# Patient Record
Sex: FEMALE | Race: WHITE | NOT HISPANIC OR LATINO | URBAN - METROPOLITAN AREA
[De-identification: names, ages, dates, MRNs, and addresses within clinical notes are randomized per-mention and may not be internally consistent; named-entity substitution may affect disease eponyms.]

---

## 2017-04-14 ENCOUNTER — INPATIENT (INPATIENT)
Facility: HOSPITAL | Age: 42
LOS: 3 days | Discharge: LEFT BEFORE DISCHARGE | DRG: 824 | End: 2017-04-18
Attending: HOSPITALIST | Admitting: HOSPITALIST
Payer: COMMERCIAL

## 2017-04-14 VITALS
HEIGHT: 66 IN | TEMPERATURE: 99 F | RESPIRATION RATE: 18 BRPM | OXYGEN SATURATION: 97 % | DIASTOLIC BLOOD PRESSURE: 75 MMHG | SYSTOLIC BLOOD PRESSURE: 106 MMHG | HEART RATE: 106 BPM

## 2017-04-14 DIAGNOSIS — R91.8 OTHER NONSPECIFIC ABNORMAL FINDING OF LUNG FIELD: ICD-10-CM

## 2017-04-14 LAB
ALBUMIN SERPL ELPH-MCNC: 2.6 G/DL — LOW (ref 3.3–5)
ALP SERPL-CCNC: 281 U/L — HIGH (ref 40–120)
ALT FLD-CCNC: 16 U/L RC — SIGNIFICANT CHANGE UP (ref 10–45)
ANION GAP SERPL CALC-SCNC: 16 MMOL/L — SIGNIFICANT CHANGE UP (ref 5–17)
APPEARANCE UR: CLEAR — SIGNIFICANT CHANGE UP
APTT BLD: 32.5 SEC — SIGNIFICANT CHANGE UP (ref 27.5–37.4)
AST SERPL-CCNC: 20 U/L — SIGNIFICANT CHANGE UP (ref 10–40)
BACTERIA # UR AUTO: ABNORMAL /HPF
BASE EXCESS BLDV CALC-SCNC: -1.8 MMOL/L — SIGNIFICANT CHANGE UP (ref -2–2)
BASOPHILS # BLD AUTO: 0 K/UL — SIGNIFICANT CHANGE UP (ref 0–0.2)
BASOPHILS NFR BLD AUTO: 0 % — SIGNIFICANT CHANGE UP (ref 0–2)
BILIRUB SERPL-MCNC: 0.8 MG/DL — SIGNIFICANT CHANGE UP (ref 0.2–1.2)
BILIRUB UR-MCNC: NEGATIVE — SIGNIFICANT CHANGE UP
BUN SERPL-MCNC: 14 MG/DL — SIGNIFICANT CHANGE UP (ref 7–23)
CA-I SERPL-SCNC: 1.18 MMOL/L — SIGNIFICANT CHANGE UP (ref 1.12–1.3)
CALCIUM SERPL-MCNC: 9.1 MG/DL — SIGNIFICANT CHANGE UP (ref 8.4–10.5)
CHLORIDE BLDV-SCNC: 103 MMOL/L — SIGNIFICANT CHANGE UP (ref 96–108)
CHLORIDE SERPL-SCNC: 99 MMOL/L — SIGNIFICANT CHANGE UP (ref 96–108)
CO2 BLDV-SCNC: 24 MMOL/L — SIGNIFICANT CHANGE UP (ref 22–30)
CO2 SERPL-SCNC: 18 MMOL/L — LOW (ref 22–31)
COLOR SPEC: YELLOW — SIGNIFICANT CHANGE UP
CREAT SERPL-MCNC: 0.53 MG/DL — SIGNIFICANT CHANGE UP (ref 0.5–1.3)
DIFF PNL FLD: ABNORMAL
EOSINOPHIL # BLD AUTO: 0.1 K/UL — SIGNIFICANT CHANGE UP (ref 0–0.5)
EOSINOPHIL NFR BLD AUTO: 1 % — SIGNIFICANT CHANGE UP (ref 0–6)
EPI CELLS # UR: SIGNIFICANT CHANGE UP /HPF
GAS PNL BLDV: 130 MMOL/L — LOW (ref 136–145)
GAS PNL BLDV: SIGNIFICANT CHANGE UP
GAS PNL BLDV: SIGNIFICANT CHANGE UP
GLUCOSE BLDV-MCNC: 85 MG/DL — SIGNIFICANT CHANGE UP (ref 70–99)
GLUCOSE SERPL-MCNC: 88 MG/DL — SIGNIFICANT CHANGE UP (ref 70–99)
GLUCOSE UR QL: NEGATIVE — SIGNIFICANT CHANGE UP
GRAN CASTS # UR COMP ASSIST: ABNORMAL
HCG UR QL: NEGATIVE — SIGNIFICANT CHANGE UP
HCO3 BLDV-SCNC: 23 MMOL/L — SIGNIFICANT CHANGE UP (ref 21–29)
HCT VFR BLD CALC: 32.7 % — LOW (ref 34.5–45)
HCT VFR BLDA CALC: 29 % — LOW (ref 39–50)
HGB BLD CALC-MCNC: 9.5 G/DL — LOW (ref 11.5–15.5)
HGB BLD-MCNC: 9.7 G/DL — LOW (ref 11.5–15.5)
HIV 1 & 2 AB SERPL IA.RAPID: SIGNIFICANT CHANGE UP
HYALINE CASTS # UR AUTO: ABNORMAL
INR BLD: 1.53 RATIO — HIGH (ref 0.88–1.16)
KETONES UR-MCNC: NEGATIVE — SIGNIFICANT CHANGE UP
LACTATE BLDV-MCNC: 1.7 MMOL/L — SIGNIFICANT CHANGE UP (ref 0.7–2)
LEUKOCYTE ESTERASE UR-ACNC: NEGATIVE — SIGNIFICANT CHANGE UP
LYMPHOCYTES # BLD AUTO: 0.9 K/UL — LOW (ref 1–3.3)
LYMPHOCYTES # BLD AUTO: 6.6 % — LOW (ref 13–44)
MCHC RBC-ENTMCNC: 23 PG — LOW (ref 27–34)
MCHC RBC-ENTMCNC: 29.8 GM/DL — LOW (ref 32–36)
MCV RBC AUTO: 77.3 FL — LOW (ref 80–100)
MONOCYTES # BLD AUTO: 0.6 K/UL — SIGNIFICANT CHANGE UP (ref 0–0.9)
MONOCYTES NFR BLD AUTO: 4.6 % — SIGNIFICANT CHANGE UP (ref 2–14)
NEUTROPHILS # BLD AUTO: 11.7 K/UL — HIGH (ref 1.8–7.4)
NEUTROPHILS NFR BLD AUTO: 87.8 % — HIGH (ref 43–77)
NITRITE UR-MCNC: NEGATIVE — SIGNIFICANT CHANGE UP
PCO2 BLDV: 40 MMHG — SIGNIFICANT CHANGE UP (ref 35–50)
PH BLDV: 7.37 — SIGNIFICANT CHANGE UP (ref 7.35–7.45)
PH UR: 5.5 — SIGNIFICANT CHANGE UP (ref 4.8–8)
PLATELET # BLD AUTO: 418 K/UL — HIGH (ref 150–400)
PO2 BLDV: 20 MMHG — LOW (ref 25–45)
POTASSIUM BLDV-SCNC: 4.8 MMOL/L — SIGNIFICANT CHANGE UP (ref 3.5–5)
POTASSIUM SERPL-MCNC: 4.7 MMOL/L — SIGNIFICANT CHANGE UP (ref 3.5–5.3)
POTASSIUM SERPL-SCNC: 4.7 MMOL/L — SIGNIFICANT CHANGE UP (ref 3.5–5.3)
PROT SERPL-MCNC: 9.4 G/DL — HIGH (ref 6–8.3)
PROT UR-MCNC: SIGNIFICANT CHANGE UP
PROTHROM AB SERPL-ACNC: 16.8 SEC — HIGH (ref 9.8–12.7)
RBC # BLD: 4.23 M/UL — SIGNIFICANT CHANGE UP (ref 3.8–5.2)
RBC # FLD: 18.9 % — HIGH (ref 10.3–14.5)
RBC CASTS # UR COMP ASSIST: SIGNIFICANT CHANGE UP /HPF (ref 0–2)
SAO2 % BLDV: 20 % — LOW (ref 67–88)
SODIUM SERPL-SCNC: 133 MMOL/L — LOW (ref 135–145)
SP GR SPEC: 1.02 — SIGNIFICANT CHANGE UP (ref 1.01–1.02)
UROBILINOGEN FLD QL: NEGATIVE — SIGNIFICANT CHANGE UP
WBC # BLD: 13.3 K/UL — HIGH (ref 3.8–10.5)
WBC # FLD AUTO: 13.3 K/UL — HIGH (ref 3.8–10.5)
WBC UR QL: SIGNIFICANT CHANGE UP /HPF (ref 0–5)

## 2017-04-14 PROCEDURE — 99285 EMERGENCY DEPT VISIT HI MDM: CPT | Mod: 25

## 2017-04-14 PROCEDURE — 93010 ELECTROCARDIOGRAM REPORT: CPT

## 2017-04-14 PROCEDURE — 71275 CT ANGIOGRAPHY CHEST: CPT | Mod: 26

## 2017-04-14 PROCEDURE — 71010: CPT | Mod: 26

## 2017-04-14 RX ORDER — IPRATROPIUM/ALBUTEROL SULFATE 18-103MCG
3 AEROSOL WITH ADAPTER (GRAM) INHALATION ONCE
Qty: 0 | Refills: 0 | Status: COMPLETED | OUTPATIENT
Start: 2017-04-14 | End: 2017-04-14

## 2017-04-14 RX ADMIN — Medication 3 MILLILITER(S): at 20:40

## 2017-04-14 NOTE — ED PROVIDER NOTE - OBJECTIVE STATEMENT
41 year old female patient with no significant reported pmhx originally from [place] and now a  presents to the ED with worsening shortness of breath, cough productive of yellow sputum, night chills, diaphoresis. Reports that cough was initially a year ago now worsening. Weight loss over the last year. Unsure of the exact amount, but friend states she is very thin now. States she was tested with ppd around 1985 that was negative. No significant family history. nonsmoker. Unknown last HIV test. Also notes swelling left side of neck several months worsening. No drooling, no difficulty speaking, or changes in voice. No rash.

## 2017-04-14 NOTE — ED ADULT NURSE REASSESSMENT NOTE - NS ED NURSE REASSESS COMMENT FT1
Pt states feeling better after duoneb treatment.
Respiratory called for acid fast sputum culture. Respiratory states they do not do them anymore - Dr. Reynoso aware.
Pt is in no current distress. Comfort and safety provided. Sister at bedside. Pt is eating self-bought meal. Offered duoneb. Pt states she wanted to finish eating first.

## 2017-04-14 NOTE — ED PROVIDER NOTE - PROGRESS NOTE DETAILS
Spoke with radiology, no evidence of PE, no evidence of cavitary lesions in the lungs.  Prominent enlarged lymph nodes, lung nodules and splenic irregularities concerning for lymphoma/malignancy.  Results discussed with patient, explained concern for possible malignancy and importance of urgent work-up.  Patient has no PMD or outpatient f/u at present.  Will admit to hospitalist (unattached admission) and have paged house hem/onc service to request inpatient consultation.  -Edgardo

## 2017-04-14 NOTE — ED PROVIDER NOTE - ENMT, MLM
Oral pharynx normal. Neck with significant large too great to measure palpable lymph nodes too many to measure extending from posterior radicular region thru lateral and anterior cervical cide of left neck. Palpable to super clavicular region.

## 2017-04-14 NOTE — ED PROVIDER NOTE - MEDICAL DECISION MAKING DETAILS
41 year old female patient with no known pmhx presenting with worsening cough, night sweats, weight loss, lymphadenopathy past year. Cough and shortness of breath worse over the last 2 weeks. On exam, lungs with wheezing, significant lymphadenopathy, tachycardic but not hypoxic. Underlying malignancy or infection. r/o lymphoma vs tuberculosis given her travel history. Other differential diagnoses include ebv, hiv, or other viral syndromes or other malignancy. Will obtain basic labs, HIV screen, monospot, CXR, sputum cultures for TB assessment, CT of chest, CT angio to r/o PE.

## 2017-04-14 NOTE — ED PROVIDER NOTE - NS ED MD SCRIBE ATTENDING SCRIBE SECTIONS
VITAL SIGNS( Pullset)/PHYSICAL EXAM/HIV/PAST MEDICAL/SURGICAL/SOCIAL HISTORY/REVIEW OF SYSTEMS/DISPOSITION/HISTORY OF PRESENT ILLNESS

## 2017-04-14 NOTE — ED ADULT NURSE NOTE - OBJECTIVE STATEMENT
41 y.o. Female presents to the ED accompanied by sister for SOB, swollen lymph nodes and weight loss. Sister at bedside reports pt was reluctant to see the MD for the past year. Hx of father passing away in sept 2016 for lung CA. Pt reports having worsening SOB for the past week, productive cough for the past year - no blood, and has night sweats. As per sister, pt loss about 20lbs in the past month. Pt states taking supplement for inflammation. Swollen lymph nodes noted on neck b/l. L side > R, firm. Last tested for TB was in 1995. Wheezing b/l. A&Ox3. Ambulatory. Pt is in no current distress. Comfort and safety provided. MD at bedside.

## 2017-04-15 DIAGNOSIS — A41.9 SEPSIS, UNSPECIFIED ORGANISM: ICD-10-CM

## 2017-04-15 DIAGNOSIS — R63.8 OTHER SYMPTOMS AND SIGNS CONCERNING FOOD AND FLUID INTAKE: ICD-10-CM

## 2017-04-15 DIAGNOSIS — R06.00 DYSPNEA, UNSPECIFIED: ICD-10-CM

## 2017-04-15 DIAGNOSIS — R59.1 GENERALIZED ENLARGED LYMPH NODES: ICD-10-CM

## 2017-04-15 DIAGNOSIS — Z41.8 ENCOUNTER FOR OTHER PROCEDURES FOR PURPOSES OTHER THAN REMEDYING HEALTH STATE: ICD-10-CM

## 2017-04-15 LAB
ALBUMIN SERPL ELPH-MCNC: 2.4 G/DL — LOW (ref 3.3–5)
ALP SERPL-CCNC: 257 U/L — HIGH (ref 40–120)
ALT FLD-CCNC: 16 U/L RC — SIGNIFICANT CHANGE UP (ref 10–45)
ANION GAP SERPL CALC-SCNC: 14 MMOL/L — SIGNIFICANT CHANGE UP (ref 5–17)
AST SERPL-CCNC: 19 U/L — SIGNIFICANT CHANGE UP (ref 10–40)
B2 MICROGLOB SERPL-MCNC: 3.1 MG/L — HIGH (ref 0.8–2.2)
BASOPHILS # BLD AUTO: 0.2 K/UL — SIGNIFICANT CHANGE UP (ref 0–0.2)
BASOPHILS NFR BLD AUTO: 1.4 % — SIGNIFICANT CHANGE UP (ref 0–2)
BILIRUB SERPL-MCNC: 0.7 MG/DL — SIGNIFICANT CHANGE UP (ref 0.2–1.2)
BLD GP AB SCN SERPL QL: NEGATIVE — SIGNIFICANT CHANGE UP
BUN SERPL-MCNC: 9 MG/DL — SIGNIFICANT CHANGE UP (ref 7–23)
CALCIUM SERPL-MCNC: 8.5 MG/DL — SIGNIFICANT CHANGE UP (ref 8.4–10.5)
CHLORIDE SERPL-SCNC: 97 MMOL/L — SIGNIFICANT CHANGE UP (ref 96–108)
CO2 SERPL-SCNC: 19 MMOL/L — LOW (ref 22–31)
CREAT SERPL-MCNC: 0.55 MG/DL — SIGNIFICANT CHANGE UP (ref 0.5–1.3)
EOSINOPHIL # BLD AUTO: 0 K/UL — SIGNIFICANT CHANGE UP (ref 0–0.5)
EOSINOPHIL NFR BLD AUTO: 0.3 % — SIGNIFICANT CHANGE UP (ref 0–6)
GLUCOSE SERPL-MCNC: 78 MG/DL — SIGNIFICANT CHANGE UP (ref 70–99)
HBV SURFACE AB SER-ACNC: SIGNIFICANT CHANGE UP
HBV SURFACE AG SER-ACNC: SIGNIFICANT CHANGE UP
HCT VFR BLD CALC: 28.7 % — LOW (ref 34.5–45)
HCV AB S/CO SERPL IA: 0.27 S/CO — SIGNIFICANT CHANGE UP
HCV AB SERPL-IMP: SIGNIFICANT CHANGE UP
HETEROPH AB TITR SER AGGL: NEGATIVE — SIGNIFICANT CHANGE UP
HGB BLD-MCNC: 9.2 G/DL — LOW (ref 11.5–15.5)
KAPPA LC SER QL IFE: 14.5 MG/DL — HIGH (ref 0.33–1.94)
KAPPA/LAMBDA FREE LIGHT CHAIN RATIO, SERUM: 1.76 RATIO — HIGH (ref 0.26–1.65)
LAMBDA LC SER QL IFE: 8.24 MG/DL — HIGH (ref 0.57–2.63)
LDH SERPL L TO P-CCNC: 203 U/L — SIGNIFICANT CHANGE UP (ref 50–242)
LYMPHOCYTES # BLD AUTO: 0.4 K/UL — LOW (ref 1–3.3)
LYMPHOCYTES # BLD AUTO: 2.8 % — LOW (ref 13–44)
MCHC RBC-ENTMCNC: 24.3 PG — LOW (ref 27–34)
MCHC RBC-ENTMCNC: 32.1 GM/DL — SIGNIFICANT CHANGE UP (ref 32–36)
MCV RBC AUTO: 75.8 FL — LOW (ref 80–100)
MONOCYTES # BLD AUTO: 0.6 K/UL — SIGNIFICANT CHANGE UP (ref 0–0.9)
MONOCYTES NFR BLD AUTO: 4.6 % — SIGNIFICANT CHANGE UP (ref 2–14)
NEUTROPHILS # BLD AUTO: 12.7 K/UL — HIGH (ref 1.8–7.4)
NEUTROPHILS NFR BLD AUTO: 91.1 % — HIGH (ref 43–77)
NIGHT BLUE STAIN TISS: SIGNIFICANT CHANGE UP
PLATELET # BLD AUTO: 343 K/UL — SIGNIFICANT CHANGE UP (ref 150–400)
POTASSIUM SERPL-MCNC: 4 MMOL/L — SIGNIFICANT CHANGE UP (ref 3.5–5.3)
POTASSIUM SERPL-SCNC: 4 MMOL/L — SIGNIFICANT CHANGE UP (ref 3.5–5.3)
PROT SERPL-MCNC: 8 G/DL — SIGNIFICANT CHANGE UP (ref 6–8.3)
PROT SERPL-MCNC: 8 G/DL — SIGNIFICANT CHANGE UP (ref 6–8.3)
PROT SERPL-MCNC: 8.5 G/DL — HIGH (ref 6–8.3)
RAPID RVP RESULT: SIGNIFICANT CHANGE UP
RBC # BLD: 3.78 M/UL — LOW (ref 3.8–5.2)
RBC # FLD: 19.3 % — HIGH (ref 10.3–14.5)
RH IG SCN BLD-IMP: POSITIVE — SIGNIFICANT CHANGE UP
SODIUM SERPL-SCNC: 130 MMOL/L — LOW (ref 135–145)
SPECIMEN SOURCE: SIGNIFICANT CHANGE UP
URATE SERPL-MCNC: 4.1 MG/DL — SIGNIFICANT CHANGE UP (ref 2.5–7)
WBC # BLD: 14 K/UL — HIGH (ref 3.8–10.5)
WBC # FLD AUTO: 14 K/UL — HIGH (ref 3.8–10.5)

## 2017-04-15 PROCEDURE — 74176 CT ABD & PELVIS W/O CONTRAST: CPT | Mod: 26

## 2017-04-15 PROCEDURE — 99223 1ST HOSP IP/OBS HIGH 75: CPT | Mod: GC

## 2017-04-15 RX ORDER — HEPARIN SODIUM 5000 [USP'U]/ML
5000 INJECTION INTRAVENOUS; SUBCUTANEOUS EVERY 12 HOURS
Qty: 0 | Refills: 0 | Status: DISCONTINUED | OUTPATIENT
Start: 2017-04-15 | End: 2017-04-17

## 2017-04-15 RX ORDER — IPRATROPIUM/ALBUTEROL SULFATE 18-103MCG
3 AEROSOL WITH ADAPTER (GRAM) INHALATION ONCE
Qty: 0 | Refills: 0 | Status: COMPLETED | OUTPATIENT
Start: 2017-04-15 | End: 2017-04-15

## 2017-04-15 RX ORDER — ALBUTEROL 90 UG/1
1 AEROSOL, METERED ORAL EVERY 6 HOURS
Qty: 0 | Refills: 0 | Status: DISCONTINUED | OUTPATIENT
Start: 2017-04-15 | End: 2017-04-16

## 2017-04-15 RX ORDER — ALBUTEROL 90 UG/1
1 AEROSOL, METERED ORAL EVERY 6 HOURS
Qty: 0 | Refills: 0 | Status: COMPLETED | OUTPATIENT
Start: 2017-04-15 | End: 2018-03-14

## 2017-04-15 RX ADMIN — Medication 3 MILLILITER(S): at 22:23

## 2017-04-15 RX ADMIN — Medication 3 MILLILITER(S): at 00:50

## 2017-04-15 NOTE — H&P ADULT. - NEUROLOGICAL DETAILS
alert and oriented x 3/responds to verbal commands/responds to pain responds to verbal commands/alert and oriented x 3/responds to pain/cranial nerves intact

## 2017-04-15 NOTE — H&P ADULT. - HISTORY OF PRESENT ILLNESS
42 y/o F with no significant past medical history presents with complaints of shortness of breath:    Patient reports progressively worsening shortness of breath with exertion over the last week, prompting her to come to ED. She otherwise denies chest pain, palpitations, dizziness, lightheadedness, or syncope.     She reports chronic nonproductive cough for the last year. Over the last week, the cough has become productive of clear to white sputum. She denies fevers or chills. Endorses intermittent night sweats over the last few years. She reports they are correlated with what she eats. She often wakes up drenched. Endorses weakness and malaise. Appetite is appropriate. Unintentional 15 pound weight loss over last year.     She has not been evaluated for any of these symptoms by a physician. She has noted increasingly bulky lymph nodes over the last three years. She believes they are related to hyperthyroidism in discussion with a relative in medicine. Denies other sx of hyperthyroidism including palpitations, tremors, anxiety, changes in skin or nails, diarrhea.     She does not take any medications daily. She denies previous radiation exposures. Denies dysphagia to liquid or solid foods. Denies change in voice quality.     ED Vitals:   98.4, , /67, RR 18, 95 on RA 42 y/o F with no significant past medical history presents with complaints of shortness of breath:    Patient reports progressively worsening shortness of breath with exertion over the last week, prompting her to come to ED. She otherwise denies chest pain, palpitations, dizziness, lightheadedness, or syncope.     She reports chronic nonproductive cough for the last year. Over the last week, the cough has become productive of clear to white sputum. She denies fevers or chills. Endorses intermittent night sweats over the last few years. She reports they are correlated with what she eats. She often wakes up drenched. Endorses weakness and malaise. Appetite is appropriate. Unintentional 15 pound weight loss over last year.     She has not been evaluated for any of these symptoms by a physician. She has noted increasingly bulky lymph nodes over the last three years. She believes they are related to hyperthyroidism in discussion with a relative in medicine. Denies other sx of hyperthyroidism including palpitations, tremors, anxiety, changes in skin or nails, diarrhea.     She does not take any medications daily. She denies previous radiation exposures. Denies dysphagia to liquid or solid foods. Denies change in voice quality. Pt was born in [place].    ED Vitals:   98.4, , /67, RR 18, 95 on RA

## 2017-04-15 NOTE — H&P ADULT. - PROBLEM SELECTOR PLAN 2
likely secondary to tracheobronchial narrowing  currently patient is saturating well on room air  vital signs q4 give CT findings and concern for potential airway compromise  concern for TB very low as other obvious explanation for SOB/weight loss/night sweats is present. likely secondary to tracheobronchial narrowing  currently patient is saturating well on room air  vital signs q4 give CT findings and concern for potential airway compromise  concern for TB very low as other obvious explanation for SOB/weight loss/night sweats is present.  pulmonary consultation for tracheobronchial narrowing. also to assess necessity to rule out TB. likely secondary to tracheobronchial narrowing vs PNA  currently patient is saturating well on room air  vital signs q4 give CT findings and concern for potential airway compromise  concern for TB very low as other obvious explanation for SOB/weight loss/night sweats is present.  pulmonary consultation in AM for tracheobronchial narrowing. also to assess necessity to rule out TB. Cont to r/o TB for now until evaluated by pulm.

## 2017-04-15 NOTE — H&P ADULT. - PROBLEM SELECTOR PLAN 1
patient with likely underlying lymphoma given diffuse lymphadenopathy, splenomegaly and bronchovascular pulmonary nodules. unclear the longevity of her symptoms as patient is somewhat vague historian. patient with limited insight into the consequences of her illness. no personal or family hx of leukemia/lymphoma. no history of radiation treatment, immunosuppression. presence of B symptoms more common in aggressive lymphomas. if consistent with lymphoma, patient would be characterized as stage 4.   -hematology/oncology consultation  -obtain lymph node biopsy, bone marrow biopsy  -monitor cell counts, serum calcium, uric acid  -check LDH, beta 2 microglobulin, HIV, Hepatitis B and C serologies  -will likely require further imaging, PET CT patient with likely underlying lymphoma given diffuse lymphadenopathy, splenomegaly and bronchovascular pulmonary nodules. unclear the longevity of her symptoms as patient is somewhat vague historian. patient with limited insight into the consequences of her illness. no personal or family hx of leukemia/lymphoma. no history of radiation treatment, immunosuppression. presence of B symptoms more common in aggressive lymphomas. if consistent with lymphoma, patient would be characterized as stage 4.   -hematology/oncology consultation  -obtain lymph node biopsy, bone marrow biopsy, eval peripheral smear  -monitor cell counts, serum calcium, uric acid  -check LDH, beta 2 microglobulin, HIV, Hepatitis B and C serologies  -will likely require further imaging, PET CT vs additional CT A/P patient with likely underlying lymphoma or other malignancy given diffuse lymphadenopathy, splenomegaly and bronchovascular pulmonary nodules. unclear the longevity of her symptoms as patient is somewhat vague historian. patient with limited insight into the consequences of her illness. no personal or family hx of leukemia/lymphoma. no history of radiation treatment, immunosuppression. presence of B symptoms more common in aggressive lymphomas. if consistent with lymphoma, patient might be characterized as stage 4.   - pt has severely elevated protein-albumin gap  -hematology/oncology consultation in AM  -obtain lymph node biopsy (IR consult in AM), bone marrow biopsy, eval peripheral smear  -monitor cell counts, serum calcium, uric acid  -check LDH, beta 2 microglobulin, HIV, Hepatitis B and C serologies, SPEP, CHANDRIKA, free light chains  -will likely require further imaging, PET CT vs additional CT A/P  - pt is on airborne isolation for r/o TB

## 2017-04-15 NOTE — H&P ADULT. - ASSESSMENT
40 y/o F w/o significant history presents with shortness of breath, with diffuse lymphadenopathy on imaging, concerning for underlying lymphoma with tracheobroncial narrowing and mild SVC narrowing. 42 y/o F w/o significant history presents with shortness of breath, with diffuse lymphadenopathy on imaging, concerning for underlying lymphoma or malignancy, c/b airway and mild SVC narrowing, as well as sepsis in setting of poss URI vs PNA.

## 2017-04-15 NOTE — H&P ADULT. - RS GEN PE MLT RESP DETAILS PC
airway patent/diminished breath sounds, R/respirations non-labored respirations non-labored/airway patent/diminished breath sounds, R/rales

## 2017-04-15 NOTE — H&P ADULT. - PROBLEM SELECTOR PLAN 3
HSQ patient meeting sepsis criteria with leukocytosis, tachycardia, and low grade fevers  concern for URI vs postobstructive pna  check RVP  blood cultures x 2 sets  would favor to monitor off antibiotics at this time. patient meeting sepsis criteria with leukocytosis, tachycardia, and low grade fevers  concern for URI vs postobstructive pna  check RVP  blood cultures x 2 sets  would favor to monitor off antibiotics at this time unless clinical worsening

## 2017-04-15 NOTE — H&P ADULT. - LAB RESULTS AND INTERPRETATION
Labs personally reviewed by me: WBC 13.3, 87% neutrophils  INR 1.53  Albumin 2.6  Total Protein 9.4  Alk phos 281

## 2017-04-15 NOTE — H&P ADULT. - RADIOLOGY RESULTS AND INTERPRETATION
CTA  1.  No pulmonary embolism.  2.  Marked thoracic and upper abdominal lymphadenopathy, multiple   bilateral bronchovascular pulmonary nodules, and splenomegaly with   multiple hypodense lesions. Findings are highly suspicious for lymphoma.  3.  Associated tracheobronchial narrowing with occlusion of distal   bronchi as described. Mild narrowing of the superior vena cava. Personally reviewed imaging. Extensive widespread lymphadenopathy and masses throughout chest, pleural, chest wall with external compression of airways and blood vessels. CTA:  1.  No pulmonary embolism.  2.  Marked thoracic and upper abdominal lymphadenopathy, multiple   bilateral bronchovascular pulmonary nodules, and splenomegaly with   multiple hypodense lesions. Findings are highly suspicious for lymphoma.  3.  Associated tracheobronchial narrowing with occlusion of distal   bronchi as described. Mild narrowing of the superior vena cava.

## 2017-04-15 NOTE — H&P ADULT. - EKG AND INTERPRETATION
NSR, nl intervals, no ST segment changes Personally reviewed EKG. NSR, nl intervals, no ST segment changes

## 2017-04-15 NOTE — H&P ADULT. - LYMPHATIC
supraclavicular L/inguinal R/anterior cervical R/anterior cervical L/axillary R/inguinal L/axillary L/posterior cervical L axillary L/anterior cervical R/inguinal R/posterior cervical L/supraclavicular L/axillary R/inguinal L/posterior cervical R/anterior cervical L

## 2017-04-16 LAB
ANION GAP SERPL CALC-SCNC: 17 MMOL/L — SIGNIFICANT CHANGE UP (ref 5–17)
APTT BLD: 33 SEC — SIGNIFICANT CHANGE UP (ref 27.5–37.4)
BASOPHILS # BLD AUTO: 0 K/UL — SIGNIFICANT CHANGE UP (ref 0–0.2)
BASOPHILS NFR BLD AUTO: 0 % — SIGNIFICANT CHANGE UP (ref 0–2)
BUN SERPL-MCNC: 10 MG/DL — SIGNIFICANT CHANGE UP (ref 7–23)
CALCIUM SERPL-MCNC: 8.6 MG/DL — SIGNIFICANT CHANGE UP (ref 8.4–10.5)
CHLORIDE SERPL-SCNC: 96 MMOL/L — SIGNIFICANT CHANGE UP (ref 96–108)
CO2 SERPL-SCNC: 22 MMOL/L — SIGNIFICANT CHANGE UP (ref 22–31)
CREAT SERPL-MCNC: 0.56 MG/DL — SIGNIFICANT CHANGE UP (ref 0.5–1.3)
EOSINOPHIL # BLD AUTO: 0 K/UL — SIGNIFICANT CHANGE UP (ref 0–0.5)
EOSINOPHIL NFR BLD AUTO: 0.3 % — SIGNIFICANT CHANGE UP (ref 0–6)
FERRITIN SERPL-MCNC: 278.6 NG/ML — HIGH (ref 15–150)
GLUCOSE SERPL-MCNC: 92 MG/DL — SIGNIFICANT CHANGE UP (ref 70–99)
HCT VFR BLD CALC: 31.3 % — LOW (ref 34.5–45)
HGB BLD-MCNC: 9.5 G/DL — LOW (ref 11.5–15.5)
INR BLD: 1.6 RATIO — HIGH (ref 0.88–1.16)
IRON SATN MFR SERPL: 11 % — LOW (ref 14–50)
IRON SATN MFR SERPL: 21 UG/DL — LOW (ref 30–160)
LYMPHOCYTES # BLD AUTO: 0.6 K/UL — LOW (ref 1–3.3)
LYMPHOCYTES # BLD AUTO: 5.1 % — LOW (ref 13–44)
MAGNESIUM SERPL-MCNC: 1.9 MG/DL — SIGNIFICANT CHANGE UP (ref 1.6–2.6)
MCHC RBC-ENTMCNC: 22.9 PG — LOW (ref 27–34)
MCHC RBC-ENTMCNC: 30.3 GM/DL — LOW (ref 32–36)
MCV RBC AUTO: 75.6 FL — LOW (ref 80–100)
MONOCYTES # BLD AUTO: 0.6 K/UL — SIGNIFICANT CHANGE UP (ref 0–0.9)
MONOCYTES NFR BLD AUTO: 4.7 % — SIGNIFICANT CHANGE UP (ref 2–14)
NEUTROPHILS # BLD AUTO: 11.4 K/UL — HIGH (ref 1.8–7.4)
NEUTROPHILS NFR BLD AUTO: 89.9 % — HIGH (ref 43–77)
PHOSPHATE SERPL-MCNC: 3.2 MG/DL — SIGNIFICANT CHANGE UP (ref 2.5–4.5)
PLATELET # BLD AUTO: 387 K/UL — SIGNIFICANT CHANGE UP (ref 150–400)
POTASSIUM SERPL-MCNC: 3.9 MMOL/L — SIGNIFICANT CHANGE UP (ref 3.5–5.3)
POTASSIUM SERPL-SCNC: 3.9 MMOL/L — SIGNIFICANT CHANGE UP (ref 3.5–5.3)
PROTHROM AB SERPL-ACNC: 17.4 SEC — HIGH (ref 9.8–12.7)
RBC # BLD: 4.14 M/UL — SIGNIFICANT CHANGE UP (ref 3.8–5.2)
RBC # FLD: 19.1 % — HIGH (ref 10.3–14.5)
SODIUM SERPL-SCNC: 135 MMOL/L — SIGNIFICANT CHANGE UP (ref 135–145)
TIBC SERPL-MCNC: 188 UG/DL — LOW (ref 220–430)
UIBC SERPL-MCNC: 167 UG/DL — SIGNIFICANT CHANGE UP (ref 110–370)
WBC # BLD: 12.7 K/UL — HIGH (ref 3.8–10.5)
WBC # FLD AUTO: 12.7 K/UL — HIGH (ref 3.8–10.5)

## 2017-04-16 RX ORDER — IPRATROPIUM/ALBUTEROL SULFATE 18-103MCG
3 AEROSOL WITH ADAPTER (GRAM) INHALATION EVERY 6 HOURS
Qty: 0 | Refills: 0 | Status: DISCONTINUED | OUTPATIENT
Start: 2017-04-16 | End: 2017-04-18

## 2017-04-16 RX ADMIN — Medication 3 MILLILITER(S): at 20:30

## 2017-04-17 ENCOUNTER — RESULT REVIEW (OUTPATIENT)
Age: 42
End: 2017-04-17

## 2017-04-17 LAB
ANION GAP SERPL CALC-SCNC: 4 MMOL/L — LOW (ref 5–17)
BASOPHILS # BLD AUTO: 0 K/UL — SIGNIFICANT CHANGE UP (ref 0–0.2)
BASOPHILS NFR BLD AUTO: 0.1 % — SIGNIFICANT CHANGE UP (ref 0–2)
BUN SERPL-MCNC: 8 MG/DL — SIGNIFICANT CHANGE UP (ref 7–23)
CALCIUM SERPL-MCNC: 8.1 MG/DL — LOW (ref 8.4–10.5)
CHLORIDE SERPL-SCNC: 97 MMOL/L — SIGNIFICANT CHANGE UP (ref 96–108)
CO2 SERPL-SCNC: 33 MMOL/L — HIGH (ref 22–31)
CREAT SERPL-MCNC: 0.47 MG/DL — LOW (ref 0.5–1.3)
EOSINOPHIL # BLD AUTO: 0 K/UL — SIGNIFICANT CHANGE UP (ref 0–0.5)
EOSINOPHIL NFR BLD AUTO: 0.4 % — SIGNIFICANT CHANGE UP (ref 0–6)
GLUCOSE SERPL-MCNC: 100 MG/DL — HIGH (ref 70–99)
HBV CORE AB SER-ACNC: SIGNIFICANT CHANGE UP
HCT VFR BLD CALC: 29.6 % — LOW (ref 34.5–45)
HGB BLD-MCNC: 8.9 G/DL — LOW (ref 11.5–15.5)
LYMPHOCYTES # BLD AUTO: 0.6 K/UL — LOW (ref 1–3.3)
LYMPHOCYTES # BLD AUTO: 4.8 % — LOW (ref 13–44)
MAGNESIUM SERPL-MCNC: 1.9 MG/DL — SIGNIFICANT CHANGE UP (ref 1.6–2.6)
MCHC RBC-ENTMCNC: 22.6 PG — LOW (ref 27–34)
MCHC RBC-ENTMCNC: 29.9 GM/DL — LOW (ref 32–36)
MCV RBC AUTO: 75.5 FL — LOW (ref 80–100)
MONOCYTES # BLD AUTO: 0.7 K/UL — SIGNIFICANT CHANGE UP (ref 0–0.9)
MONOCYTES NFR BLD AUTO: 5.5 % — SIGNIFICANT CHANGE UP (ref 2–14)
NEUTROPHILS # BLD AUTO: 11.6 K/UL — HIGH (ref 1.8–7.4)
NEUTROPHILS NFR BLD AUTO: 89.2 % — HIGH (ref 43–77)
NIGHT BLUE STAIN TISS: SIGNIFICANT CHANGE UP
PHOSPHATE SERPL-MCNC: 3 MG/DL — SIGNIFICANT CHANGE UP (ref 2.5–4.5)
PLATELET # BLD AUTO: 391 K/UL — SIGNIFICANT CHANGE UP (ref 150–400)
POTASSIUM SERPL-MCNC: 3.8 MMOL/L — SIGNIFICANT CHANGE UP (ref 3.5–5.3)
POTASSIUM SERPL-SCNC: 3.8 MMOL/L — SIGNIFICANT CHANGE UP (ref 3.5–5.3)
RBC # BLD: 3.92 M/UL — SIGNIFICANT CHANGE UP (ref 3.8–5.2)
RBC # FLD: 19 % — HIGH (ref 10.3–14.5)
SODIUM SERPL-SCNC: 134 MMOL/L — LOW (ref 135–145)
SPECIMEN SOURCE: SIGNIFICANT CHANGE UP
WBC # BLD: 13 K/UL — HIGH (ref 3.8–10.5)
WBC # FLD AUTO: 13 K/UL — HIGH (ref 3.8–10.5)

## 2017-04-17 PROCEDURE — 99222 1ST HOSP IP/OBS MODERATE 55: CPT

## 2017-04-17 PROCEDURE — 99232 SBSQ HOSP IP/OBS MODERATE 35: CPT | Mod: GC

## 2017-04-17 PROCEDURE — 99233 SBSQ HOSP IP/OBS HIGH 50: CPT | Mod: GC

## 2017-04-17 PROCEDURE — 88189 FLOWCYTOMETRY/READ 16 & >: CPT

## 2017-04-17 RX ORDER — SENNA PLUS 8.6 MG/1
2 TABLET ORAL AT BEDTIME
Qty: 0 | Refills: 0 | Status: DISCONTINUED | OUTPATIENT
Start: 2017-04-17 | End: 2017-04-18

## 2017-04-17 RX ORDER — ENOXAPARIN SODIUM 100 MG/ML
40 INJECTION SUBCUTANEOUS DAILY
Qty: 0 | Refills: 0 | Status: DISCONTINUED | OUTPATIENT
Start: 2017-04-17 | End: 2017-04-17

## 2017-04-17 RX ORDER — SODIUM CHLORIDE 9 MG/ML
3 INJECTION INTRAMUSCULAR; INTRAVENOUS; SUBCUTANEOUS ONCE
Qty: 0 | Refills: 0 | Status: COMPLETED | OUTPATIENT
Start: 2017-04-17 | End: 2017-04-17

## 2017-04-17 RX ADMIN — SENNA PLUS 2 TABLET(S): 8.6 TABLET ORAL at 22:00

## 2017-04-17 RX ADMIN — ENOXAPARIN SODIUM 40 MILLIGRAM(S): 100 INJECTION SUBCUTANEOUS at 13:45

## 2017-04-17 RX ADMIN — SODIUM CHLORIDE 3 MILLILITER(S): 9 INJECTION INTRAMUSCULAR; INTRAVENOUS; SUBCUTANEOUS at 11:39

## 2017-04-17 NOTE — PROVIDER CONTACT NOTE (OTHER) - ASSESSMENT
Patient AOx3 and resting comfortably in bed. Currently denies presence of pain, dyspnea and/or discomfort at this time.

## 2017-04-17 NOTE — PROVIDER CONTACT NOTE (OTHER) - REASON
Patient refusing AF sputum collection during shift. Patient states "I've sent 3 already" Patient refusing AF sputum collection during shift. Patient states "I did it three times already"

## 2017-04-17 NOTE — PROVIDER CONTACT NOTE (OTHER) - SITUATION
Patient refusing AF sputum collection during shift. Patient states "I've sent 3 already and I'm not doing it anymore" Patient refusing AF sputum collection during shift. Patient states "I did it three times already and I'm not doing it anymore"

## 2017-04-18 VITALS
OXYGEN SATURATION: 97 % | HEART RATE: 108 BPM | TEMPERATURE: 99 F | SYSTOLIC BLOOD PRESSURE: 105 MMHG | DIASTOLIC BLOOD PRESSURE: 74 MMHG | RESPIRATION RATE: 18 BRPM

## 2017-04-18 LAB
% ALBUMIN: 26.8 % — SIGNIFICANT CHANGE UP
% ALPHA 1: 7.1 % — SIGNIFICANT CHANGE UP
% ALPHA 2: 13 % — SIGNIFICANT CHANGE UP
% BETA: 13.2 % — SIGNIFICANT CHANGE UP
% GAMMA: 39.9 % — SIGNIFICANT CHANGE UP
ALBUMIN SERPL ELPH-MCNC: 2.1 G/DL — LOW (ref 3.6–5.5)
ALBUMIN/GLOB SERPL ELPH: 0.4 RATIO — SIGNIFICANT CHANGE UP
ALPHA1 GLOB SERPL ELPH-MCNC: 0.6 G/DL — HIGH (ref 0.1–0.4)
ALPHA2 GLOB SERPL ELPH-MCNC: 1 G/DL — SIGNIFICANT CHANGE UP (ref 0.5–1)
ANION GAP SERPL CALC-SCNC: 16 MMOL/L — SIGNIFICANT CHANGE UP (ref 5–17)
APTT BLD: 33 SEC — SIGNIFICANT CHANGE UP (ref 27.5–37.4)
B-GLOBULIN SERPL ELPH-MCNC: 1.1 G/DL — HIGH (ref 0.5–1)
BUN SERPL-MCNC: 10 MG/DL — SIGNIFICANT CHANGE UP (ref 7–23)
CALCIUM SERPL-MCNC: 8.6 MG/DL — SIGNIFICANT CHANGE UP (ref 8.4–10.5)
CHLORIDE SERPL-SCNC: 94 MMOL/L — LOW (ref 96–108)
CO2 SERPL-SCNC: 23 MMOL/L — SIGNIFICANT CHANGE UP (ref 22–31)
CREAT SERPL-MCNC: 0.48 MG/DL — LOW (ref 0.5–1.3)
GAMMA GLOBULIN: 3.2 G/DL — HIGH (ref 0.6–1.6)
GLUCOSE SERPL-MCNC: 79 MG/DL — SIGNIFICANT CHANGE UP (ref 70–99)
HCT VFR BLD CALC: 30.5 % — LOW (ref 34.5–45)
HGB BLD-MCNC: 9.3 G/DL — LOW (ref 11.5–15.5)
INR BLD: 1.65 RATIO — HIGH (ref 0.88–1.16)
INTERPRETATION SERPL IFE-IMP: SIGNIFICANT CHANGE UP
M TB TUBERC IFN-G BLD QL: 0 IU/ML — SIGNIFICANT CHANGE UP
M TB TUBERC IFN-G BLD QL: 0.02 IU/ML — SIGNIFICANT CHANGE UP
M TB TUBERC IFN-G BLD QL: ABNORMAL
MAGNESIUM SERPL-MCNC: 1.8 MG/DL — SIGNIFICANT CHANGE UP (ref 1.6–2.6)
MAGNESIUM SERPL-MCNC: 2 MG/DL — SIGNIFICANT CHANGE UP (ref 1.6–2.6)
MCHC RBC-ENTMCNC: 23.3 PG — LOW (ref 27–34)
MCHC RBC-ENTMCNC: 30.5 GM/DL — LOW (ref 32–36)
MCV RBC AUTO: 76.2 FL — LOW (ref 80–100)
MITOGEN IGNF BCKGRD COR BLD-ACNC: 0.02 IU/ML — SIGNIFICANT CHANGE UP
NIGHT BLUE STAIN TISS: SIGNIFICANT CHANGE UP
NIGHT BLUE STAIN TISS: SIGNIFICANT CHANGE UP
PHOSPHATE SERPL-MCNC: 3.2 MG/DL — SIGNIFICANT CHANGE UP (ref 2.5–4.5)
PHOSPHATE SERPL-MCNC: 3.4 MG/DL — SIGNIFICANT CHANGE UP (ref 2.5–4.5)
PLATELET # BLD AUTO: 375 K/UL — SIGNIFICANT CHANGE UP (ref 150–400)
POTASSIUM SERPL-MCNC: 3.7 MMOL/L — SIGNIFICANT CHANGE UP (ref 3.5–5.3)
POTASSIUM SERPL-SCNC: 3.7 MMOL/L — SIGNIFICANT CHANGE UP (ref 3.5–5.3)
PROT PATTERN SERPL ELPH-IMP: SIGNIFICANT CHANGE UP
PROTHROM AB SERPL-ACNC: 18 SEC — HIGH (ref 9.8–12.7)
RBC # BLD: 4 M/UL — SIGNIFICANT CHANGE UP (ref 3.8–5.2)
RBC # FLD: 19.1 % — HIGH (ref 10.3–14.5)
SODIUM SERPL-SCNC: 133 MMOL/L — LOW (ref 135–145)
SPECIMEN SOURCE: SIGNIFICANT CHANGE UP
SPECIMEN SOURCE: SIGNIFICANT CHANGE UP
WBC # BLD: 14.8 K/UL — HIGH (ref 3.8–10.5)
WBC # FLD AUTO: 14.8 K/UL — HIGH (ref 3.8–10.5)

## 2017-04-18 PROCEDURE — 87040 BLOOD CULTURE FOR BACTERIA: CPT

## 2017-04-18 PROCEDURE — 88341 IMHCHEM/IMCYTCHM EA ADD ANTB: CPT | Mod: 26

## 2017-04-18 PROCEDURE — 83521 IG LIGHT CHAINS FREE EACH: CPT

## 2017-04-18 PROCEDURE — 85610 PROTHROMBIN TIME: CPT

## 2017-04-18 PROCEDURE — 99232 SBSQ HOSP IP/OBS MODERATE 35: CPT

## 2017-04-18 PROCEDURE — 84132 ASSAY OF SERUM POTASSIUM: CPT

## 2017-04-18 PROCEDURE — 83605 ASSAY OF LACTIC ACID: CPT

## 2017-04-18 PROCEDURE — 82232 ASSAY OF BETA-2 PROTEIN: CPT

## 2017-04-18 PROCEDURE — 87015 SPECIMEN INFECT AGNT CONCNTJ: CPT

## 2017-04-18 PROCEDURE — 88305 TISSUE EXAM BY PATHOLOGIST: CPT

## 2017-04-18 PROCEDURE — 83615 LACTATE (LD) (LDH) ENZYME: CPT

## 2017-04-18 PROCEDURE — 80053 COMPREHEN METABOLIC PANEL: CPT

## 2017-04-18 PROCEDURE — 86334 IMMUNOFIX E-PHORESIS SERUM: CPT

## 2017-04-18 PROCEDURE — 94640 AIRWAY INHALATION TREATMENT: CPT

## 2017-04-18 PROCEDURE — 83735 ASSAY OF MAGNESIUM: CPT

## 2017-04-18 PROCEDURE — 85027 COMPLETE CBC AUTOMATED: CPT

## 2017-04-18 PROCEDURE — 88173 CYTOPATH EVAL FNA REPORT: CPT | Mod: 26

## 2017-04-18 PROCEDURE — 88184 FLOWCYTOMETRY/ TC 1 MARKER: CPT

## 2017-04-18 PROCEDURE — 81001 URINALYSIS AUTO W/SCOPE: CPT

## 2017-04-18 PROCEDURE — 84550 ASSAY OF BLOOD/URIC ACID: CPT

## 2017-04-18 PROCEDURE — 86480 TB TEST CELL IMMUN MEASURE: CPT

## 2017-04-18 PROCEDURE — 93005 ELECTROCARDIOGRAM TRACING: CPT

## 2017-04-18 PROCEDURE — 82947 ASSAY GLUCOSE BLOOD QUANT: CPT

## 2017-04-18 PROCEDURE — 86704 HEP B CORE ANTIBODY TOTAL: CPT

## 2017-04-18 PROCEDURE — 88172 CYTP DX EVAL FNA 1ST EA SITE: CPT

## 2017-04-18 PROCEDURE — 87486 CHLMYD PNEUM DNA AMP PROBE: CPT

## 2017-04-18 PROCEDURE — 71275 CT ANGIOGRAPHY CHEST: CPT

## 2017-04-18 PROCEDURE — 87633 RESP VIRUS 12-25 TARGETS: CPT

## 2017-04-18 PROCEDURE — 76942 ECHO GUIDE FOR BIOPSY: CPT

## 2017-04-18 PROCEDURE — 88312 SPECIAL STAINS GROUP 1: CPT

## 2017-04-18 PROCEDURE — 87581 M.PNEUMON DNA AMP PROBE: CPT

## 2017-04-18 PROCEDURE — 86900 BLOOD TYPING SEROLOGIC ABO: CPT

## 2017-04-18 PROCEDURE — 88365 INSITU HYBRIDIZATION (FISH): CPT

## 2017-04-18 PROCEDURE — 99232 SBSQ HOSP IP/OBS MODERATE 35: CPT | Mod: GC

## 2017-04-18 PROCEDURE — 87798 DETECT AGENT NOS DNA AMP: CPT

## 2017-04-18 PROCEDURE — 85730 THROMBOPLASTIN TIME PARTIAL: CPT

## 2017-04-18 PROCEDURE — 84165 PROTEIN E-PHORESIS SERUM: CPT

## 2017-04-18 PROCEDURE — 86706 HEP B SURFACE ANTIBODY: CPT

## 2017-04-18 PROCEDURE — 88185 FLOWCYTOMETRY/TC ADD-ON: CPT

## 2017-04-18 PROCEDURE — 83550 IRON BINDING TEST: CPT

## 2017-04-18 PROCEDURE — 86308 HETEROPHILE ANTIBODY SCREEN: CPT

## 2017-04-18 PROCEDURE — 87340 HEPATITIS B SURFACE AG IA: CPT

## 2017-04-18 PROCEDURE — 80048 BASIC METABOLIC PNL TOTAL CA: CPT

## 2017-04-18 PROCEDURE — 88305 TISSUE EXAM BY PATHOLOGIST: CPT | Mod: 26

## 2017-04-18 PROCEDURE — 99285 EMERGENCY DEPT VISIT HI MDM: CPT | Mod: 25

## 2017-04-18 PROCEDURE — 87116 MYCOBACTERIA CULTURE: CPT

## 2017-04-18 PROCEDURE — 82330 ASSAY OF CALCIUM: CPT

## 2017-04-18 PROCEDURE — 88342 IMHCHEM/IMCYTCHM 1ST ANTB: CPT | Mod: 26

## 2017-04-18 PROCEDURE — 84155 ASSAY OF PROTEIN SERUM: CPT

## 2017-04-18 PROCEDURE — 82728 ASSAY OF FERRITIN: CPT

## 2017-04-18 PROCEDURE — 88365 INSITU HYBRIDIZATION (FISH): CPT | Mod: 26

## 2017-04-18 PROCEDURE — 84100 ASSAY OF PHOSPHORUS: CPT

## 2017-04-18 PROCEDURE — 82435 ASSAY OF BLOOD CHLORIDE: CPT

## 2017-04-18 PROCEDURE — 81025 URINE PREGNANCY TEST: CPT

## 2017-04-18 PROCEDURE — 82803 BLOOD GASES ANY COMBINATION: CPT

## 2017-04-18 PROCEDURE — 38505 NEEDLE BIOPSY LYMPH NODES: CPT

## 2017-04-18 PROCEDURE — 88312 SPECIAL STAINS GROUP 1: CPT | Mod: 26

## 2017-04-18 PROCEDURE — 87206 SMEAR FLUORESCENT/ACID STAI: CPT

## 2017-04-18 PROCEDURE — 76942 ECHO GUIDE FOR BIOPSY: CPT | Mod: 26

## 2017-04-18 PROCEDURE — 86901 BLOOD TYPING SEROLOGIC RH(D): CPT

## 2017-04-18 PROCEDURE — 86803 HEPATITIS C AB TEST: CPT

## 2017-04-18 PROCEDURE — 85014 HEMATOCRIT: CPT

## 2017-04-18 PROCEDURE — 88341 IMHCHEM/IMCYTCHM EA ADD ANTB: CPT

## 2017-04-18 PROCEDURE — 88173 CYTOPATH EVAL FNA REPORT: CPT

## 2017-04-18 PROCEDURE — 71045 X-RAY EXAM CHEST 1 VIEW: CPT

## 2017-04-18 PROCEDURE — 74176 CT ABD & PELVIS W/O CONTRAST: CPT

## 2017-04-18 PROCEDURE — 88342 IMHCHEM/IMCYTCHM 1ST ANTB: CPT

## 2017-04-18 PROCEDURE — 84295 ASSAY OF SERUM SODIUM: CPT

## 2017-04-18 PROCEDURE — 86703 HIV-1/HIV-2 1 RESULT ANTBDY: CPT

## 2017-04-18 PROCEDURE — 86850 RBC ANTIBODY SCREEN: CPT

## 2017-04-18 NOTE — DISCHARGE NOTE ADULT - HOSPITAL COURSE
42 yo F p/w cough, night sweats and weight loss, found to have extensive lymphadenopathy with tracheobronchial narrowing with occlusion of distal bronchi and mild narrowing of the superior vena cava, concerning for lymphoma (please see below for CT findings). HIV was negative, AFB negative x2 (suspicion for TB low, third sputum pending at time of leaving AMA). Surg/onc, hematology and pulmonology were consulted. Patient was recommended to have an excisional lymph node biopsy for tissue diagnosis. After repeated discussions, patient only agreed to core needle biopsy understanding that this option may lead to inconclusive results and a delay in diagnosis and treatment. Patient had core needle biopsy done 4/18, tolerated procedure well. Despite multiple discussions about importance of staying in hospital for diagnosis and inpatient treatment, patient was adamant about leaving hospital, understanding risks including delay in diagnosis/treatment, airway compromise and death and understanding that she will be leaving against medical advice. Patient was strongly encouraged to followup with hematology for results of biopsy and potential treatment as well as pulmonology.     *** Patient leaving against medical advice ***

## 2017-04-18 NOTE — DISCHARGE NOTE ADULT - PLAN OF CARE
Follow up You were found to have enlarged lymph nodes. These enlarged lymph nodes were impinging on your airway and one of your blood vessels. There is significant concern that you may have lymphoma. It was recommended that you have a excisional lymph node biopsy done for diagnosis and that you stay in the hospital for possible inpatient treatment with chemotherapy. You decided to have a core needle biopsy done and to leave the hospital, understanding the risks including delay in diagnosis and/or treatment, airway compromise and death.     It is CRITICALLY IMPORTANT that you follow up with the HEMATOLOGIST after discharge regarding the results of the biopsy and treatment options. Return to the hospital if you are having difficulty breathing or swallowing.

## 2017-04-18 NOTE — DISCHARGE NOTE ADULT - CARE PLAN
Principal Discharge DX:	Lymphadenopathy  Goal:	Follow up  Instructions for follow-up, activity and diet:	You were found to have enlarged lymph nodes. These enlarged lymph nodes were impinging on your airway and one of your blood vessels. There is significant concern that you may have lymphoma. It was recommended that you have a excisional lymph node biopsy done for diagnosis and that you stay in the hospital for possible inpatient treatment with chemotherapy. You decided to have a core needle biopsy done and to leave the hospital, understanding the risks including delay in diagnosis and/or treatment, airway compromise and death.     It is CRITICALLY IMPORTANT that you follow up with the HEMATOLOGIST after discharge regarding the results of the biopsy and treatment options. Return to the hospital if you are having difficulty breathing or swallowing.

## 2017-04-18 NOTE — DISCHARGE NOTE ADULT - PATIENT PORTAL LINK FT
“You can access the FollowHealth Patient Portal, offered by [facility], by registering with the following website: [email or web address]”

## 2017-04-18 NOTE — DISCHARGE NOTE ADULT - OTHER SIGNIFICANT FINDINGS
PROCEDURE:   CTA of the Chest was performed with intravenous contrast.  90 ml of Omnipaque 350 was injected intravenously. 10 ml were discarded.  Sagittal, coronal, and MIP reformats were performed.    FINDINGS:    CHEST:     LUNGS AND LARGE AIRWAYS: There is narrowing of the tracheobronchial tree   by mediastinal and hilar lymphadenopathy. The trachea is mild to   moderately narrowed. There is mild narrowing of the bilateral mainstem   bronchi with endobronchial extension. There is narrowing and partial   occlusion of the right upper, right middle and right lower lobe bronchi   with distal occlusion by lymphadenopathy and bronchovascular soft tissue   opacity. There is mild right upper lobe peribronchial thickening. There   is mosaic attenuation compatible with air trapping. There are multiple   soft tissue nodules in the bilateral lungs, right greater than left in a   bronchovascular distribution.    PLEURA: 1.2 x 0.5 cm right lower lobe pleural nodule (image 76, series   7). There is a left major fissural nodule measuring 0.7 x 0.5 cm (image   58, series 7).  No pleural effusion or pneumothorax.    VESSELS: No pulmonary embolism. Mild narrowing of the superior vena cava   by lymphadenopathy.    HEART: Normal-size heart. No pericardial effusion.    AXILLA, MEDIASTINUM AND SOBEIDA: Extensive mediastinal, hilar and axillary   lymphadenopathy, some necrotic. A lymph node mass in the right superior   mediastinum measures 5.2 x 3.2 cm (image 28, series 7). A lymph node mass   in the AP window measures 4.9 x 3.6 cm (image 50, series 7). A subcarinal   lymph node mass measures 2.7 cm in short axis. A left hilar lymph node   measures 1.5 cm in short axis (image 57, series 7). A left axillary lymph   node mass measures 3.7 x 2.7 cm (image 40, series 3). A necrotic right   axillary lymph node measures 2.8 x 2.2 cm (image 49, series 7). There is   a 1.2 cm short axis right cardiophrenic lymph node (image 102, series 7).     CHEST WALL AND LOWER NECK: There is extensive bilateral supraclavicular   lymphadenopathy. There are bilateral breast implants.  Multiple enlarged   bilateral subpectoral and internal mammary lymph nodes are present    VISUALIZED UPPER ABDOMEN: Extensive upper abdominal lymphadenopathy,   partially imaged, with para-aortic, gastrohepatic, periportal, portacaval   and peripancreatic lymph nodes. Enlarged spleen containing multiple small   hypodense lesions.    BONES: No suspicious osseous lesions.    IMPRESSION:   1.  No pulmonary embolism.  2.  Marked thoracic and upper abdominal lymphadenopathy, multiple   bilateral bronchovascular pulmonary nodules, and splenomegaly with   multiple hypodense lesions. Findings are highly suspicious for lymphoma.  3.  Associated tracheobronchial narrowing with occlusion of distal   bronchi as described. Mild narrowing of the superior vena cava.    EXAM:  CT ABDOMEN AND PELVIS                          PROCEDURE DATE:  04/15/2017      INTERPRETATION:  Clinical information: Lymphadenopathy noted on a recent   CT scan of the chest.    CT scan of the abdomen was obtained without administration of either oral   or intravenous contrast.    Patient is status post bilateral breast implants.    For interpretation of the pulmonary findings, please see the report of   the CT scan of the chest performed on 4/14/2017.    Liver, spleen, pancreas and the right adrenal gland are unremarkable. The   gallbladder and the left adrenal gland is not clearly visualized on this   exam.     Both kidneys are present in the anatomic location. No hydronephrosis is   noted.    Extensive adenopathy is noted in the retroperitoneum and the retrocrural   region. One the largest lymph node is noted in the left para-aortic space   and measures 2.8 x 1.7 cm.    Stool is noted throughout the large bowel.     Examination of the pelvis demonstrate no free fluid.     Visualized osseous structures are within normal limits.    Impression: Extensive retroperitoneal adenopathy. Differential diagnostic   consideration includes lymphoma/leukemia.

## 2017-04-18 NOTE — DISCHARGE NOTE ADULT - CARE PROVIDER_API CALL
Elmer Guthrie), Critical Care Medicine; Internal Medicine; Pulmonary Disease  [address] [place] [address]  Phone: [phone]  Fax: [phone]    Gabrielle Baxter (), Hematology; Internal Medicine; Medical Oncology  [address] [place] [address]  Phone: [phone]  Fax: [phone]

## 2017-04-18 NOTE — DISCHARGE NOTE ADULT - CARE PROVIDERS DIRECT ADDRESSES
[email or web address],DirectAddress_Unknown

## 2017-04-20 PROBLEM — Z00.00 ENCOUNTER FOR PREVENTIVE HEALTH EXAMINATION: Status: ACTIVE | Noted: 2017-04-20

## 2017-04-20 LAB
CULTURE RESULTS: SIGNIFICANT CHANGE UP
CULTURE RESULTS: SIGNIFICANT CHANGE UP
SPECIMEN SOURCE: SIGNIFICANT CHANGE UP
SPECIMEN SOURCE: SIGNIFICANT CHANGE UP
TM INTERPRETATION: SIGNIFICANT CHANGE UP

## 2017-04-24 LAB — TM INTERPRETATION: SIGNIFICANT CHANGE UP

## 2017-05-08 ENCOUNTER — APPOINTMENT (OUTPATIENT)
Dept: SURGICAL ONCOLOGY | Facility: AMBULATORY SURGERY CENTER | Age: 42
End: 2017-05-08

## 2017-05-10 ENCOUNTER — APPOINTMENT (OUTPATIENT)
Dept: HEMATOLOGY ONCOLOGY | Facility: CLINIC | Age: 42
End: 2017-05-10

## 2017-05-15 ENCOUNTER — LABORATORY RESULT (OUTPATIENT)
Age: 42
End: 2017-05-15

## 2017-05-15 ENCOUNTER — APPOINTMENT (OUTPATIENT)
Dept: PULMONOLOGY | Facility: CLINIC | Age: 42
End: 2017-05-15

## 2017-05-15 VITALS
HEIGHT: 66 IN | TEMPERATURE: 97.8 F | RESPIRATION RATE: 18 BRPM | DIASTOLIC BLOOD PRESSURE: 60 MMHG | HEART RATE: 109 BPM | BODY MASS INDEX: 17.19 KG/M2 | WEIGHT: 107 LBS | SYSTOLIC BLOOD PRESSURE: 90 MMHG

## 2017-05-15 DIAGNOSIS — R59.1 GENERALIZED ENLARGED LYMPH NODES: ICD-10-CM

## 2017-05-15 DIAGNOSIS — E55.9 VITAMIN D DEFICIENCY, UNSPECIFIED: ICD-10-CM

## 2017-05-15 DIAGNOSIS — D64.9 ANEMIA, UNSPECIFIED: ICD-10-CM

## 2017-05-15 DIAGNOSIS — C85.90 NON-HODGKIN LYMPHOMA, UNSPECIFIED, UNSPECIFIED SITE: ICD-10-CM

## 2017-05-16 ENCOUNTER — CHART COPY (OUTPATIENT)
Age: 42
End: 2017-05-16

## 2017-05-16 PROBLEM — E55.9 VITAMIN D DEFICIENCY: Status: ACTIVE | Noted: 2017-05-16

## 2017-05-16 PROBLEM — D64.9 ANEMIA: Status: ACTIVE | Noted: 2017-05-16

## 2017-05-16 LAB
25(OH)D3 SERPL-MCNC: 5.7 NG/ML
ALBUMIN SERPL ELPH-MCNC: 2.4 G/DL
ALP BLD-CCNC: 351 U/L
ALT SERPL-CCNC: 22 U/L
ANION GAP SERPL CALC-SCNC: 15 MMOL/L
APTT BLD: 37.6 SEC
AST SERPL-CCNC: 20 U/L
BASOPHILS # BLD AUTO: 0.11 K/UL
BASOPHILS NFR BLD AUTO: 0.9 %
BILIRUB SERPL-MCNC: 1.1 MG/DL
BUN SERPL-MCNC: 19 MG/DL
CALCIUM SERPL-MCNC: 8.7 MG/DL
CALCIUM SERPL-MCNC: 8.7 MG/DL
CHLORIDE SERPL-SCNC: 100 MMOL/L
CO2 SERPL-SCNC: 17 MMOL/L
CREAT SERPL-MCNC: 0.53 MG/DL
EOSINOPHIL # BLD AUTO: 0.43 K/UL
EOSINOPHIL NFR BLD AUTO: 3.5 %
GLUCOSE SERPL-MCNC: 71 MG/DL
HCG SERPL-MCNC: <1 MIU/ML
HCT VFR BLD CALC: 29.8 %
HGB BLD-MCNC: 8.6 G/DL
INR PPP: 1.42 RATIO
LDH SERPL-CCNC: 293 U/L
LYMPHOCYTES # BLD AUTO: 0.54 K/UL
LYMPHOCYTES NFR BLD AUTO: 4.4 %
MAN DIFF?: NORMAL
MCHC RBC-ENTMCNC: 23.6 PG
MCHC RBC-ENTMCNC: 28.9 GM/DL
MCV RBC AUTO: 81.9 FL
MONOCYTES # BLD AUTO: 0.54 K/UL
MONOCYTES NFR BLD AUTO: 4.4 %
MPO AB + PR3 PNL SER: NORMAL
NEUTROPHILS # BLD AUTO: 10.49 K/UL
NEUTROPHILS NFR BLD AUTO: 81.4 %
PARATHYROID HORMONE INTACT: 38 PG/ML
PLATELET # BLD AUTO: 367 K/UL
POTASSIUM SERPL-SCNC: 4 MMOL/L
PROT SERPL-MCNC: 9.4 G/DL
PT BLD: 16.2 SEC
RBC # BLD: 3.64 M/UL
RBC # FLD: 22.2 %
SODIUM SERPL-SCNC: 132 MMOL/L
TSH SERPL-ACNC: 4.01 UIU/ML
URATE SERPL-MCNC: 2.7 MG/DL
VIT B12 SERPL-MCNC: 986 PG/ML
WBC # FLD AUTO: 12.36 K/UL

## 2017-05-17 LAB
A1AT SERPL-MCNC: 307 MG/DL
ANA PAT FLD IF-IMP: ABNORMAL
ANACR T: ABNORMAL
CARDIOLIPIN AB SER IA-ACNC: POSITIVE
DEPRECATED KAPPA LC FREE/LAMBDA SER: 1.88 RATIO
IGA SER QL IEP: 769 MG/DL
IGG SER QL IEP: 4370 MG/DL
IGM SER QL IEP: 71 MG/DL
KAPPA LC CSF-MCNC: 10.7 MG/DL
KAPPA LC SERPL-MCNC: 20.1 MG/DL

## 2017-05-18 LAB
ALBUMIN MFR SERPL ELPH: 26.6 %
ALBUMIN SERPL-MCNC: 2.5 G/DL
ALBUMIN/GLOB SERPL: 0.4 RATIO
ALPHA1 GLOB MFR SERPL ELPH: 6.6 %
ALPHA1 GLOB SERPL ELPH-MCNC: 0.6 G/DL
ALPHA2 GLOB MFR SERPL ELPH: 11.7 %
ALPHA2 GLOB SERPL ELPH-MCNC: 1.1 G/DL
B-GLOBULIN MFR SERPL ELPH: 12.5 %
B-GLOBULIN SERPL ELPH-MCNC: 1.2 G/DL
B2 MICROGLOB SERPL-MCNC: 3.4 MG/L
GAMMA GLOB FLD ELPH-MCNC: 4 G/DL
GAMMA GLOB MFR SERPL ELPH: 42.6 %
INTERPRETATION SERPL IEP-IMP: NORMAL
PROT SERPL-MCNC: 9.4 G/DL
PROT SERPL-MCNC: 9.4 G/DL
TOTAL IGE SMQN RAST: ABNORMAL KU/L

## 2017-05-20 ENCOUNTER — APPOINTMENT (OUTPATIENT)
Dept: CT IMAGING | Facility: IMAGING CENTER | Age: 42
End: 2017-05-20
Payer: COMMERCIAL

## 2017-05-20 ENCOUNTER — OUTPATIENT (OUTPATIENT)
Dept: OUTPATIENT SERVICES | Facility: HOSPITAL | Age: 42
LOS: 1 days | End: 2017-05-20
Payer: COMMERCIAL

## 2017-05-20 DIAGNOSIS — C85.90 NON-HODGKIN LYMPHOMA, UNSPECIFIED, UNSPECIFIED SITE: ICD-10-CM

## 2017-05-20 PROCEDURE — 71260 CT THORAX DX C+: CPT

## 2017-05-20 PROCEDURE — 71260 CT THORAX DX C+: CPT | Mod: 26

## 2017-05-27 LAB
CULTURE RESULTS: SIGNIFICANT CHANGE UP
SPECIMEN SOURCE: SIGNIFICANT CHANGE UP

## 2017-05-31 LAB
CULTURE RESULTS: SIGNIFICANT CHANGE UP
SPECIMEN SOURCE: SIGNIFICANT CHANGE UP

## 2017-06-28 ENCOUNTER — OUTPATIENT (OUTPATIENT)
Dept: OUTPATIENT SERVICES | Facility: HOSPITAL | Age: 42
LOS: 1 days | End: 2017-06-28

## 2017-06-28 VITALS
SYSTOLIC BLOOD PRESSURE: 96 MMHG | WEIGHT: 111.99 LBS | HEART RATE: 96 BPM | HEIGHT: 65.5 IN | TEMPERATURE: 101 F | DIASTOLIC BLOOD PRESSURE: 64 MMHG | OXYGEN SATURATION: 96 % | RESPIRATION RATE: 16 BRPM

## 2017-06-28 DIAGNOSIS — Z98.890 OTHER SPECIFIED POSTPROCEDURAL STATES: Chronic | ICD-10-CM

## 2017-06-28 DIAGNOSIS — R50.9 FEVER, UNSPECIFIED: ICD-10-CM

## 2017-06-28 DIAGNOSIS — R59.9 ENLARGED LYMPH NODES, UNSPECIFIED: ICD-10-CM

## 2017-06-28 DIAGNOSIS — Z98.82 BREAST IMPLANT STATUS: Chronic | ICD-10-CM

## 2017-06-28 DIAGNOSIS — R59.1 GENERALIZED ENLARGED LYMPH NODES: ICD-10-CM

## 2017-06-28 LAB
ANISOCYTOSIS BLD QL: SLIGHT — SIGNIFICANT CHANGE UP
ANISOCYTOSIS BLD QL: SLIGHT — SIGNIFICANT CHANGE UP
BASOPHILS # BLD AUTO: 0.02 K/UL — SIGNIFICANT CHANGE UP (ref 0–0.2)
BASOPHILS NFR BLD AUTO: 0.2 % — SIGNIFICANT CHANGE UP (ref 0–2)
BASOPHILS NFR SPEC: 0.9 % — SIGNIFICANT CHANGE UP (ref 0–2)
BASOPHILS NFR SPEC: 0.9 % — SIGNIFICANT CHANGE UP (ref 0–2)
BUN SERPL-MCNC: 10 MG/DL — SIGNIFICANT CHANGE UP (ref 7–23)
CALCIUM SERPL-MCNC: 8.7 MG/DL — SIGNIFICANT CHANGE UP (ref 8.4–10.5)
CHLORIDE SERPL-SCNC: 93 MMOL/L — LOW (ref 98–107)
CO2 SERPL-SCNC: 27 MMOL/L — SIGNIFICANT CHANGE UP (ref 22–31)
CREAT SERPL-MCNC: 0.55 MG/DL — SIGNIFICANT CHANGE UP (ref 0.5–1.3)
EOSINOPHIL # BLD AUTO: 0.28 K/UL — SIGNIFICANT CHANGE UP (ref 0–0.5)
EOSINOPHIL NFR BLD AUTO: 3.3 % — SIGNIFICANT CHANGE UP (ref 0–6)
EOSINOPHIL NFR FLD: 1.8 % — SIGNIFICANT CHANGE UP (ref 0–6)
EOSINOPHIL NFR FLD: 1.8 % — SIGNIFICANT CHANGE UP (ref 0–6)
GIANT PLATELETS BLD QL SMEAR: PRESENT — SIGNIFICANT CHANGE UP
GIANT PLATELETS BLD QL SMEAR: PRESENT — SIGNIFICANT CHANGE UP
GLUCOSE SERPL-MCNC: 89 MG/DL — SIGNIFICANT CHANGE UP (ref 70–99)
HCG SERPL-ACNC: < 5 MIU/ML — SIGNIFICANT CHANGE UP
HCT VFR BLD CALC: 27.6 % — LOW (ref 34.5–45)
HCT VFR BLD CALC: 27.6 % — LOW (ref 34.5–45)
HGB BLD-MCNC: 8.1 G/DL — LOW (ref 11.5–15.5)
HGB BLD-MCNC: 8.1 G/DL — LOW (ref 11.5–15.5)
HYPOCHROMIA BLD QL: SLIGHT — SIGNIFICANT CHANGE UP
HYPOCHROMIA BLD QL: SLIGHT — SIGNIFICANT CHANGE UP
IMM GRANULOCYTES # BLD AUTO: 0.48 # — SIGNIFICANT CHANGE UP
IMM GRANULOCYTES NFR BLD AUTO: 5.6 % — HIGH (ref 0–1.5)
LYMPHOCYTES # BLD AUTO: 1.15 K/UL — SIGNIFICANT CHANGE UP (ref 1–3.3)
LYMPHOCYTES # BLD AUTO: 13.4 % — SIGNIFICANT CHANGE UP (ref 13–44)
LYMPHOCYTES NFR SPEC AUTO: 5.6 % — LOW (ref 13–44)
LYMPHOCYTES NFR SPEC AUTO: 5.6 % — LOW (ref 13–44)
MCHC RBC-ENTMCNC: 24.9 PG — LOW (ref 27–34)
MCHC RBC-ENTMCNC: 24.9 PG — LOW (ref 27–34)
MCHC RBC-ENTMCNC: 29.3 % — LOW (ref 32–36)
MCHC RBC-ENTMCNC: 29.3 % — LOW (ref 32–36)
MCV RBC AUTO: 84.9 FL — SIGNIFICANT CHANGE UP (ref 80–100)
MCV RBC AUTO: 84.9 FL — SIGNIFICANT CHANGE UP (ref 80–100)
MICROCYTES BLD QL: SLIGHT — SIGNIFICANT CHANGE UP
MICROCYTES BLD QL: SLIGHT — SIGNIFICANT CHANGE UP
MONOCYTES # BLD AUTO: 0.82 K/UL — SIGNIFICANT CHANGE UP (ref 0–0.9)
MONOCYTES NFR BLD AUTO: 9.5 % — SIGNIFICANT CHANGE UP (ref 2–14)
MONOCYTES NFR BLD: 10.4 % — HIGH (ref 2–9)
MONOCYTES NFR BLD: 10.4 % — HIGH (ref 2–9)
MYELOCYTES NFR BLD: 1.3 % — HIGH (ref 0–0)
MYELOCYTES NFR BLD: 1.3 % — HIGH (ref 0–0)
NEUTROPHIL AB SER-ACNC: 74 % — SIGNIFICANT CHANGE UP (ref 43–77)
NEUTROPHIL AB SER-ACNC: 74 % — SIGNIFICANT CHANGE UP (ref 43–77)
NEUTROPHILS # BLD AUTO: 5.86 K/UL — SIGNIFICANT CHANGE UP (ref 1.8–7.4)
NEUTROPHILS NFR BLD AUTO: 68 % — SIGNIFICANT CHANGE UP (ref 43–77)
NEUTS BAND # BLD: 4.3 % — SIGNIFICANT CHANGE UP (ref 0–6)
NEUTS BAND # BLD: 4.3 % — SIGNIFICANT CHANGE UP (ref 0–6)
NRBC # FLD: 0.23 — SIGNIFICANT CHANGE UP
NRBC # FLD: 0.23 — SIGNIFICANT CHANGE UP
NRBC FLD-RTO: 2.7 — SIGNIFICANT CHANGE UP
NRBC FLD-RTO: 2.7 — SIGNIFICANT CHANGE UP
OTHER - HEMATOLOGY %: 1.7 — SIGNIFICANT CHANGE UP
OTHER - HEMATOLOGY %: 1.7 — SIGNIFICANT CHANGE UP
PLATELET # BLD AUTO: 320 K/UL — SIGNIFICANT CHANGE UP (ref 150–400)
PLATELET # BLD AUTO: 320 K/UL — SIGNIFICANT CHANGE UP (ref 150–400)
PLATELET COUNT - ESTIMATE: NORMAL — SIGNIFICANT CHANGE UP
PLATELET COUNT - ESTIMATE: NORMAL — SIGNIFICANT CHANGE UP
PMV BLD: 9.3 FL — SIGNIFICANT CHANGE UP (ref 7–13)
PMV BLD: 9.3 FL — SIGNIFICANT CHANGE UP (ref 7–13)
POLYCHROMASIA BLD QL SMEAR: SIGNIFICANT CHANGE UP
POLYCHROMASIA BLD QL SMEAR: SIGNIFICANT CHANGE UP
POTASSIUM SERPL-MCNC: 4.2 MMOL/L — SIGNIFICANT CHANGE UP (ref 3.5–5.3)
POTASSIUM SERPL-SCNC: 4.2 MMOL/L — SIGNIFICANT CHANGE UP (ref 3.5–5.3)
RBC # BLD: 3.25 M/UL — LOW (ref 3.8–5.2)
RBC # BLD: 3.25 M/UL — LOW (ref 3.8–5.2)
RBC # FLD: 19.9 % — HIGH (ref 10.3–14.5)
RBC # FLD: 19.9 % — HIGH (ref 10.3–14.5)
SODIUM SERPL-SCNC: 133 MMOL/L — LOW (ref 135–145)
WBC # BLD: 8.38 K/UL — SIGNIFICANT CHANGE UP (ref 3.8–10.5)
WBC # BLD: 8.38 K/UL — SIGNIFICANT CHANGE UP (ref 3.8–10.5)
WBC # FLD AUTO: 8.38 K/UL — SIGNIFICANT CHANGE UP (ref 3.8–10.5)
WBC # FLD AUTO: 8.38 K/UL — SIGNIFICANT CHANGE UP (ref 3.8–10.5)

## 2017-06-28 NOTE — H&P PST ADULT - PROBLEM SELECTOR PLAN 1
Pt. is scheduled for excisional biopsy of axillary lymph node possible cervical on 7/12/17.  Preop instructions reviewed, pt verbalized understanding.  Preop Famotidine provided.  Lab results pending

## 2017-06-28 NOTE — H&P PST ADULT - WEIGHT IN LBS
Chief Complaint   Patient presents with     WINDY Marte Norberto Bowden is a 58 year old male who presents today for follow up of   Chief Complaint   Patient presents with     RECHECK    f/u post RRP.  He is without any complaints.    Current Outpatient Prescriptions   Medication Sig Dispense Refill     docusate sodium (COLACE) 100 MG capsule Take 100 mg by mouth       bacitracin 500 UNIT/GM OINT Apply 1 each topically       ciprofloxacin (CIPRO) 500 MG tablet Take 500 mg by mouth       polyethylene glycol (MIRALAX) powder Take 17 g (1 capful) by mouth 2 times daily 510 g 1     No Known Allergies   Past Medical History   Diagnosis Date     Guillain-Kinsale syndrome (H) 1999     no sequale     ED (erectile dysfunction)      Prostate cancer (H) 5/2015     Past Surgical History   Procedure Laterality Date     No history of surgery       Sigmoidoscopy flexible  7/15/2013     Procedure: SIGMOIDOSCOPY FLEXIBLE;  Sigmoidoscopy Flexible;  Surgeon: Dusty Chang MD;  Location: [facility]     Arthrotomy shoulder, rotator cuff repair, combined Right 10/7/2016     Right RCR, Russell Farley     Family History   Problem Relation Age of Onset     DIABETES Maternal Grandmother      C.A.D. No family hx of      Coronary Artery Disease No family hx of      Breast Cancer No family hx of      Ovarian Cancer No family hx of      Mental Illness No family hx of      Asthma No family hx of      Obesity No family hx of      Unknown/Adopted No family hx of      Anesthesia Reaction No family hx of      CEREBROVASCULAR DISEASE No family hx of      Other Cancer No family hx of      Prostate Cancer No family hx of      Cancer - colorectal No family hx of      Hyperlipidemia No family hx of      Known Genetic Syndrome No family hx of      OSTEOPOROSIS No family hx of      Depression No family hx of      Anxiety Disorder No family hx of      MENTAL ILLNESS No family hx of      Substance Abuse No family hx of      Thyroid Disease No family  hx of      Genetic Disorder No family hx of      Colon Cancer No family hx of      Hypertension Mother      Social History     Social History     Marital Status:      Spouse Name: N/A     Number of Children: 2     Years of Education: N/A     Occupational History      [employer]     Social History Main Topics     Smoking status: Former Smoker     Types: Dip, chew, snus or snuff     Smokeless tobacco: Current User     Types: Chew      Comment: 1 tin every 3 days     Alcohol Use: 0.0 oz/week     0 Standard drinks or equivalent per week      Comment: 6-12 beers per week     Drug Use: No     Sexual Activity:     Partners: Female      Comment: no protection     Other Topics Concern     Parent/Sibling W/ Cabg, Mi Or Angioplasty Before 65f 55m? No      Service No     Blood Transfusions No     Caffeine Concern No     Occupational Exposure No     Hobby Hazards No     Sleep Concern No     Stress Concern No     Weight Concern No     Special Diet No     Back Care No     Exercise Yes     [employer] 12+ times per month     Seat Belt Yes     Self-Exams Yes     Social History Narrative       REVIEW OF SYSTEMS  =================  C: NEGATIVE for fever, chills, change in weight  I: NEGATIVE for worrisome rashes, moles or lesions  E/M: NEGATIVE for ear, mouth and throat problems  R: NEGATIVE for significant cough or SHORTNESS OF BREATH,   CV: NEGATIVE for chest pain, palpitations or peripheral edema  GI: NEGATIVE for nausea, abdominal pain, heartburn, or change in bowel habits  NEURO: NEGATIVE any motor/sensory changes  PSYCH: NEGATIVE for recent mood disorder    Physical Exam:  /80 mmHg  Pulse 88  Resp 12   Patient is pleasant, in no acute distress, good general condition.  Lung: no evidence of respiratory distress    Abdomen: Soft, nondistended, non tender. No masses. No rebound or guarding.   Exam: incision c/d/i.  Rangel and DAVID removed today.  Skin: Warm and dry.  No redness.  Psych: normal mood  and affect  Neuro: alert and oriented  Final Diagnosis   A) LYMPH NODES, RIGHT PELVIC, REGIONAL DISSECTION:  One lymph node, negative for metastatic carcinoma (0/1)    B) LYMPH NODES, LEFT PELVIC, REGIONAL DISSECTION:  One lymph node, negative for metastatic carcinoma (0/1)    C) PROSTATE, RADICAL PROSTATECTOMY:  1. Prostatic adenocarcinoma, [place] s grade 4+4 (8/10)  2. Extraprostatic extension by tumor is present at the left base  3. Tumor involves the muscularis of the seminal vesicle bilaterally  4. The surgical margins are negative for tumor  5. Negative for lymphatic/vascular invasion by tumor  6. Please see prostate cancer staging parameters below    Assessment/Plan:   (C61) Prostate cancer (H)  (primary encounter diagnosis)  Comment: path discussed.  [place] 8 stage T3b  Plan: kegel exercise instructions           psa in 3-4 months                       111.9

## 2017-06-28 NOTE — H&P PST ADULT - PROBLEM SELECTOR PLAN 2
Attempted to notify Dr. Cornelius about Pts. fever.  I called his office, but he was gone for the day.  Email sent to MD re:above

## 2017-06-28 NOTE — H&P PST ADULT - NEGATIVE NEUROLOGICAL SYMPTOMS
no weakness/no paresthesias/no focal seizures/no generalized seizures/no difficulty walking/no headache/no syncope

## 2017-06-28 NOTE — H&P PST ADULT - NEGATIVE BREAST SYMPTOMS
no breast lump R/no breast tenderness L/no breast lump L/no breast tenderness R/no nipple discharge L

## 2017-06-28 NOTE — H&P PST ADULT - ATTENDING COMMENTS
R/B/A discussed with patient, she expressed understanding and agrees to proceed with excisional biopsy of left axillary lymph node, possible right.

## 2017-06-28 NOTE — H&P PST ADULT - HISTORY OF PRESENT ILLNESS
41 y/o female with history of adenopathy presents to PAST today for presurgical evlauation.  She noticed the swollen lymph nodes approximately 3 years ago, but wasnt going to the doctors.  In April she went to [facility] for shortness of breath.  CT Scan was done, biopsy was performed.  fever, chills, fatigue, 41 y/o female with history of adenopathy presents to PAST today for presurgical evaluation.  She reports history of swollen lymph nodes for approximately 3 years, but never went to the doctor.  In April she was admitted to [facility] for shortness of breath.  CT Scan was done, biopsy was performed, results inconclusive.  She continues to complain of fever, chills, night sweats, fatigue, weight loss and cough.  Denies any shortness of breath.  She is scheduled for excisional biopsy of axillary lymph node, possible cervical on 7/12/17.

## 2017-06-28 NOTE — H&P PST ADULT - NSANTHOSAYNRD_GEN_A_CORE
No. TORO screening performed.  STOP BANG Legend: 0-2 = LOW Risk; 3-4 = INTERMEDIATE Risk; 5-8 = HIGH Risk

## 2017-07-12 ENCOUNTER — APPOINTMENT (OUTPATIENT)
Dept: SURGICAL ONCOLOGY | Facility: AMBULATORY SURGERY CENTER | Age: 42
End: 2017-07-12

## 2017-07-12 ENCOUNTER — RESULT REVIEW (OUTPATIENT)
Age: 42
End: 2017-07-12

## 2017-07-12 ENCOUNTER — OUTPATIENT (OUTPATIENT)
Dept: OUTPATIENT SERVICES | Facility: HOSPITAL | Age: 42
LOS: 1 days | Discharge: HOME | End: 2017-07-12
Payer: COMMERCIAL

## 2017-07-12 ENCOUNTER — TRANSCRIPTION ENCOUNTER (OUTPATIENT)
Age: 42
End: 2017-07-12

## 2017-07-12 VITALS
RESPIRATION RATE: 26 BRPM | DIASTOLIC BLOOD PRESSURE: 64 MMHG | OXYGEN SATURATION: 96 % | WEIGHT: 111.99 LBS | HEIGHT: 65.5 IN | SYSTOLIC BLOOD PRESSURE: 96 MMHG | HEART RATE: 121 BPM | TEMPERATURE: 101 F

## 2017-07-12 VITALS
TEMPERATURE: 98 F | DIASTOLIC BLOOD PRESSURE: 52 MMHG | SYSTOLIC BLOOD PRESSURE: 93 MMHG | RESPIRATION RATE: 26 BRPM | OXYGEN SATURATION: 98 % | HEART RATE: 95 BPM

## 2017-07-12 DIAGNOSIS — Z98.890 OTHER SPECIFIED POSTPROCEDURAL STATES: Chronic | ICD-10-CM

## 2017-07-12 DIAGNOSIS — Z98.82 BREAST IMPLANT STATUS: Chronic | ICD-10-CM

## 2017-07-12 DIAGNOSIS — R59.9 ENLARGED LYMPH NODES, UNSPECIFIED: ICD-10-CM

## 2017-07-12 PROCEDURE — 88341 IMHCHEM/IMCYTCHM EA ADD ANTB: CPT | Mod: 26,59

## 2017-07-12 PROCEDURE — 88342 IMHCHEM/IMCYTCHM 1ST ANTB: CPT | Mod: 26,59

## 2017-07-12 PROCEDURE — 88365 INSITU HYBRIDIZATION (FISH): CPT | Mod: 26,59

## 2017-07-12 PROCEDURE — 88331 PATH CONSLTJ SURG 1 BLK 1SPC: CPT | Mod: 26

## 2017-07-12 PROCEDURE — 38525 BIOPSY/REMOVAL LYMPH NODES: CPT

## 2017-07-12 PROCEDURE — 88307 TISSUE EXAM BY PATHOLOGIST: CPT | Mod: 26

## 2017-07-12 PROCEDURE — 88360 TUMOR IMMUNOHISTOCHEM/MANUAL: CPT | Mod: 26

## 2017-07-12 PROCEDURE — 88367 INSITU HYBRIDIZATION AUTO: CPT | Mod: 26

## 2017-07-12 NOTE — BRIEF OPERATIVE NOTE - OPERATION/FINDINGS
Left axillary lymphadenopathy identified.  Excisional biopsy taken, and frozen section confirmed adequate tissue specimen.

## 2017-07-12 NOTE — ASU DISCHARGE PLAN (ADULT/PEDIATRIC). - NOTIFY
Swelling that continues/Persistent Nausea and Vomiting/Unable to Urinate/Bleeding that does not stop/Pain not relieved by Medications/Inability to Tolerate Liquids or Foods/Fever greater than 101

## 2017-07-25 ENCOUNTER — APPOINTMENT (OUTPATIENT)
Dept: PULMONOLOGY | Facility: CLINIC | Age: 42
End: 2017-07-25
Payer: COMMERCIAL

## 2017-07-25 ENCOUNTER — APPOINTMENT (OUTPATIENT)
Dept: SURGICAL ONCOLOGY | Facility: CLINIC | Age: 42
End: 2017-07-25

## 2017-07-25 VITALS
WEIGHT: 121 LBS | HEIGHT: 66 IN | SYSTOLIC BLOOD PRESSURE: 113 MMHG | DIASTOLIC BLOOD PRESSURE: 85 MMHG | HEART RATE: 117 BPM | BODY MASS INDEX: 19.44 KG/M2 | OXYGEN SATURATION: 93 %

## 2017-07-25 VITALS
BODY MASS INDEX: 19.44 KG/M2 | TEMPERATURE: 98.5 F | HEIGHT: 66 IN | HEART RATE: 89 BPM | RESPIRATION RATE: 16 BRPM | DIASTOLIC BLOOD PRESSURE: 70 MMHG | WEIGHT: 121 LBS | SYSTOLIC BLOOD PRESSURE: 119 MMHG | OXYGEN SATURATION: 98 %

## 2017-07-25 DIAGNOSIS — R06.02 SHORTNESS OF BREATH: ICD-10-CM

## 2017-07-25 DIAGNOSIS — R60.0 LOCALIZED EDEMA: ICD-10-CM

## 2017-07-25 PROCEDURE — 99215 OFFICE O/P EST HI 40 MIN: CPT

## 2017-07-25 RX ORDER — PREDNISONE 10 MG/1
10 TABLET ORAL DAILY
Qty: 60 | Refills: 1 | Status: DISCONTINUED | COMMUNITY
Start: 2017-05-15 | End: 2017-07-25

## 2017-07-25 RX ORDER — OMEPRAZOLE 20 MG/1
20 CAPSULE, DELAYED RELEASE ORAL
Qty: 30 | Refills: 5 | Status: DISCONTINUED | COMMUNITY
Start: 2017-05-15 | End: 2017-07-25

## 2017-07-26 ENCOUNTER — OUTPATIENT (OUTPATIENT)
Dept: OUTPATIENT SERVICES | Facility: HOSPITAL | Age: 42
LOS: 1 days | Discharge: HOME | End: 2017-07-26

## 2017-07-26 DIAGNOSIS — Z98.890 OTHER SPECIFIED POSTPROCEDURAL STATES: Chronic | ICD-10-CM

## 2017-07-26 DIAGNOSIS — R59.1 GENERALIZED ENLARGED LYMPH NODES: ICD-10-CM

## 2017-07-26 DIAGNOSIS — Z98.82 BREAST IMPLANT STATUS: Chronic | ICD-10-CM

## 2017-07-27 ENCOUNTER — INPATIENT (INPATIENT)
Facility: HOSPITAL | Age: 42
LOS: 1 days | Discharge: HOME | DRG: 841 | End: 2017-07-29
Attending: HOSPITALIST | Admitting: STUDENT IN AN ORGANIZED HEALTH CARE EDUCATION/TRAINING PROGRAM
Payer: COMMERCIAL

## 2017-07-27 ENCOUNTER — RESULT REVIEW (OUTPATIENT)
Age: 42
End: 2017-07-27

## 2017-07-27 ENCOUNTER — APPOINTMENT (OUTPATIENT)
Dept: HEMATOLOGY ONCOLOGY | Facility: CLINIC | Age: 42
End: 2017-07-27
Payer: COMMERCIAL

## 2017-07-27 VITALS
DIASTOLIC BLOOD PRESSURE: 57 MMHG | HEART RATE: 110 BPM | TEMPERATURE: 99 F | OXYGEN SATURATION: 98 % | SYSTOLIC BLOOD PRESSURE: 96 MMHG

## 2017-07-27 VITALS
BODY MASS INDEX: 19.84 KG/M2 | RESPIRATION RATE: 16 BRPM | HEIGHT: 65.98 IN | TEMPERATURE: 98.5 F | OXYGEN SATURATION: 97 % | SYSTOLIC BLOOD PRESSURE: 77 MMHG | HEART RATE: 140 BPM | WEIGHT: 123.46 LBS | DIASTOLIC BLOOD PRESSURE: 49 MMHG

## 2017-07-27 DIAGNOSIS — Z80.1 FAMILY HISTORY OF MALIGNANT NEOPLASM OF TRACHEA, BRONCHUS AND LUNG: ICD-10-CM

## 2017-07-27 DIAGNOSIS — C85.90 NON-HODGKIN LYMPHOMA, UNSPECIFIED, UNSPECIFIED SITE: ICD-10-CM

## 2017-07-27 DIAGNOSIS — C81.78 OTHER HODGKIN LYMPHOMA, LYMPH NODES OF MULTIPLE SITES: ICD-10-CM

## 2017-07-27 DIAGNOSIS — Z98.82 BREAST IMPLANT STATUS: Chronic | ICD-10-CM

## 2017-07-27 DIAGNOSIS — Z98.890 OTHER SPECIFIED POSTPROCEDURAL STATES: Chronic | ICD-10-CM

## 2017-07-27 DIAGNOSIS — R22.0 LOCALIZED SWELLING, MASS AND LUMP, HEAD: ICD-10-CM

## 2017-07-27 LAB
ALBUMIN SERPL ELPH-MCNC: 2.3 G/DL — LOW (ref 3.3–5)
ALP SERPL-CCNC: 298 U/L — HIGH (ref 40–120)
ALT FLD-CCNC: 19 U/L RC — SIGNIFICANT CHANGE UP (ref 10–45)
ANION GAP SERPL CALC-SCNC: 10 MMOL/L — SIGNIFICANT CHANGE UP (ref 5–17)
ANISOCYTOSIS BLD QL: SLIGHT — SIGNIFICANT CHANGE UP
APTT BLD: 33.7 SEC — SIGNIFICANT CHANGE UP (ref 27.5–37.4)
AST SERPL-CCNC: 17 U/L — SIGNIFICANT CHANGE UP (ref 10–40)
BASOPHILS # BLD AUTO: 0 K/UL — SIGNIFICANT CHANGE UP (ref 0–0.2)
BASOPHILS # BLD AUTO: 0.3 K/UL — HIGH (ref 0–0.2)
BASOPHILS NFR BLD AUTO: 0 % — SIGNIFICANT CHANGE UP (ref 0–2)
BASOPHILS NFR BLD AUTO: 1.9 % — SIGNIFICANT CHANGE UP (ref 0–2)
BILIRUB SERPL-MCNC: 1.3 MG/DL — HIGH (ref 0.2–1.2)
BUN SERPL-MCNC: 11 MG/DL — SIGNIFICANT CHANGE UP (ref 7–23)
CALCIUM SERPL-MCNC: 8.8 MG/DL — SIGNIFICANT CHANGE UP (ref 8.4–10.5)
CHLORIDE SERPL-SCNC: 96 MMOL/L — SIGNIFICANT CHANGE UP (ref 96–108)
CO2 SERPL-SCNC: 28 MMOL/L — SIGNIFICANT CHANGE UP (ref 22–31)
CREAT SERPL-MCNC: 0.43 MG/DL — LOW (ref 0.5–1.3)
DACRYOCYTES BLD QL SMEAR: SLIGHT — SIGNIFICANT CHANGE UP
EOSINOPHIL # BLD AUTO: 0 K/UL — SIGNIFICANT CHANGE UP (ref 0–0.5)
EOSINOPHIL # BLD AUTO: 0.2 K/UL — SIGNIFICANT CHANGE UP (ref 0–0.5)
EOSINOPHIL NFR BLD AUTO: 1 % — SIGNIFICANT CHANGE UP (ref 0–6)
EOSINOPHIL NFR BLD AUTO: 1.1 % — SIGNIFICANT CHANGE UP (ref 0–6)
ERYTHROCYTE [SEDIMENTATION RATE] IN BLOOD: 120 MM/HR — HIGH (ref 0–15)
GLUCOSE SERPL-MCNC: 111 MG/DL — HIGH (ref 70–99)
HCT VFR BLD CALC: 25.2 % — LOW (ref 34.5–45)
HCT VFR BLD CALC: 25.9 % — LOW (ref 34.5–45)
HGB BLD-MCNC: 7.9 G/DL — LOW (ref 11.5–15.5)
HGB BLD-MCNC: 8.1 G/DL — LOW (ref 11.5–15.5)
INR BLD: 1.69 RATIO — HIGH (ref 0.88–1.16)
LDH SERPL L TO P-CCNC: 172 U/L — SIGNIFICANT CHANGE UP (ref 50–242)
LYMPHOCYTES # BLD AUTO: 0.9 K/UL — LOW (ref 1–3.3)
LYMPHOCYTES # BLD AUTO: 1 % — LOW (ref 13–44)
LYMPHOCYTES # BLD AUTO: 1 K/UL — SIGNIFICANT CHANGE UP (ref 1–3.3)
LYMPHOCYTES # BLD AUTO: 4.9 % — LOW (ref 13–44)
MACROCYTES BLD QL: SLIGHT — SIGNIFICANT CHANGE UP
MCHC RBC-ENTMCNC: 25.8 PG — LOW (ref 27–34)
MCHC RBC-ENTMCNC: 26.2 PG — LOW (ref 27–34)
MCHC RBC-ENTMCNC: 30.5 GM/DL — LOW (ref 32–36)
MCHC RBC-ENTMCNC: 32 G/DL — SIGNIFICANT CHANGE UP (ref 32–36)
MCV RBC AUTO: 81.9 FL — SIGNIFICANT CHANGE UP (ref 80–100)
MCV RBC AUTO: 84.5 FL — SIGNIFICANT CHANGE UP (ref 80–100)
MICROCYTES BLD QL: SLIGHT — SIGNIFICANT CHANGE UP
MONOCYTES # BLD AUTO: 0.5 K/UL — SIGNIFICANT CHANGE UP (ref 0–0.9)
MONOCYTES # BLD AUTO: 0.5 K/UL — SIGNIFICANT CHANGE UP (ref 0–0.9)
MONOCYTES NFR BLD AUTO: 2.8 % — SIGNIFICANT CHANGE UP (ref 2–14)
MONOCYTES NFR BLD AUTO: 3 % — SIGNIFICANT CHANGE UP (ref 2–14)
MYELOCYTES NFR BLD: 1 % — HIGH (ref 0–0)
NEUTROPHILS # BLD AUTO: 14.1 K/UL — HIGH (ref 1.8–7.4)
NEUTROPHILS # BLD AUTO: 16.1 K/UL — HIGH (ref 1.8–7.4)
NEUTROPHILS NFR BLD AUTO: 89.4 % — HIGH (ref 43–77)
NEUTROPHILS NFR BLD AUTO: 91 % — HIGH (ref 43–77)
NEUTS BAND # BLD: 3 % — SIGNIFICANT CHANGE UP (ref 0–8)
NRBC # BLD: 1 /100 — HIGH (ref 0–0)
PLAT MORPH BLD: NORMAL — SIGNIFICANT CHANGE UP
PLATELET # BLD AUTO: 310 K/UL — SIGNIFICANT CHANGE UP (ref 150–400)
PLATELET # BLD AUTO: 339 K/UL — SIGNIFICANT CHANGE UP (ref 150–400)
POIKILOCYTOSIS BLD QL AUTO: SLIGHT — SIGNIFICANT CHANGE UP
POLYCHROMASIA BLD QL SMEAR: SLIGHT — SIGNIFICANT CHANGE UP
POTASSIUM SERPL-MCNC: 4.1 MMOL/L — SIGNIFICANT CHANGE UP (ref 3.5–5.3)
POTASSIUM SERPL-SCNC: 4.1 MMOL/L — SIGNIFICANT CHANGE UP (ref 3.5–5.3)
PROT SERPL-MCNC: 7.3 G/DL — SIGNIFICANT CHANGE UP (ref 6–8.3)
PROTHROM AB SERPL-ACNC: 18.4 SEC — HIGH (ref 9.8–12.7)
RBC # BLD: 3.06 M/UL — LOW (ref 3.8–5.2)
RBC # BLD: 3.08 M/UL — LOW (ref 3.8–5.2)
RBC # FLD: 21.7 % — HIGH (ref 10.3–14.5)
RBC # FLD: 22 % — HIGH (ref 10.3–14.5)
RBC BLD AUTO: ABNORMAL
SODIUM SERPL-SCNC: 134 MMOL/L — LOW (ref 135–145)
TOXIC GRANULES BLD QL SMEAR: PRESENT — SIGNIFICANT CHANGE UP
URATE SERPL-MCNC: 3.3 MG/DL — SIGNIFICANT CHANGE UP (ref 2.5–7)
WBC # BLD: 15.6 K/UL — HIGH (ref 3.8–10.5)
WBC # BLD: 18 K/UL — HIGH (ref 3.8–10.5)
WBC # FLD AUTO: 15.6 K/UL — HIGH (ref 3.8–10.5)
WBC # FLD AUTO: 18 K/UL — HIGH (ref 3.8–10.5)

## 2017-07-27 PROCEDURE — 99499A: CUSTOM | Mod: NC

## 2017-07-27 PROCEDURE — 99223 1ST HOSP IP/OBS HIGH 75: CPT | Mod: GC

## 2017-07-27 PROCEDURE — 74177 CT ABD & PELVIS W/CONTRAST: CPT | Mod: 26

## 2017-07-27 PROCEDURE — 99285 EMERGENCY DEPT VISIT HI MDM: CPT

## 2017-07-27 PROCEDURE — 71275 CT ANGIOGRAPHY CHEST: CPT | Mod: 26

## 2017-07-27 PROCEDURE — 93308 TTE F-UP OR LMTD: CPT | Mod: 26

## 2017-07-27 RX ORDER — ALLOPURINOL 300 MG
100 TABLET ORAL DAILY
Qty: 0 | Refills: 0 | Status: DISCONTINUED | OUTPATIENT
Start: 2017-07-27 | End: 2017-07-29

## 2017-07-27 RX ORDER — FERROUS SULFATE 325(65) MG
325 TABLET ORAL DAILY
Qty: 0 | Refills: 0 | Status: DISCONTINUED | OUTPATIENT
Start: 2017-07-27 | End: 2017-07-29

## 2017-07-27 RX ORDER — ACETAMINOPHEN 500 MG
650 TABLET ORAL EVERY 6 HOURS
Qty: 0 | Refills: 0 | Status: DISCONTINUED | OUTPATIENT
Start: 2017-07-27 | End: 2017-07-29

## 2017-07-27 RX ORDER — ENOXAPARIN SODIUM 100 MG/ML
40 INJECTION SUBCUTANEOUS DAILY
Qty: 0 | Refills: 0 | Status: DISCONTINUED | OUTPATIENT
Start: 2017-07-27 | End: 2017-07-29

## 2017-07-27 RX ORDER — SODIUM CHLORIDE 9 MG/ML
1000 INJECTION INTRAMUSCULAR; INTRAVENOUS; SUBCUTANEOUS ONCE
Qty: 0 | Refills: 0 | Status: COMPLETED | OUTPATIENT
Start: 2017-07-27 | End: 2017-07-27

## 2017-07-27 RX ORDER — DEXAMETHASONE 0.5 MG/5ML
10 ELIXIR ORAL ONCE
Qty: 0 | Refills: 0 | Status: COMPLETED | OUTPATIENT
Start: 2017-07-27 | End: 2017-07-27

## 2017-07-27 RX ADMIN — Medication 102 MILLIGRAM(S): at 13:59

## 2017-07-27 RX ADMIN — SODIUM CHLORIDE 1000 MILLILITER(S): 9 INJECTION INTRAMUSCULAR; INTRAVENOUS; SUBCUTANEOUS at 13:59

## 2017-07-27 NOTE — ED ADULT NURSE NOTE - OBJECTIVE STATEMENT
41 yo F presents to ED A+OX3 via EMS from [facility] for hypotension. As per EMS, pt. was recently diagnosed with hodgkins lymphoma, has not started any chemotherapy, went into [facility] earlier today for "a routine visit." As per EMS, pt. was noted to have BP of 70s/50s. Pt. arrives with BP 90s/60s. Pt. denies chest pain, SOB, abdominal pain, swelling to extremities, fever, chills, recent travel, sick contacts, dizziness, lightheadedness. Breathing unlabored on RA. Skin warm pink and dry. Abdomen soft. Comfort and safety measures in place.

## 2017-07-27 NOTE — H&P ADULT - NSHPREVIEWOFSYSTEMS_GEN_ALL_CORE
Review of Systems: GEN: no fevers, chills, no night sweats no weight loss , no swollen lymph nodes    EYES: No blurry vision , no changes in vision  ENT: no sores, no runny nose, no sore throat   PULM: no cough, no shortness of breath   CARD: no chest pain, no palpitations    GI : no abdominal pain , no nausea, no vomiting  : no burning urination, no change in color of urine, no flank pain, no blood in urine   MSK: no swelling   SKIN: no bruising or bleeding, no sores in mouth  , no yellowing of the skin  Neuro: no lightheadedness, no headaches, no weakness, no fainting, no confusion , no seizures

## 2017-07-27 NOTE — H&P ADULT - HISTORY OF PRESENT ILLNESS
Patient is a 42 years old female with PMH of Hodgkin's Lymphoma (Lymphocyte depleted) who was sent in from [facility] by Dr. Gabrielle Baxter after she was found to have worsening facial swelling and hypotension to 70s/40 in office. Patient has been experiencing diffuse lymphadenopathy and night sweats for the past two years and was found to have extensive lymphadenopathy on imaging. Biopsies revealed classical HL with bulk disease. She is known to have diffuse lymphadenopathy systemically including a mediastinal mass which is ~5x4 cm in size and is impinging on her bronchotracheal tree and SVC. Patient has thus far not been treated out of refusal to accept conventional medical therapies and has expressed wishes to try alternative medical therapies despite repeat counseling by her oncologist about the risks of delaying treatment which include death. Patient was seen at [facility] today and was found to have significant facial swelling along with RUE swelling and some dyspnea. Patient has been experiencing these symptoms for the past two months and says that they are getting worse. Patient sent to NS for IV steroids, pan scanning and workup prior to initiating RT/chemo and evaluation for facial swelling.     In the ER, patient was afebrile, tachycardic in 100s and BP 90s/50s. Received 10 IV decadron and admitted to medicine. Patient is a 42 years old female with PMH of Hodgkin's Lymphoma (Lymphocyte depleted) who was sent in from [facility] by Dr. Gabrielle Baxter after she was found to have worsening facial swelling and hypotension to 70s/40 in office. Patient has been experiencing diffuse lymphadenopathy and night sweats for the past two years and was found to have extensive lymphadenopathy on imaging. Biopsies revealed classical HL with bulk disease. She is also known to have a mediastinal mass which is ~5x4 cm in size which is impinging on her bronchotracheal tree and SVC. Patient has thus far not been treated out of refusal to accept conventional medical therapies and has expressed wishes to try alternative medical therapies despite repeat counseling by her oncologist about the risks of delaying treatment which include death. Patient was seen at [facility] today and was found to have significant facial swelling along with RUE swelling and some dyspnea. Patient has been experiencing these symptoms for the past two months and says that they are getting worse. She otherwise noticed no redness of face.  Denies any shortness of breath.  She also denies fever, weight loss.  Patient sent to [facility] for IV steroids, pan scanning and workup prior to initiating RT/chemo and evaluation for facial swelling.     In the ER, patient was afebrile, tachycardic in 100s and BP 90s/50s. Received 10 IV decadron and admitted to medicine.  During ER stay, her temperature dropped to 94.

## 2017-07-27 NOTE — H&P ADULT - PROBLEM SELECTOR PLAN 4
DVT ppx: Lovenox HL untreated with lymphopenia who is hypothermic   Will pan cultures. CT chest neg for consolidation   Cover with broad spectrum antibiotics for now

## 2017-07-27 NOTE — ED PROVIDER NOTE - NS ED ROS FT
ROS: denies HA, dizziness, fevers/chills, nausea/vomiting, chest pain, SOB, diaphoresis, abdominal pain, back/neck pain, dysuria/hematuria, or rash  +generalized weakness

## 2017-07-27 NOTE — ED PROVIDER NOTE - OBJECTIVE STATEMENT
42 y.o. female hx of recent diagnosis of Hodgkins lymphoma has not started chemo yet because pt currently refusing, sent in for [facility] for possible SVC syndrome, hypotensive in office to 70s systolic. Sent with instructions for admission, labs, cts, fluids, decadron. Pt denies any upper extremity swelling, new sob, chest pain.

## 2017-07-27 NOTE — H&P ADULT - NSHPREVIEWOFSYSTEMS_GEN_ALL_CORE
Review of Systems: GEN: Neg fevers, Neg chills, Neg night sweats no weight loss , Positive swollen lymph nodes    EYES: No blurry vision , no changes in vision  ENT: no sores, no runny nose, no sore throat   PULM: no cough, Positive shortness of breath   CARD: no chest pain, no palpitations    GI : no abdominal pain , no nausea, no vomiting  : no burning urination, no change in color of urine, no flank pain, no blood in urine   MSK: Positive RUE swelling   SKIN: no bruising or bleeding, no sores in mouth  , no yellowing of the skin  Neuro: no lightheadedness, no headaches, no weakness, no fainting, no confusion , no seizures Review of Systems: GEN: Neg fevers, Neg chills, + night sweats; no weight loss , Positive swollen lymph nodes    EYES: No blurry vision , no changes in vision  ENT: no sores, no runny nose, no sore throat   PULM: no cough, Positive shortness of breath   CARD: no chest pain, no palpitations    GI : no abdominal pain , no nausea, no vomiting  : no burning urination, no change in color of urine, no flank pain, no blood in urine   MSK: Positive RUE swelling   PSYCH: no anxiety or depression  SKIN: no bruising or bleeding, no sores in mouth  , no yellowing of the skin  Neuro: no lightheadedness, no headaches, no weakness, no fainting, no confusion , no seizures

## 2017-07-27 NOTE — H&P ADULT - HISTORY OF PRESENT ILLNESS
This is a 42 year old female with progressive lymphadenopathy for the past 2 years who recently underwent a left axillary lymph node excisional biopsy that revealed classic Hodgkin's lymphoma, lymphocyte depleted. Pathology with enlarged lymph node with diffuse proliferation of atypical cells/Pedro-Marcos cells, foci of necrosis. Cells are positive for CD30, [place]-5, Mum-1, negative CD3, CD4, CD15, CD20, CD79a, CD43, CD45, KIMBERLEY. Ki-67 20%.     She had noticed cervical lymphadenopathy over the last 2 years, progressively increasing. She had associated night sweats, weight loss. She was admitted from 4/14-4/18 at [facility] due to worsening dyspnea on exertion. There she underwent a CT chest/abdomen/pelvis which revealed extensive mediastinal, hilar, axillary lymphadenopathy. Mass in the right superior mediastinum measuring 5.2 x 3.2 cm, AP window 4.9 x 3.6 cm, subcarinal 2.7 cm, left axillary 2.8 x 2.2 cm. There was narrowing of the tracheaobronchial tree, bilateral mainstem bronchi with distal occlusion. Extensive upper abdominal, paraaortic/periportal, portacaval, peripancreatic lymph nodes, enlarged spleen. She underwent a core needle biopsy on 4/18- results were not diagnostic. She then underwent a CT chest with contrast on 5/20 which revealed increased mediastinal, axillary, upper/retroperitoneal adenopathy. She underwent an excisional lymph node biopsy on 7/12.     She is currently accompanied by her daughter for this visit. Her lymph nodes have progressively gotten larger, now with new lymphadenopathy in her left groin. She has progressive facial swelling, lower extremity swelling. She continues to have dyspnea on exertion, shortness of breath at rest. She does not feel it has worsened though her daughter does feel that she has worsened. She denies headaches, not lightheaded, not dizzy, no visual changes. She has a fair appetite, does not feel that she has lost any weight. She does not have chest pain, no abdominal pain, no n/v/d, no fever/chills. She denies hemoptysis or any cough. KsoadJqdyyos722Ppd RfftgLitgrho270Hrvrz   GtgrxQlivutp504Nfv MsvfmJopdxdb924Lktgx EwdrfNqpamoq864Imm RywskFasiblc251Yavwe DbnrmNdiunet739Rai VhbuyCvjinuy880Xrrgl SmaxjEmcruzd612Ble KuppiOedonfc364Iwser VynyxJnnjlsr014Qny KifxfWndniwd134Xlbeq NdlngAzivihd007Ftd PddqdLxouyja827Cpacf IbnztTprgyxm562Fbl NgoouAzqttmx540Bxurt GduanPfgmvtf318Vfr EoygmQfvsakg589Awltq DxbwtQydukmo858Ekb DlsyrHosnfzx841Dlkal MipdhHfeywcz364Rxi YzjxkFbnnvpu877Htshu StbueFxicoxs174Vpi NpreoLsxeqnd122Tcepq This is a 42 year old female with progressive lymphadenopathy for the past 2 years who recently underwent a left axillary lymph node excisional biopsy that revealed classic Hodgkin's lymphoma, lymphocyte depleted. Pathology with enlarged lymph node with diffuse proliferation of atypical cells/Pedro-Marcos cells, foci of necrosis. Cells are positive for CD30, [place]-5, Mum-1, negative CD3, CD4, CD15, CD20, CD79a, CD43, CD45, KIMBERLEY. Ki-67 20%.     She had noticed cervical lymphadenopathy over the last 2 years, progressively increasing. She had associated night sweats, weight loss. She was admitted from 4/14-4/18 at [facility] due to worsening dyspnea on exertion. There she underwent a CT chest/abdomen/pelvis which revealed extensive mediastinal, hilar, axillary lymphadenopathy. Mass in the right superior mediastinum measuring 5.2 x 3.2 cm, AP window 4.9 x 3.6 cm, subcarinal 2.7 cm, left axillary 2.8 x 2.2 cm. There was narrowing of the tracheaobronchial tree, bilateral mainstem bronchi with distal occlusion. Extensive upper abdominal, paraaortic/periportal, portacaval, peripancreatic lymph nodes, enlarged spleen. She underwent a core needle biopsy on 4/18- results were not diagnostic. She then underwent a CT chest with contrast on 5/20 which revealed increased mediastinal, axillary, upper/retroperitoneal adenopathy. She underwent an excisional lymph node biopsy on 7/12.     She is currently accompanied by her daughter for this visit. Her lymph nodes have progressively gotten larger, now with new lymphadenopathy in her left groin. She has progressive facial swelling, lower extremity swelling. She continues to have dyspnea on exertion, shortness of breath at rest. She does not feel it has worsened though her daughter does feel that she has worsened. She denies headaches, not lightheaded, not dizzy, no visual changes. She has a fair appetite, does not feel that she has lost any weight. She does not have chest pain, no abdominal pain, no n/v/d, no fever/chills. She denies hemoptysis or any cough. PmlvnOlpgjee215Owl JbhtoLxskzju460Mqvzb   KgkegBxjkhrr386Uxb UhnbpGoeqckx888Ictrh QpfjsUargaft588Xhv OwpqnXxrdovq789Oqnmv FynrsQwkkmzt302Cdy DxkzgAwxshxl866Ypqis ZtrbeGmwgper755Bgx VzhlxOsmdioy184Irffb CgnriJhrrpum647Gty JmywoWkkpmje369Vwoon ToqhySowsdwz324Dfm BckkpBytzojg577Lhqxs YzlejNktwybb173Siu CtsfqSmrwmqh681Kiajo QlbgiVykocqo497Krm IqcgpVldrmhh683Giadv UtjpxOblxibl230Yek AhsdaMlcjjnf047Ubvov ZksloEnmaqbj651Mxp LrpgbPicclzg654Lyvsn QgszuWfczulh659Lez RlltdAjlttle476Jxlmw

## 2017-07-27 NOTE — ED PROVIDER NOTE - PHYSICAL EXAMINATION
Gen: Well appearing, NAD, AOx3  Head: NCAT  HEENT: PERRL, MMM, normal conjunctiva  Lung: CTAB, no rales, rhonchi or wheezing  CV: S1/S2, no murmurs, rubs or gallops  Abd: soft, NTND, no rebound or guarding  MSK: No CVA tenderness. No edema, no visible deformities  Neuro: No focal neurologic deficits.   Skin: Warm and dry, no evidence of rash  Psych: normal mood and affect Gen: ill appearing, NAD, AOx3  Head: NCAT  HEENT: PERRL, MMM, normal conjunctiva, neck: anterior and posterior cervical lympadenopathy  Lung: CTAB, no rales, rhonchi or wheezing  CV: S1/S2, no murmurs, rubs or gallops, + 1 pitting edema rue, Mild edema b/l le  Abd: soft, NTND, no rebound or guarding  MSK: No CVA tenderness. No edema, no visible deformities  Neuro: No focal neurologic deficits.   Skin: Warm and dry, no evidence of rash  Psych: normal mood and affect

## 2017-07-27 NOTE — H&P ADULT - PROBLEM SELECTOR PLAN 2
Classical HL as per pathology report-newly diagnosed and lymphocyte depleted   Bulky disease with extensive involvement of her lungs-bronchial narrowing and compression on imaging-Stage III/IV with likely BM involvement as per onc. Has IPS risk factors like WBC> 15, Hg< 10, Lymphocytes >8%  Untreated because patient not amenable  Plan to offer AVM with RT-omitting bleomycin because of pulmonary symptoms   Will check TL labs q12 with TTE. Miller scanned already   Onc following Classical HL as per pathology report-newly diagnosed and lymphocyte depleted   Bulky disease with extensive involvement of her lungs-bronchial narrowing and compression on imaging-Stage III/IV with likely BM involvement as per onc. Has IPS risk factors like WBC> 15, Hg< 10, Lymphocytes >8%--IPS score 3060% freedome from progression   Untreated because patient not amenable  Plan to offer AVM with RT-omitting bleomycin because of pulmonary symptoms   Will check TL labs q12 with TTE. Miller scanned already   Onc following Classical HL as per pathology report-newly diagnosed and lymphocyte depleted   Bulky disease with extensive involvement of her lungs-bronchial narrowing and compression on imaging-Stage III/IV with likely BM involvement as per onc. Has IPS risk factors like WBC> 15, Hg< 10, Lymphocytes >8%--IPS score 3060% freedome from progression   Untreated because patient not amenable. She was explicitly told by Dr. Baxter that the risks of delaying treatment include worsening respiratory distress and even death among other complications   Plan to offer AVM with RT-omitting bleomycin because of pulmonary symptoms   Will check TL labs q12 with TTE. Miller scanned already   Onc following

## 2017-07-27 NOTE — H&P ADULT - ASSESSMENT
Patient is a 42 years old female with PMH of Hodgkin's Lymphoma (Lymphocyte depleted) who was sent in from [facility] by Dr. Gabrielle Baxter after she was found to have worsening facial swelling and hypotension to 70s/40 in office.

## 2017-07-27 NOTE — ED ADULT NURSE REASSESSMENT NOTE - NS ED NURSE REASSESS COMMENT FT1
Pt. on CM, MD aware of heart rate, no interventions required at this time. Breathing unlabored on RA. comfort and safety measures in place. TBA.

## 2017-07-27 NOTE — CONSULT NOTE ADULT - SUBJECTIVE AND OBJECTIVE BOX
HPI:    pt is  a 42 yo F with newly diagnosed classical hodgkins lymphoma who was sent from [facility] with hypotension and concern for SVC syndrome. Pt reports her hx dates back to 2-3 years ago when she noted lymphadenopathy in her neck region. She ignored this as they were small. It wasn't until this past april 207 when she went to the ED with acute onset of SOB. She went for CT chest that     PAST MEDICAL & SURGICAL HISTORY:  Fever and chills  Adenopathy  H/O abdominoplasty: 2004  H/O bilateral breast implants: 2004      General: denies fevers, chills  Skin/Breast: denies rash   Ophthalmologic: denies blurry vision  ENMT: denies throat pain  Respiratory and Thorax: denies cough, denies shortness of breath  Cardiovascular: denies chest pain, palpitations. Denies LE swelling   Gastrointestinal: denies abdominal pain/ nausea/ vomiting/ diarrhea. Denies BRBPR/ melena   Genitourinary: Denies dysuria  Musculoskeletal: Denies mylagias   Neurological: Denies syncope  Psychiatric: Denies mood disturbance   Hematology/Lymphatics: denies bleeding/bruising. Denies skin lumps 	    MEDICATIONS  (STANDING):    MEDICATIONS  (PRN):      Allergies    No Known Allergies    Intolerances        SOCIAL HISTORY:    FAMILY HISTORY:  Family history of lung cancer (Father)      Vital Signs Last 24 Hrs  T(C): 36.7 (27 Jul 2017 13:48), Max: 37.1 (27 Jul 2017 13:23)  T(F): 98.1 (27 Jul 2017 13:48), Max: 98.7 (27 Jul 2017 13:23)  HR: 105 (27 Jul 2017 13:48) (105 - 110)  BP: 95/75 (27 Jul 2017 13:48) (95/75 - 96/57)  BP(mean): --  RR: 18 (27 Jul 2017 13:48) (18 - 18)  SpO2: 100% (27 Jul 2017 13:48) (98% - 100%)    PHYSICAL EXAM:    GENERAL: NAD, AAOx3   HEAD:  NC/AT  EYES: EOMI, PERRLA, no scleral icterus  HEENT: Moist mucous membranes  LUNG: Clear to auscultation bilaterally; No rales, rhonchi, wheezing, or rubs  HEART: RRR; No murmurs, rubs, or gallops  ABDOMEN: +BS, ST/ND/NT  EXTREMITIES:  2+ Peripheral Pulses, No clubbing, cyanosis, or edema  LAD: no palpable adenopathy    LABS:                        7.9    15.6  )-----------( 310      ( 27 Jul 2017 14:12 )             25.9     07-27    134<L>  |  96  |  11  ----------------------------<  111<H>  4.1   |  28  |  0.43<L>    Ca    8.8      27 Jul 2017 14:12    TPro  7.3  /  Alb  2.3<L>  /  TBili  1.3<H>  /  DBili  x   /  AST  17  /  ALT  19  /  AlkPhos  298<H>  07-27    PT/INR - ( 27 Jul 2017 14:12 )   PT: 18.4 sec;   INR: 1.69 ratio         PTT - ( 27 Jul 2017 14:12 )  PTT:33.7 sec          RADIOLOGY & ADDITIONAL STUDIES: HPI:    pt is  a 44 yo F with newly diagnosed classical hodgkins lymphoma who was sent from [facility] with hypotension and concern for SVC syndrome. Pt reports her hx dates back to 2-3 years ago when she noted lymphadenopathy in her neck region. She ignored this as they were small. It wasn't until this past april 207 when she went to the ED with acute onset of SOB. She went for CT chest that showed diffuse lymphadenopathy. Pt was advised to come to [facility] for work up however was     PAST MEDICAL & SURGICAL HISTORY:  Fever and chills  Adenopathy  H/O abdominoplasty: 2004  H/O bilateral breast implants: 2004      General: denies fevers, chills  Skin/Breast: denies rash   Ophthalmologic: denies blurry vision  ENMT: denies throat pain  Respiratory and Thorax: denies cough, denies shortness of breath  Cardiovascular: denies chest pain, palpitations. Denies LE swelling   Gastrointestinal: denies abdominal pain/ nausea/ vomiting/ diarrhea. Denies BRBPR/ melena   Genitourinary: Denies dysuria  Musculoskeletal: Denies mylagias   Neurological: Denies syncope  Psychiatric: Denies mood disturbance   Hematology/Lymphatics: denies bleeding/bruising. Denies skin lumps 	    MEDICATIONS  (STANDING):    MEDICATIONS  (PRN):      Allergies    No Known Allergies    Intolerances        SOCIAL HISTORY:    FAMILY HISTORY:  Family history of lung cancer (Father)      Vital Signs Last 24 Hrs  T(C): 36.7 (27 Jul 2017 13:48), Max: 37.1 (27 Jul 2017 13:23)  T(F): 98.1 (27 Jul 2017 13:48), Max: 98.7 (27 Jul 2017 13:23)  HR: 105 (27 Jul 2017 13:48) (105 - 110)  BP: 95/75 (27 Jul 2017 13:48) (95/75 - 96/57)  BP(mean): --  RR: 18 (27 Jul 2017 13:48) (18 - 18)  SpO2: 100% (27 Jul 2017 13:48) (98% - 100%)    PHYSICAL EXAM:    GENERAL: NAD, AAOx3   HEAD:  NC/AT  EYES: EOMI, PERRLA, no scleral icterus  HEENT: Moist mucous membranes  LUNG: Clear to auscultation bilaterally; No rales, rhonchi, wheezing, or rubs  HEART: RRR; No murmurs, rubs, or gallops  ABDOMEN: +BS, ST/ND/NT  EXTREMITIES:  2+ Peripheral Pulses, No clubbing, cyanosis, or edema  LAD: no palpable adenopathy    LABS:                        7.9    15.6  )-----------( 310      ( 27 Jul 2017 14:12 )             25.9     07-27    134<L>  |  96  |  11  ----------------------------<  111<H>  4.1   |  28  |  0.43<L>    Ca    8.8      27 Jul 2017 14:12    TPro  7.3  /  Alb  2.3<L>  /  TBili  1.3<H>  /  DBili  x   /  AST  17  /  ALT  19  /  AlkPhos  298<H>  07-27    PT/INR - ( 27 Jul 2017 14:12 )   PT: 18.4 sec;   INR: 1.69 ratio         PTT - ( 27 Jul 2017 14:12 )  PTT:33.7 sec          RADIOLOGY & ADDITIONAL STUDIES: HPI:    pt is  a 42 yo F with newly diagnosed classical hodgkins lymphoma who was sent from [facility] with hypotension and concern for SVC syndrome. Pt reports her hx dates back to 2-3 years ago when she noted lymphadenopathy in her neck region. She ignored this as they were small. It wasn't until this past april 2007 when she went to the ED with acute onset of SOB. She went for CT chest that showed diffuse lymphadenopathy. Pt was advised to come to [facility] for work up however was     PAST MEDICAL & SURGICAL HISTORY:  Fever and chills  Adenopathy  H/O abdominoplasty: 2004  H/O bilateral breast implants: 2004      General: denies fevers, chills  Skin/Breast: denies rash   Ophthalmologic: denies blurry vision  ENMT: denies throat pain  Respiratory and Thorax: denies cough, denies shortness of breath  Cardiovascular: denies chest pain, palpitations. Denies LE swelling   Gastrointestinal: denies abdominal pain/ nausea/ vomiting/ diarrhea. Denies BRBPR/ melena   Genitourinary: Denies dysuria  Musculoskeletal: Denies mylagias   Neurological: Denies syncope  Psychiatric: Denies mood disturbance   Hematology/Lymphatics: denies bleeding/bruising. Denies skin lumps 	    MEDICATIONS  (STANDING):    MEDICATIONS  (PRN):      Allergies    No Known Allergies    Intolerances        SOCIAL HISTORY:    FAMILY HISTORY:  Family history of lung cancer (Father)      Vital Signs Last 24 Hrs  T(C): 36.7 (27 Jul 2017 13:48), Max: 37.1 (27 Jul 2017 13:23)  T(F): 98.1 (27 Jul 2017 13:48), Max: 98.7 (27 Jul 2017 13:23)  HR: 105 (27 Jul 2017 13:48) (105 - 110)  BP: 95/75 (27 Jul 2017 13:48) (95/75 - 96/57)  BP(mean): --  RR: 18 (27 Jul 2017 13:48) (18 - 18)  SpO2: 100% (27 Jul 2017 13:48) (98% - 100%)    PHYSICAL EXAM:    GENERAL: NAD, AAOx3   HEAD:  NC/AT  EYES: EOMI, PERRLA, no scleral icterus  HEENT: Moist mucous membranes  LUNG: Clear to auscultation bilaterally; No rales, rhonchi, wheezing, or rubs  HEART: RRR; No murmurs, rubs, or gallops  ABDOMEN: +BS, ST/ND/NT  EXTREMITIES:  2+ Peripheral Pulses, No clubbing, cyanosis, or edema  LAD: no palpable adenopathy    LABS:                        7.9    15.6  )-----------( 310      ( 27 Jul 2017 14:12 )             25.9     07-27    134<L>  |  96  |  11  ----------------------------<  111<H>  4.1   |  28  |  0.43<L>    Ca    8.8      27 Jul 2017 14:12    TPro  7.3  /  Alb  2.3<L>  /  TBili  1.3<H>  /  DBili  x   /  AST  17  /  ALT  19  /  AlkPhos  298<H>  07-27    PT/INR - ( 27 Jul 2017 14:12 )   PT: 18.4 sec;   INR: 1.69 ratio         PTT - ( 27 Jul 2017 14:12 )  PTT:33.7 sec          RADIOLOGY & ADDITIONAL STUDIES: HPI:    pt is  a 44 yo F with newly diagnosed classical hodgkins lymphoma who was sent from [facility] with hypotension and concern for SVC syndrome. Pt reports her hx dates back to 2-3 years ago when she noted lymphadenopathy in her neck region. She ignored this as they were small. It wasn't until this past apri/ mayl 2007 when she went to the ED with acute onset of SOB. She went for CT chest that showed diffuse lymphadenopathy. Pt was advised to come to [facility] for work up however she did not make an appointment as her symptoms resloved in the ED. she then had further progression of her symptoms that prompted her to seek medical care. she underwent a left axillary LN biopsy on 7/12 that was consistent with classical hodgkins. Pt was planned for an appointment with Dr. Baxter at [facility] to establish care today, however upon arrival was found with a BP of 70/50 and swelling of the UE and face. Given hypotension and concern for SVC syndrome pt was sent urgently over to [facility]. Treatment options and the need to urgent radiation therapy were d/w pt at [facility] and pt is not interesetd in treatment at this moment as she feels overwhelmed with all the news and needs to let things " settle in".     PAST MEDICAL & SURGICAL HISTORY:  Fever and chills  Adenopathy  H/O abdominoplasty: 2004  H/O bilateral breast implants: 2004      General: denies fevers, chills, occasional night sweats and weight loss over the past 6 months  Skin/Breast: denies rash   Ophthalmologic: denies blurry vision  ENMT: denies throat pain, denies issues swallowing   Respiratory and Thorax: + dry wheezy cough denies shortness of breath  Cardiovascular: denies chest pain, palpitations. Denies LE swelling   Gastrointestinal: denies abdominal pain/ nausea/ vomiting/ diarrhea. Denies BRBPR/ melena   Genitourinary: Denies dysuria  Musculoskeletal: Denies mylagias   Neurological: Denies syncope  Psychiatric: Denies mood disturbance   Hematology/Lymphatics: denies bleeding/bruising. + swollen lymph nodes 	    MEDICATIONS  (STANDING):    MEDICATIONS  (PRN):      Allergies    No Known Allergies    Intolerances        SOCIAL HISTORY:    FAMILY HISTORY:  Family history of lung cancer (Father)      Vital Signs Last 24 Hrs  T(C): 36.7 (27 Jul 2017 13:48), Max: 37.1 (27 Jul 2017 13:23)  T(F): 98.1 (27 Jul 2017 13:48), Max: 98.7 (27 Jul 2017 13:23)  HR: 105 (27 Jul 2017 13:48) (105 - 110)  BP: 95/75 (27 Jul 2017 13:48) (95/75 - 96/57)  BP(mean): --  RR: 18 (27 Jul 2017 13:48) (18 - 18)  SpO2: 100% (27 Jul 2017 13:48) (98% - 100%)    PHYSICAL EXAM:    GENERAL: NAD, AAOx3   HEAD:  NC/AT  EYES: EOMI, PERRLA, no scleral icterus  HEENT: Moist mucous membranes  LUNG: Clear to auscultation bilaterally; No rales, rhonchi, wheezing, or rubs  HEART: RRR; No murmurs, rubs, or gallops  ABDOMEN: +BS, ST/ND/NT, no splenomegaly  EXTREMITIES:  2+ Peripheral Pulses, No clubbing, cyanosis, or edema  LAD: extensive palpable adenopathy cervical, axillary, and inguinal region on the left.     LABS:                        7.9    15.6  )-----------( 310      ( 27 Jul 2017 14:12 )             25.9     07-27    134<L>  |  96  |  11  ----------------------------<  111<H>  4.1   |  28  |  0.43<L>    Ca    8.8      27 Jul 2017 14:12    TPro  7.3  /  Alb  2.3<L>  /  TBili  1.3<H>  /  DBili  x   /  AST  17  /  ALT  19  /  AlkPhos  298<H>  07-27    PT/INR - ( 27 Jul 2017 14:12 )   PT: 18.4 sec;   INR: 1.69 ratio         PTT - ( 27 Jul 2017 14:12 )  PTT:33.7 sec    Lactate Dehydrogenase, Serum (07.27.17 @ 14:12)    Lactate Dehydrogenase, Serum: 172 U/L    Uric Acid, Serum: 3.3 mg/dL (07.27.17 @ 14:12)    Sedimentation Rate, Erythrocyte (07.27.17 @ 14:12)    Sedimentation Rate, Erythrocyte: 120 mm/hr              RADIOLOGY & ADDITIONAL STUDIES:

## 2017-07-27 NOTE — H&P ADULT - PSH
H/O abdominoplasty  2004  H/O bilateral breast implants  2004
H/O abdominoplasty  2004  H/O bilateral breast implants  2004

## 2017-07-27 NOTE — H&P ADULT - PROBLEM SELECTOR PLAN 1
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 Will c/w decadron 4 mg q6 and monitor respiratory status   Patient still not amenable to RT or chemo-may need urgent RT this admission Facial and RUE swelling along with mild dyspnea   Likely secondary to SVC impingement from mediastinal mass along with other diffuse lymphadenopathy. Will c/w IV decadron 4 mg q6 and monitor respiratory status   Patient still not amenable to RT or chemo-may need urgent RT this admission

## 2017-07-27 NOTE — H&P ADULT - NSHPLABSRESULTS_GEN_ALL_CORE
7.9    15.6  )-----------( 310      ( 27 Jul 2017 14:12 )             25.9       07-27    134<L>  |  96  |  11  ----------------------------<  111<H>  4.1   |  28  |  0.43<L>    Ca    8.8      27 Jul 2017 14:12    TPro  7.3  /  Alb  2.3<L>  /  TBili  1.3<H>  /  DBili  x   /  AST  17  /  ALT  19  /  AlkPhos  298<H>  07-27    CT: 7.9    15.6  )-----------( 310      ( 27 Jul 2017 14:12 )             25.9       07-27    134<L>  |  96  |  11  ----------------------------<  111<H>  4.1   |  28  |  0.43<L>    Ca    8.8      27 Jul 2017 14:12    TPro  7.3  /  Alb  2.3<L>  /  TBili  1.3<H>  /  DBili  x   /  AST  17  /  ALT  19  /  AlkPhos  298<H>  07-27    CT: Chest:  1.  No pulmonary embolism.   2.  Moderate right and small left pleural effusions.   3.   Bilateral nodular opacities have decreased from prior examination.   4.  Redemonstration of extensive lymphadenopathy.    Abdomen/Pelvis  1.  Redemonstration of extensive retroperitoneal lymphadenopathy with new   pelvic side wall and left inguinal lymphadenopathy.  2.  Redemonstration of splenomegaly with multiple indeterminate hypodense   lesions suspicious for metastatic disease.  3.  Small volume ascites.  4.  Anasarca. 7.9    15.6  )-----------( 310      ( 27 Jul 2017 14:12 )             25.9       07-27    134<L>  |  96  |  11  ----------------------------<  111<H>  4.1   |  28  |  0.43<L>    Ca    8.8      27 Jul 2017 14:12    TPro  7.3  /  Alb  2.3<L>  /  TBili  1.3<H>  /  DBili  x   /  AST  17  /  ALT  19  /  AlkPhos  298<H>  07-27    CT: Chest:  1.  No pulmonary embolism.   2.  Moderate right and small left pleural effusions.   3.   Bilateral nodular opacities have decreased from prior examination.   4.  Redemonstration of extensive lymphadenopathy.    Abdomen/Pelvis  1.  Redemonstration of extensive retroperitoneal lymphadenopathy with new   pelvic side wall and left inguinal lymphadenopathy.  2.  Redemonstration of splenomegaly with multiple indeterminate hypodense   lesions suspicious for metastatic disease.  3.  Small volume ascites.  4.  Anasarca.    A  focused  transthoracic  cardiac  ultrasound  examination  was  performed, which showed moderate pericardial effusion and some right ventricular collapse with no evidence of temponade      EKG ordered    Old records reviewed:  EKG April 2017: NSR  CXR April 2017: Clear lungs.  Multiple nodules are seen scattered throughout both lungs. Correlate with   CT chest May 2017: Extensive mediastinal, axillary, and upper abdominal/retroperitoneal   adenopathy, with increased axillary adenopathy increased compared to the   prior study concerning for lymphoma. Bronchovascular nodularity is   increased compared to the prior study also concerning for lymphoma. This   causes mass effect upon the trachea and opacification of bronchi.     Right breast skin thickening may be secondary to mass effect upon   draining veins. Correlate with physical exam.

## 2017-07-27 NOTE — H&P ADULT - FAMILY HISTORY
Father  Still living? Unknown  Family history of lung cancer, Age at diagnosis: Age Unknown     Sibling  Still living? Unknown  Family history of cervical cancer, Age at diagnosis: Age Unknown
Father  Still living? Unknown  Family history of lung cancer, Age at diagnosis: Age Unknown

## 2017-07-27 NOTE — H&P ADULT - NSHPLABSRESULTS_GEN_ALL_CORE
7.9    15.6  )-----------( 310      ( 27 Jul 2017 14:12 )             25.9       07-27    134<L>  |  96  |  11  ----------------------------<  111<H>  4.1   |  28  |  0.43<L>    Ca    8.8      27 Jul 2017 14:12    TPro  7.3  /  Alb  2.3<L>  /  TBili  1.3<H>  /  DBili  x   /  AST  17  /  ALT  19  /  AlkPhos  298<H>  07-27

## 2017-07-27 NOTE — H&P ADULT - PROBLEM SELECTOR PLAN 3
HL untreated with lymphopenia who is hypothermic   Will pan cultures. CT chest neg for consolidation   Cover with broad spectrum antibiotics for now wbc>12 with HR>90   HL untreated with lymphopenia who is hypothermic   Will pan cultures. CT chest neg for consolidation   Cover with broad spectrum antibiotics for now

## 2017-07-27 NOTE — H&P ADULT - NSHPSOCIALHISTORY_GEN_ALL_CORE
Lives with  and children   Denies smoking, alcohol use and illicit drug use Lives with children   Denies smoking, alcohol use and illicit drug use

## 2017-07-27 NOTE — CONSULT NOTE ADULT - ASSESSMENT
42 F wtih newly diagnosed 42 F with newly diagnosed with classical HD with SVC syndrome. 42 F with newly diagnosed treatment naieve classical HD admitted with symptomatic disease and concern for SVC syndrome

## 2017-07-27 NOTE — H&P ADULT - NSHPPHYSICALEXAM_GEN_ALL_CORE
Vital Signs Last 24 Hrs  T(C): 34.8 (27 Jul 2017 23:04), Max: 37.1 (27 Jul 2017 13:23)  T(F): 94.6 (27 Jul 2017 23:04), Max: 98.7 (27 Jul 2017 13:23)  HR: 85 (27 Jul 2017 23:04) (85 - 117)  BP: 101/72 (27 Jul 2017 23:04) (95/75 - 102/73)  BP(mean): --  RR: 18 (27 Jul 2017 23:04) (17 - 18)  SpO2: 99% (27 Jul 2017 23:04) (95% - 100%)    PHYSICAL EXAM:  GENERAL: NAD, well-developed, facial swelling noted   HEAD:  Atraumatic, Normocephalic  EYES: EOMI, PERRLA, conjunctiva and sclera clear  NECK: Supple, No JVD, anterior and posterior cervical Lymphadenopathy noted b/l  CHEST/LUNG: Clear to auscultation bilaterally; No wheeze or crackles. Good airway movement all fields   HEART: Regular rate and rhythm; No murmurs, rubs, or gallops  ABDOMEN: Soft, Nontender, Nondistended; Bowel sounds present. Hepatomegaly and Splenomegaly noted. No fluid wave   EXTREMITIES:  RUE pitting edema 1+. 2+ Peripheral Pulses, No clubbing, cyanosis  PSYCH: AAOx3  NEUROLOGY: non-focal  Lymphatics: Diffuse lymphadenopathy noted cervical, axillary,   SKIN: No rashes or lesions
Vital Signs Last 24 Hrs  T(C): 36.3 (27 Jul 2017 22:22), Max: 37.1 (27 Jul 2017 13:23)  T(F): 97.3 (27 Jul 2017 22:22), Max: 98.7 (27 Jul 2017 13:23)  HR: 85 (27 Jul 2017 23:04) (85 - 117)  BP: 101/72 (27 Jul 2017 23:04) (95/75 - 102/73)  BP(mean): --  RR: 18 (27 Jul 2017 23:04) (17 - 18)  SpO2: 99% (27 Jul 2017 23:04) (95% - 100%)    PHYSICAL EXAM:  GENERAL: NAD, well-developed  HEAD:  Atraumatic, Normocephalic  EYES: EOMI, PERRLA, conjunctiva and sclera clear  NECK: Supple, No JVD  CHEST/LUNG: Clear to auscultation bilaterally; No wheeze  HEART: Regular rate and rhythm; No murmurs, rubs, or gallops  ABDOMEN: Soft, Nontender, Nondistended; Bowel sounds present  EXTREMITIES:  2+ Peripheral Pulses, No clubbing, cyanosis, or edema  PSYCH: AAOx3  NEUROLOGY: non-focal  SKIN: No rashes or lesions

## 2017-07-27 NOTE — ED PROVIDER NOTE - MEDICAL DECISION MAKING DETAILS
42 y.o. female sent in for admission for cts, fluids, decadron for possible SVC syndrome. Will then call heme/onc and admit. 42 y.o. female sent in for admission for cts, fluids, decadron for possible SVC syndrome. Will then call heme/onc and admit.  Adilia: Patient sent from [facility] with newly diagnosed lymphoma with hypotension. IVF and steroids started, will get cta to r/o pe, admit.

## 2017-07-28 ENCOUNTER — TRANSCRIPTION ENCOUNTER (OUTPATIENT)
Age: 42
End: 2017-07-28

## 2017-07-28 DIAGNOSIS — R65.10 SYSTEMIC INFLAMMATORY RESPONSE SYNDROME (SIRS) OF NON-INFECTIOUS ORIGIN WITHOUT ACUTE ORGAN DYSFUNCTION: ICD-10-CM

## 2017-07-28 DIAGNOSIS — Z29.9 ENCOUNTER FOR PROPHYLACTIC MEASURES, UNSPECIFIED: ICD-10-CM

## 2017-07-28 DIAGNOSIS — C81.90 HODGKIN LYMPHOMA, UNSPECIFIED, UNSPECIFIED SITE: ICD-10-CM

## 2017-07-28 DIAGNOSIS — T68.XXXA HYPOTHERMIA, INITIAL ENCOUNTER: ICD-10-CM

## 2017-07-28 DIAGNOSIS — I31.3 PERICARDIAL EFFUSION (NONINFLAMMATORY): ICD-10-CM

## 2017-07-28 DIAGNOSIS — Z71.89 OTHER SPECIFIED COUNSELING: ICD-10-CM

## 2017-07-28 DIAGNOSIS — I87.1 COMPRESSION OF VEIN: ICD-10-CM

## 2017-07-28 DIAGNOSIS — J90 PLEURAL EFFUSION, NOT ELSEWHERE CLASSIFIED: ICD-10-CM

## 2017-07-28 LAB
ALBUMIN SERPL ELPH-MCNC: 2.3 G/DL — LOW (ref 3.3–5)
ALBUMIN SERPL ELPH-MCNC: 2.4 G/DL — LOW (ref 3.3–5)
ALP SERPL-CCNC: 257 U/L — HIGH (ref 40–120)
ALP SERPL-CCNC: 278 U/L — HIGH (ref 40–120)
ALT FLD-CCNC: 17 U/L RC — SIGNIFICANT CHANGE UP (ref 10–45)
ALT FLD-CCNC: 17 U/L RC — SIGNIFICANT CHANGE UP (ref 10–45)
ANION GAP SERPL CALC-SCNC: 13 MMOL/L — SIGNIFICANT CHANGE UP (ref 5–17)
ANION GAP SERPL CALC-SCNC: 14 MMOL/L — SIGNIFICANT CHANGE UP (ref 5–17)
APPEARANCE UR: CLEAR — SIGNIFICANT CHANGE UP
AST SERPL-CCNC: 13 U/L — SIGNIFICANT CHANGE UP (ref 10–40)
AST SERPL-CCNC: 14 U/L — SIGNIFICANT CHANGE UP (ref 10–40)
BILIRUB SERPL-MCNC: 0.9 MG/DL — SIGNIFICANT CHANGE UP (ref 0.2–1.2)
BILIRUB SERPL-MCNC: 1 MG/DL — SIGNIFICANT CHANGE UP (ref 0.2–1.2)
BILIRUB UR-MCNC: NEGATIVE — SIGNIFICANT CHANGE UP
BUN SERPL-MCNC: 12 MG/DL — SIGNIFICANT CHANGE UP (ref 7–23)
BUN SERPL-MCNC: 12 MG/DL — SIGNIFICANT CHANGE UP (ref 7–23)
CALCIUM SERPL-MCNC: 9 MG/DL — SIGNIFICANT CHANGE UP (ref 8.4–10.5)
CALCIUM SERPL-MCNC: 9.7 MG/DL — SIGNIFICANT CHANGE UP (ref 8.4–10.5)
CHLORIDE SERPL-SCNC: 100 MMOL/L — SIGNIFICANT CHANGE UP (ref 96–108)
CHLORIDE SERPL-SCNC: 97 MMOL/L — SIGNIFICANT CHANGE UP (ref 96–108)
CO2 SERPL-SCNC: 22 MMOL/L — SIGNIFICANT CHANGE UP (ref 22–31)
CO2 SERPL-SCNC: 23 MMOL/L — SIGNIFICANT CHANGE UP (ref 22–31)
COLOR SPEC: YELLOW — SIGNIFICANT CHANGE UP
CREAT SERPL-MCNC: 0.43 MG/DL — LOW (ref 0.5–1.3)
CREAT SERPL-MCNC: 0.46 MG/DL — LOW (ref 0.5–1.3)
DIFF PNL FLD: NEGATIVE — SIGNIFICANT CHANGE UP
GLUCOSE SERPL-MCNC: 131 MG/DL — HIGH (ref 70–99)
GLUCOSE SERPL-MCNC: 200 MG/DL — HIGH (ref 70–99)
GLUCOSE UR QL: NEGATIVE — SIGNIFICANT CHANGE UP
HAV IGM SER-ACNC: SIGNIFICANT CHANGE UP
HBV CORE IGM SER-ACNC: SIGNIFICANT CHANGE UP
HBV SURFACE AB SER-ACNC: SIGNIFICANT CHANGE UP
HBV SURFACE AG SER-ACNC: SIGNIFICANT CHANGE UP
HCT VFR BLD CALC: 27.4 % — LOW (ref 34.5–45)
HCV AB S/CO SERPL IA: 0.21 S/CO — SIGNIFICANT CHANGE UP
HCV AB SERPL-IMP: SIGNIFICANT CHANGE UP
HGB BLD-MCNC: 7.9 G/DL — LOW (ref 11.5–15.5)
HIV 1+2 AB+HIV1 P24 AG SERPL QL IA: SIGNIFICANT CHANGE UP
KETONES UR-MCNC: NEGATIVE — SIGNIFICANT CHANGE UP
LDH SERPL L TO P-CCNC: 164 U/L — SIGNIFICANT CHANGE UP (ref 50–242)
LDH SERPL L TO P-CCNC: 180 U/L — SIGNIFICANT CHANGE UP (ref 50–242)
LEUKOCYTE ESTERASE UR-ACNC: NEGATIVE — SIGNIFICANT CHANGE UP
MAGNESIUM SERPL-MCNC: 1.9 MG/DL — SIGNIFICANT CHANGE UP (ref 1.6–2.6)
MAGNESIUM SERPL-MCNC: 2 MG/DL — SIGNIFICANT CHANGE UP (ref 1.6–2.6)
MCHC RBC-ENTMCNC: 24.6 PG — LOW (ref 27–34)
MCHC RBC-ENTMCNC: 28.9 GM/DL — LOW (ref 32–36)
MCV RBC AUTO: 85 FL — SIGNIFICANT CHANGE UP (ref 80–100)
NITRITE UR-MCNC: NEGATIVE — SIGNIFICANT CHANGE UP
PH UR: 5.5 — SIGNIFICANT CHANGE UP (ref 5–8)
PHOSPHATE SERPL-MCNC: 3.8 MG/DL — SIGNIFICANT CHANGE UP (ref 2.5–4.5)
PHOSPHATE SERPL-MCNC: 3.9 MG/DL — SIGNIFICANT CHANGE UP (ref 2.5–4.5)
PLATELET # BLD AUTO: 285 K/UL — SIGNIFICANT CHANGE UP (ref 150–400)
POTASSIUM SERPL-MCNC: 4 MMOL/L — SIGNIFICANT CHANGE UP (ref 3.5–5.3)
POTASSIUM SERPL-MCNC: 4.1 MMOL/L — SIGNIFICANT CHANGE UP (ref 3.5–5.3)
POTASSIUM SERPL-SCNC: 4 MMOL/L — SIGNIFICANT CHANGE UP (ref 3.5–5.3)
POTASSIUM SERPL-SCNC: 4.1 MMOL/L — SIGNIFICANT CHANGE UP (ref 3.5–5.3)
PROT SERPL-MCNC: 7.4 G/DL — SIGNIFICANT CHANGE UP (ref 6–8.3)
PROT SERPL-MCNC: 7.9 G/DL — SIGNIFICANT CHANGE UP (ref 6–8.3)
PROT UR-MCNC: 30 MG/DL
RBC # BLD: 3.22 M/UL — LOW (ref 3.8–5.2)
RBC # FLD: 21.9 % — HIGH (ref 10.3–14.5)
SODIUM SERPL-SCNC: 132 MMOL/L — LOW (ref 135–145)
SODIUM SERPL-SCNC: 137 MMOL/L — SIGNIFICANT CHANGE UP (ref 135–145)
SP GR SPEC: >1.03 — HIGH (ref 1.01–1.02)
URATE SERPL-MCNC: 2.8 MG/DL — SIGNIFICANT CHANGE UP (ref 2.5–7)
URATE SERPL-MCNC: 3.4 MG/DL — SIGNIFICANT CHANGE UP (ref 2.5–7)
UROBILINOGEN FLD QL: NEGATIVE — SIGNIFICANT CHANGE UP
WBC # BLD: 13.3 K/UL — HIGH (ref 3.8–10.5)
WBC # FLD AUTO: 13.3 K/UL — HIGH (ref 3.8–10.5)

## 2017-07-28 PROCEDURE — 99223 1ST HOSP IP/OBS HIGH 75: CPT

## 2017-07-28 PROCEDURE — 99233 SBSQ HOSP IP/OBS HIGH 50: CPT | Mod: GC

## 2017-07-28 PROCEDURE — 93306 TTE W/DOPPLER COMPLETE: CPT | Mod: 26

## 2017-07-28 PROCEDURE — 0399T: CPT

## 2017-07-28 PROCEDURE — 99232 SBSQ HOSP IP/OBS MODERATE 35: CPT | Mod: GC

## 2017-07-28 RX ORDER — SODIUM CHLORIDE 9 MG/ML
1000 INJECTION INTRAMUSCULAR; INTRAVENOUS; SUBCUTANEOUS
Qty: 0 | Refills: 0 | Status: DISCONTINUED | OUTPATIENT
Start: 2017-07-28 | End: 2017-07-28

## 2017-07-28 RX ORDER — VANCOMYCIN HCL 1 G
1000 VIAL (EA) INTRAVENOUS EVERY 12 HOURS
Qty: 0 | Refills: 0 | Status: DISCONTINUED | OUTPATIENT
Start: 2017-07-28 | End: 2017-07-28

## 2017-07-28 RX ORDER — DEXAMETHASONE 0.5 MG/5ML
4 ELIXIR ORAL EVERY 6 HOURS
Qty: 0 | Refills: 0 | Status: DISCONTINUED | OUTPATIENT
Start: 2017-07-28 | End: 2017-07-29

## 2017-07-28 RX ORDER — PIPERACILLIN AND TAZOBACTAM 4; .5 G/20ML; G/20ML
3.38 INJECTION, POWDER, LYOPHILIZED, FOR SOLUTION INTRAVENOUS EVERY 8 HOURS
Qty: 0 | Refills: 0 | Status: DISCONTINUED | OUTPATIENT
Start: 2017-07-28 | End: 2017-07-28

## 2017-07-28 RX ORDER — PANTOPRAZOLE SODIUM 20 MG/1
40 TABLET, DELAYED RELEASE ORAL
Qty: 0 | Refills: 0 | Status: DISCONTINUED | OUTPATIENT
Start: 2017-07-28 | End: 2017-07-29

## 2017-07-28 RX ORDER — PIPERACILLIN AND TAZOBACTAM 4; .5 G/20ML; G/20ML
3.38 INJECTION, POWDER, LYOPHILIZED, FOR SOLUTION INTRAVENOUS ONCE
Qty: 0 | Refills: 0 | Status: COMPLETED | OUTPATIENT
Start: 2017-07-28 | End: 2017-07-28

## 2017-07-28 RX ORDER — DEXAMETHASONE 0.5 MG/5ML
4 ELIXIR ORAL ONCE
Qty: 0 | Refills: 0 | Status: COMPLETED | OUTPATIENT
Start: 2017-07-28 | End: 2017-07-28

## 2017-07-28 RX ADMIN — ENOXAPARIN SODIUM 40 MILLIGRAM(S): 100 INJECTION SUBCUTANEOUS at 12:49

## 2017-07-28 RX ADMIN — ENOXAPARIN SODIUM 40 MILLIGRAM(S): 100 INJECTION SUBCUTANEOUS at 00:01

## 2017-07-28 RX ADMIN — Medication 4 MILLIGRAM(S): at 23:34

## 2017-07-28 RX ADMIN — Medication 4 MILLIGRAM(S): at 06:34

## 2017-07-28 RX ADMIN — PIPERACILLIN AND TAZOBACTAM 200 GRAM(S): 4; .5 INJECTION, POWDER, LYOPHILIZED, FOR SOLUTION INTRAVENOUS at 02:41

## 2017-07-28 RX ADMIN — SODIUM CHLORIDE 100 MILLILITER(S): 9 INJECTION INTRAMUSCULAR; INTRAVENOUS; SUBCUTANEOUS at 02:41

## 2017-07-28 RX ADMIN — Medication 4 MILLIGRAM(S): at 17:44

## 2017-07-28 RX ADMIN — Medication 250 MILLIGRAM(S): at 04:11

## 2017-07-28 RX ADMIN — Medication 325 MILLIGRAM(S): at 12:49

## 2017-07-28 RX ADMIN — Medication 100 MILLIGRAM(S): at 12:49

## 2017-07-28 RX ADMIN — Medication 4 MILLIGRAM(S): at 12:49

## 2017-07-28 NOTE — DISCHARGE NOTE ADULT - PLAN OF CARE
please continue to take all meds as prescribed you have a known diagnosis of Hodgkins lymphoma. Please make an appointment with Dr. Baxter as soon as possible and follow up as an outpatient for possible treatment options. You will need to continue taking the steroids until Dr. Baxter sees you in clinic. The same goes for the allopurinol medication. You were foudnt to have a small-moderate amount of fluid around your heart, as well as in your lungs. You will need to have an outpt TTE to evaluate for resolution. Please come back to the ED if you ever have any chest pain, shortness of breath or palpitations. Your cancer is causing inefficient drainage of your body fluids. Please follow up with Dr. Baxter for possible treatment options as these will be the only ways to relieve the fluid build up and pressure you are experiencing. You were found to have a small-moderate amount of fluid around your heart, as well as in your lungs. You will need to have an outpt TTE to evaluate for resolution. Please come back to the ED if you ever have any chest pain, shortness of breath or palpitations.

## 2017-07-28 NOTE — DISCHARGE NOTE ADULT - HOME CARE AGENCY
[facility] - A visiting nurse will contact you to schedule your home care visit. If you have any questions about your home care services, please contact [facility]

## 2017-07-28 NOTE — PHYSICAL THERAPY INITIAL EVALUATION ADULT - ADDITIONAL COMMENTS
She is also known to have a mediastinal mass which is ~5x4 cm in size which is impinging on her bronchotracheal tree and SVC. Patient has thus far not been treated out of refusal to accept conventional medical therapies and has expressed wishes to try alternative medical therapies despite repeat counseling by her oncologist about the risks of delaying treatment which include death. Patient was seen at [facility] today and was found to have significant facial swelling along with RUE swelling and some dyspnea. Patient has been experiencing these symptoms for the past two months and says that they are getting worse. She otherwise noticed no redness of face. Patient sent to [facility] for IV steroids, pan scanning and workup prior to initiating RT/chemo and evaluation for facial swelling. CT C/A/P: b/l pleural effusions, extensive diffuse LAD. She is also known to have a mediastinal mass which is ~5x4 cm in size which is impinging on her bronchotracheal tree and SVC. Patient has thus far not been treated out of refusal to accept conventional medical therapies and has expressed wishes to try alternative medical therapies despite repeat counseling by her oncologist about the risks of delaying treatment which include death. Patient was seen at [facility] today and was found to have significant facial swelling along with RUE swelling and some dyspnea. Patient has been experiencing these symptoms for the past two months and says that they are getting worse. She otherwise noticed no redness of face. Patient sent to [facility] for IV steroids, pan scanning and workup prior to initiating RT/chemo and evaluation for facial swelling. CT C/A/P: b/l pleural effusions, extensive diffuse LAD.  Pt lives alone in an apartment with 4 flights of stairs to enter. Per pt, her daughter has been staying with her to assist as needed. Pt ambulated without AD

## 2017-07-28 NOTE — PROGRESS NOTE ADULT - SUBJECTIVE AND OBJECTIVE BOX
Nghia Silva MD  Cell: [phone]  Pager: [phone]    S:  Pt reports no pain or distress currently.      VITAL SIGNS:  Vital Signs Last 24 Hrs  T(C): 35.1 (28 Jul 2017 14:12), Max: 36.7 (27 Jul 2017 19:40)  T(F): 95.1 (28 Jul 2017 14:12), Max: 98 (27 Jul 2017 19:40)  HR: 80 (28 Jul 2017 13:26) (80 - 117)  BP: 92/63 (28 Jul 2017 13:26) (91/65 - 102/73)  BP(mean): --  RR: 18 (28 Jul 2017 13:26) (17 - 18)  SpO2: 97% (28 Jul 2017 13:26) (95% - 100%)      PHYSICAL EXAM:  GENERAL: NAD, well-developed,  HEAD:   facial swelling, atraumatic  EYES: EOMI, conjunctiva and sclera clear  NECK: anterior and posterior cervical Lymphadenopathy noted b/l  CHEST/LUNG: CTAB; No wheezes or crackles  HEART: Regular rate and rhythm; No murmurs, rubs, or gallops  ABDOMEN: Soft, Nontender, Bowel sounds present. Hepatomegaly and Splenomegaly noted. No fluid wave.  EXTREMITIES:  RUE pitting edema 1+. 2+ Peripheral Pulses, No clubbing, cyanosis  PSYCH: AAOx3  NEUROLOGY: non-focal  Lymphatics: Diffuse lymphadenopathy noted cervical, axillary,   SKIN: No rashes or lesions                          7.9    13.3  )-----------( 285      ( 28 Jul 2017 06:37 )             27.4     07-28    132<L>  |  97  |  12  ----------------------------<  200<H>  4.0   |  22  |  0.43<L>    Ca    9.0      28 Jul 2017 01:25  Phos  3.8     07-28  Mg     1.9     07-28    TPro  7.4  /  Alb  2.3<L>  /  TBili  1.0  /  DBili  x   /  AST  13  /  ALT  17  /  AlkPhos  278<H>  07-28      CAPILLARY BLOOD GLUCOSE          MEDICATIONS  (STANDING):  enoxaparin Injectable 40 milliGRAM(s) SubCutaneous daily  ferrous    sulfate 325 milliGRAM(s) Oral daily  allopurinol 100 milliGRAM(s) Oral daily  sodium chloride 0.9%. 1000 milliLiter(s) (100 mL/Hr) IV Continuous <Continuous>  dexamethasone  Injectable 4 milliGRAM(s) IV Push every 6 hours

## 2017-07-28 NOTE — DISCHARGE NOTE ADULT - CARE PROVIDER_API CALL
Gabrielle Baxter (DO), Hematology; Internal Medicine; Medical Oncology  [address] [place] [address]  Phone: [phone]  Fax: [phone]

## 2017-07-28 NOTE — PROGRESS NOTE ADULT - PROBLEM SELECTOR PLAN 1
- please get TLS labs q 12, cmp, uric acid, ldh, mag, phos, CBC with diff; HIV, hepatitis panel  - TTE moderate pleural effusion with RV collapse, no other evidence of tamponade  - CT C/A/P: b/l pleural effusions, extensive diffuse LAD  - will likely need marrow for staging as pt has not gone for PET CT  - will review treatment options again with pt. Explained she has high risk disease given her extensive LN. Informed her that she is at risk for tumor lysis syndrome and should her uric acid reach above 10 we would like to give her rasburicase. She was also unsure about this but would think about it.   -c/w NS IVF, allopurinol 100mg daily, pt agreeable to this..

## 2017-07-28 NOTE — CONSULT NOTE ADULT - SUBJECTIVE AND OBJECTIVE BOX
CHIEF COMPLAINT: Patient is a 42y old  Female who presents with a chief complaint of facial swelling, hypotension (27 Jul 2017 23:59)    HPI: 42F with significant lymphadenopathy for the last few years now s/p recent LN biopsy which showed classic hodgkins. She has been unwilling to accept her diagnosis and to this point had not agreed to treatment. She is hoping for an alternative treatment.    She originally went to have a diagnosis because she was having shortness of breath. Today she denies chest pain, palpitations or shortness of breath. She denies fevers, chills or recent travels. She is originally from [facility].    PAST MEDICAL & SURGICAL HISTORY:  Hodgkin lymphoma  H/O abdominoplasty: 2004  H/O bilateral breast implants: 2004      FAMILY HISTORY:  Family history of cervical cancer (Sibling)  Family history of lung cancer (Father)      SOCIAL HISTORY:  Smoking: Never  Substance Use: Denied  EtOH Use: Denied  Marital Status: Lives with daughter  Occupation:   Recent Travel: none  Country of Birth: [facility]  Advance Directives: Full Code    Allergies:  No Known Allergies      HOME MEDICATIONS: reviewed    REVIEW OF SYSTEMS:  Constitutional: [x] negative[ ] fevers [ ] chills [ ] weight loss [ ] weight gain  HEENT: [x] facial swelling [ ] dry eyes [ ] eye irritation [ ] postnasal drip [ ] nasal congestion  CV: [x] negative [ ] chest pain [ ] orthopnea [ ] palpitations [ ] murmur  Resp: [ ] cough [x ] shortness of breath [ ] dyspnea [ ] wheezing [ ] sputum [ ] hemoptysis  Neurological: [x] negative [ ] headache [ ] dizziness [ ] syncope [ ] weakness [ ] numbness  Psychiatric: [ ] anxiety [x ] depression  Endocrine: [ ] diabetes [ ] thyroid problem  Hematologic/Lymphatic: [ ] anemia [ ] bleeding problem [x] Lymphomas  [ x] All other systems negative  [ ] Unable to assess ROS because ________    OBJECTIVE:  ICU Vital Signs Last 24 Hrs  T(C): 35.1 (28 Jul 2017 14:12), Max: 36.7 (27 Jul 2017 19:40)  T(F): 95.1 (28 Jul 2017 14:12), Max: 98 (27 Jul 2017 19:40)  HR: 80 (28 Jul 2017 13:26) (80 - 117)  BP: 92/63 (28 Jul 2017 13:26) (91/65 - 102/73)  BP(mean): --  ABP: --  ABP(mean): --  RR: 18 (28 Jul 2017 13:26) (17 - 18)  SpO2: 97% (28 Jul 2017 13:26) (95% - 100%)      CAPILLARY BLOOD GLUCOSE      HOSPITAL MEDICATIONS:  MEDICATIONS  (STANDING):  enoxaparin Injectable 40 milliGRAM(s) SubCutaneous daily  ferrous    sulfate 325 milliGRAM(s) Oral daily  allopurinol 100 milliGRAM(s) Oral daily  sodium chloride 0.9%. 1000 milliLiter(s) (100 mL/Hr) IV Continuous <Continuous>  dexamethasone  Injectable 4 milliGRAM(s) IV Push every 6 hours    MEDICATIONS  (PRN):  acetaminophen   Tablet 650 milliGRAM(s) Oral every 6 hours PRN For Temp greater than 38 C (100.4 F)      LABS:                        7.9    13.3  )-----------( 285      ( 28 Jul 2017 06:37 )             27.4     Hgb Trend: 7.9<--, 7.9<--, 8.1<--  07-28    132<L>  |  97  |  12  ----------------------------<  200<H>  4.0   |  22  |  0.43<L>    Ca    9.0      28 Jul 2017 01:25  Phos  3.8     07-28  Mg     1.9     07-28    TPro  7.4  /  Alb  2.3<L>  /  TBili  1.0  /  DBili  x   /  AST  13  /  ALT  17  /  AlkPhos  278<H>  07-28    Creatinine Trend: 0.43<--, 0.43<--, 0.55<--  PT/INR - ( 27 Jul 2017 14:12 )   PT: 18.4 sec;   INR: 1.69 ratio         PTT - ( 27 Jul 2017 14:12 )  PTT:33.7 sec  Urinalysis Basic - ( 28 Jul 2017 01:25 )    Color: Yellow / Appearance: Clear / SG: >1.030 / pH: x  Gluc: x / Ketone: Negative  / Bili: Negative / Urobili: Negative   Blood: x / Protein: 30 mg/dL / Nitrite: Negative   Leuk Esterase: Negative / RBC: 2-5 /HPF / WBC 2-5 /HPF   Sq Epi: x / Non Sq Epi: Few /HPF / Bacteria: Few /HPF      MICROBIOLOGY:     RADIOLOGY:  [x ] Reviewed and interpreted by me  ECHO 7/27/17 - < from: US TTE 2D F/U, Limited w/o Contrast (ED) (07.27.17 @ 23:23) >  IMPRESSION:     Moderate Pericardial Effusion with some right ventricular collapse on   subxiphoid view only. No further evidence of tamponade seen    < end of copied text >    CT CHEST 7/17/17 - < from: CT Angio Chest w/ IV Cont (07.27.17 @ 17:48) >  IMPRESSION:     Chest:  1.  No pulmonary embolism.   2.  Moderate right and small left pleural effusions.   3.   Bilateral nodular opacities have decreased from prior examination.   4.  Redemonstration of extensive lymphadenopathy.    Abdomen/Pelvis  1.  Redemonstration of extensive retroperitoneal lymphadenopathy with new   pelvic side wall and left inguinal lymphadenopathy.  2.  Redemonstration of splenomegaly with multiple indeterminate hypodense   lesions suspicious for metastatic disease.  3.  Small volume ascites.  4.  Anasarca.    < end of copied text >

## 2017-07-28 NOTE — PROGRESS NOTE ADULT - PROBLEM SELECTOR PLAN 2
Classical HL as per pathology report-newly diagnosed and lymphocyte depleted   Bulky disease with extensive involvement of her lungs-bronchial narrowing and compression on imaging-Stage III/IV with likely BM involvement as per onc. Has IPS risk factors like WBC> 15, Hg< 10, Lymphocytes >8%--IPS score 3060% freedome from progression   Untreated because patient not amenable. She was explicitly told by Dr. Baxter that the risks of delaying treatment include worsening respiratory distress and even death among other complications   Plan to offer AVM with RT-omitting bleomycin because of pulmonary symptoms   Will check TL labs q12 with TTE. Miller scanned already   Onc following - Facial and RUE swelling along with mild dyspnea   - Likely secondary to SVC impingement from mediastinal mass along with other diffuse lymphadenopathy.   - Will c/w IV decadron 4 mg q6 and monitor respiratory status - add PPI  - Patient still not amenable to RT or chemo-may need urgent RT this admission  PER ONC:  explained that this is a hematologic emergency and requirers high dose steroids and radiation evaluation to try and reverse her aggressive disease. despite this, pt refused radiation consult as she is still overwhelmed with the diagnosis - Facial and RUE swelling along with mild dyspnea   - Likely secondary to SVC impingement from mediastinal mass along with other diffuse lymphadenopathy.   - Will c/w IV decadron 4 mg q6 and monitor respiratory status - add PPI  - Patient still not amenable to RT or chemo-may need urgent RT this admission  PER HEME/ONC:  - explained that this is a hematologic emergency and requirers high dose steroids and radiation evaluation to try and reverse her aggressive disease. despite this, pt refused radiation consult as she is still overwhelmed with the diagnosis - Facial and RUE swelling along with mild dyspnea   - Likely secondary to SVC impingement from mediastinal mass along with other diffuse lymphadenopathy.   - Will c/w IV decadron 4 mg q6 and monitor respiratory status  - Patient still not amenable to RT or chemo-may need urgent RT this admission  PER HEME/ONC:  - explained that this is a hematologic emergency and requirers high dose steroids and radiation evaluation to try and reverse her aggressive disease.

## 2017-07-28 NOTE — DISCHARGE NOTE ADULT - PATIENT PORTAL LINK FT
“You can access the [facility] Patient Portal, offered by [facility], by registering with the following website: [email or web address]”

## 2017-07-28 NOTE — PROGRESS NOTE ADULT - PROBLEM SELECTOR PLAN 3
wbc>12 with HR>90   HL untreated with lymphopenia who is hypothermic   Will pan cultures. CT chest neg for consolidation   Cover with broad spectrum antibiotics for now  - Pt has continued to have low temperatures wbc>12 with HR>90   HL untreated with lymphopenia who is hypothermic   Will pan cultures. CT chest neg for consolidation   no focal source for infection at this time, will hold broad spectrum antibiotics for now and monitor  - Pt has continued to have low temperatures - wbc>12 with HR>90   - HL untreated with lymphopenia who and hypothermia  - no focal source for infection at this time, will hold broad spectrum antibiotics for now and monitor  - Pt has continued to have low temperatures - wbc>12 with HR>90   - HL untreated with lymphopenia and hypothermia  - no focal source for infection at this time, will hold broad spectrum antibiotics for now and monitor  - Pt has continued to have low temperatures

## 2017-07-28 NOTE — CONSULT NOTE ADULT - LYMPHATIC
anterior cervical L/posterior cervical L/axillary L/axillary R/supraclavicular R/supraclavicular L/anterior cervical R/posterior cervical R

## 2017-07-28 NOTE — DISCHARGE NOTE ADULT - HOSPITAL COURSE
Patient is a 42 years old female with PMH of Hodgkin's Lymphoma (Lymphocyte depleted) who was sent in from [facility] by Dr. Gabrielle Baxter after she was found to have worsening facial swelling and hypotension to 70s/40 in office. Patient has been experiencing diffuse lymphadenopathy and night sweats for the past two years and was found to have extensive lymphadenopathy on imaging. Biopsies revealed classical HL with bulk disease. She is also known to have a mediastinal mass which is ~5x4 cm in size which is impinging on her bronchotracheal tree and SVC. Patient has thus far not been treated out of refusal to accept conventional medical therapies and has expressed wishes to try alternative medical therapies despite repeat counseling by her oncologist about the risks of delaying treatment which include death. Patient was seen at [facility] today and was found to have significant facial swelling along with RUE swelling and some dyspnea. Patient has been experiencing these symptoms for the past two months and says that they are getting worse. She otherwise noticed no redness of face.  Denies any shortness of breath.  She also denies fever, weight loss.  Patient sent to [facility] for IV steroids, pan scanning and workup prior to initiating RT/chemo and evaluation for facial swelling.     In the ER, patient was afebrile, tachycardic in 100s and BP 90s/50s. Received 10 IV decadron and admitted to medicine.  During ER stay, her temperature dropped to 94.     Hospital Stay:  Pt continued on dex 4mg q6, and allopurinol started. PT still not willing to start chemo/radiation. CJust wants to go home. Pt will be discharged as no acute issues keeping here in the hospital. As per onc, will continue on dexamethasone 4 mg q6 PO and the allopurinol. Patient is a 42 years old female with PMH of Hodgkin's Lymphoma (Lymphocyte depleted) who was sent in from [facility] by Dr. Gabrielle Baxter after she was found to have worsening facial swelling and hypotension to 70s/40 in office. Patient has been experiencing diffuse lymphadenopathy and night sweats for the past two years and was found to have extensive lymphadenopathy on imaging. Biopsies revealed classical HL with bulk disease. She is also known to have a mediastinal mass which is ~5x4 cm in size which is impinging on her bronchotracheal tree and SVC. Patient has thus far not been treated out of refusal to accept conventional medical therapies and has expressed wishes to try alternative medical therapies despite repeat counseling by her oncologist about the risks of delaying treatment which include death. Patient was seen at [facility] today and was found to have significant facial swelling along with RUE swelling and some dyspnea. Patient has been experiencing these symptoms for the past two months and says that they are getting worse. She otherwise noticed no redness of face.  Denies any shortness of breath.  She also denies fever, weight loss.  Patient sent to [facility] for IV steroids, pan scanning and workup prior to initiating RT/chemo and evaluation for facial swelling.     In the ER, patient was afebrile, tachycardic in 100s and BP 90s/50s. Received 10 IV decadron and admitted to medicine.  During ER stay, her temperature dropped to 94.     Hospital Stay:  Pt continued on dex 4mg q6, and allopurinol started. Patient still not willing to start chemo/radiation. Just wants to go home. Pt will be discharged as no acute issues keeping here in the hospital. As per onc, will continue on dexamethasone 4 mg q6 PO and the allopurinol.    Discharge time spent: 32 min

## 2017-07-28 NOTE — PROGRESS NOTE ADULT - SUBJECTIVE AND OBJECTIVE BOX
INTERVAL HPI/OVERNIGHT EVENTS:  Patient S&E at bedside.    VITAL SIGNS:  T(F): 94.5 (07-28-17 @ 06:28)  HR: 82 (07-28-17 @ 04:48)  BP: 91/65 (07-28-17 @ 04:48)  RR: 18 (07-28-17 @ 04:48)  SpO2: 99% (07-28-17 @ 04:48)  Wt(kg): --    PHYSICAL EXAM:  Constitutional: NAD  HEENT:   Respiratory: CTA b/l, good air entry b/l  Cardiovascular: RRR, no M/R/G  Gastrointestinal: soft, NTND, no masses palpable, + BS, no hepatosplenomegaly  Extremities: no c/c/e  Neurological: AAOx3  LAD:     MEDICATIONS  (STANDING):  enoxaparin Injectable 40 milliGRAM(s) SubCutaneous daily  ferrous    sulfate 325 milliGRAM(s) Oral daily  allopurinol 100 milliGRAM(s) Oral daily  sodium chloride 0.9%. 1000 milliLiter(s) (100 mL/Hr) IV Continuous <Continuous>  dexamethasone  Injectable 4 milliGRAM(s) IV Push every 6 hours    MEDICATIONS  (PRN):  acetaminophen   Tablet 650 milliGRAM(s) Oral every 6 hours PRN For Temp greater than 38 C (100.4 F)    Allergies  No Known Allergies    LABS:                        7.9    13.3  )-----------( 285      ( 28 Jul 2017 06:37 )             27.4     07-28    132<L>  |  97  |  12  ----------------------------<  200<H>  4.0   |  22  |  0.43<L>    Ca    9.0      28 Jul 2017 01:25  Phos  3.8     07-28  Mg     1.9     07-28    TPro  7.4  /  Alb  2.3<L>  /  TBili  1.0  /  DBili  x   /  AST  13  /  ALT  17  /  AlkPhos  278<H>  07-28    PT/INR - ( 27 Jul 2017 14:12 )   PT: 18.4 sec;   INR: 1.69 ratio         PTT - ( 27 Jul 2017 14:12 )  PTT:33.7 sec  Urinalysis Basic - ( 28 Jul 2017 01:25 )    Color: Yellow / Appearance: Clear / SG: >1.030 / pH: x  Gluc: x / Ketone: Negative  / Bili: Negative / Urobili: Negative   Blood: x / Protein: 30 mg/dL / Nitrite: Negative   Leuk Esterase: Negative / RBC: 2-5 /HPF / WBC 2-5 /HPF   Sq Epi: x / Non Sq Epi: Few /HPF / Bacteria: Few /HPF        RADIOLOGY & ADDITIONAL TESTS:  Studies reviewed.    ASSESSMENT & PLAN:

## 2017-07-28 NOTE — DISCHARGE NOTE ADULT - CARE PLAN
Principal Discharge DX:	Hodgkin lymphoma  Goal:	please continue to take all meds as prescribed  Instructions for follow-up, activity and diet:	you have a known diagnosis of Hodgkins lymphoma. Please make an appointment with Dr. Baxter as soon as possible and follow up as an outpatient for possible treatment options. You will need to continue taking the steroids until Dr. Baxter sees you in clinic. The same goes for the allopurinol medication.  Secondary Diagnosis:	Pericardial effusion  Instructions for follow-up, activity and diet:	You were foudnt to have a small-moderate amount of fluid around your heart, as well as in your lungs. You will need to have an outpt TTE to evaluate for resolution. Please come back to the ED if you ever have any chest pain, shortness of breath or palpitations.  Secondary Diagnosis:	Superior vena cava syndrome  Instructions for follow-up, activity and diet:	Your cancer is causing inefficient drainage of your body fluids. Please follow up with Dr. Baxter for possible treatment options as these will be the only ways to relieve the fluid build up and pressure you are experiencing. Principal Discharge DX:	Hodgkin lymphoma  Goal:	please continue to take all meds as prescribed  Instructions for follow-up, activity and diet:	you have a known diagnosis of Hodgkins lymphoma. Please make an appointment with Dr. Baxter as soon as possible and follow up as an outpatient for possible treatment options. You will need to continue taking the steroids until Dr. Baxter sees you in clinic. The same goes for the allopurinol medication.  Secondary Diagnosis:	Pericardial effusion  Instructions for follow-up, activity and diet:	You were found to have a small-moderate amount of fluid around your heart, as well as in your lungs. You will need to have an outpt TTE to evaluate for resolution. Please come back to the ED if you ever have any chest pain, shortness of breath or palpitations.  Secondary Diagnosis:	Superior vena cava syndrome  Instructions for follow-up, activity and diet:	Your cancer is causing inefficient drainage of your body fluids. Please follow up with Dr. Baxter for possible treatment options as these will be the only ways to relieve the fluid build up and pressure you are experiencing.

## 2017-07-28 NOTE — PHYSICAL THERAPY INITIAL EVALUATION ADULT - PERTINENT HX OF CURRENT PROBLEM, REHAB EVAL
41 yo F w/ Hodgkin's Lymphoma (Lymphocyte depleted) who was sent in from [facility] by Dr. Gabrielle Baxter after she was found to have worsening facial swelling and hypotension to 70s/40 in office. Patient has been experiencing diffuse lymphadenopathy and night sweats for the past two years and was found to have extensive lymphadenopathy on imaging. Biopsies revealed classical HL with bulk disease. Cont'd...

## 2017-07-28 NOTE — CONSULT NOTE ADULT - PROBLEM SELECTOR RECOMMENDATION 4
-moderate on right and small on left  -no role for thoracentesis at this time  -maintain O2 sats > 90  -incentive spirometer and acapella  -her effusions are likely to be persistent given her significant adenopathy - without treatment for the adenopathy the effusions will constantly return

## 2017-07-28 NOTE — PROGRESS NOTE ADULT - PROBLEM SELECTOR PLAN 1
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 Will c/w IV decadron 4 mg q6 and monitor respiratory status   Patient still not amenable to RT or chemo-may need urgent RT this admission Classical HL as per pathology report-newly diagnosed and lymphocyte depleted   Bulky disease with extensive involvement of her lungs-bronchial narrowing and compression on imaging-Stage III/IV with likely BM involvement as per onc. Has IPS risk factors like WBC> 15, Hg< 10, Lymphocytes >8%--IPS score 3060% freedome from progression   Untreated because patient not amenable. She was explicitly told by Dr. Baxter that the risks of delaying treatment include worsening respiratory distress and even death among other complications   Plan to offer AVM with RT-omitting bleomycin because of pulmonary symptoms   Will check TL labs q12 with TTE.   - CT C/A/P: b/l pleural effusions, extensive diffuse LAD  PER ONC:  - TLS labs q 12, cmp, uric acid, ldh, mag, phos, CBC with diff  -  Informed her that she is at risk for tumor lysis syndrome and should her uric acid reach above 10 we would like to give her rasburicase.   - c/w NS IVF, allopurinol 100mg daily, pt agreeable to this.  - will likely need marrow for staging as pt has not gone for PET CT  - HIV and Hep Panel - Classical HL as per pathology report-newly diagnosed and lymphocyte depleted   Bulky disease with extensive involvement of her lungs-bronchial narrowing and compression on imaging  - Stage III/IV with likely BM involvement as per onc.  - Untreated because patient not amenable. She was explicitly told by Dr. Baxter that the risks of delaying treatment include worsening respiratory distress and even death among other complications   - CT C/A/P: b/l pleural effusions, extensive diffuse LAD, splenomegaly with multiple indeterminate hypodense lesions suspicious for metastatic disease.  PER HEME/ONC:  - pt refusing treatment  - Plan to offer AVM with RT-omitting bleomycin because of pulmonary symptoms   - TLS labs q 12, cmp, uric acid, ldh, mag, phos, CBC with diff  - Informed her that she is at risk for tumor lysis syndrome and should her uric acid reach above 10 we would like to give her rasburicase.   - c/w NS IVF, allopurinol 100mg daily, pt agreeable to this.  - will likely need marrow for staging as pt has not gone for PET CT  - HIV negative and Hep Panel pending - Classical HL as per pathology report-newly diagnosed and lymphocyte depleted   Bulky disease with extensive involvement of her lungs-bronchial narrowing and compression on imaging  - Stage III/IV with likely BM involvement as per onc.  - Untreated because patient not amenable. She was explicitly told by Dr. Baxter that the risks of delaying treatment include worsening respiratory distress and even death among other complications   - CT C/A/P: b/l pleural effusions, extensive diffuse LAD, splenomegaly with multiple indeterminate hypodense lesions suspicious for metastatic disease.  PER HEME/ONC:  - pt refusing treatment  - Plan to offer AVM with RT - omitting bleomycin because of pulmonary symptoms   - TLS labs q 12, cmp, uric acid, ldh, mag, phos, CBC with diff  - Informed her that she is at risk for tumor lysis syndrome and should her uric acid reach above 10 we would like to give her rasburicase.   - c/w NS IVF, allopurinol 100mg daily, pt agreeable to this.  - will likely need marrow for staging as pt has not gone for PET CT  - HIV negative and Hep Panel pending - Classical HL as per pathology report - newly diagnosed and lymphocyte depleted   Bulky disease with extensive involvement of her lungs - bronchial narrowing and compression on imaging  - Stage III/IV with likely BM involvement as per onc.  - Untreated because patient not amenable. She was explicitly told by Dr. Baxter that the risks of delaying treatment include worsening respiratory distress and even death among other complications   - CT C/A/P: b/l pleural effusions, extensive diffuse LAD, splenomegaly with multiple indeterminate hypodense lesions suspicious for metastatic disease.  PER HEME/ONC:  - pt refusing treatment  - Plan to offer AVM with RT - omitting bleomycin because of pulmonary symptoms   - Tumor Lysis labs q 12: cmp, uric acid, ldh, mag, phos, CBC with diff  - Informed her that she is at risk for tumor lysis syndrome and should her uric acid reach above 10 we would like to give her rasburicase.   - c/w NS IVF, allopurinol 100mg daily, pt agreeable to this.  - will likely need marrow for staging as pt has not gone for PET CT  - HIV negative and Hep Panel pending

## 2017-07-28 NOTE — PROVIDER CONTACT NOTE (OTHER) - ACTION/TREATMENT ORDERED:
pt refused warming blanket,blood culture x 2 sets and Ua urine culture ordered and send   started on IV antibiotic as ordered.will continue to monitor.
No intervention ordered, will continue to monitor.

## 2017-07-28 NOTE — CONSULT NOTE ADULT - PROBLEM SELECTOR RECOMMENDATION 2
- concerned for SVC syndome given extensive bulky disease  - explained that this is a hematologic emergency and requrires high dose steriods and radiation evalution to try and reverse her aggressive disease. despite this, pt refused radiation consult as she is still overhwlemed with the diagnosis. she was agreeable to steriods.   - loaded with 10mg Iv x 1, please cont dex 4 q 6 iv , add PPI and monitor glucose and for s/s of thrush.
-concern for SVC syndrome as RUE and R. face are increasingly swollen as compared to the left

## 2017-07-28 NOTE — CONSULT NOTE ADULT - PROBLEM SELECTOR PROBLEM 1
Other classical Hodgkin lymphoma of lymph nodes of multiple regions
Other classical Hodgkin lymphoma of lymph nodes of multiple regions

## 2017-07-28 NOTE — CONSULT NOTE ADULT - ATTENDING COMMENTS
Pt with longstanding lymphadenopathy who presented to [facility] and had work up with excisional LN biopsy 7/12/17 showing classical HD. She has not received any prior therapy and presenting with SVC syndrome. Currently hemodynamically stable. Treat with steroids along with PPI. Pt will have repeat follow up CT imaging to re-evaluate size of LN prior to chemotherapy. Please check LDH, uric acid, CMP prior to treatment. Obtain 2 D Echo for EF prior to chemotherapy. Supportive care.
I will be covered by the service over the weekend, please call [phone] with questions    Edgardo De La Rosa MD  [phone]

## 2017-07-28 NOTE — CONSULT NOTE ADULT - PROBLEM SELECTOR RECOMMENDATION 9
- please get TLS labs q 12, cmp, uric acid, ldh, mag, phos, CBC with diff  - HIV, hepatitis panel  - please order TTE  - follow up Ct chest abd pelvis with contrast to get baseline of pts disease  - will likely need marrow for staging as pt has not gone for PET CT  - fluids  - will review treatment options again with pt. Explained she has high risk disease given her extensive LN. Informed her that she is at risk for tumor lysis syndrome and should her uric acid reach above 10 we would like to give her rasburicase. She was also unsure about this but would think about it.   -please start allopurinol 100mg daily, pt agreeable to this.
-she has classical hodgkin's lymphoma of multiple sites  -she has refused treatment by report  -followup with hematology regarding options - ABVD vs Steroids/XRT vs ?immunotherapy which patient is hoping she will be able to take  -patient at this time does not seem to want any treatment

## 2017-07-28 NOTE — CONSULT NOTE ADULT - ASSESSMENT
42 F with newly diagnosed treatment naive classical Hodgkins Lymphoma admitted with symptomatic disease and concern for SVC syndrome with noted pleural and pericardial effusion

## 2017-07-28 NOTE — DISCHARGE NOTE ADULT - MEDICATION SUMMARY - MEDICATIONS TO TAKE
I will START or STAY ON the medications listed below when I get home from the hospital:    dexamethasone 4 mg oral tablet  -- 1 tab(s) by mouth every 6 hours  -- It is very important that you take or use this exactly as directed.  Do not skip doses or discontinue unless directed by your doctor.  Obtain medical advice before taking any non-prescription drugs as some may affect the action of this medication.  Take with food or milk.    -- Indication: For Hodgkin lymphoma    allopurinol 100 mg oral tablet  -- 1 tab(s) by mouth once a day  -- Indication: For Hodgkin lymphoma    ferrous sulfate 324 mg (65 mg elemental iron) oral tablet  --  by mouth   -- Indication: For Anemia    pantoprazole 40 mg oral delayed release tablet  -- 1 tab(s) by mouth once a day (before a meal)  -- Indication: For Ppx

## 2017-07-28 NOTE — PROGRESS NOTE ADULT - PROBLEM SELECTOR PLAN 2
- concerned for SVC syndome given extensive bulky disease  - explained that this is a hematologic emergency and requrires high dose steriods and radiation evalution to try and reverse her aggressive disease. despite this, pt refused radiation consult as she is still overhwlemed with the diagnosis. she was agreeable to steriods.   - loaded with 10mg Iv x 1, please cont dex 4 q 6 iv , add PPI and monitor glucose and for s/s of thrush..

## 2017-07-28 NOTE — PROGRESS NOTE ADULT - ATTENDING COMMENTS
CT of the chest/ abd/ pelvis reviewed: bulky adenopathy. Currently tolerating steroids and clinically feels improved. No tumor lysis. Pt refusing standard chemotherapy or radiation. Not ready for Pet either until next week. She has many reservations and fears about chemotherapy that remains despite review of information and treatments. She is open to immunotherapy but will follow up with Dr Baxter. If remains clinically stable on steroids and insists on returning home tomorrow, we will arrange for outpatient follow up with Dr Baxter next week.

## 2017-07-28 NOTE — PROGRESS NOTE ADULT - PROBLEM SELECTOR PLAN 5
Sister is next of kin   Full code - moderate on right and small on left  - Per Pulm: no role for thoracentesis at this time  - her effusions are likely to be persistent given her significant adenopathy - without treatment for the adenopathy the effusions will constantly return

## 2017-07-28 NOTE — PROGRESS NOTE ADULT - PROBLEM SELECTOR PLAN 4
DVT ppx: Lovenox - must be cautious with fluid administration - TTE shows small to moderate circumferential pericardial effusion.  - must be cautious with fluid administration - TTE shows small to moderate circumferential pericardial effusion but no compression that would be cause for concern and reason to consult cardio  - must be cautious with fluid administration

## 2017-07-29 VITALS — TEMPERATURE: 95 F | HEART RATE: 82 BPM | SYSTOLIC BLOOD PRESSURE: 108 MMHG | DIASTOLIC BLOOD PRESSURE: 74 MMHG

## 2017-07-29 LAB
ALBUMIN SERPL ELPH-MCNC: 2.4 G/DL — LOW (ref 3.3–5)
ALP SERPL-CCNC: 224 U/L — HIGH (ref 40–120)
ALT FLD-CCNC: 17 U/L RC — SIGNIFICANT CHANGE UP (ref 10–45)
ANION GAP SERPL CALC-SCNC: 13 MMOL/L — SIGNIFICANT CHANGE UP (ref 5–17)
AST SERPL-CCNC: 13 U/L — SIGNIFICANT CHANGE UP (ref 10–40)
BILIRUB SERPL-MCNC: 0.7 MG/DL — SIGNIFICANT CHANGE UP (ref 0.2–1.2)
BUN SERPL-MCNC: 17 MG/DL — SIGNIFICANT CHANGE UP (ref 7–23)
CALCIUM SERPL-MCNC: 9.6 MG/DL — SIGNIFICANT CHANGE UP (ref 8.4–10.5)
CHLORIDE SERPL-SCNC: 101 MMOL/L — SIGNIFICANT CHANGE UP (ref 96–108)
CO2 SERPL-SCNC: 24 MMOL/L — SIGNIFICANT CHANGE UP (ref 22–31)
CREAT SERPL-MCNC: 0.57 MG/DL — SIGNIFICANT CHANGE UP (ref 0.5–1.3)
CULTURE RESULTS: NO GROWTH — SIGNIFICANT CHANGE UP
GLUCOSE SERPL-MCNC: 159 MG/DL — HIGH (ref 70–99)
HCT VFR BLD CALC: 27.3 % — LOW (ref 34.5–45)
HGB BLD-MCNC: 8.2 G/DL — LOW (ref 11.5–15.5)
LDH SERPL L TO P-CCNC: 141 U/L — SIGNIFICANT CHANGE UP (ref 50–242)
MAGNESIUM SERPL-MCNC: 2.1 MG/DL — SIGNIFICANT CHANGE UP (ref 1.6–2.6)
MCHC RBC-ENTMCNC: 26.2 PG — LOW (ref 27–34)
MCHC RBC-ENTMCNC: 30.2 GM/DL — LOW (ref 32–36)
MCV RBC AUTO: 86.8 FL — SIGNIFICANT CHANGE UP (ref 80–100)
PHOSPHATE SERPL-MCNC: 4 MG/DL — SIGNIFICANT CHANGE UP (ref 2.5–4.5)
PLATELET # BLD AUTO: 312 K/UL — SIGNIFICANT CHANGE UP (ref 150–400)
POTASSIUM SERPL-MCNC: 4.7 MMOL/L — SIGNIFICANT CHANGE UP (ref 3.5–5.3)
POTASSIUM SERPL-SCNC: 4.7 MMOL/L — SIGNIFICANT CHANGE UP (ref 3.5–5.3)
PROT SERPL-MCNC: 7.6 G/DL — SIGNIFICANT CHANGE UP (ref 6–8.3)
RBC # BLD: 3.15 M/UL — LOW (ref 3.8–5.2)
RBC # FLD: 22.1 % — HIGH (ref 10.3–14.5)
SODIUM SERPL-SCNC: 138 MMOL/L — SIGNIFICANT CHANGE UP (ref 135–145)
SPECIMEN SOURCE: SIGNIFICANT CHANGE UP
URATE SERPL-MCNC: 3.1 MG/DL — SIGNIFICANT CHANGE UP (ref 2.5–7)
WBC # BLD: 16 K/UL — HIGH (ref 3.8–10.5)
WBC # FLD AUTO: 16 K/UL — HIGH (ref 3.8–10.5)

## 2017-07-29 PROCEDURE — 93356 MYOCRD STRAIN IMG SPCKL TRCK: CPT

## 2017-07-29 PROCEDURE — 74177 CT ABD & PELVIS W/CONTRAST: CPT

## 2017-07-29 PROCEDURE — 85027 COMPLETE CBC AUTOMATED: CPT

## 2017-07-29 PROCEDURE — 80074 ACUTE HEPATITIS PANEL: CPT

## 2017-07-29 PROCEDURE — 86706 HEP B SURFACE ANTIBODY: CPT

## 2017-07-29 PROCEDURE — 99285 EMERGENCY DEPT VISIT HI MDM: CPT | Mod: 25

## 2017-07-29 PROCEDURE — 97162 PT EVAL MOD COMPLEX 30 MIN: CPT

## 2017-07-29 PROCEDURE — 81001 URINALYSIS AUTO W/SCOPE: CPT

## 2017-07-29 PROCEDURE — 96374 THER/PROPH/DIAG INJ IV PUSH: CPT | Mod: XU

## 2017-07-29 PROCEDURE — 85730 THROMBOPLASTIN TIME PARTIAL: CPT

## 2017-07-29 PROCEDURE — 93308 TTE F-UP OR LMTD: CPT

## 2017-07-29 PROCEDURE — 83735 ASSAY OF MAGNESIUM: CPT

## 2017-07-29 PROCEDURE — 83615 LACTATE (LD) (LDH) ENZYME: CPT

## 2017-07-29 PROCEDURE — 84550 ASSAY OF BLOOD/URIC ACID: CPT

## 2017-07-29 PROCEDURE — 87086 URINE CULTURE/COLONY COUNT: CPT

## 2017-07-29 PROCEDURE — 99239 HOSP IP/OBS DSCHRG MGMT >30: CPT

## 2017-07-29 PROCEDURE — 71275 CT ANGIOGRAPHY CHEST: CPT

## 2017-07-29 PROCEDURE — 80053 COMPREHEN METABOLIC PANEL: CPT

## 2017-07-29 PROCEDURE — 85610 PROTHROMBIN TIME: CPT

## 2017-07-29 PROCEDURE — 84100 ASSAY OF PHOSPHORUS: CPT

## 2017-07-29 PROCEDURE — 99238 HOSP IP/OBS DSCHRG MGMT 30/<: CPT

## 2017-07-29 PROCEDURE — 85652 RBC SED RATE AUTOMATED: CPT

## 2017-07-29 PROCEDURE — 87040 BLOOD CULTURE FOR BACTERIA: CPT

## 2017-07-29 PROCEDURE — 93306 TTE W/DOPPLER COMPLETE: CPT

## 2017-07-29 PROCEDURE — 87389 HIV-1 AG W/HIV-1&-2 AB AG IA: CPT

## 2017-07-29 RX ORDER — ALLOPURINOL 300 MG
1 TABLET ORAL
Qty: 30 | Refills: 0 | OUTPATIENT
Start: 2017-07-29 | End: 2017-08-28

## 2017-07-29 RX ORDER — DEXAMETHASONE 0.5 MG/5ML
1 ELIXIR ORAL
Qty: 56 | Refills: 0 | OUTPATIENT
Start: 2017-07-29 | End: 2017-08-12

## 2017-07-29 RX ORDER — PANTOPRAZOLE SODIUM 20 MG/1
1 TABLET, DELAYED RELEASE ORAL
Qty: 30 | Refills: 0 | OUTPATIENT
Start: 2017-07-29 | End: 2017-08-28

## 2017-07-29 RX ORDER — DEXAMETHASONE 0.5 MG/5ML
1 ELIXIR ORAL
Qty: 28 | Refills: 0 | OUTPATIENT
Start: 2017-07-29 | End: 2017-08-12

## 2017-07-29 RX ADMIN — Medication 325 MILLIGRAM(S): at 12:49

## 2017-07-29 RX ADMIN — Medication 4 MILLIGRAM(S): at 13:18

## 2017-07-29 RX ADMIN — Medication 100 MILLIGRAM(S): at 12:49

## 2017-07-29 RX ADMIN — Medication 4 MILLIGRAM(S): at 07:59

## 2017-07-29 NOTE — PROGRESS NOTE ADULT - PROBLEM SELECTOR PLAN 5
- moderate on right and small on left  - Per Pulm: no role for thoracentesis at this time  - her effusions are likely to be persistent given her significant adenopathy - without treatment for the adenopathy the effusions will constantly return - moderate on right and small on left  - Per Pulm: no role for thoracentesis at this time

## 2017-07-29 NOTE — PROGRESS NOTE ADULT - SUBJECTIVE AND OBJECTIVE BOX
Patient is a 42y old  Female who presents with a chief complaint of facial swelling, hypotension (28 Jul 2017 16:56)      SUBJECTIVE / OVERNIGHT EVENTS:  No overnight events of complaints.     MEDICATIONS  (STANDING):  enoxaparin Injectable 40 milliGRAM(s) SubCutaneous daily  ferrous    sulfate 325 milliGRAM(s) Oral daily  allopurinol 100 milliGRAM(s) Oral daily  dexamethasone  Injectable 4 milliGRAM(s) IV Push every 6 hours  pantoprazole    Tablet 40 milliGRAM(s) Oral before breakfast    MEDICATIONS  (PRN):  acetaminophen   Tablet 650 milliGRAM(s) Oral every 6 hours PRN For Temp greater than 38 C (100.4 F)        CAPILLARY BLOOD GLUCOSE  164 (29 Jul 2017 08:45)  172 (28 Jul 2017 21:41)        I&O's Summary      PHYSICAL EXAM:  GENERAL: NAD, well-developed  HEAD:  Atraumatic, Normocephalic  EYES: EOMI, PERRLA, conjunctiva and sclera clear  NECK: Supple, No JVD  CHEST/LUNG: Clear to auscultation bilaterally; No wheeze  HEART: Regular rate and rhythm; No murmurs, rubs, or gallops  ABDOMEN: Soft, Nontender, Nondistended; Bowel sounds present  EXTREMITIES:  2+ Peripheral Pulses, No clubbing, cyanosis, or edema  PSYCH: AAOx3  NEUROLOGY: non-focal  SKIN: No rashes or lesions    LABS:                        8.2    16.0  )-----------( 312      ( 29 Jul 2017 02:38 )             27.3     07-29    138  |  101  |  17  ----------------------------<  159<H>  4.7   |  24  |  0.57    Ca    9.6      29 Jul 2017 02:38  Phos  4.0     07-29  Mg     2.1     07-29    TPro  7.6  /  Alb  2.4<L>  /  TBili  0.7  /  DBili  x   /  AST  13  /  ALT  17  /  AlkPhos  224<H>  07-29    PT/INR - ( 27 Jul 2017 14:12 )   PT: 18.4 sec;   INR: 1.69 ratio         PTT - ( 27 Jul 2017 14:12 )  PTT:33.7 sec      Urinalysis Basic - ( 28 Jul 2017 01:25 )    Color: Yellow / Appearance: Clear / SG: >1.030 / pH: x  Gluc: x / Ketone: Negative  / Bili: Negative / Urobili: Negative   Blood: x / Protein: 30 mg/dL / Nitrite: Negative   Leuk Esterase: Negative / RBC: 2-5 /HPF / WBC 2-5 /HPF   Sq Epi: x / Non Sq Epi: Few /HPF / Bacteria: Few /HPF        RADIOLOGY & ADDITIONAL TESTS:    Imaging Personally Reviewed:    Consultant(s) Notes Reviewed:      Care Discussed with Consultants/Other Providers: Patient is a 42y old  Female who presents with a chief complaint of facial swelling, hypotension (28 Jul 2017 16:56)      SUBJECTIVE / OVERNIGHT EVENTS:  No overnight events of complaints.     MEDICATIONS  (STANDING):  enoxaparin Injectable 40 milliGRAM(s) SubCutaneous daily  ferrous    sulfate 325 milliGRAM(s) Oral daily  allopurinol 100 milliGRAM(s) Oral daily  dexamethasone  Injectable 4 milliGRAM(s) IV Push every 6 hours  pantoprazole    Tablet 40 milliGRAM(s) Oral before breakfast    MEDICATIONS  (PRN):  acetaminophen   Tablet 650 milliGRAM(s) Oral every 6 hours PRN For Temp greater than 38 C (100.4 F)        CAPILLARY BLOOD GLUCOSE  164 (29 Jul 2017 08:45)  172 (28 Jul 2017 21:41)        I&O's Summary      PHYSICAL EXAM:  GENERAL: NAD  HEAD:  Atraumatic, Normocephalic  EYES: EOMI, PERRLA, conjunctiva and sclera clear  NECK: significant lymphadenopathy diffuedly throughout cervical ln and supraclavicular ln  CHEST/LUNG: Clear to auscultation bilaterally; No wheeze  HEART: Regular rate and rhythm; No murmurs, rubs, or gallops  ABDOMEN: Soft, Nontender, Nondistended; Bowel sounds present  EXTREMITIES:  2+ Peripheral Pulses, No clubbing, cyanosis, or edema  PSYCH: AAOx3  NEUROLOGY: non-focal  Lymph: diffusely enlarge LN throughout the body    LABS:                        8.2    16.0  )-----------( 312      ( 29 Jul 2017 02:38 )             27.3     07-29    138  |  101  |  17  ----------------------------<  159<H>  4.7   |  24  |  0.57    Ca    9.6      29 Jul 2017 02:38  Phos  4.0     07-29  Mg     2.1     07-29    TPro  7.6  /  Alb  2.4<L>  /  TBili  0.7  /  DBili  x   /  AST  13  /  ALT  17  /  AlkPhos  224<H>  07-29    PT/INR - ( 27 Jul 2017 14:12 )   PT: 18.4 sec;   INR: 1.69 ratio         PTT - ( 27 Jul 2017 14:12 )  PTT:33.7 sec      Urinalysis Basic - ( 28 Jul 2017 01:25 )    Color: Yellow / Appearance: Clear / SG: >1.030 / pH: x  Gluc: x / Ketone: Negative  / Bili: Negative / Urobili: Negative   Blood: x / Protein: 30 mg/dL / Nitrite: Negative   Leuk Esterase: Negative / RBC: 2-5 /HPF / WBC 2-5 /HPF   Sq Epi: x / Non Sq Epi: Few /HPF / Bacteria: Few /HPF        RADIOLOGY & ADDITIONAL TESTS:    Imaging Personally Reviewed:    Consultant(s) Notes Reviewed:      Care Discussed with Consultants/Other Providers: Patient is a 42y old  Female who presents with a chief complaint of facial swelling, hypotension (28 Jul 2017 16:56)      SUBJECTIVE / OVERNIGHT EVENTS:  No overnight events of complaints.     Vital Signs Last 24 Hrs  T(C): 35.1 (29 Jul 2017 08:45), Max: 35.1 (29 Jul 2017 08:45)  T(F): 95.1 (29 Jul 2017 08:45), Max: 95.1 (29 Jul 2017 08:45)  HR: 82 (29 Jul 2017 08:45) (72 - 83)  BP: 108/74 (29 Jul 2017 08:45) (86/58 - 108/74)  BP(mean): --  RR: 18 (29 Jul 2017 04:59) (18 - 18)  SpO2: 95% (29 Jul 2017 04:59) (95% - 98%)    MEDICATIONS  (STANDING):  enoxaparin Injectable 40 milliGRAM(s) SubCutaneous daily  ferrous    sulfate 325 milliGRAM(s) Oral daily  allopurinol 100 milliGRAM(s) Oral daily  dexamethasone  Injectable 4 milliGRAM(s) IV Push every 6 hours  pantoprazole    Tablet 40 milliGRAM(s) Oral before breakfast    MEDICATIONS  (PRN):  acetaminophen   Tablet 650 milliGRAM(s) Oral every 6 hours PRN For Temp greater than 38 C (100.4 F)        CAPILLARY BLOOD GLUCOSE  164 (29 Jul 2017 08:45)  172 (28 Jul 2017 21:41)        I&O's Summary      PHYSICAL EXAM:  GENERAL: NAD  HEAD:  Atraumatic, Normocephalic  EYES: EOMI, PERRLA, conjunctiva and sclera clear  NECK: significant lymphadenopathy diffusely throughout cervical ln and supraclavicular ln  CHEST/LUNG: Clear to auscultation bilaterally; No wheeze  HEART: Regular rate and rhythm; No murmurs, rubs, or gallops  ABDOMEN: Soft, Nontender, Nondistended; Bowel sounds present  EXTREMITIES:  2+ Peripheral Pulses, No clubbing, cyanosis, or edema  PSYCH: AAOx3  NEUROLOGY: non-focal  Lymph: diffusely enlarge LN throughout the body    LABS:                        8.2    16.0  )-----------( 312      ( 29 Jul 2017 02:38 )             27.3     07-29    138  |  101  |  17  ----------------------------<  159<H>  4.7   |  24  |  0.57    Ca    9.6      29 Jul 2017 02:38  Phos  4.0     07-29  Mg     2.1     07-29    TPro  7.6  /  Alb  2.4<L>  /  TBili  0.7  /  DBili  x   /  AST  13  /  ALT  17  /  AlkPhos  224<H>  07-29    PT/INR - ( 27 Jul 2017 14:12 )   PT: 18.4 sec;   INR: 1.69 ratio         PTT - ( 27 Jul 2017 14:12 )  PTT:33.7 sec      Urinalysis Basic - ( 28 Jul 2017 01:25 )    Color: Yellow / Appearance: Clear / SG: >1.030 / pH: x  Gluc: x / Ketone: Negative  / Bili: Negative / Urobili: Negative   Blood: x / Protein: 30 mg/dL / Nitrite: Negative   Leuk Esterase: Negative / RBC: 2-5 /HPF / WBC 2-5 /HPF   Sq Epi: x / Non Sq Epi: Few /HPF / Bacteria: Few /HPF        RADIOLOGY & ADDITIONAL TESTS:    Imaging Personally Reviewed:    Consultant(s) Notes Reviewed:  Pulmonary and Heme    Care Discussed with Consultants/Other Providers:

## 2017-07-29 NOTE — PROGRESS NOTE ADULT - PROBLEM SELECTOR PLAN 4
- TTE shows small to moderate circumferential pericardial effusion but no compression that would be cause for concern and reason to consult cardio  - must be cautious with fluid administration - TTE shows small to moderate circumferential pericardial effusion without tamponade  - must be cautious with fluid administration

## 2017-07-29 NOTE — PROGRESS NOTE ADULT - PROBLEM SELECTOR PLAN 1
- Stage III/IV with likely BM involvement as per onc, may require BM bx here while hospitalized  - Untreated because patient not amenable. She was explicitly told by Dr. Baxter that the risks of delaying treatment include worsening respiratory distress and even death among other complications   - CT C/A/P: b/l pleural effusions, extensive diffuse LAD, splenomegaly with multiple indeterminate hypodense lesions suspicious for metastatic disease.  PER HEME/ONC:  - pt refusing treatment at this time  - Plan to offer AVM with RT - omitting bleomycin because of pulmonary symptoms   - Tumor Lysis labs q 12: cmp, uric acid, ldh, mag, phos, CBC with diff  - Informed her that she is at risk for tumor lysis syndrome and should her uric acid reach above 10 we would like to give her rasburicase but patient likely does not want that treatment either.  - c/w allopurinol 100mg daily, pt agreeable to this. - Stage III/IV with likely BM involvement as per onc, will require BM for staging  - Untreated because patient not amenable.   - c/w allopurinol 100mg daily and c/w dexamethasone

## 2017-07-29 NOTE — PROGRESS NOTE ADULT - PROBLEM SELECTOR PLAN 2
- Facial and RUE swelling along with mild dyspnea  - Likely secondary to SVC impingement from mediastinal mass along with other diffuse lymphadenopathy.   -pt told that symptoms will not resolve unless HL is treated.  - Will c/w IV decadron 4 mg q6 and monitor respiratory status  - Patient still not amenable to RT or chemo-may need urgent RT this admission  PER HEME/ONC: - Facial and RUE swelling along with mild dyspnea  - Likely secondary to SVC impingement from mediastinal mass along with other diffuse lymphadenopathy.   -pt told that symptoms will not resolve unless HL is treated.  - Will c/w decadron 4 mg q6 will change to PO

## 2017-07-29 NOTE — PROGRESS NOTE ADULT - PROBLEM SELECTOR PLAN 7
Sister is next of kin, spoke in detail with mother, sister and daughter along with patient about disease and outcomes without treatment, pt still refusing to make decision about treatment. Continues to be concerned about losing her hair, etc and does not want chemo for that reason.  Full code
Sister is next of kin   Full code

## 2017-07-29 NOTE — PROGRESS NOTE ADULT - ASSESSMENT
Patient is a 42 years old female with PMH of Hodgkin's Lymphoma (Lymphocyte depleted) who was sent in from [facility] by Dr. Gabrielle Baxetr after she was found to have worsening facial swelling and hypotension to 70s/40 in office.
42 F with newly diagnosed treatment naieve classical HD admitted with symptomatic disease and concern for SVC syndrome
42 F with newly diagnosed treatment naieve classical HD admitted with symptomatic disease and concern for SVC syndrome
Patient is a 42 years old female with PMH of Hodgkin's Lymphoma (Lymphocyte depleted) who was sent in from [facility] by Dr. Gabrielle Baxter after she was found to have worsening facial swelling and hypotension to 70s/40 in office.

## 2017-07-29 NOTE — PROGRESS NOTE ADULT - PROBLEM SELECTOR PLAN 1
Patient being discharged.  Instructed to stay on same steroid dose and f/u with Dr. Baxter at [facility] this coming week.

## 2017-07-29 NOTE — PROGRESS NOTE ADULT - PROBLEM SELECTOR PLAN 3
- wbc>12 with HR>90   - HL untreated with lymphopenia and hypothermia  - no focal source for infection at this time, will hold broad spectrum antibiotics for now and monitor  - Pt has continued to have low temperatures - resolved  - wbc>12 with HR>90   - HL untreated with lymphopenia and hypothermia  - no focal source for infection at this time, will hold broad spectrum antibiotics for now and monitor - patient has been doing well off of abx

## 2017-07-29 NOTE — PROGRESS NOTE ADULT - SUBJECTIVE AND OBJECTIVE BOX
INTERVAL HPI/OVERNIGHT EVENTS:  Feels well and awaiting discharge.  No SOB or CP.      VITAL SIGNS:  T(F): 94.5 (07-28-17 @ 06:28)  HR: 82 (07-28-17 @ 04:48)  BP: 91/65 (07-28-17 @ 04:48)  RR: 18 (07-28-17 @ 04:48)  SpO2: 99% (07-28-17 @ 04:48)  Wt(kg): --    PHYSICAL EXAM:  Constitutional: NAD  HEENT: MMM No icterus   Respiratory: CTA b/l, good air entry b/l  Cardiovascular: RRR, no M/R/G    MEDICATIONS  (STANDING):  enoxaparin Injectable 40 milliGRAM(s) SubCutaneous daily  ferrous    sulfate 325 milliGRAM(s) Oral daily  allopurinol 100 milliGRAM(s) Oral daily  sodium chloride 0.9%. 1000 milliLiter(s) (100 mL/Hr) IV Continuous <Continuous>  dexamethasone  Injectable 4 milliGRAM(s) IV Push every 6 hours    MEDICATIONS  (PRN):  acetaminophen   Tablet 650 milliGRAM(s) Oral every 6 hours PRN For Temp greater than 38 C (100.4 F)    Allergies  No Known Allergies    LABS:                        7.9    13.3  )-----------( 285      ( 28 Jul 2017 06:37 )             27.4     07-28    132<L>  |  97  |  12  ----------------------------<  200<H>  4.0   |  22  |  0.43<L>    Ca    9.0      28 Jul 2017 01:25  Phos  3.8     07-28  Mg     1.9     07-28    TPro  7.4  /  Alb  2.3<L>  /  TBili  1.0  /  DBili  x   /  AST  13  /  ALT  17  /  AlkPhos  278<H>  07-28    PT/INR - ( 27 Jul 2017 14:12 )   PT: 18.4 sec;   INR: 1.69 ratio         PTT - ( 27 Jul 2017 14:12 )  PTT:33.7 sec  Urinalysis Basic - ( 28 Jul 2017 01:25 )    Color: Yellow / Appearance: Clear / SG: >1.030 / pH: x  Gluc: x / Ketone: Negative  / Bili: Negative / Urobili: Negative   Blood: x / Protein: 30 mg/dL / Nitrite: Negative   Leuk Esterase: Negative / RBC: 2-5 /HPF / WBC 2-5 /HPF   Sq Epi: x / Non Sq Epi: Few /HPF / Bacteria: Few /HPF        RADIOLOGY & ADDITIONAL TESTS:  Studies reviewed.    ASSESSMENT & PLAN:

## 2017-08-02 LAB
CULTURE RESULTS: SIGNIFICANT CHANGE UP
CULTURE RESULTS: SIGNIFICANT CHANGE UP
SPECIMEN SOURCE: SIGNIFICANT CHANGE UP
SPECIMEN SOURCE: SIGNIFICANT CHANGE UP

## 2017-08-08 ENCOUNTER — OTHER (OUTPATIENT)
Age: 42
End: 2017-08-08

## 2017-08-25 ENCOUNTER — OTHER (OUTPATIENT)
Age: 42
End: 2017-08-25

## 2017-08-25 ENCOUNTER — INPATIENT (INPATIENT)
Facility: HOSPITAL | Age: 42
LOS: 2 days | Discharge: LEFT BEFORE DISCHARGE | DRG: 840 | End: 2017-08-28
Attending: HOSPITALIST | Admitting: INTERNAL MEDICINE
Payer: COMMERCIAL

## 2017-08-25 VITALS — HEART RATE: 76 BPM | DIASTOLIC BLOOD PRESSURE: 63 MMHG | RESPIRATION RATE: 20 BRPM | SYSTOLIC BLOOD PRESSURE: 92 MMHG

## 2017-08-25 DIAGNOSIS — Z98.82 BREAST IMPLANT STATUS: Chronic | ICD-10-CM

## 2017-08-25 DIAGNOSIS — Z98.890 OTHER SPECIFIED POSTPROCEDURAL STATES: Chronic | ICD-10-CM

## 2017-08-25 LAB
ALBUMIN SERPL ELPH-MCNC: 2.6 G/DL — LOW (ref 3.3–5)
ALP SERPL-CCNC: 244 U/L — HIGH (ref 40–120)
ALT FLD-CCNC: 23 U/L RC — SIGNIFICANT CHANGE UP (ref 10–45)
ANION GAP SERPL CALC-SCNC: 15 MMOL/L — SIGNIFICANT CHANGE UP (ref 5–17)
AST SERPL-CCNC: 15 U/L — SIGNIFICANT CHANGE UP (ref 10–40)
BASE EXCESS BLDV CALC-SCNC: 1.4 MMOL/L — SIGNIFICANT CHANGE UP (ref -2–2)
BILIRUB SERPL-MCNC: 1 MG/DL — SIGNIFICANT CHANGE UP (ref 0.2–1.2)
BUN SERPL-MCNC: 17 MG/DL — SIGNIFICANT CHANGE UP (ref 7–23)
CA-I SERPL-SCNC: 1.28 MMOL/L — SIGNIFICANT CHANGE UP (ref 1.12–1.3)
CALCIUM SERPL-MCNC: 9.9 MG/DL — SIGNIFICANT CHANGE UP (ref 8.4–10.5)
CHLORIDE BLDV-SCNC: 102 MMOL/L — SIGNIFICANT CHANGE UP (ref 96–108)
CHLORIDE SERPL-SCNC: 98 MMOL/L — SIGNIFICANT CHANGE UP (ref 96–108)
CK MB BLD-MCNC: 8.3 % — HIGH (ref 0–3.5)
CK MB CFR SERPL CALC: 1 NG/ML — SIGNIFICANT CHANGE UP (ref 0–3.8)
CK SERPL-CCNC: 12 U/L — LOW (ref 25–170)
CO2 BLDV-SCNC: 27 MMOL/L — SIGNIFICANT CHANGE UP (ref 22–30)
CO2 SERPL-SCNC: 24 MMOL/L — SIGNIFICANT CHANGE UP (ref 22–31)
CREAT SERPL-MCNC: 0.52 MG/DL — SIGNIFICANT CHANGE UP (ref 0.5–1.3)
GAS PNL BLDV: 133 MMOL/L — LOW (ref 136–145)
GAS PNL BLDV: SIGNIFICANT CHANGE UP
GAS PNL BLDV: SIGNIFICANT CHANGE UP
GLUCOSE BLDV-MCNC: 107 MG/DL — HIGH (ref 70–99)
GLUCOSE SERPL-MCNC: 115 MG/DL — HIGH (ref 70–99)
HCO3 BLDV-SCNC: 26 MMOL/L — SIGNIFICANT CHANGE UP (ref 21–29)
HCT VFR BLD CALC: 25.3 % — LOW (ref 34.5–45)
HCT VFR BLDA CALC: 31 % — LOW (ref 39–50)
HGB BLD CALC-MCNC: 10.2 G/DL — LOW (ref 11.5–15.5)
HGB BLD-MCNC: 7.6 G/DL — LOW (ref 11.5–15.5)
LACTATE BLDV-MCNC: 3.2 MMOL/L — HIGH (ref 0.7–2)
MCHC RBC-ENTMCNC: 26.1 PG — LOW (ref 27–34)
MCHC RBC-ENTMCNC: 30.1 GM/DL — LOW (ref 32–36)
MCV RBC AUTO: 86.6 FL — SIGNIFICANT CHANGE UP (ref 80–100)
NT-PROBNP SERPL-SCNC: 445 PG/ML — HIGH (ref 0–300)
PCO2 BLDV: 43 MMHG — SIGNIFICANT CHANGE UP (ref 35–50)
PH BLDV: 7.4 — SIGNIFICANT CHANGE UP (ref 7.35–7.45)
PLATELET # BLD AUTO: 298 K/UL — SIGNIFICANT CHANGE UP (ref 150–400)
PO2 BLDV: 27 MMHG — SIGNIFICANT CHANGE UP (ref 25–45)
POTASSIUM BLDV-SCNC: 3.5 MMOL/L — SIGNIFICANT CHANGE UP (ref 3.5–5)
POTASSIUM SERPL-MCNC: 3.8 MMOL/L — SIGNIFICANT CHANGE UP (ref 3.5–5.3)
POTASSIUM SERPL-SCNC: 3.8 MMOL/L — SIGNIFICANT CHANGE UP (ref 3.5–5.3)
PROT SERPL-MCNC: 6.9 G/DL — SIGNIFICANT CHANGE UP (ref 6–8.3)
RBC # BLD: 2.92 M/UL — LOW (ref 3.8–5.2)
RBC # FLD: 22.2 % — HIGH (ref 10.3–14.5)
SAO2 % BLDV: 36 % — LOW (ref 67–88)
SODIUM SERPL-SCNC: 137 MMOL/L — SIGNIFICANT CHANGE UP (ref 135–145)
TROPONIN T SERPL-MCNC: <0.01 NG/ML — SIGNIFICANT CHANGE UP (ref 0–0.06)
WBC # BLD: 13.5 K/UL — HIGH (ref 3.8–10.5)
WBC # FLD AUTO: 13.5 K/UL — HIGH (ref 3.8–10.5)

## 2017-08-25 PROCEDURE — 99285 EMERGENCY DEPT VISIT HI MDM: CPT

## 2017-08-25 PROCEDURE — 93308 TTE F-UP OR LMTD: CPT | Mod: 26

## 2017-08-25 PROCEDURE — 71020: CPT | Mod: 26

## 2017-08-25 RX ORDER — FUROSEMIDE 40 MG
20 TABLET ORAL ONCE
Qty: 0 | Refills: 0 | Status: COMPLETED | OUTPATIENT
Start: 2017-08-25 | End: 2017-08-25

## 2017-08-25 RX ORDER — SODIUM CHLORIDE 9 MG/ML
250 INJECTION INTRAMUSCULAR; INTRAVENOUS; SUBCUTANEOUS ONCE
Qty: 0 | Refills: 0 | Status: COMPLETED | OUTPATIENT
Start: 2017-08-25 | End: 2017-08-25

## 2017-08-25 RX ADMIN — Medication 20 MILLIGRAM(S): at 23:09

## 2017-08-25 NOTE — ED PROVIDER NOTE - FAMILY HISTORY
Father  Still living? Unknown  Family history of lung cancer, Age at diagnosis: Age Unknown     Sibling  Still living? Unknown  Family history of cervical cancer, Age at diagnosis: Age Unknown

## 2017-08-25 NOTE — ED PROVIDER NOTE - CARE PLAN
Principal Discharge DX:	Pleural effusion  Secondary Diagnosis:	Hodgkin lymphoma Principal Discharge DX:	Pleural effusion  Secondary Diagnosis:	Hodgkin lymphoma  Secondary Diagnosis:	Pericardial effusion

## 2017-08-25 NOTE — ED ADULT NURSE REASSESSMENT NOTE - NS ED NURSE REASSESS COMMENT FT1
Comments: Patient has never received lasix - lasix given as ordered, 20 mg. IV push very slowly over 10 minutes. BP closely monitored.   2330 - patient ambulated to bathroom, advised patient to get up slowly, patient was assisted by family, denies dizziness, ambulated well with steady gait noted.

## 2017-08-25 NOTE — ED PROVIDER NOTE - OBJECTIVE STATEMENT
42 year old female, past medical history Hodgkins Lymphoma (not on active treatment), who presents to the ED for shortness of breath for 1-2 weeks. Worsening shortness of breath. No chest pain. Reports wheezing, abdominal distention, and swelling in legs. No fevers, chills, nausea, vomiting. Reports increasing cough, white sputum. No abdominal pain, diarrhea, constipation.     primary medical doctor: Dr. Ethan Mejia   Onc: Dr. Baxter

## 2017-08-25 NOTE — ED PROVIDER NOTE - MEDICAL DECISION MAKING DETAILS
42 year old female, past medical history Hodgkins Lymphoma (not on active treatment), who presents to the ED for shortness of breath for 1-2 weeks. Concern for new onset congestive heart failure, vs pneuonia, vs effusion, vs copd/asthma, will obtain labwork. xray, and symptomatic treatment. 42 year old female, past medical history Hodgkins Lymphoma (not on active treatment), who presents to the ED for shortness of breath for 1-2 weeks. Concern for new onset congestive heart failure, vs pneuonia, vs effusion, vs copd/asthma, will obtain labwork. xray, and symptomatic treatment. lasix, Oncology consult, admit, reassess

## 2017-08-25 NOTE — ED PROVIDER NOTE - PROGRESS NOTE DETAILS
DAVID: Onc consult, admit DAVID: Onc consult, admit for sob, Lasix, CXR R effusion HERNANDEZ: , pt walking around ED, SOB improved, Bedside US showed + Pericardial effusion, no obvious tamponade physiology, Cardiology consult, admit, reassess discussed case with hospitalist, pending CTA. cardiology final recs pending

## 2017-08-26 DIAGNOSIS — J96.01 ACUTE RESPIRATORY FAILURE WITH HYPOXIA: ICD-10-CM

## 2017-08-26 DIAGNOSIS — Z29.9 ENCOUNTER FOR PROPHYLACTIC MEASURES, UNSPECIFIED: ICD-10-CM

## 2017-08-26 DIAGNOSIS — I87.1 COMPRESSION OF VEIN: ICD-10-CM

## 2017-08-26 DIAGNOSIS — J90 PLEURAL EFFUSION, NOT ELSEWHERE CLASSIFIED: ICD-10-CM

## 2017-08-26 DIAGNOSIS — C81.90 HODGKIN LYMPHOMA, UNSPECIFIED, UNSPECIFIED SITE: ICD-10-CM

## 2017-08-26 DIAGNOSIS — Z71.3 DIETARY COUNSELING AND SURVEILLANCE: ICD-10-CM

## 2017-08-26 DIAGNOSIS — I31.3 PERICARDIAL EFFUSION (NONINFLAMMATORY): ICD-10-CM

## 2017-08-26 LAB
ANISOCYTOSIS BLD QL: SIGNIFICANT CHANGE UP
APPEARANCE UR: CLEAR — SIGNIFICANT CHANGE UP
APTT BLD: 35.1 SEC — SIGNIFICANT CHANGE UP (ref 27.5–37.4)
BASE EXCESS BLDV CALC-SCNC: 3.2 MMOL/L — HIGH (ref -2–2)
BASOPHILS # BLD AUTO: 0 K/UL — SIGNIFICANT CHANGE UP (ref 0–0.2)
BASOPHILS NFR BLD AUTO: 0 % — SIGNIFICANT CHANGE UP (ref 0–2)
BILIRUB UR-MCNC: NEGATIVE — SIGNIFICANT CHANGE UP
CA-I SERPL-SCNC: 1.24 MMOL/L — SIGNIFICANT CHANGE UP (ref 1.12–1.3)
CHLORIDE BLDV-SCNC: 102 MMOL/L — SIGNIFICANT CHANGE UP (ref 96–108)
CO2 BLDV-SCNC: 28 MMOL/L — SIGNIFICANT CHANGE UP (ref 22–30)
COLOR SPEC: YELLOW — SIGNIFICANT CHANGE UP
DIFF PNL FLD: NEGATIVE — SIGNIFICANT CHANGE UP
EOSINOPHIL # BLD AUTO: 0.3 K/UL — SIGNIFICANT CHANGE UP (ref 0–0.5)
EOSINOPHIL NFR BLD AUTO: 0 % — SIGNIFICANT CHANGE UP (ref 0–6)
GAS PNL BLDV: 132 MMOL/L — LOW (ref 136–145)
GAS PNL BLDV: SIGNIFICANT CHANGE UP
GAS PNL BLDV: SIGNIFICANT CHANGE UP
GLUCOSE BLDV-MCNC: 96 MG/DL — SIGNIFICANT CHANGE UP (ref 70–99)
GLUCOSE UR QL: NEGATIVE — SIGNIFICANT CHANGE UP
HCO3 BLDV-SCNC: 26 MMOL/L — SIGNIFICANT CHANGE UP (ref 21–29)
HCT VFR BLDA CALC: 21 % — CRITICAL LOW (ref 39–50)
HGB BLD CALC-MCNC: 6.8 G/DL — CRITICAL LOW (ref 11.5–15.5)
HYPOCHROMIA BLD QL: SIGNIFICANT CHANGE UP
INR BLD: 1.62 RATIO — HIGH (ref 0.88–1.16)
KETONES UR-MCNC: NEGATIVE — SIGNIFICANT CHANGE UP
LACTATE BLDV-MCNC: 1.8 MMOL/L — SIGNIFICANT CHANGE UP (ref 0.7–2)
LDH SERPL L TO P-CCNC: 192 U/L — SIGNIFICANT CHANGE UP (ref 50–242)
LEUKOCYTE ESTERASE UR-ACNC: NEGATIVE — SIGNIFICANT CHANGE UP
LYMPHOCYTES # BLD AUTO: 1.1 K/UL — SIGNIFICANT CHANGE UP (ref 1–3.3)
LYMPHOCYTES # BLD AUTO: 8 % — LOW (ref 13–44)
MAGNESIUM SERPL-MCNC: 1.6 MG/DL — SIGNIFICANT CHANGE UP (ref 1.6–2.6)
MICROCYTES BLD QL: SLIGHT — SIGNIFICANT CHANGE UP
MONOCYTES # BLD AUTO: 0.6 K/UL — SIGNIFICANT CHANGE UP (ref 0–0.9)
MONOCYTES NFR BLD AUTO: 4 % — SIGNIFICANT CHANGE UP (ref 2–14)
MYELOCYTES NFR BLD: 1 % — HIGH (ref 0–0)
NEUTROPHILS # BLD AUTO: 11.5 K/UL — HIGH (ref 1.8–7.4)
NEUTROPHILS NFR BLD AUTO: 84 % — HIGH (ref 43–77)
NEUTS BAND # BLD: 3 % — SIGNIFICANT CHANGE UP (ref 0–8)
NITRITE UR-MCNC: NEGATIVE — SIGNIFICANT CHANGE UP
NRBC # BLD: 7 /100 — HIGH (ref 0–0)
PCO2 BLDV: 36 MMHG — SIGNIFICANT CHANGE UP (ref 35–50)
PH BLDV: 7.48 — HIGH (ref 7.35–7.45)
PH UR: 6 — SIGNIFICANT CHANGE UP (ref 5–8)
PHOSPHATE SERPL-MCNC: 4.8 MG/DL — HIGH (ref 2.5–4.5)
PLAT MORPH BLD: NORMAL — SIGNIFICANT CHANGE UP
PO2 BLDV: 78 MMHG — HIGH (ref 25–45)
POIKILOCYTOSIS BLD QL AUTO: SLIGHT — SIGNIFICANT CHANGE UP
POLYCHROMASIA BLD QL SMEAR: SIGNIFICANT CHANGE UP
POTASSIUM BLDV-SCNC: 3.4 MMOL/L — LOW (ref 3.5–5)
PROT UR-MCNC: NEGATIVE — SIGNIFICANT CHANGE UP
PROTHROM AB SERPL-ACNC: 17.8 SEC — HIGH (ref 9.8–12.7)
RBC BLD AUTO: ABNORMAL
SAO2 % BLDV: 96 % — HIGH (ref 67–88)
SP GR SPEC: =1.051 — SIGNIFICANT CHANGE UP (ref 1.01–1.02)
URATE SERPL-MCNC: 4.6 MG/DL — SIGNIFICANT CHANGE UP (ref 2.5–7)
UROBILINOGEN FLD QL: 1 — SIGNIFICANT CHANGE UP

## 2017-08-26 PROCEDURE — 99223 1ST HOSP IP/OBS HIGH 75: CPT | Mod: GC

## 2017-08-26 PROCEDURE — 71275 CT ANGIOGRAPHY CHEST: CPT | Mod: 26

## 2017-08-26 PROCEDURE — 93306 TTE W/DOPPLER COMPLETE: CPT | Mod: 26

## 2017-08-26 PROCEDURE — 71260 CT THORAX DX C+: CPT | Mod: 26,59

## 2017-08-26 PROCEDURE — 12345: CPT | Mod: GC,NC

## 2017-08-26 PROCEDURE — 99223 1ST HOSP IP/OBS HIGH 75: CPT

## 2017-08-26 RX ORDER — SODIUM CHLORIDE 9 MG/ML
1000 INJECTION INTRAMUSCULAR; INTRAVENOUS; SUBCUTANEOUS
Qty: 0 | Refills: 0 | Status: DISCONTINUED | OUTPATIENT
Start: 2017-08-26 | End: 2017-08-27

## 2017-08-26 RX ORDER — DEXAMETHASONE 0.5 MG/5ML
4 ELIXIR ORAL EVERY 6 HOURS
Qty: 0 | Refills: 0 | Status: DISCONTINUED | OUTPATIENT
Start: 2017-08-26 | End: 2017-08-28

## 2017-08-26 RX ORDER — ENOXAPARIN SODIUM 100 MG/ML
40 INJECTION SUBCUTANEOUS DAILY
Qty: 0 | Refills: 0 | Status: DISCONTINUED | OUTPATIENT
Start: 2017-08-26 | End: 2017-08-28

## 2017-08-26 RX ORDER — FERROUS SULFATE 325(65) MG
1 TABLET ORAL
Qty: 0 | Refills: 0 | COMMUNITY

## 2017-08-26 RX ORDER — PANTOPRAZOLE SODIUM 20 MG/1
40 TABLET, DELAYED RELEASE ORAL
Qty: 0 | Refills: 0 | Status: DISCONTINUED | OUTPATIENT
Start: 2017-08-26 | End: 2017-08-28

## 2017-08-26 RX ORDER — SODIUM CHLORIDE 9 MG/ML
1000 INJECTION INTRAMUSCULAR; INTRAVENOUS; SUBCUTANEOUS ONCE
Qty: 0 | Refills: 0 | Status: COMPLETED | OUTPATIENT
Start: 2017-08-26 | End: 2017-08-26

## 2017-08-26 RX ORDER — ALLOPURINOL 300 MG
100 TABLET ORAL DAILY
Qty: 0 | Refills: 0 | Status: DISCONTINUED | OUTPATIENT
Start: 2017-08-26 | End: 2017-08-28

## 2017-08-26 RX ORDER — FERROUS SULFATE 325(65) MG
325 TABLET ORAL DAILY
Qty: 0 | Refills: 0 | Status: DISCONTINUED | OUTPATIENT
Start: 2017-08-26 | End: 2017-08-28

## 2017-08-26 RX ORDER — FERROUS SULFATE 325(65) MG
0 TABLET ORAL
Qty: 0 | Refills: 0 | COMMUNITY

## 2017-08-26 RX ORDER — ALLOPURINOL 300 MG
1 TABLET ORAL
Qty: 0 | Refills: 0 | COMMUNITY

## 2017-08-26 RX ADMIN — SODIUM CHLORIDE 1000 MILLILITER(S): 9 INJECTION INTRAMUSCULAR; INTRAVENOUS; SUBCUTANEOUS at 01:00

## 2017-08-26 RX ADMIN — Medication 4 MILLIGRAM(S): at 23:53

## 2017-08-26 RX ADMIN — Medication 4 MILLIGRAM(S): at 18:14

## 2017-08-26 RX ADMIN — SODIUM CHLORIDE 2000 MILLILITER(S): 9 INJECTION INTRAMUSCULAR; INTRAVENOUS; SUBCUTANEOUS at 11:12

## 2017-08-26 RX ADMIN — PANTOPRAZOLE SODIUM 40 MILLIGRAM(S): 20 TABLET, DELAYED RELEASE ORAL at 07:23

## 2017-08-26 RX ADMIN — Medication 325 MILLIGRAM(S): at 12:34

## 2017-08-26 RX ADMIN — Medication 4 MILLIGRAM(S): at 07:22

## 2017-08-26 RX ADMIN — Medication 4 MILLIGRAM(S): at 12:34

## 2017-08-26 RX ADMIN — Medication 100 MILLIGRAM(S): at 12:34

## 2017-08-26 NOTE — CONSULT NOTE ADULT - PROBLEM SELECTOR RECOMMENDATION 2
- pulmonary recommendations appreciated, recommending repeat ECHO and cardiology follow up, and placement of pleurex catheter    Attending Recommendations to Follow.    *Note is incomplete*    Julián Mcdonald, PGY4  Hematology/Oncology Fellow  Pager: [phone] - pulmonary recommendations appreciated, recommending repeat ECHO and cardiology follow up, and placement of pleurex catheter    Attending Recommendations to Follow.    Julián Mcdonald, PGY4  Hematology/Oncology Fellow  Pager: [phone]

## 2017-08-26 NOTE — ED ADULT NURSE REASSESSMENT NOTE - NS ED NURSE REASSESS COMMENT FT1
Patient needs CTA prior to RTM - may change dispo to tele vs medicine pending CTA results Per Dr. Coronado Patient needs CTA prior to RTM - may change dispo to tele vs medicine pending CTA results Per Dr. Veronica Antunez.

## 2017-08-26 NOTE — CONSULT NOTE ADULT - PROBLEM SELECTOR RECOMMENDATION 9
- continues to refuse treatment with conventional chemotherapy  - immunotherapy options to be discussed as an outpatient, this modality would not be able to offer her any acute improvement of her symptoms and SVC syndrome - continues to refuse treatment with conventional chemotherapy  - immunotherapy options to be discussed as an outpatient, this modality would not be able to offer her any acute improvement of her symptoms and SVC syndrome  - c/w decadron as you are, patient to consider Adcetris as she is refusing traditional chemotherapy such as ABVD or AVD but will consider something milder

## 2017-08-26 NOTE — CONSULT NOTE ADULT - ASSESSMENT
42F PMH recently diagnosed Hodgkin's lymphoma, lymphocyte depleted with bulky disease presents with increased SOB noted to have slightly worsened right pleural effussion

## 2017-08-26 NOTE — H&P ADULT - ATTENDING COMMENTS
NIGHT HOSPITALIST:  Patient UNKNOWN to me previously, assigned to me at this point via the ER and by the HEMATOLOGY / ONCOLOGY service to admit this 43 y/o F--followed by Dr. WENDY Baxter as above--patient with newly diagnosed treatment naive Hodgkin's Lymphoma from LEFT axillary LN biopsy with recent discharge from [place] in July 29, 2107 following SVC syndrome in the setting of patient with extensive mediastinal bulky adenopathy/ mass impinging the tracheobronchial tree, retroperitoneal LN with patient consistently refusing conventional treatment (chemo/RT only agreed to steroids) with worsening of patient's dyspnea for the last few days but ongoing for the past month since discharge.  Patient denies fever but notes chills, night sweats.  No chest pain/pressure.  No HA, no focal weakness.  No abdominal pain, no red blood per rectum, no melena.  No  back pain, no tearing back pain.  No dysuria, no hematuria.  Patient with anorexia and unspecified weight loss.  No suicidal or homicidal ideation.  Remaining review of systems not contributory.  Patient with no tobacco or ethanol history.  Patient is single with a 22 year old daughter but patient informs examiner that she has her sister as her health care proxy.  Patient has expressed to examiner and to the Dr. Adame that patient wishes DNR status.  Physical exam with a middle aged emaciated F. NIGHT HOSPITALIST:  Patient UNKNOWN to me previously, assigned to me at this point via the ER and by the HEMATOLOGY / ONCOLOGY service to admit this 43 y/o F--followed by Dr. WENDY Baxter as above--patient with newly diagnosed treatment naive Hodgkin's Lymphoma from LEFT axillary LN biopsy with recent discharge from [place] in July 29, 2107 following SVC syndrome in the setting of patient with extensive mediastinal bulky adenopathy/ mass impinging the tracheobronchial tree, retroperitoneal LN with patient consistently refusing conventional treatment (chemo/RT only agreed to steroids) with worsening of patient's dyspnea for the last few days but ongoing for the past month since discharge.  Patient denies fever but notes chills, night sweats.  No chest pain/pressure.  No HA, no focal weakness.  No abdominal pain, no red blood per rectum, no melena.  No  back pain, no tearing back pain.  No dysuria, no hematuria.  Patient with anorexia and unspecified weight loss.  No suicidal or homicidal ideation.  Remaining review of systems not contributory.  Patient with no tobacco or ethanol history.  Patient is single with a 22 year old daughter but patient informs examiner that she has her sister as her health care proxy.  Patient has expressed to examiner and to the Dr. Adame that patient wishes DNR status.  Physical exam with a middle aged emaciated F. BP /67-72  now 98/66 HR up to 140, now 100.  RR 16-20.  100% on NC.  HEENT, PERRL, EOMI, bitemporal wasting.  Neck supple, chest poor effort decreased breath sounds at the bases.  Cor tachycardia.  Declined breast exam.  Abdomen scaphoid, soft, nontender, no rebound.  ext with 1-2++ edema but with severe diffuse sarcopenia.  skin dry.  Neurologic exam AxOx3.  Speech fluent.  Cognition intact.  Patient able to verbalize and review with examiner risks/benefits of patient's continued refusal for conventional treatment for Hodgkin's lymphoma and understands the risks to continued progression of disease and risk of death.  Patient has requested DNR status.  UE/LE 5/5.  WBC 13.5.  hgb 7.6.  Platelets of 298K.  Hcg neg.  .  troponin nil.  .  Cr 0.5.  Cr 3.8.  random glucose of 115.  Alb severely low at 2.6.  Echo in the ER with moderate pericardial effusion with RV scalloping in diastole, early sign of tamponade.  CTT angio in the ER NO PE, moderate to large RIGHT pleural effusion and extensive adenopathy and bilateral pulmonary nodules.  EKG tracing reviewed by examiner with sinus tachycardia.  Admitted to telemetry with SVC syndrome with echo and clinical evidence of malignant early tamponade.  Would AVOID diuresis short of teodoro pulmonary oedema and maintain IVF NS to avoid RV collapse.  Oncology to see patient.  Patient demonstrates at present capacity.  Patient continues to refuse standard treatment options by oncology.  Patient has requested DNR status.  Patient' prognosis is poor.  Emotional support provided to patient.  Patient aware of course and agrees with plan/care as above.  Care reviewed with Dr. Adame.  Care assumed by the DAY HOSPITALIST. NIGHT HOSPITALIST:  Patient UNKNOWN to me previously, assigned to me at this point via the ER and by the HEMATOLOGY / ONCOLOGY service to admit this 43 y/o F--followed by Dr. WENDY Baxter as above--patient with newly diagnosed treatment naive Hodgkin's Lymphoma from LEFT axillary LN biopsy with recent discharge from [place] in July 29, 2107 following SVC syndrome in the setting of patient with extensive mediastinal bulky adenopathy/ mass impinging the tracheobronchial tree, retroperitoneal LN with patient consistently refusing conventional treatment (chemo/RT only agreed to steroids) with worsening of patient's dyspnea for the last few days but ongoing for the past month since discharge.  Patient denies fever but notes chills, night sweats.  No chest pain/pressure.  No HA, no focal weakness.  No abdominal pain, no red blood per rectum, no melena.  No  back pain, no tearing back pain.  No dysuria, no hematuria.  Patient with anorexia and unspecified weight loss.  No suicidal or homicidal ideation.  Remaining review of systems not contributory.  Patient with no tobacco or ethanol history.  Patient is single with a 22 year old daughter but patient informs examiner that she has her sister as her health care proxy.  Patient has expressed to examiner and to the Dr. Adame that patient wishes DNR status.  Physical exam with a middle aged emaciated F. BP /67-72  now 98/66 HR up to 140, now 100.  RR 16-20.  100% on NC.  HEENT, PERRL, EOMI, bitemporal wasting.  Neck supple, chest poor effort decreased breath sounds at the bases.  Cor tachycardia.  Declined breast exam.  Abdomen scaphoid, soft, nontender, no rebound.  ext with 1-2++ edema but with severe diffuse sarcopenia.  skin dry.  Neurologic exam AxOx3.  Speech fluent.  Cognition intact.  Patient able to verbalize and review with examiner risks/benefits of patient's continued refusal for conventional treatment for Hodgkin's lymphoma and understands the risks to continued progression of disease and risk of death.  Patient has requested DNR status.  UE/LE 5/5.  WBC 13.5.  hgb 7.6.  Platelets of 298K.  Hcg neg.  .  troponin nil.  .  Cr 0.5.  Cr 3.8.  random glucose of 115.  Alb severely low at 2.6.  Echo in the ER with moderate pericardial effusion with RV scalloping in diastole, early sign of tamponade.  CTT angio in the ER NO PE, moderate to large RIGHT pleural effusion and extensive adenopathy and bilateral pulmonary nodules.  EKG tracing reviewed by examiner with sinus tachycardia.  Admitted to telemetry with SVC syndrome with echo and clinical evidence of malignant early tamponade.  Would AVOID diuresis short of teodoro pulmonary oedema and maintain IVF NS to avoid RV collapse.  Oncology to see patient.  Patient demonstrates at present capacity.  Patient continues to refuse standard treatment options by oncology.  Patient has requested DNR status.  Patient' prognosis is poor.  Emotional support provided to patient.  Patient aware of course and agrees with plan/care as above.  I have reviewed above with patient's adult daughter, who informs me that the patient's sister is aware of the patient's decision to refuse standard treatment and are aware of patient's request for DNR status.  Patient's adult daughter has informed me that she and her aunt has deferred to the patient's decisions but will continue to have a conversation with the patient on the issue of treatment.  Would consider palliative care evaluation.  Care reviewed with Dr. Adame.  [facility] N[facility]OSPITALIST:  Patient UNKNOWN to me previously, assigned to me at this point via the ER and by the HEMATOLOGY / ONCOLOGY service to admit this 43 y/o F--followed by Dr. WENDY Baxter as above--patient with newly diagnosed treatment naive Hodgkin's Lymphoma from LEFT axillary LN biopsy with recent discharge from [place] in July 29, 2107 following SVC syndrome in the setting of patient with extensive mediastinal bulky adenopathy/ mass impinging the tracheobronchial tree, retroperitoneal LN with patient consistently refusing conventional treatment (chemo/RT only agreed to steroids) with worsening of patient's dyspnea for the last few days but ongoing for the past month since discharge.  Patient denies fever but notes chills, night sweats.  No chest pain/pressure.  No HA, no focal weakness.  No abdominal pain, no red blood per rectum, no melena.  No  back pain, no tearing back pain.  No dysuria, no hematuria.  Patient with anorexia and unspecified weight loss.  No suicidal or homicidal ideation.  Remaining review of systems not contributory.  Patient with no tobacco or ethanol history.  Patient is single with a 22 year old daughter but patient informs examiner that she has her sister as her health care proxy.  Patient has expressed to examiner and to the Dr. Adame that patient wishes DNR status.  Physical exam with a middle aged emaciated F. BP /67-72  now 98/66 HR up to 140, now 100.  RR 16-20.  100% on NC.  HEENT, PERRL, EOMI, bitemporal wasting.  Neck supple, chest poor effort decreased breath sounds at the bases.  Cor tachycardia.  Declined breast exam.  Abdomen scaphoid, soft, nontender, no rebound.  ext with 1-2++ edema but with severe diffuse sarcopenia.  skin dry.  Neurologic exam AxOx3.  Speech fluent.  Cognition intact.  Patient able to verbalize and review with examiner risks/benefits of patient's continued refusal for conventional treatment for Hodgkin's lymphoma and understands the risks to continued progression of disease and risk of death.  Patient has requested DNR status.  UE/LE 5/5.  WBC 13.5.  hgb 7.6.  Platelets of 298K.  Hcg neg.  .  troponin nil.  .  Cr 0.5.  Cr 3.8.  random glucose of 115.  Alb severely low at 2.6.  Echo in the ER with moderate pericardial effusion with RV scalloping in diastole, early sign of tamponade.  CTT angio in the ER NO PE, moderate to large RIGHT pleural effusion and extensive adenopathy and bilateral pulmonary nodules.  EKG tracing reviewed by examiner with sinus tachycardia.  Admitted to telemetry with SVC syndrome with echo and clinical evidence of malignant early tamponade.  Would AVOID diuresis short of teodoro pulmonary oedema and maintain IVF NS to avoid RV collapse.  Oncology to see patient.  Patient demonstrates at present capacity.  Patient continues to refuse standard treatment options by oncology.  Patient has requested DNR status.  Patient has insisted on a regular diet but will  maintain aspiration precautions.  Patient' prognosis is poor.  Emotional support provided to patient.  Patient aware of course and agrees with plan/care as above.  I have reviewed above with patient's adult daughter, who informs me that the patient's sister is aware of the patient's decision to refuse standard treatment and are aware of patient's request for DNR status.  Patient's adult daughter has informed me that she and her aunt has deferred to the patient's decisions but will continue to have a conversation with the patient on the issue of treatment.  Would consider palliative care evaluation.  Care reviewed with Dr. Adame.  Care assumed by the DAY HOSPITALIST.

## 2017-08-26 NOTE — CONSULT NOTE ADULT - PROBLEM SELECTOR RECOMMENDATION 9
-she has classical hodgkin's lymphoma of multiple sites  -she has refused treatment by report  -followup with hematology regarding options - ABVD vs Steroids/XRT vs ?immunotherapy which patient is hoping she will be able to take  -patient at this time does not seem to want any treatment -she has classical hodgkin's lymphoma of multiple sites  -she has refused treatment by report  -followup with hematology regarding options - ABVD vs immunotherapy which patient is hoping she will be able to take  -patient at this time does not seem to want any treatment or intervention.

## 2017-08-26 NOTE — CONSULT NOTE ADULT - ATTENDING COMMENTS
Agree with above with following additions:  42F with treatment naive hodgkin's lymphoma presents with progressively worsening shortness of breath. Findings on exam and imaging suggest non-cardiogenic. No pericardial effusion.   Would diurese. Check TTE.
as above--long discussion w/pt about root cause of her issues and she will need rx of underlying dx  will consider pleurx after-echo and this will allow to consider if CTS vs pulm intervention    Loi Vickers MD-Pulmonary   [phone]
PAtient with extensive and bulky lymphoma. Need to start steroids to debulk. Consider future Adecentris after discuss with primary oncologist and patient.

## 2017-08-26 NOTE — PROGRESS NOTE ADULT - PROBLEM SELECTOR PLAN 1
-CXR showed right pleural effusion & extensive mediastinal lymphadenopathy with associated narrowing of the distal trachea  - likely 2/2 extensive lymphadenopathy/pulmonary nodules from Hodgkin's lymphoma as well as large L pleural effusion  - CTA negative for pulmonary embolism  - currently stable on 2L NC  - Patient was DNR/DNI -CXR showed large right pleural effusion & extensive mediastinal lymphadenopathy with associated narrowing of the distal trachea and R mainstem bronchus compression  - 2/2 extensive lymphadenopathy/pulmonary nodules from Hodgkin's lymphoma as well as large L pleural effusion  - CTA negative for pulmonary embolism  - currently stable on 2L NC  - Patient was DNR/DNI  - Pulm eval appreciated

## 2017-08-26 NOTE — ED ADULT NURSE REASSESSMENT NOTE - NS ED NURSE REASSESS COMMENT FT1
Dr. Coronado at bedside discussing advanced directives. Patient states she does not want CPR or to be intubated. DNR/DNI signed and placed in chart.

## 2017-08-26 NOTE — CONSULT NOTE ADULT - SUBJECTIVE AND OBJECTIVE BOX
Patient seen and evaluated @ 11 pm  Chief Complaint: SOB    HPI:  42F with treatment naive hodgkin's lymphoma presents with progressively worsening shortness of breath. Patient with progressively worsening bulky lymphadenopathy and edema. 2 weeks ago, increasing leg edema progressed to abd and now into chest. Yesterday shortness of breath with exertion and lying flat. No CP. No palpitations. No PND. No syncope, lightheadedness.    Prior pericardial effusion on TTE.    PMH:   Hodgkin lymphoma  Fever and chills  Adenopathy  No pertinent past medical history    PSH:   H/O abdominoplasty  H/O bilateral breast implants  No significant past surgical history    Medications:   sodium chloride 0.9% Bolus 250 milliLiter(s) IV Bolus once    Allergies:  No Known Allergies    FAMILY HISTORY:  Family history of cervical cancer (Sibling)  Family history of lung cancer    Social History:  Smoking: none  Alcohol: none  Drugs: none    Review of Systems:  Constitutional: [ ] Fever [ ] Chills [ ] Fatigue [ ] Weight change   HEENT: [ ] Blurred vision [ ] Eye Pain [ ] Headache [ ] Runny nose [ ] Sore Throat   Respiratory: [ ] Cough [ ] Wheezing [x] Shortness of breath  Cardiovascular: [ ] Chest Pain [ ] Palpitations [ ] ALFARO [ ] PND [ ] Orthopnea  Gastrointestinal: [ ] Abdominal Pain [ ] Diarrhea [ ] Constipation [ ] Hemorrhoids [ ] Nausea [ ] Vomiting  Genitourinary: [ ] Nocturia [ ] Dysuria [ ] Incontinence  Extremities: [x] Swelling [ ] Joint Pain  Neurologic: [ ] Focal deficit [ ] Paresthesias [ ] Syncope  Lymphatic: [ ] Swelling [ ] Lymphadenopathy   Skin: [ ] Rash [ ] Ecchymoses [ ] Wounds [ ] Lesions  Psychiatry: [ ] Depression [ ] Suicidal/Homicidal Ideation [ ] Anxiety [ ] Sleep Disturbances  [x] 10 point review of systems is otherwise negative except as mentioned above            [ ]Unable to obtain    Physical Exam:  T(C): --  HR: 140 (17 @ 23:19) (76 - 150)  BP: 108/73 (17 @ 23:19) (92/63 - 114/79)  RR: 38 (17 @ 23:19) (20 - 40)  SpO2: 95% (17 @ 23:19) (90% - 95%)  Wt(kg): --    Daily     Daily     Appearance: tachypneic  Eyes: PERRL, EOMI  HENT: Normal oral muscosa, NC/AT, massive bulky lymphadenopathy  Cardiovascular: tachycardic, no m/r/g, 3+ LE edema, normal JVP  Respiratory: diminished breath sounds at the bases bilaterally  Gastrointestinal: Soft, non-tender, distended  Musculoskeletal: No clubbing, no joint deformity   Neurologic: Non-focal  Lymphatic: No lymphadenopathy  Psychiatry: AAOx3, mood & affect appropriate  Skin: No rashes, no ecchymoses, no cyanosis    Cardiovascular Diagnostic Testing:  ECG:sinus tach 136, low voltage    Echo:  17  Conclusions:  1. Myxomatous mitral valve with bileaflet prolapse.  Moderate, late systolic mitral regurgitation.  2. Normal left ventricular systolic function. No segmental  wall motion abnormalities. Global longitudinal strain (GLS)  is normal (-23.0%). GLS imaging was performed on the  [employer] IE33 with an average HR of 86 bpm and a BP of  110/66. GLS tracking is suboptimal.  3. Normal right ventricular size and function.  4. Small to moderate circumferential pericardial effusion.  The IVC is normal in size with complete contraction with  sniff (RA pressure 0-5 mm)  *** No previous Echo exam.    Stress Testing:  none    Cath:  none    Imagin17 CT PE  Chest:  1.  No pulmonary embolism.   2.  Moderate right and small left pleural effusions.   3.   Bilateral nodular opacities have decreased from prior examination.   4.  Redemonstration of extensive lymphadenopathy.    Abdomen/Pelvis  1.  Redemonstration of extensive retroperitoneal lymphadenopathy with new   pelvic side wall and left inguinal lymphadenopathy.  2.  Redemonstration of splenomegaly with multiple indeterminate hypodense   lesions suspicious for metastatic disease.  3.  Small volume ascites.  4.  Anasarca.      Labs:                        7.6    13.5  )-----------( 298      ( 25 Aug 2017 22:24 )             25.3     08    137  |  98  |  17  ----------------------------<  115<H>  3.8   |  24  |  0.52    Ca    9.9      25 Aug 2017 22:24    TPro  6.9  /  Alb  2.6<L>  /  TBili  1.0  /  DBili  x   /  AST  15  /  ALT  23  /  AlkPhos  244<H>        CARDIAC MARKERS ( 25 Aug 2017 22:24 )  x     / <0.01 ng/mL / 12 U/L / x     / 1.0 ng/mL      Serum Pro-Brain Natriuretic Peptide: 445 pg/mL ( @ 22:24) Patient seen and evaluated @ 11 pm  Chief Complaint: SOB    HPI:  42F with treatment naive hodgkin's lymphoma presents with progressively worsening shortness of breath. Patient with progressively worsening bulky lymphadenopathy and edema. 2 weeks ago, increasing leg edema progressed to abd and now into chest. Yesterday shortness of breath with exertion and lying flat. No CP. No palpitations. No PND. No syncope, lightheadedness.    Prior pericardial effusion on TTE.    PMH:   Hodgkin lymphoma  Fever and chills  Adenopathy  No pertinent past medical history    PSH:   H/O abdominoplasty  H/O bilateral breast implants  No significant past surgical history    Medications:   sodium chloride 0.9% Bolus 250 milliLiter(s) IV Bolus once    Allergies:  No Known Allergies    FAMILY HISTORY:  Family history of cervical cancer (Sibling)  Family history of lung cancer    Social History:  Smoking: none  Alcohol: none  Drugs: none    Review of Systems:  Constitutional: [ ] Fever [ ] Chills [ ] Fatigue [ ] Weight change   HEENT: [ ] Blurred vision [ ] Eye Pain [ ] Headache [ ] Runny nose [ ] Sore Throat   Respiratory: [ ] Cough [ ] Wheezing [x] Shortness of breath  Cardiovascular: [ ] Chest Pain [ ] Palpitations [ ] ALFARO [ ] PND [ ] Orthopnea  Gastrointestinal: [ ] Abdominal Pain [ ] Diarrhea [ ] Constipation [ ] Hemorrhoids [ ] Nausea [ ] Vomiting  Genitourinary: [ ] Nocturia [ ] Dysuria [ ] Incontinence  Extremities: [x] Swelling [ ] Joint Pain  Neurologic: [ ] Focal deficit [ ] Paresthesias [ ] Syncope  Lymphatic: [ ] Swelling [ ] Lymphadenopathy   Skin: [ ] Rash [ ] Ecchymoses [ ] Wounds [ ] Lesions  Psychiatry: [ ] Depression [ ] Suicidal/Homicidal Ideation [ ] Anxiety [ ] Sleep Disturbances  [x] 10 point review of systems is otherwise negative except as mentioned above            [ ]Unable to obtain    Physical Exam:  T(C): --  HR: 140 (08-25-17 @ 23:19) (76 - 150)  BP: 108/73 (08-25-17 @ 23:19) (92/63 - 114/79)  RR: 38 (08-25-17 @ 23:19) (20 - 40)  SpO2: 95% (08-25-17 @ 23:19) (90% - 95%)  Wt(kg): --    Daily     Daily     Appearance: tachypneic  Eyes: PERRL, EOMI  HENT: Normal oral muscosa, NC/AT, massive bulky lymphadenopathy  Cardiovascular: tachycardic, no m/r/g, 3+ LE edema, normal JVP  Respiratory: diminished breath sounds at the bases bilaterally  Gastrointestinal: Soft, non-tender, distended  Musculoskeletal: No clubbing, no joint deformity   Neurologic: Non-focal  Lymphatic: No lymphadenopathy  Psychiatry: AAOx3, mood & affect appropriate  Skin: No rashes, no ecchymoses, no cyanosis    Cardiovascular Diagnostic Testing:  ECG:sinus tach 136, low voltage    Echo:  7/28/17  Conclusions:  1. Myxomatous mitral valve with bileaflet prolapse.  Moderate, late systolic mitral regurgitation.  2. Normal left ventricular systolic function. No segmental  wall motion abnormalities. Global longitudinal strain (GLS)  is normal (-23.0%). GLS imaging was performed on the  [employer] IE33 with an average HR of 86 bpm and a BP of  110/66. GLS tracking is suboptimal.  3. Normal right ventricular size and function.  4. Small to moderate circumferential pericardial effusion.  The IVC is normal in size with complete contraction with  sniff (RA pressure 0-5 mm)  *** No previous Echo exam.    Stress Testing:  none    Cath:  none    Imaging:  CT PE 8/26  IMPRESSION:     No pulmonary embolism.    Interval slight increase in moderate to large right pleural effusion   since prior examination. Stable small left pleural effusion.    Relatively stable extensive mediastinal, axillary, and supraclavicular,   lymphadenopathy.     Stable bilateral pulmonary nodules and peribronchovascular disease.    7/27/17 CT PE  Chest:  1.  No pulmonary embolism.   2.  Moderate right and small left pleural effusions.   3.   Bilateral nodular opacities have decreased from prior examination.   4.  Redemonstration of extensive lymphadenopathy.    Abdomen/Pelvis  1.  Redemonstration of extensive retroperitoneal lymphadenopathy with new   pelvic side wall and left inguinal lymphadenopathy.  2.  Redemonstration of splenomegaly with multiple indeterminate hypodense   lesions suspicious for metastatic disease.  3.  Small volume ascites.  4.  Anasarca.      Labs:                        7.6    13.5  )-----------( 298      ( 25 Aug 2017 22:24 )             25.3     08-25    137  |  98  |  17  ----------------------------<  115<H>  3.8   |  24  |  0.52    Ca    9.9      25 Aug 2017 22:24    TPro  6.9  /  Alb  2.6<L>  /  TBili  1.0  /  DBili  x   /  AST  15  /  ALT  23  /  AlkPhos  244<H>  08-25      CARDIAC MARKERS ( 25 Aug 2017 22:24 )  x     / <0.01 ng/mL / 12 U/L / x     / 1.0 ng/mL      Serum Pro-Brain Natriuretic Peptide: 445 pg/mL (08-25 @ 22:24)

## 2017-08-26 NOTE — H&P ADULT - PROBLEM SELECTOR PLAN 4
- cards consulted in the ED - f/u recs  - likely malignant   - TTE in the am  - currently hemodynamically stable - large right pleural effusion, likely malignant   - consider thoracentesis

## 2017-08-26 NOTE — CHART NOTE - NSCHARTNOTEFT_GEN_A_CORE
Pt has known history of declining treatment for lymphoma in the past during evaluation by heme-onc service. Pt today endorses that she will "look into immunotherapy" but has not had a chance to learn more about it over the last month because she'd been "very busy." Pt expresses reluctance to being seen by oncology team in AM. She states that they pushed her many times during her last admission to get chemotherapy, which caused her to resist treatment even more so. When asked about wishes regarding intubation, pt stated that she would not wanted to be intubated in the event of respiratory failure. Pt also expressed that she would not want intubation even in the event of cardiopulmonary arrest and therefore would be DNR.  Chart has been updated to reflect patient's wishes.    Veronica Antunez MD, pager [phone]  Available at [facility] [id number] Pt has known history of declining treatment for lymphoma in the past during evaluation by heme-onc service. Pt today endorses that she will "look into immunotherapy" but has not had a chance to learn more about it over the last month because she'd been "very busy." Pt expresses reluctance to being seen by oncology team in AM. She states that they pushed her many times during her last admission to get chemotherapy, which caused her to resist treatment even more so. Pt states that she is aware and understands that by delaying her care, she can potentially die as a consequence of her lymphoma.     When asked about wishes regarding intubation, pt stated that she would not wanted to be intubated in the event of respiratory failure. Pt also expressed that she would not want intubation even in the event of cardiopulmonary arrest and therefore would be DNR.   Chart has been updated to reflect patient's wishes.    Veronica Antunez MD, pager [phone]  Available at [email or web address] [phone]

## 2017-08-26 NOTE — ED ADULT NURSE REASSESSMENT NOTE - NS ED NURSE REASSESS COMMENT FT1
Call from CT - patient needs negative UCG/HCG prior to going for test. Patient asked for urine sample, will give when she can. HCG added. Will call CT with results.

## 2017-08-26 NOTE — H&P ADULT - NSHPLABSRESULTS_GEN_ALL_CORE
In the ED VS Temp 99.5 -140 BP 90 - 108 / 60-70 RR 20-40 O2 90-95% 2LNC  Labs notable for leukocytosis (WBC 13.5), anemia (Hgb 7.6). Elevated Alk Phos (244), hyperphosphatemia (4.8)   Bedside TTE showed moderate pericardial effusion w/ RV scalloping concerning for tamponade.    CTA chest: w/ no pulmonary embolism.    Interval slight increase in moderate to large right pleural effusion   since prior examination. Stable small left pleural effusion.    Relatively stable extensive mediastinal, axillary, and supraclavicular,   lymphadenopathy.     Stable bilateral pulmonary nodules and peribronchovascular disease. In the ED VS Temp 99.5 -140 BP 90 - 108 / 60-70 RR 20-40 O2 90-95% 2LNC  Labs notable for leukocytosis (WBC 13.5), anemia (Hgb 7.6). Elevated Alk Phos (244), hyperphosphatemia (4.8)   Bedside TTE showed moderate pericardial effusion w/ RV scalloping concerning for tamponade.    CTA chest: w/ no pulmonary embolism, interval increase in large R pleural effusion, small L pleural effusion.  Extensive mediastinal, axillary, supraclavicular lymphadenopathy. Stable bilateral pulmonary nodes. In the ED VS Temp 99.5 -140 BP 90 - 108 / 60-70 RR 20-40 O2 90-95% 2LNC  Labs notable for leukocytosis (WBC 13.5), anemia (Hgb 7.6). Elevated Alk Phos (244), hyperphosphatemia (4.8)   Bedside TTE showed moderate pericardial effusion w/ RV scalloping concerning for tamponade.  CTA chest: w/ no pulmonary embolism, interval increase in large R pleural effusion, small L pleural effusion.  Extensive mediastinal, axillary, supraclavicular lymphadenopathy. Stable bilateral pulmonary nodes.

## 2017-08-26 NOTE — PROGRESS NOTE ADULT - PROBLEM SELECTOR PLAN 3
- patient previously refused chemotherapy/radiation  - patient states she is still thinking about chemotherapy/radiation  - c/w Dexamethasone 4 q 6 for now - patient previously refused chemotherapy/radiation  - patient states she is still thinking about chemotherapy/radiation, wanted to know more about immunotherapy  - c/w Dexamethasone 4 q 6 for now

## 2017-08-26 NOTE — PROGRESS NOTE ADULT - PROBLEM SELECTOR PLAN 5
- bedside TTE w/ pericardial effusion w/ early tamponade.    - c/w IVF - will give 1L Bolus now, then maintenance fluids @ 100/hr  - avoid diuretics if possible   - cards consulted in the ED - f/u recs  - likely malignant   - formal TTE in the am  - currently hemodynamically stable - bedside TTE w/ pericardial effusion w/ early tamponade.    - c/w IVF - will give 1L Bolus now, then maintenance fluids @ 100/hr  - avoid diuretics if possible   - cards consulted in the ED - f/u recs  - likely malignant   - TTE order, - pending  - currently hemodynamically stable

## 2017-08-26 NOTE — ED ADULT NURSE NOTE - OBJECTIVE STATEMENT
42 y.o. female presents to ED via EMS c/o worsening shortness of breath, edema and ALFARO. 42 y.o. female presents to ED via EMS c/o worsening shortness of breath, edema and ALFARO. Patient states "lumps on neck" for 3 years, went to PMD July & was diagnosed with Hodgkin lymphoma - has not decided on treatment plan as of yet. Patient pas pitting edema in bilateral legs, lymphadenopathy & R breast swelling.

## 2017-08-26 NOTE — CONSULT NOTE ADULT - ASSESSMENT
42F with treatment naive hodgkin's lymphoma presents with progressively worsening shortness of breath. Patient has multiple reasons to have shortness of breath including bulky lymphadenopathy compressing airways, increasing edema/third spacing fluid, deconditioning. Patient is more tachycardic but BP is at baseline. Negative pulsus.    -- formal TTE in AM  -- if worsening clinically, please notify [phone] for stat echo  -- discuss lymphoma treatment with onc    Fermín Alves MD  [phone]

## 2017-08-26 NOTE — CONSULT NOTE ADULT - SUBJECTIVE AND OBJECTIVE BOX
HPI:  42F PMH Hodgkin's Lymphoma (Dx 7/2017 w/ L axillary LN biopsy, treatment naive) presents with shortness of breath.  Patient states she has had worsening shortness of breath for 2 months. Denies fevers, chills, chest pain.  Also with worsening lower extremity edema.  Patient had recent admission 7/27 - 7/29 for worsening facial swelling and hypotension.  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  Patient has been refusing conventional medical treatment and wishes to try alternative medicine.  Has been experiencing worsening RUE swelling for the past two months.  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 During past admission she was still refusing chemotherapy and radiation, she was discharged home as she did not want further treatment for her cancer, she was continued on dexamethasone 4mg q6 and allopurinol.      In the ED VS Temp 99.5 -140 BP 90 - 108 / 60-70 RR 20-40 O2 90-95% 2LNC  Labs notable for leukocytosis (WBC 13.5), anemia (Hgb 7.6). Elevated Alk Phos (244), hyperphosphatemia (4.8)   Bedside TTE showed moderate pericardial effusion w/ RV scalloping concerning for tamponade.      Of note, had GOC discussion with patient, patient electing to be DNR/DNI (see goals of care conversation note). (26 Aug 2017 05:27)      Allergies    No Known Allergies    Intolerances        MEDICATIONS  (STANDING):  dexamethasone     Tablet 4 milliGRAM(s) Oral every 6 hours  allopurinol 100 milliGRAM(s) Oral daily  ferrous    sulfate 325 milliGRAM(s) Oral daily  pantoprazole    Tablet 40 milliGRAM(s) Oral before breakfast  enoxaparin Injectable 40 milliGRAM(s) SubCutaneous daily  sodium chloride 0.9%. 1000 milliLiter(s) (100 mL/Hr) IV Continuous <Continuous>    MEDICATIONS  (PRN):      PAST MEDICAL & SURGICAL HISTORY:  Hodgkin lymphoma  H/O abdominoplasty: 2004  H/O bilateral breast implants: 2004      FAMILY HISTORY:  Family history of cervical cancer (Sibling)  Family history of lung cancer      SOCIAL HISTORY: No EtOH, no tobacco    REVIEW OF SYSTEMS:    CONSTITUTIONAL: No weakness, fevers or chills  EYES/ENT: No visual changes;  No vertigo or throat pain   NECK: No pain or stiffness  RESPIRATORY: No cough, wheezing, hemoptysis; No shortness of breath  CARDIOVASCULAR: No chest pain or palpitations  GASTROINTESTINAL: No abdominal or epigastric pain. No nausea, vomiting, or hematemesis; No diarrhea or constipation. No melena or hematochezia.  GENITOURINARY: No dysuria, frequency or hematuria  NEUROLOGICAL: No numbness or weakness  SKIN: No itching, burning, rashes, or lesions   All other review of systems is negative unless indicated above.        T(F): 97.7 (08-26-17 @ 14:36), Max: 99.5 (08-25-17 @ 23:00)  HR: 106 (08-26-17 @ 14:36)  BP: 89/58 (08-26-17 @ 14:36)  RR: 16 (08-26-17 @ 14:36)  SpO2: 95% (08-26-17 @ 14:36)  Wt(kg): --    GENERAL: NAD, well-developed  HEAD:  Atraumatic, Normocephalic  EYES: EOMI, PERRLA, conjunctiva and sclera clear  NECK: Supple, No JVD  CHEST/LUNG: Clear to auscultation bilaterally; No wheeze  HEART: Regular rate and rhythm; No murmurs, rubs, or gallops  ABDOMEN: Soft, Nontender, Nondistended; Bowel sounds present  EXTREMITIES:  2+ Peripheral Pulses, No clubbing, cyanosis, or edema  NEUROLOGY: non-focal  SKIN: No rashes or lesions                          7.6    13.5  )-----------( 298      ( 25 Aug 2017 22:24 )             25.3       08-25    137  |  98  |  17  ----------------------------<  115<H>  3.8   |  24  |  0.52    Ca    9.9      25 Aug 2017 22:24  Phos  4.8     08-26  Mg     1.6     08-26    TPro  6.9  /  Alb  2.6<L>  /  TBili  1.0  /  DBili  x   /  AST  15  /  ALT  23  /  AlkPhos  244<H>  08-25      Uric Acid, Serum: 4.6 mg/dL (08-26 @ 01:53)  Lactate Dehydrogenase, Serum: 192 U/L (08-26 @ 01:53)  Magnesium, Serum: 1.6 mg/dL (08-26 @ 01:53)  Phosphorus Level, Serum: 4.8 mg/dL (08-26 @ 01:53) 42F PMH recently diagnosed Hodgkin's lymphoma, lymphocyte depleted with bulky disease with extensive involvement in her lung with prior evidence of bronchial narrowing/compression, noted to be at least stage III/IV disease with poor risk features including HB less than 10, low lymphocyte count < 8% and WBC > 15. who presents with progressive SOB of 2 months duration. Additionally her RUE swelling is also worsening. She reports chills and nightsweats but denies fevers. Denies headache/photophobia/neck stiffness. Denies N/V/diarrhea, abdominal pain, dysuria, BRBPR/melena/hematuria/hematemesis/epistaxis. She was tachycardic and hypotensive on admission. Cardiology was consulted on admission who stated there was no pericardial effussion and patient should be diuresed and check a formal TTE. She was noted to have sinus tachycardia and went for CTA which showed no PE, but extensive but stable adenopathy and b/l nodules.  Patient wishes to be DNR/DNI. She again verbalized continued refusal for conventional chemotherapy for Hodgkin's Lymphoma on admission and at present, acknowledging the risk of death.  Regarding patient's hematologic history, patient was diagnosed after an excisional lymph node biopsy on 7/12 of the left axillary, which showed diffuse proliferation of atypical/frank miriam cells, foci of necrosis. Cells were positive for CD30, Newton-5, Mum-1, negative for CD3, CD4, CD15, CD20, CD79a, CD43, KIMBERLEY. Ki67 was 20%.  When seen by Dr. Baxter in the office in late July patient appeared to be in denial of her diagnosis and felt that her disease will regress with herbal remedies. She had evidence of SVC syndrome at that time as well as hypotension tachycardia, tachypnea, LE swelling. Dr. Baxter discussed with her risks associated with refusing treatment including death and the patient was adamant against any chemotherapy at that time. Her Healthcare proxy Dilip was at odds with this decision, however, patient had capacity and was able to refuse. Patient was agreeable at that time to admission for IV fluids, steroids for symptomatic management. Dr. Baxter hoped to treat her with AVD if she agreed, the alternative being Brentuximab, immunotherapy, but it was unclear if it would be approved as first line. She was to follow up after discharge but has not yet. She was to continue dexamethasone and allopurinol at the time of recent discharge.     HPI:  42F PMH Hodgkin's Lymphoma (Dx 7/2017 w/ L axillary LN biopsy, treatment naive) presents with shortness of breath.  Patient states she has had worsening shortness of breath for 2 months. Denies fevers, chills, chest pain.  Also with worsening lower extremity edema.  Patient had recent admission 7/27 - 7/29 for worsening facial swelling and hypotension.  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  Patient has been refusing conventional medical treatment and wishes to try alternative medicine.  Has been experiencing worsening RUE swelling for the past two months.  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 During past admission she was still refusing chemotherapy and radiation, she was discharged home as she did not want further treatment for her cancer, she was continued on dexamethasone 4mg q6 and allopurinol.      In the ED VS Temp 99.5 -140 BP 90 - 108 / 60-70 RR 20-40 O2 90-95% 2LNC  Labs notable for leukocytosis (WBC 13.5), anemia (Hgb 7.6). Elevated Alk Phos (244), hyperphosphatemia (4.8)   Bedside TTE showed moderate pericardial effusion w/ RV scalloping concerning for tamponade.      Of note, had GOC discussion with patient, patient electing to be DNR/DNI (see goals of care conversation note). (26 Aug 2017 05:27)      Allergies    No Known Allergies    Intolerances        MEDICATIONS  (STANDING):  dexamethasone     Tablet 4 milliGRAM(s) Oral every 6 hours  allopurinol 100 milliGRAM(s) Oral daily  ferrous    sulfate 325 milliGRAM(s) Oral daily  pantoprazole    Tablet 40 milliGRAM(s) Oral before breakfast  enoxaparin Injectable 40 milliGRAM(s) SubCutaneous daily  sodium chloride 0.9%. 1000 milliLiter(s) (100 mL/Hr) IV Continuous <Continuous>    MEDICATIONS  (PRN):      PAST MEDICAL & SURGICAL HISTORY:  Hodgkin lymphoma  H/O abdominoplasty: 2004  H/O bilateral breast implants: 2004      FAMILY HISTORY:  Family history of cervical cancer (Sibling)  Family history of lung cancer      SOCIAL HISTORY: No EtOH, no tobacco    REVIEW OF SYSTEMS:    CONSTITUTIONAL: No weakness, fevers or chills  EYES/ENT: No visual changes;  No vertigo or throat pain   NECK: No pain or stiffness  RESPIRATORY: No cough, wheezing, hemoptysis; No shortness of breath  CARDIOVASCULAR: No chest pain or palpitations  GASTROINTESTINAL: No abdominal or epigastric pain. No nausea, vomiting, or hematemesis; No diarrhea or constipation. No melena or hematochezia.  GENITOURINARY: No dysuria, frequency or hematuria  NEUROLOGICAL: No numbness or weakness  SKIN: No itching, burning, rashes, or lesions   All other review of systems is negative unless indicated above.        T(F): 97.7 (08-26-17 @ 14:36), Max: 99.5 (08-25-17 @ 23:00)  HR: 106 (08-26-17 @ 14:36)  BP: 89/58 (08-26-17 @ 14:36)  RR: 16 (08-26-17 @ 14:36)  SpO2: 95% (08-26-17 @ 14:36)  Wt(kg): --    GENERAL: NAD, well-developed  HEAD:  Atraumatic, Normocephalic  EYES: EOMI, PERRLA, conjunctiva and sclera clear  NECK: Supple, No JVD  CHEST/LUNG: Clear to auscultation bilaterally; No wheeze  HEART: Regular rate and rhythm; No murmurs, rubs, or gallops  ABDOMEN: Soft, Nontender, Nondistended; Bowel sounds present  EXTREMITIES:  2+ Peripheral Pulses, No clubbing, cyanosis, or edema  NEUROLOGY: non-focal  SKIN: No rashes or lesions                          7.6    13.5  )-----------( 298      ( 25 Aug 2017 22:24 )             25.3       08-25    137  |  98  |  17  ----------------------------<  115<H>  3.8   |  24  |  0.52    Ca    9.9      25 Aug 2017 22:24  Phos  4.8     08-26  Mg     1.6     08-26    TPro  6.9  /  Alb  2.6<L>  /  TBili  1.0  /  DBili  x   /  AST  15  /  ALT  23  /  AlkPhos  244<H>  08-25      Uric Acid, Serum: 4.6 mg/dL (08-26 @ 01:53)  Lactate Dehydrogenase, Serum: 192 U/L (08-26 @ 01:53)  Magnesium, Serum: 1.6 mg/dL (08-26 @ 01:53)  Phosphorus Level, Serum: 4.8 mg/dL (08-26 @ 01:53)      CT Angio Chest:    IMPRESSION:     No pulmonary embolism.    Interval slight increase in moderate to large right pleural effusion   since prior examination. Stable small left pleural effusion.    Relatively stable extensive mediastinal, axillary, and supraclavicular,   lymphadenopathy.     Stable bilateral pulmonary nodules and peribronchovascular disease.    CT Chest/Abdomen/Pelvis 7/27/17:    Chest:  1.  No pulmonary embolism.   2.  Moderate right and small left pleural effusions.   3.   Bilateral nodular opacities have decreased from prior examination.   4.  Redemonstration of extensive lymphadenopathy.    Abdomen/Pelvis  1.  Redemonstration of extensive retroperitoneal lymphadenopathy with new   pelvic side wall and left inguinal lymphadenopathy.  2.  Redemonstration of splenomegaly with multiple indeterminate hypodense   lesions suspicious for metastatic disease.  3.  Small volume ascites.  4.  Anasarca.

## 2017-08-26 NOTE — CONSULT NOTE ADULT - PROBLEM SELECTOR RECOMMENDATION 3
- Would repeat another echocardiogram to rule out tamponade physiology and assess need for intervention.  - Would recommend cardiology evaluation.

## 2017-08-26 NOTE — H&P ADULT - PROBLEM SELECTOR PLAN 1
- differential includes worsening lymphadenopathy from Hodgkin's lymphoma, pulmonary edema, pulmonary embolism  - f/u CTA - likely 2/2 extensive lymphadenopathy/pulmonary nodules from Hodgkin's lymphoma as well as large L pleural effusion  - CTA negative for pulmonary embolism  - currently stable on NC - likely 2/2 extensive lymphadenopathy/pulmonary nodules from Hodgkin's lymphoma as well as large L pleural effusion  - CTA negative for pulmonary embolism  - currently stable on NC  - DNR/DNI

## 2017-08-26 NOTE — H&P ADULT - HISTORY OF PRESENT ILLNESS
42F [facility] Hodgkin's Lymphoma (Dx 7/2017 w/ L axillary LN biopsy) presents with shortness of breath.      Patient had recent admission 7/27 - 7/29 for worsening facial swelling and hypotension.  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  Patient has been refusing conventional medical treatment and wishes to try alternative medicine.  Has been experiencing worsening RUE swelling for the past two months.  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 During past admission she was still refusing chemotherapy and radiation, she was discharged home as she did not want further treatment for her cancer, she was continued on dexamethasone 4mg q6 and allopurinol.      In the ED VS Temp 99.5 -140 BP 90 - 108 / 60-70 RR 20-40 O2 90-95% 2LNC  Labs notable for leukocytosis (WBC 13.5), anemia (Hgb 7.6).  Electrolytes Na 137 K 3.8 42F [facility] Hodgkin's Lymphoma (Dx 7/2017 w/ L axillary LN biopsy) presents with shortness of breath.      Patient had recent admission 7/27 - 7/29 for worsening facial swelling and hypotension.  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  Patient has been refusing conventional medical treatment and wishes to try alternative medicine.  Has been experiencing worsening RUE swelling for the past two months.  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 During past admission she was still refusing chemotherapy and radiation, she was discharged home as she did not want further treatment for her cancer, she was continued on dexamethasone 4mg q6 and allopurinol.      In the ED VS Temp 99.5 -140 BP 90 - 108 / 60-70 RR 20-40 O2 90-95% 2LNC  Labs notable for leukocytosis (WBC 13.5), anemia (Hgb 7.6). Elevated Alk Phos (244), hyperphosphatemia (4.8)   Bedside TTE showed moderate pericardial effusion w/ RV scalloping concerning for tamponade. 42F [facility] Hodgkin's Lymphoma (Dx 7/2017 w/ L axillary LN biopsy, treatment naive) presents with shortness of breath.  Patient states she has had worsening shortness of breath for 2 months. Denies fevers, chills, chest pain.  Also with worsening lower extremity edema.  Patient had recent admission 7/27 - 7/29 for worsening facial swelling and hypotension.  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  Patient has been refusing conventional medical treatment and wishes to try alternative medicine.  Has been experiencing worsening RUE swelling for the past two months.  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 During past admission she was still refusing chemotherapy and radiation, she was discharged home as she did not want further treatment for her cancer, she was continued on dexamethasone 4mg q6 and allopurinol.      In the ED VS Temp 99.5 -140 BP 90 - 108 / 60-70 RR 20-40 O2 90-95% 2LNC  Labs notable for leukocytosis (WBC 13.5), anemia (Hgb 7.6). Elevated Alk Phos (244), hyperphosphatemia (4.8)   Bedside TTE showed moderate pericardial effusion w/ RV scalloping concerning for tamponade.      Of note, had GOC discussion with patient, patient electing to be DNR/DNI (see goals of care conversation note). 42F [facility] Hodgkin's Lymphoma (Dx 7/2017 w/ L axillary LN biopsy, treatment naive) presents with shortness of breath.  Patient states she has had worsening shortness of breath for 2 months. Denies fevers, chills, chest pain.  Also with worsening lower extremity edema.  Patient had recent admission 7/27 - 7/29 for worsening facial swelling and hypotension.  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  Patient has been refusing conventional medical treatment and wishes to try alternative medicine.  Has been experiencing worsening RUE swelling for the past two months.  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 During past admission she was still refusing chemotherapy and radiation, she was discharged home as she did not want further treatment for her cancer, she was continued on dexamethasone 4mg q6 and allopurinol.      In the ED VS Temp 99.5 -140 BP 90 - 108 / 60-70 RR 20-40 O2 90-95% 2LNC  Labs notable for leukocytosis (WBC 13.5), anemia (Hgb 7.6). Elevated Alk Phos (244), hyperphosphatemia (4.8)   Bedside TTE showed moderate pericardial effusion w/ RV scalloping concerning for tamponade.      Of note, had GOC discussion with patient, patient electing to be DNR/DNI (see goals of care conversation note).

## 2017-08-26 NOTE — CONSULT NOTE ADULT - SUBJECTIVE AND OBJECTIVE BOX
CHIEF COMPLAINT: Patient is a 42y old  Female who presents with a chief complaint of Shortness of breath. Edema (Water retention). Fatigue.    HPI: 42F with significant lymphadenopathy for the last few years now s/p  LN biopsy which showed classic hodgkins. She has been unwilling to accept her diagnosis and to this point had not agreed to treatment. She is hoping for an alternative treatment.  She originally went to have a diagnosis because she was having shortness of breath. Today she denies chest pain, palpitations or shortness of breath. She denies fevers, chills or recent travels. She is originally from [facility] [facility].    PAST MEDICAL & SURGICAL HISTORY:  Hodgkin lymphoma  H/O abdominoplasty: 2004  H/O bilateral breast implants: 2004      FAMILY HISTORY:  Family history of cervical cancer (Sibling)  Family history of lung cancer (Father)      SOCIAL HISTORY:  Smoking: Never  Substance Use: Denied  EtOH Use: Denied  Marital Status: Lives with daughter  Occupation:   Recent Travel: none  Country of Birth: [place]  Advance Directives: Full Code    Allergies:  No Known Allergies      HOME MEDICATIONS: reviewed    REVIEW OF SYSTEMS:  Constitutional: [x] negative[ ] fevers [ ] chills [ ] weight loss [ ] weight gain [x] fatigue  HEENT: [x] facial swelling [ ] dry eyes [ ] eye irritation [ ] postnasal drip [ ] nasal congestion  CV: [x] negative [ ] chest pain [ ] orthopnea [ ] palpitations [ ] murmur  Resp: [ ] cough [x ] shortness of breath [ ] dyspnea [ ] wheezing [ ] sputum [ ] hemoptysis  Neurological: [x] negative [ ] headache [ ] dizziness [ ] syncope [ ] weakness [ ] numbness  Psychiatric: [ ] anxiety [x ] depression  Endocrine: [ ] diabetes [ ] thyroid problem  Hematologic/Lymphatic: [ ] anemia [ ] bleeding problem [x] Lymphoma [x] lymphadenopathy  [x] All other systems negative      OBJECTIVE:  ICU Vital Signs Last 24 Hrs  T(C): 35.1 (28 Jul 2017 14:12), Max: 36.7 (27 Jul 2017 19:40)  T(F): 95.1 (28 Jul 2017 14:12), Max: 98 (27 Jul 2017 19:40)  HR: 80 (28 Jul 2017 13:26) (80 - 117)  BP: 92/63 (28 Jul 2017 13:26) (91/65 - 102/73)  BP(mean): --  ABP: --  ABP(mean): --  RR: 18 (28 Jul 2017 13:26) (17 - 18)  SpO2: 97% (28 Jul 2017 13:26) (95% - 100%)    Exam:   General: Awake, alert, oriented.   Neck: Significant lymphadenopathy on neck and cervical lymphadenopathy.   RS: Dullness to percussion on right. Decreased BS on right side. Decreased BS on left lung base.   Abdomen: Soft, mild discomfort on the abdomen.  Cardiac: S1S1 normal. No murmur.   Lymph node: Significant generalized lymph node enlargement.  Integumentary: Peripheral edema. Mild anasarca. Enlarged right breast.  Neurological: Normal motor and sensory examination.     CAPILLARY BLOOD GLUCOSE    HOSPITAL MEDICATIONS:  MEDICATIONS  (STANDING):  enoxaparin Injectable 40 milliGRAM(s) SubCutaneous daily  ferrous    sulfate 325 milliGRAM(s) Oral daily  allopurinol 100 milliGRAM(s) Oral daily  sodium chloride 0.9%. 1000 milliLiter(s) (100 mL/Hr) IV Continuous <Continuous>  dexamethasone  Injectable 4 milliGRAM(s) IV Push every 6 hours    MEDICATIONS  (PRN):  acetaminophen   Tablet 650 milliGRAM(s) Oral every 6 hours PRN For Temp greater than 38 C (100.4 F)      LABS:                        7.9    13.3  )-----------( 285      ( 28 Jul 2017 06:37 )             27.4     Hgb Trend: 7.9<--, 7.9<--, 8.1<--  07-28    132<L>  |  97  |  12  ----------------------------<  200<H>  4.0   |  22  |  0.43<L>    Ca    9.0      28 Jul 2017 01:25  Phos  3.8     07-28  Mg     1.9     07-28    TPro  7.4  /  Alb  2.3<L>  /  TBili  1.0  /  DBili  x   /  AST  13  /  ALT  17  /  AlkPhos  278<H>  07-28    Creatinine Trend: 0.43<--, 0.43<--, 0.55<--  PT/INR - ( 27 Jul 2017 14:12 )   PT: 18.4 sec;   INR: 1.69 ratio         PTT - ( 27 Jul 2017 14:12 )  PTT:33.7 sec  Urinalysis Basic - ( 28 Jul 2017 01:25 )    Color: Yellow / Appearance: Clear / SG: >1.030 / pH: x  Gluc: x / Ketone: Negative  / Bili: Negative / Urobili: Negative   Blood: x / Protein: 30 mg/dL / Nitrite: Negative   Leuk Esterase: Negative / RBC: 2-5 /HPF / WBC 2-5 /HPF   Sq Epi: x / Non Sq Epi: Few /HPF / Bacteria: Few /HPF      MICROBIOLOGY:     RADIOLOGY:  [x ] Reviewed and interpreted by me    CT Chest: Moderate to large right pleural effusion. Small left pleural effusion. Significant generalized lymphadenopathy.     Previous imaging reviewed.

## 2017-08-26 NOTE — H&P ADULT - NSHPREVIEWOFSYSTEMS_GEN_ALL_CORE
REVIEW OF SYSTEMS:  CONSTITUTIONAL: No fever, weight loss, or fatigue  EYES: No eye pain, visual disturbances, or discharge  ENMT:  No difficulty hearing, tinnitus, vertigo; No sinus or throat pain  RESPIRATORY: +shortness of breath  CARDIOVASCULAR: No chest pain, palpitations, dizziness, or leg swelling  GASTROINTESTINAL: No abdominal or epigastric pain. No nausea, vomiting, or hematemesis; No diarrhea or constipation. No melena or hematochezia.  GENITOURINARY: No dysuria, frequency, hematuria, or incontinence  NEUROLOGICAL: No headaches, loss of strength, numbness, or tremors  SKIN: No itching, burning, rashes, or lesions   LYMPH NODES: No enlarged glands  ENDOCRINE: No heat or cold intolerance; No polydipsia or polyuria  MUSCULOSKELETAL: No joint pain or swelling;   PSYCHIATRIC: Denies depression, anxiety  HEME/LYMPH: No easy bruising, or bleeding gums  ALLERGY AND IMMUNOLOGIC: No hives or eczema REVIEW OF SYSTEMS:  CONSTITUTIONAL: No fever, weight loss, or fatigue  EYES: No eye pain, visual disturbances, or discharge  ENMT:  No difficulty hearing, tinnitus, vertigo; No sinus or throat pain  RESPIRATORY: +shortness of breath.  CARDIOVASCULAR: No chest pain, palpitations, dizziness, or leg swelling  GASTROINTESTINAL: No abdominal or epigastric pain. No nausea, vomiting, or hematemesis; No diarrhea or constipation. No melena or hematochezia.  GENITOURINARY: No dysuria, frequency, hematuria, or incontinence  NEUROLOGICAL: No headaches, loss of strength, numbness, or tremors  SKIN: No itching, burning, rashes, or lesions   LYMPH NODES: No enlarged glands  ENDOCRINE: No heat or cold intolerance; No polydipsia or polyuria  MUSCULOSKELETAL: No joint pain or swelling;   PSYCHIATRIC: Denies depression, anxiety  HEME/LYMPH: No easy bruising, or bleeding gums  ALLERGY AND IMMUNOLOGIC: No hives or eczema

## 2017-08-26 NOTE — CONSULT NOTE ADULT - ASSESSMENT
42 F with newly diagnosed treatment naive classical Hodgkins Lymphoma admitted with symptomatic disease a noted pleural and pericardial effusion with dyspnea on exertion.

## 2017-08-26 NOTE — CONSULT NOTE ADULT - PROBLEM SELECTOR RECOMMENDATION 2
-concern for SVC syndrome as RUE and R. face are increasingly swollen as compared to the left -concern for SVC syndrome on treatment

## 2017-08-26 NOTE — PROGRESS NOTE ADULT - PROBLEM SELECTOR PLAN 2
- heme onc consult.  patiently previously refused treatment  - GOC discussion had with patient, patient DNR / DNI  - consider psych consult in AM - patient appears to understand risks of refusing chemotherapy/radiation but may explore underlying depression component, has had multiple deaths in the family recently - heme onc consult.  patiently previously refused treatment  - GOC discussion had with patient, patient DNR / DNI  - Patient wanted to know more about immunotherapy

## 2017-08-26 NOTE — H&P ADULT - PROBLEM SELECTOR PLAN 2
- heme onc consult in AM, patiently previously refused treatment  - GOC discussion had with patient, patient DNR / DNI  - consider psych consult in AM - patient appears to understand risks of refusing chemotherapy/radiation but may explore underlying depression component, has had multiple deaths in the family recently

## 2017-08-26 NOTE — H&P ADULT - PROBLEM SELECTOR PLAN 5
- lovenox - bedside TTE w/ pericardial effusion w/ early tamponade.    - c/w IVF - will give 1L Bolus now, then maintenance fluids @ 100/hr  - avoid diuretics if possible   - cards consulted in the ED - f/u recs  - likely malignant   - formal TTE in the am  - currently hemodynamically stable

## 2017-08-26 NOTE — H&P ADULT - NSHPPHYSICALEXAM_GEN_ALL_CORE
PHYSICAL EXAM:  GENERAL: NAD, well-groomed, well-developed  HEAD:  Atraumatic, Normocephalic  EYES: EOMI, PERRLA, conjunctiva and sclera clear  ENMT: No tonsillar erythema, exudates, or enlargement; Moist mucous membranes, Good dentition  NECK: Supple, No JVD  NERVOUS SYSTEM: AOX3, motor and sensation grossly intact in b/l UE and b/l LE  CHEST/LUNG: Clear to auscultation bilaterally; No rales, rhonchi, wheezing, or rubs  HEART: Regular rate and rhythm; No murmurs, rubs, or gallops. No LE edema  ABDOMEN: Soft, Nontender, Nondistended; Bowel sounds present  EXTREMITIES:  2+ Peripheral Pulses, No clubbing, cyanosis  LYMPH: No lymphadenopathy noted  SKIN: No rashes or lesions PHYSICAL EXAM:  GENERAL: NAD, well-groomed, well-developed  HEAD:  Atraumatic, Normocephalic  EYES: EOMI, PERRLA, conjunctiva and sclera clear  ENMT: No tonsillar erythema, exudates, or enlargement; Moist mucous membranes, Good dentition  NECK: Supple, No JVD  NERVOUS SYSTEM: AOX3, motor and sensation grossly intact in b/l UE and b/l LE  CHEST/LUNG: +decreased breath sounds R side.  +crackles L base. No wheeze.  HEART: Regular rate and rhythm; No murmurs, rubs, or gallops. No LE edema  ABDOMEN: Soft, Nontender, Nondistended; Bowel sounds present  EXTREMITIES:  2+ Peripheral Pulses, No clubbing, cyanosis  LYMPH: No lymphadenopathy noted  SKIN: No rashes or lesions

## 2017-08-26 NOTE — ED ADULT NURSE NOTE - PERIPHERAL VASCULAR WDL
Pulses equal bilaterally, positive pitting edema in bilateral legs, up to abdomin. Patient reports R breast swelling.

## 2017-08-26 NOTE — H&P ADULT - PROBLEM SELECTOR PLAN 3
- large right pleural effusion, likely malignant   - consider throacentesis for fluid sampling - patient previously refused chemotherapy/radiation  - patient states she is still thinking about chemotherapy/radiation  - c/w Dexamethasone 4 q 6 for now

## 2017-08-26 NOTE — CHART NOTE - NSCHARTNOTEFT_GEN_A_CORE
Spoke to Radiology resident for f/u repeat CT findings. He said he the image is of poor quality due to diffuse adenopathy but says it looks similar to the prior CT. Also he can't rule out a trace or small pericardial effusion but notes that there isn't a large or moderate pericardial effusion but it is hard to tell due to the adenopathy present. He recommended an official TTE to be done by cardiology since the TTE done prior was by the ED. Pt was seen and examined at the bedside. She was resting comfortably in bed w/ no CP or SOB but still has leg swellings. Spoke to Radiology resident for f/u repeat CT findings. He said he the image is of poor quality due to diffuse adenopathy but says it looks similar to the prior CT. Also he can't rule out a trace or small pericardial effusion but notes that there isn't a large or moderate pericardial effusion but it is hard to tell due to the adenopathy present. He recommended an official TTE to be done by cardiology since the TTE done prior was by the ED. Pt was seen and examined at the bedside. She was resting comfortably in bed w/ no CP or SOB but still has leg swellings. Cardio was also called about the results and the Cardiology fellow mentioned that no acute interventions needed at this time as the pt is clinically stable for now and BP is holding steady.

## 2017-08-26 NOTE — PROGRESS NOTE ADULT - SUBJECTIVE AND OBJECTIVE BOX
MEDICINE ACCEPT NOTE    CC: Patient is a 42y old  Female who presents with a chief complaint of shortness of breath (26 Aug 2017 05:27)      HPI:    Allergies    No Known Allergies    Intolerances    	    PAST MEDICAL & SURGICAL HISTORY:  Hodgkin lymphoma  H/O abdominoplasty: 2004  H/O bilateral breast implants: 2004      FAMILY HISTORY:  Family history of cervical cancer (Sibling)  Family history of lung cancer      SOCIAL HISTORY:  OCCUPATION:  TOBACCO USE:  ALCOHOL USE:  RECREATIONAL DRUG USE:  HIGH RISK SEXUAL ACTIVITY:    MEDICATIONS:  enoxaparin Injectable 40 milliGRAM(s) SubCutaneous daily          pantoprazole    Tablet 40 milliGRAM(s) Oral before breakfast    dexamethasone     Tablet 4 milliGRAM(s) Oral every 6 hours  allopurinol 100 milliGRAM(s) Oral daily    ferrous    sulfate 325 milliGRAM(s) Oral daily  sodium chloride 0.9% Bolus 1000 milliLiter(s) IV Bolus once  sodium chloride 0.9%. 1000 milliLiter(s) IV Continuous <Continuous>      PHYSICAL EXAM:  T(C): 36.7 (08-26-17 @ 07:08), Max: 37.5 (08-25-17 @ 23:00)  HR: 103 (08-26-17 @ 07:08) (76 - 150)  BP: 103/54 (08-26-17 @ 07:08) (87/67 - 114/79)  RR: 17 (08-26-17 @ 07:08) (17 - 40)  SpO2: 98% (08-26-17 @ 07:08) (85% - 100%)  Wt(kg): --  Daily     Daily   I&O's Summary    TELEMETRY:     Appearance: NAD	  HEENT:   Normal oral mucosa, PERRL, EOMI	  Lymphatic: No lymphadenopathy  Cardiovascular: Normal S1 S2, No JVD, No murmurs, No edema  Respiratory: Lungs clear to auscultation	  Psychiatry: A & O x 3, Mood & affect appropriate  Gastrointestinal:  Soft, Non-tender, + BS	  Skin: No rashes, No ecchymoses, No cyanosis	  Neurologic: Non-focal  MSK/Extremities: Normal range of motion, No clubbing, cyanosis or edema  Vascular: Peripheral pulses palpable 2+ bilaterally    LABS:	 	                        7.6    13.5  )-----------( 298      ( 25 Aug 2017 22:24 )             25.3     08-25    137  |  98  |  17  ----------------------------<  115<H>  3.8   |  24  |  0.52    Ca    9.9      25 Aug 2017 22:24  Phos  4.8     08-26  Mg     1.6     08-26    TPro  6.9  /  Alb  2.6<L>  /  TBili  1.0  /  DBili  x   /  AST  15  /  ALT  23  /  AlkPhos  244<H>  08-25      proBNP: Serum Pro-Brain Natriuretic Peptide: 445 pg/mL (08-25 @ 22:24)    Lipid Profile:   HgA1c:   TSH:   FS: CAPILLARY BLOOD GLUCOSE        BCX/UCX:     CARDIAC MARKERS:       COAGS:    	    ECG:  	  RADIOLOGY: MEDICINE ACCEPT NOTE - PGY1    CC: Patient is a 42y old  Female who presents with a chief complaint of shortness of breath (26 Aug 2017 05:27)      HPI:  42F PMH Hodgkin's Lymphoma (Dx 7/2017 w/ L axillary LN biopsy, treatment naive) presents with shortness of breath.  Patient states she has had worsening shortness of breath for 2 months. Denies fevers, chills, chest pain.  Also with worsening lower extremity edema.  Patient had recent admission 7/27 - 7/29 for worsening facial swelling and hypotension.  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  Patient has been refusing conventional medical treatment and wishes to try alternative medicine.  Has been experiencing worsening RUE swelling for the past two months.  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 During past admission she was still refusing chemotherapy and radiation, she was discharged home as she did not want further treatment for her cancer, she was continued on dexamethasone 4mg q6 and allopurinol.    In the ED VS Temp 99.5 -140 BP 90 - 108 / 60-70 RR 20-40 O2 90-95% 2LNC  Labs notable for leukocytosis (WBC 13.5), anemia (Hgb 7.6). Elevated Alk Phos (244), hyperphosphatemia (4.8)   Bedside TTE showed moderate pericardial effusion w/ RV scalloping concerning for tamponade.    Of note, had GOC discussion with patient, patient electing to be DNR/DNI (see goals of care conversation note).    Seen and examined patient at bedside this morning. Patient reports continue to feel shortness of breath. She     Allergies    No Known Allergies    Intolerances    	    PAST MEDICAL & SURGICAL HISTORY:  Hodgkin lymphoma  H/O abdominoplasty: 2004  H/O bilateral breast implants: 2004      FAMILY HISTORY:  Family history of cervical cancer (Sibling)  Family history of lung cancer      SOCIAL HISTORY:  OCCUPATION:  TOBACCO USE:  ALCOHOL USE:  RECREATIONAL DRUG USE:  HIGH RISK SEXUAL ACTIVITY:    MEDICATIONS:  enoxaparin Injectable 40 milliGRAM(s) SubCutaneous daily          pantoprazole    Tablet 40 milliGRAM(s) Oral before breakfast    dexamethasone     Tablet 4 milliGRAM(s) Oral every 6 hours  allopurinol 100 milliGRAM(s) Oral daily    ferrous    sulfate 325 milliGRAM(s) Oral daily  sodium chloride 0.9% Bolus 1000 milliLiter(s) IV Bolus once  sodium chloride 0.9%. 1000 milliLiter(s) IV Continuous <Continuous>      PHYSICAL EXAM:  T(C): 36.7 (08-26-17 @ 07:08), Max: 37.5 (08-25-17 @ 23:00)  HR: 103 (08-26-17 @ 07:08) (76 - 150)  BP: 103/54 (08-26-17 @ 07:08) (87/67 - 114/79)  RR: 17 (08-26-17 @ 07:08) (17 - 40)  SpO2: 98% (08-26-17 @ 07:08) (85% - 100%)  Wt(kg): --  Daily     Daily   I&O's Summary    TELEMETRY:     Appearance: NAD	  HEENT:   Normal oral mucosa, PERRL, EOMI	  Lymphatic: No lymphadenopathy  Cardiovascular: Normal S1 S2, No JVD, No murmurs, No edema  Respiratory: Lungs clear to auscultation	  Psychiatry: A & O x 3, Mood & affect appropriate  Gastrointestinal:  Soft, Non-tender, + BS	  Skin: No rashes, No ecchymoses, No cyanosis	  Neurologic: Non-focal  MSK/Extremities: Normal range of motion, No clubbing, cyanosis or edema  Vascular: Peripheral pulses palpable 2+ bilaterally    LABS:	 	                        7.6    13.5  )-----------( 298      ( 25 Aug 2017 22:24 )             25.3     08-25    137  |  98  |  17  ----------------------------<  115<H>  3.8   |  24  |  0.52    Ca    9.9      25 Aug 2017 22:24  Phos  4.8     08-26  Mg     1.6     08-26    TPro  6.9  /  Alb  2.6<L>  /  TBili  1.0  /  DBili  x   /  AST  15  /  ALT  23  /  AlkPhos  244<H>  08-25      proBNP: Serum Pro-Brain Natriuretic Peptide: 445 pg/mL (08-25 @ 22:24)    Lipid Profile:   HgA1c:   TSH:   FS: CAPILLARY BLOOD GLUCOSE        BCX/UCX:     CARDIAC MARKERS:       COAGS:    	    ECG:  	  RADIOLOGY: MEDICINE ACCEPT NOTE - PGY1    CC: Patient is a 42y old  Female who presents with a chief complaint of shortness of breath (26 Aug 2017 05:27)      HPI:  42F PMH Hodgkin's Lymphoma (Dx 7/2017 w/ L axillary LN biopsy, treatment naive) presents with shortness of breath.  Patient states she has had worsening shortness of breath for 2 months. Denies fevers, chills, chest pain.  Also with worsening lower extremity edema.  Patient had recent admission 7/27 - 7/29 for worsening facial swelling and hypotension.  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  Patient has been refusing conventional medical treatment and wishes to try alternative medicine.  Has been experiencing worsening RUE swelling for the past two months.  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 During past admission she was still refusing chemotherapy and radiation, she was discharged home as she did not want further treatment for her cancer, she was continued on dexamethasone 4mg q6 and allopurinol.    In the ED VS Temp 99.5 -140 BP 90 - 108 / 60-70 RR 20-40 O2 90-95% 2LNC  Labs notable for leukocytosis (WBC 13.5), anemia (Hgb 7.6). Elevated Alk Phos (244), hyperphosphatemia (4.8)   Bedside TTE showed moderate pericardial effusion w/ RV scalloping concerning for tamponade.    Of note, had GOC discussion with patient, patient electing to be DNR/DNI (see goals of care conversation note).    Seen and examined patient at bedside this morning. Patient reports breathing about the same. She wanted to speak with her oncologist Dr. Baxter regarding immunotherapy. Denied fever, chill, n/v, CP, abdominal pain.     Allergies  No Known Allergies  Intolerances    	    PAST MEDICAL & SURGICAL HISTORY:  Hodgkin lymphoma  H/O abdominoplasty: 2004  H/O bilateral breast implants: 2004      FAMILY HISTORY:  Family history of cervical cancer (Sibling)  Family history of lung cancer      SOCIAL HISTORY:  Lives with daughter   Denies toxic habits. Never smoker or drinker. No illicit drug use    MEDICATIONS:  enoxaparin Injectable 40 milliGRAM(s) SubCutaneous daily  pantoprazole    Tablet 40 milliGRAM(s) Oral before breakfast  dexamethasone     Tablet 4 milliGRAM(s) Oral every 6 hours  allopurinol 100 milliGRAM(s) Oral daily  ferrous    sulfate 325 milliGRAM(s) Oral daily  sodium chloride 0.9% Bolus 1000 milliLiter(s) IV Bolus once  sodium chloride 0.9%. 1000 milliLiter(s) IV Continuous <Continuous>      PHYSICAL EXAM:  T(C): 36.7 (08-26-17 @ 07:08), Max: 37.5 (08-25-17 @ 23:00)  HR: 103 (08-26-17 @ 07:08) (76 - 150)  BP: 103/54 (08-26-17 @ 07:08) (87/67 - 114/79)  RR: 17 (08-26-17 @ 07:08) (17 - 40)  SpO2: 98% (08-26-17 @ 07:08) (85% - 100%)  Wt(kg): --  Daily     Daily   I&O's Summary      GENERAL: NAD, well-groomed, well-developed  HEAD:  Atraumatic, Normocephalic  EYES: EOMI, PERRLA, conjunctiva and sclera clear  ENMT: No tonsillar erythema, exudates, or enlargement; Moist mucous membranes, Good dentition  NECK: Supple, No JVD  NERVOUS SYSTEM: AOX3, motor and sensation grossly intact in b/l UE and b/l LE  CHEST/LUNG: +decreased breath sounds R side.  +crackles L base. No wheeze.  HEART: Regular rate and rhythm; No murmurs, rubs, or gallops. No LE edema  ABDOMEN: Soft, Nontender, Nondistended; Bowel sounds present  EXTREMITIES:  2+ Peripheral Pulses, No clubbing, cyanosis  LYMPH: No lymphadenopathy noted  SKIN: No rashes or lesions    LABS:	 	                        7.6    13.5  )-----------( 298      ( 25 Aug 2017 22:24 )             25.3     08-25    137  |  98  |  17  ----------------------------<  115<H>  3.8   |  24  |  0.52    Ca    9.9      25 Aug 2017 22:24  Phos  4.8     08-26  Mg     1.6     08-26    TPro  6.9  /  Alb  2.6<L>  /  TBili  1.0  /  DBili  x   /  AST  15  /  ALT  23  /  AlkPhos  244<H>  08-25      proBNP: Serum Pro-Brain Natriuretic Peptide: 445 pg/mL (08-25 @ 22:24)    Lipid Profile:   HgA1c:   TSH:   FS: CAPILLARY BLOOD GLUCOSE        BCX/UCX:     CARDIAC MARKERS:       COAGS:    	    ECG:  	  RADIOLOGY: MEDICINE ACCEPT NOTE - PGY1    CC: Patient is a 42y old  Female who presents with a chief complaint of shortness of breath (26 Aug 2017 05:27)      HPI:  42F PMH Hodgkin's Lymphoma (Dx 7/2017 w/ L axillary LN biopsy, treatment naive) presents with shortness of breath.  Patient states she has had worsening shortness of breath for 2 months. Denies fevers, chills, chest pain.  Also with worsening lower extremity edema.  Patient had recent admission 7/27 - 7/29 for worsening facial swelling and hypotension.  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  Patient has been refusing conventional medical treatment and wishes to try alternative medicine.  Has been experiencing worsening RUE swelling for the past two months.  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 During past admission she was still refusing chemotherapy and radiation, she was discharged home as she did not want further treatment for her cancer, she was continued on dexamethasone 4mg q6 and allopurinol.    In the ED VS Temp 99.5 -140 BP 90 - 108 / 60-70 RR 20-40 O2 90-95% 2LNC  Labs notable for leukocytosis (WBC 13.5), anemia (Hgb 7.6). Elevated Alk Phos (244), hyperphosphatemia (4.8)   Bedside TTE showed moderate pericardial effusion w/ RV scalloping concerning for tamponade.    Of note, had GOC discussion with patient, patient electing to be DNR/DNI (see goals of care conversation note).    Seen and examined patient at bedside this morning. Patient reports breathing about the same. She wanted to speak with her oncologist Dr. Baxter regarding immunotherapy. Denied fever, chill, n/v, CP, abdominal pain.     Allergies  No Known Allergies  Intolerances    	    PAST MEDICAL & SURGICAL HISTORY:  Hodgkin lymphoma  H/O abdominoplasty: 2004  H/O bilateral breast implants: 2004      FAMILY HISTORY:  Family history of cervical cancer (Sibling)  Family history of lung cancer      SOCIAL HISTORY:  Lives with daughter   Denies toxic habits. Never smoker or drinker. No illicit drug use    MEDICATIONS:  enoxaparin Injectable 40 milliGRAM(s) SubCutaneous daily  pantoprazole    Tablet 40 milliGRAM(s) Oral before breakfast  dexamethasone     Tablet 4 milliGRAM(s) Oral every 6 hours  allopurinol 100 milliGRAM(s) Oral daily  ferrous    sulfate 325 milliGRAM(s) Oral daily  sodium chloride 0.9% Bolus 1000 milliLiter(s) IV Bolus once  sodium chloride 0.9%. 1000 milliLiter(s) IV Continuous <Continuous>      PHYSICAL EXAM:  T(C): 36.7 (08-26-17 @ 07:08), Max: 37.5 (08-25-17 @ 23:00)  HR: 103 (08-26-17 @ 07:08) (76 - 150)  BP: 103/54 (08-26-17 @ 07:08) (87/67 - 114/79)  RR: 17 (08-26-17 @ 07:08) (17 - 40)  SpO2: 98% (08-26-17 @ 07:08) (85% - 100%)  Wt(kg): --  Daily     Daily   I&O's Summary      GENERAL: NAD, well-groomed, well-developed  HEAD:  Atraumatic, Normocephalic  EYES: EOMI, PERRLA, conjunctiva and sclera clear  ENMT: No tonsillar erythema, exudates, or enlargement; Moist mucous membranes, Good dentition  NECK: Supple, No JVD,   NERVOUS SYSTEM: AOX3, motor and sensation grossly intact in b/l UE and b/l LE  CHEST/LUNG: +decreased breath sounds R side.  +crackles L base. No wheeze.  HEART: Regular rate and rhythm; No murmurs, rubs, or gallops. No LE edema  ABDOMEN: Soft, Nontender, Nondistended; Bowel sounds present  EXTREMITIES:  2+ Peripheral Pulses, No clubbing, cyanosis  LYMPH: extensive LAD on left neck  SKIN: No rashes or lesions    LABS:	 	                        7.6    13.5  )-----------( 298      ( 25 Aug 2017 22:24 )             25.3     08-25    137  |  98  |  17  ----------------------------<  115<H>  3.8   |  24  |  0.52    Ca    9.9      25 Aug 2017 22:24  Phos  4.8     08-26  Mg     1.6     08-26    TPro  6.9  /  Alb  2.6<L>  /  TBili  1.0  /  DBili  x   /  AST  15  /  ALT  23  /  AlkPhos  244<H>  08-25      proBNP: Serum Pro-Brain Natriuretic Peptide: 445 pg/mL (08-25 @ 22:24) MEDICINE ACCEPT NOTE - PGY1    CC: Patient is a 42y old  Female who presents with a chief complaint of shortness of breath (26 Aug 2017 05:27)      HPI:  42F PMH Hodgkin's Lymphoma (Dx 7/2017 w/ L axillary LN biopsy, treatment naive) presents with shortness of breath.  Patient states she has had worsening shortness of breath for 2 months. Denies fevers, chills, chest pain.  Also with worsening lower extremity edema.  Patient had recent admission 7/27 - 7/29 for worsening facial swelling and hypotension.  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  Patient has been refusing conventional medical treatment and wishes to try alternative medicine.  Has been experiencing worsening RUE swelling for the past two months.  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 During past admission she was still refusing chemotherapy and radiation, she was discharged home as she did not want further treatment for her cancer, she was continued on dexamethasone 4mg q6 and allopurinol.    In the ED VS Temp 99.5 -140 BP 90 - 108 / 60-70 RR 20-40 O2 90-95% 2LNC  Labs notable for leukocytosis (WBC 13.5), anemia (Hgb 7.6). Elevated Alk Phos (244), hyperphosphatemia (4.8)   Bedside TTE showed moderate pericardial effusion w/ RV scalloping concerning for tamponade.    Of note, had GOC discussion with patient, patient electing to be DNR/DNI (see goals of care conversation note).    Seen and examined patient at bedside this morning. Patient reports breathing about the same. She wanted to speak with her oncologist Dr. Baxter regarding immunotherapy. Denied fever, chill, n/v, CP, abdominal pain.     Allergies  No Known Allergies  Intolerances    	    PAST MEDICAL & SURGICAL HISTORY:  Hodgkin lymphoma  H/O abdominoplasty: 2004  H/O bilateral breast implants: 2004      FAMILY HISTORY:  Family history of cervical cancer (Sibling)  Family history of lung cancer      SOCIAL HISTORY:  Lives with daughter   Denies toxic habits. Never smoker or drinker. No illicit drug use    MEDICATIONS:  enoxaparin Injectable 40 milliGRAM(s) SubCutaneous daily  pantoprazole    Tablet 40 milliGRAM(s) Oral before breakfast  dexamethasone     Tablet 4 milliGRAM(s) Oral every 6 hours  allopurinol 100 milliGRAM(s) Oral daily  ferrous    sulfate 325 milliGRAM(s) Oral daily  sodium chloride 0.9% Bolus 1000 milliLiter(s) IV Bolus once  sodium chloride 0.9%. 1000 milliLiter(s) IV Continuous <Continuous>      PHYSICAL EXAM:  T(C): 36.7 (08-26-17 @ 07:08), Max: 37.5 (08-25-17 @ 23:00)  HR: 103 (08-26-17 @ 07:08) (76 - 150)  BP: 103/54 (08-26-17 @ 07:08) (87/67 - 114/79)  RR: 17 (08-26-17 @ 07:08) (17 - 40)  SpO2: 98% (08-26-17 @ 07:08) (85% - 100%)  Wt(kg): --  Daily     Daily   I&O's Summary      GENERAL: NAD, well-groomed, well-developed  HEAD:  Atraumatic, Normocephalic  EYES: EOMI, PERRLA, conjunctiva and sclera clear  ENMT: No tonsillar erythema, exudates, or enlargement; Moist mucous membranes, Good dentition  NECK: Supple, No JVD,   NERVOUS SYSTEM: AOX3, motor and sensation grossly intact in b/l UE and b/l LE  CHEST/LUNG: +decreased breath sounds R side.  +crackles L base. No wheeze.  HEART: Regular rate and rhythm; No murmurs, rubs, or gallops. No LE edema  ABDOMEN: Soft, Nontender, Nondistended; Bowel sounds present  EXTREMITIES:  2+ Peripheral Pulses, No clubbing, cyanosis  LYMPH: extensive LAD on left neck  SKIN: No rashes or lesions    LABS:	 	                        7.6    13.5  )-----------( 298      ( 25 Aug 2017 22:24 )             25.3     08-25    137  |  98  |  17  ----------------------------<  115<H>  3.8   |  24  |  0.52    Ca    9.9      25 Aug 2017 22:24  Phos  4.8     08-26  Mg     1.6     08-26    TPro  6.9  /  Alb  2.6<L>  /  TBili  1.0  /  DBili  x   /  AST  15  /  ALT  23  /  AlkPhos  244<H>  08-25      proBNP: Serum Pro-Brain Natriuretic Peptide: 445 pg/mL (08-25 @ 22:24)      < from: CT Angio Chest w/ IV Cont (08.26.17 @ 04:20) >  IMPRESSION:     No pulmonary embolism.    Interval slight increase in moderate to large right pleural effusion   since prior examination. Stable small left pleural effusion.    Relatively stable extensive mediastinal, axillary, and supraclavicular,   lymphadenopathy.     Stable bilateral pulmonary nodules and peribronchovascular disease.    < end of copied text >    < from: Xray Chest 2 Views PA/Lat (08.25.17 @ 21:46) >  IMPRESSION:     Increase in size of the right pleural effusion as compared with the prior   studies. Extensive mediastinal lymphadenopathy with associated narrowing   of the distal trachea, similar to the prior studies.    < end of copied text >    < from: US TTE 2D F/U, Limited w/o Contrast (ED) (08.25.17 @ 23:12) >  IMPRESSION:   Moderate Pericardial Effusion with right ventricular scalloping in   diastole, an early sign of tamponade.     < end of copied text > MEDICINE ACCEPT NOTE - PGY1    CC: Patient is a 42y old  Female who presents with a chief complaint of shortness of breath (26 Aug 2017 05:27)      HPI:  42F PMH Hodgkin's Lymphoma (Dx 7/2017 w/ L axillary LN biopsy, treatment naive) presents with shortness of breath.  Patient states she has had worsening shortness of breath for 2 months. Denies fevers, chills, chest pain.  Also with worsening lower extremity edema.  Patient had recent admission 7/27 - 7/29 for worsening facial swelling and hypotension.  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  Patient has been refusing conventional medical treatment and wishes to try alternative medicine.  Has been experiencing worsening RUE swelling for the past two months.  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 During past admission she was still refusing chemotherapy and radiation, she was discharged home as she did not want further treatment for her cancer, she was continued on dexamethasone 4mg q6 and allopurinol.    In the ED VS Temp 99.5 -140 BP 90 - 108 / 60-70 RR 20-40 O2 90-95% 2LNC  Labs notable for leukocytosis (WBC 13.5), anemia (Hgb 7.6). Elevated Alk Phos (244), hyperphosphatemia (4.8)   Bedside TTE showed moderate pericardial effusion w/ RV scalloping concerning for tamponade.    Of note, had GOC discussion with patient, patient electing to be DNR/DNI (see goals of care conversation note).    Seen and examined patient at bedside this morning. Patient reports breathing about the same. She wanted to speak with her oncologist Dr. Baxter regarding immunotherapy. Denied fever, chill, n/v, CP, abdominal pain.     Allergies  No Known Allergies  Intolerances    	    PAST MEDICAL & SURGICAL HISTORY:  Hodgkin lymphoma  H/O abdominoplasty: 2004  H/O bilateral breast implants: 2004      FAMILY HISTORY:  Family history of cervical cancer (Sibling)  Family history of lung cancer      SOCIAL HISTORY:  Lives with daughter   Denies toxic habits. Never smoker or drinker. No illicit drug use    MEDICATIONS:  enoxaparin Injectable 40 milliGRAM(s) SubCutaneous daily  pantoprazole    Tablet 40 milliGRAM(s) Oral before breakfast  dexamethasone     Tablet 4 milliGRAM(s) Oral every 6 hours  allopurinol 100 milliGRAM(s) Oral daily  ferrous    sulfate 325 milliGRAM(s) Oral daily  sodium chloride 0.9% Bolus 1000 milliLiter(s) IV Bolus once  sodium chloride 0.9%. 1000 milliLiter(s) IV Continuous <Continuous>      PHYSICAL EXAM:  T(C): 36.7 (08-26-17 @ 07:08), Max: 37.5 (08-25-17 @ 23:00)  HR: 103 (08-26-17 @ 07:08) (76 - 150)  BP: 103/54 (08-26-17 @ 07:08) (87/67 - 114/79)  RR: 17 (08-26-17 @ 07:08) (17 - 40)  SpO2: 98% (08-26-17 @ 07:08) (85% - 100%)  Wt(kg): --  Daily     Daily   I&O's Summary      GENERAL: NAD, well-groomed, well-developed  HEAD:  Atraumatic, Normocephalic  EYES: EOMI, PERRLA, conjunctiva and sclera clear  ENMT: No tonsillar erythema, exudates, or enlargement; Moist mucous membranes, Good dentition  NECK: Supple, No JVD,   NERVOUS SYSTEM: AOX3, motor and sensation grossly intact in b/l UE and b/l LE  CHEST/LUNG: +decreased breath sounds R side.  +crackles L base. + wheeze.  HEART: Regular rate and rhythm; No murmurs, rubs, or gallops. No LE edema  ABDOMEN: Soft, Nontender, Nondistended; Bowel sounds present  EXTREMITIES:  2+ Peripheral Pulses, No clubbing, cyanosis  LYMPH: extensive LAD on left neck  SKIN: No rashes or lesions    LABS:	 	                        7.6    13.5  )-----------( 298      ( 25 Aug 2017 22:24 )             25.3     08-25    137  |  98  |  17  ----------------------------<  115<H>  3.8   |  24  |  0.52    Ca    9.9      25 Aug 2017 22:24  Phos  4.8     08-26  Mg     1.6     08-26    TPro  6.9  /  Alb  2.6<L>  /  TBili  1.0  /  DBili  x   /  AST  15  /  ALT  23  /  AlkPhos  244<H>  08-25      proBNP: Serum Pro-Brain Natriuretic Peptide: 445 pg/mL (08-25 @ 22:24)      < from: CT Angio Chest w/ IV Cont (08.26.17 @ 04:20) >  IMPRESSION:     No pulmonary embolism.    Interval slight increase in moderate to large right pleural effusion   since prior examination. Stable small left pleural effusion.    Relatively stable extensive mediastinal, axillary, and supraclavicular,   lymphadenopathy.     Stable bilateral pulmonary nodules and peribronchovascular disease.    < end of copied text >    < from: Xray Chest 2 Views PA/Lat (08.25.17 @ 21:46) >  IMPRESSION:     Increase in size of the right pleural effusion as compared with the prior   studies. Extensive mediastinal lymphadenopathy with associated narrowing   of the distal trachea, similar to the prior studies.    < end of copied text >    < from: US TTE 2D F/U, Limited w/o Contrast (ED) (08.25.17 @ 23:12) >  IMPRESSION:   Moderate Pericardial Effusion with right ventricular scalloping in   diastole, an early sign of tamponade.     < end of copied text >

## 2017-08-26 NOTE — CONSULT NOTE ADULT - PROBLEM SELECTOR RECOMMENDATION 4
- Moderate to large on right and small on left.  - Her effusions are likely to be persistent given her significant adenopathy - without treatment for the adenopathy the effusions will constantly return.  - Patient will benefit from pleurex (IPC) catheter. If patient willing, can schedule for upcoming week.   - Incentive spirometer and acapella - Moderate to large on right and small on left.  - Her effusions are likely to be persistent given her significant adenopathy - without treatment for the adenopathy the effusions will constantly return.  - Patient will benefit from pleurex (IPC) catheter. If patient willing, can schedule for upcoming week.  - Would address pericardial effusion/possible tamponade before any pleural intervention at this time as pleural effusion appears to be chronic and increasing slowly in size.  - Incentive spirometer and acapella - Moderate to large on right and small on left.  - Her effusions are likely to be persistent given her significant adenopathy - without treatment for the adenopathy the effusions will constantly return.  - Patient may benefit from pleurex (IPC) catheter, for purely symptomatic management knowing that this will only relieve her symptoms but not treat the underlying problem. If patient willing, can schedule for upcoming week.  - Would address pericardial effusion/possible tamponade before any pleural intervention at this time as pleural effusion appears to be chronic and increasing slowly in size.  - Incentive spirometer and acapella

## 2017-08-26 NOTE — CHART NOTE - NSCHARTNOTEFT_GEN_A_CORE
Called by Attending as TTE showed pericardial effusion with RV invagination. Spoke with radiology for IR consult who reviewed CT scan with attending. Per Radiology, since no Effusion on CT scan they will not intervene. Pt to have another CT scan to evaluate effusion and radiology will follow up. NF to follow up official CT scan results and speak with radiology regarding possible intervention.     Hector Durant PGY-3

## 2017-08-26 NOTE — PROGRESS NOTE ADULT - PROBLEM SELECTOR PLAN 4
- large right pleural effusion, likely malignant   - consider thoracentesis - large right pleural effusion, likely malignant   - consider thoracentesis - pulm consult - large right pleural effusion, likely malignant   - consider thoracentesis - pulm consult appreciated

## 2017-08-26 NOTE — PROGRESS NOTE ADULT - PROBLEM SELECTOR PLAN 6
- lovenox - lovenox    Diet: Regular diet (gluten free)  IVF: none    Pasquale Donovan MD  Medicine Team 4  Pager: [phone]  After 7 PM on weekdays please page [phone]

## 2017-08-26 NOTE — H&P ADULT - FAMILY HISTORY
<<-----Click on this checkbox to enter Family History Family history of lung cancer     Sibling  Still living? Unknown  Family history of cervical cancer, Age at diagnosis: Age Unknown

## 2017-08-27 LAB
ANION GAP SERPL CALC-SCNC: 14 MMOL/L — SIGNIFICANT CHANGE UP (ref 5–17)
BASOPHILS # BLD AUTO: 0 K/UL — SIGNIFICANT CHANGE UP (ref 0–0.2)
BASOPHILS NFR BLD AUTO: 0 % — SIGNIFICANT CHANGE UP (ref 0–2)
BUN SERPL-MCNC: 18 MG/DL — SIGNIFICANT CHANGE UP (ref 7–23)
CALCIUM SERPL-MCNC: 9.8 MG/DL — SIGNIFICANT CHANGE UP (ref 8.4–10.5)
CHLORIDE SERPL-SCNC: 99 MMOL/L — SIGNIFICANT CHANGE UP (ref 96–108)
CO2 SERPL-SCNC: 23 MMOL/L — SIGNIFICANT CHANGE UP (ref 22–31)
CREAT SERPL-MCNC: 0.43 MG/DL — LOW (ref 0.5–1.3)
CULTURE RESULTS: SIGNIFICANT CHANGE UP
EOSINOPHIL # BLD AUTO: 0 K/UL — SIGNIFICANT CHANGE UP (ref 0–0.5)
EOSINOPHIL NFR BLD AUTO: 0 % — SIGNIFICANT CHANGE UP (ref 0–6)
GLUCOSE SERPL-MCNC: 128 MG/DL — HIGH (ref 70–99)
HCT VFR BLD CALC: 26.3 % — LOW (ref 34.5–45)
HGB BLD-MCNC: 7.3 G/DL — LOW (ref 11.5–15.5)
LYMPHOCYTES # BLD AUTO: 1.18 K/UL — SIGNIFICANT CHANGE UP (ref 1–3.3)
LYMPHOCYTES # BLD AUTO: 8 % — LOW (ref 13–44)
MAGNESIUM SERPL-MCNC: 1.8 MG/DL — SIGNIFICANT CHANGE UP (ref 1.6–2.6)
MANUAL SMEAR VERIFICATION: SIGNIFICANT CHANGE UP
MCHC RBC-ENTMCNC: 23.9 PG — LOW (ref 27–34)
MCHC RBC-ENTMCNC: 27.8 GM/DL — LOW (ref 32–36)
MCV RBC AUTO: 85.9 FL — SIGNIFICANT CHANGE UP (ref 80–100)
METAMYELOCYTES # FLD: 2 % — HIGH (ref 0–0)
MONOCYTES # BLD AUTO: 0.44 K/UL — SIGNIFICANT CHANGE UP (ref 0–0.9)
MONOCYTES NFR BLD AUTO: 3 % — SIGNIFICANT CHANGE UP (ref 2–14)
MYELOCYTES NFR BLD: 2 % — HIGH (ref 0–0)
NEUTROPHILS # BLD AUTO: 12.58 K/UL — HIGH (ref 1.8–7.4)
NEUTROPHILS NFR BLD AUTO: 80 % — HIGH (ref 43–77)
NEUTS BAND # BLD: 5 % — SIGNIFICANT CHANGE UP (ref 0–8)
NRBC # BLD: 3 /100 — HIGH (ref 0–0)
PHOSPHATE SERPL-MCNC: 4 MG/DL — SIGNIFICANT CHANGE UP (ref 2.5–4.5)
PLAT MORPH BLD: NORMAL — SIGNIFICANT CHANGE UP
PLATELET # BLD AUTO: 248 K/UL — SIGNIFICANT CHANGE UP (ref 150–400)
POTASSIUM SERPL-MCNC: 4.6 MMOL/L — SIGNIFICANT CHANGE UP (ref 3.5–5.3)
POTASSIUM SERPL-SCNC: 4.6 MMOL/L — SIGNIFICANT CHANGE UP (ref 3.5–5.3)
RBC # BLD: 3.06 M/UL — LOW (ref 3.8–5.2)
RBC # FLD: 22.7 % — HIGH (ref 10.3–14.5)
RBC BLD AUTO: SIGNIFICANT CHANGE UP
SODIUM SERPL-SCNC: 136 MMOL/L — SIGNIFICANT CHANGE UP (ref 135–145)
SPECIMEN SOURCE: SIGNIFICANT CHANGE UP
WBC # BLD: 14.8 K/UL — HIGH (ref 3.8–10.5)
WBC # FLD AUTO: 14.8 K/UL — HIGH (ref 3.8–10.5)

## 2017-08-27 PROCEDURE — 99233 SBSQ HOSP IP/OBS HIGH 50: CPT | Mod: GC

## 2017-08-27 PROCEDURE — 99233 SBSQ HOSP IP/OBS HIGH 50: CPT

## 2017-08-27 RX ORDER — SODIUM CHLORIDE 9 MG/ML
1000 INJECTION INTRAMUSCULAR; INTRAVENOUS; SUBCUTANEOUS
Qty: 0 | Refills: 0 | Status: DISCONTINUED | OUTPATIENT
Start: 2017-08-27 | End: 2017-08-28

## 2017-08-27 RX ADMIN — SODIUM CHLORIDE 100 MILLILITER(S): 9 INJECTION INTRAMUSCULAR; INTRAVENOUS; SUBCUTANEOUS at 13:00

## 2017-08-27 RX ADMIN — ENOXAPARIN SODIUM 40 MILLIGRAM(S): 100 INJECTION SUBCUTANEOUS at 11:43

## 2017-08-27 RX ADMIN — Medication 4 MILLIGRAM(S): at 17:34

## 2017-08-27 RX ADMIN — Medication 4 MILLIGRAM(S): at 06:50

## 2017-08-27 RX ADMIN — PANTOPRAZOLE SODIUM 40 MILLIGRAM(S): 20 TABLET, DELAYED RELEASE ORAL at 06:50

## 2017-08-27 RX ADMIN — Medication 325 MILLIGRAM(S): at 11:42

## 2017-08-27 RX ADMIN — Medication 100 MILLIGRAM(S): at 11:42

## 2017-08-27 RX ADMIN — Medication 4 MILLIGRAM(S): at 23:03

## 2017-08-27 RX ADMIN — Medication 4 MILLIGRAM(S): at 11:56

## 2017-08-27 NOTE — PROVIDER CONTACT NOTE (OTHER) - ACTION/TREATMENT ORDERED:
MD Swanson aware and coming to assess pt. Obtain a rectal temp with another thermometer as per MD Swanson. Will continue to monitor.

## 2017-08-27 NOTE — PROGRESS NOTE ADULT - PROBLEM SELECTOR PLAN 4
slightly progressed on current CT-right over left; will consider right sided pleurx catheter once echo done and reviewed as pt may need 2 interventions based on echo (pleurx and window)

## 2017-08-27 NOTE — PROGRESS NOTE ADULT - PROBLEM SELECTOR PLAN 3
- patient previously refused chemotherapy/radiation  - patient states she is still thinking about chemotherapy/radiation, wanted to know more about immunotherapy  - c/w Dexamethasone 4 q 6 for now

## 2017-08-27 NOTE — PROGRESS NOTE ADULT - PROBLEM SELECTOR PLAN 5
- bedside TTE w/ pericardial effusion w/ early tamponade.    - c/w IVF - will give 1L Bolus now, then maintenance fluids @ 100/hr  - avoid diuretics if possible   - cards consulted/ f/u recs.   patient HD stable.  -D/W IR and consult placed to evaluate effusion for possible pericardial window. If not amenable then CT sx consult.  - likely malignant   - currently hemodynamically stable - with some tamponade effect though but pt is currently hemodynamically stable  - c/w IVF @ 100/hr  - avoid diuretics  - cards consulted/ f/u recs.   patient HD stable.  -D/W IR and consult placed to evaluate effusion for possible pericardial window. If not amenable then CT sx consult.  - currently hemodynamically stable

## 2017-08-27 NOTE — PROVIDER CONTACT NOTE (OTHER) - SITUATION
Unable to obtain oral temperature during morning VS, rectal temp obtained temp 94.6F. Pt states that she is unable to obtain an oral temperature at times.

## 2017-08-27 NOTE — PROGRESS NOTE ADULT - ATTENDING COMMENTS
Agree with above.  42F with treatment hodgkin's lymphoma presents with progressively worsening shortness of breath.  Initial CT without pericardial effusion, but echocardiogram with signs of RV diastolic compression and early tamponade physiology.  I had a long discussion with Ms. Bravo, explaining the urgency for drainage of pericardial effusion. I explained that unfortunately if we wait, she may not survive.   She expressed understanding; however, then expressed desire to go home and think it over. I told her it would not be my recommendation and she is in a critical state. She said she would discuss it with her family, but in the meantime would not move to the ICU nor agree to any intervention.  Agree with IVF in the meantime.   Continue to monitor TTE.   She remains DNR.  Palliative consult may be very helpful at this time in decision-making and GOC moving forward.

## 2017-08-27 NOTE — PROGRESS NOTE ADULT - PROBLEM SELECTOR PLAN 4
- large right pleural effusion, likely malignant   - consider thoracentesis - pulm consult appreciated - large right pleural effusion, presumed malignant   - pulm consult appreciated - possible pleurex catheter placement

## 2017-08-27 NOTE — PROGRESS NOTE ADULT - SUBJECTIVE AND OBJECTIVE BOX
Date of Admission: 8/26/17    24H hour events: no acute events overnight. denies CP ,SOB. BP stable.     MEDICATIONS:  enoxaparin Injectable 40 milliGRAM(s) SubCutaneous daily          pantoprazole    Tablet 40 milliGRAM(s) Oral before breakfast    dexamethasone     Tablet 4 milliGRAM(s) Oral every 6 hours  allopurinol 100 milliGRAM(s) Oral daily    ferrous    sulfate 325 milliGRAM(s) Oral daily  sodium chloride 0.9%. 1000 milliLiter(s) IV Continuous <Continuous>      REVIEW OF SYSTEMS:  pt denies CP, SOB, n/v, syncope, HA, AMS   PHYSICAL EXAM:  T(C): 37 (08-27-17 @ 04:25), Max: 37 (08-27-17 @ 04:25)  HR: 99 (08-27-17 @ 04:00) (99 - 106)  BP: 101/72 (08-27-17 @ 04:00) (89/58 - 101/72)  RR: 18 (08-27-17 @ 04:00) (16 - 18)  SpO2: 100% (08-27-17 @ 04:00) (95% - 100%)  Wt(kg): --  I&O's Summary    26 Aug 2017 07:01  -  27 Aug 2017 07:00  --------------------------------------------------------  IN: 250 mL / OUT: 650 mL / NET: -400 mL        Appearance: Normal	  HEENT:   Normal oral mucosa, PERRL, EOMI	  Lymphatic: + cervical lymphadenopathy  Cardiovascular: Normal S1 S2, No JVD, No murmurs, 2+ b/l LE edema. JVD not appreciated.   Respiratory: Lungs clear to auscultation	  Psychiatry: A & O x 3, Mood & affect appropriate  Gastrointestinal:  Soft, Non-tender, + BS	  Skin: No rashes, No ecchymoses, No cyanosis	  Neurologic: Non-focal  Extremities: Normal range of motion, No clubbing, cyanosis or edema  Vascular: Peripheral pulses palpable 2+ bilaterally        LABS:	 	    CBC Full  -  ( 27 Aug 2017 08:52 )  WBC Count : 14.80 K/uL  Hemoglobin : 7.3 g/dL  Hematocrit : 26.3 %  Platelet Count - Automated : x  Mean Cell Volume : 85.9 fl  Mean Cell Hemoglobin : 23.9 pg  Mean Cell Hemoglobin Concentration : 27.8 gm/dL  Auto Neutrophil # : x  Auto Lymphocyte # : x  Auto Monocyte # : x  Auto Eosinophil # : x  Auto Basophil # : x  Auto Neutrophil % : x  Auto Lymphocyte % : x  Auto Monocyte % : x  Auto Eosinophil % : x  Auto Basophil % : x    08-27    136  |  99  |  18  ----------------------------<  128<H>  4.6   |  23  |  0.43<L>  08-25    137  |  98  |  17  ----------------------------<  115<H>  3.8   |  24  |  0.52    Ca    9.8      27 Aug 2017 08:50  Ca    9.9      25 Aug 2017 22:24  Phos  4.0     08-27  Phos  4.8     08-26  Mg     1.8     08-27  Mg     1.6     08-26    TPro  6.9  /  Alb  2.6<L>  /  TBili  1.0  /  DBili  x   /  AST  15  /  ALT  23  /  AlkPhos  244<H>  08-25      proBNP: Serum Pro-Brain Natriuretic Peptide: 445 pg/mL (08-25-17 @ 22:24)    Lipid Profile:   HgA1c:   TSH:       CARDIAC MARKERS:            TELEMETRY: 	    ECG:  	  RADIOLOGY:  OTHER: 	    PREVIOUS DIAGNOSTIC TESTING:    [ ] Echocardiogram:  [ ]  Catheterization:  [ ] Stress Test:  	  	  ASSESSMENT/PLAN: 	    Jordan Bledsoe [place] [id number] Date of Admission: 8/26/17    24H hour events: no acute events overnight. denies CP ,SOB. BP stable.     MEDICATIONS:  enoxaparin Injectable 40 milliGRAM(s) SubCutaneous daily          pantoprazole    Tablet 40 milliGRAM(s) Oral before breakfast    dexamethasone     Tablet 4 milliGRAM(s) Oral every 6 hours  allopurinol 100 milliGRAM(s) Oral daily    ferrous    sulfate 325 milliGRAM(s) Oral daily  sodium chloride 0.9%. 1000 milliLiter(s) IV Continuous <Continuous>      REVIEW OF SYSTEMS:  pt denies CP, SOB, n/v, syncope, HA, AMS   PHYSICAL EXAM:  T(C): 37 (08-27-17 @ 04:25), Max: 37 (08-27-17 @ 04:25)  HR: 99 (08-27-17 @ 04:00) (99 - 106)  BP: 101/72 (08-27-17 @ 04:00) (89/58 - 101/72)  RR: 18 (08-27-17 @ 04:00) (16 - 18)  SpO2: 100% (08-27-17 @ 04:00) (95% - 100%)  Wt(kg): --  I&O's Summary    26 Aug 2017 07:01  -  27 Aug 2017 07:00  --------------------------------------------------------  IN: 250 mL / OUT: 650 mL / NET: -400 mL        Appearance: Normal	  HEENT:   Normal oral mucosa, PERRL, EOMI	  Lymphatic: + cervical lymphadenopathy  Cardiovascular: Normal S1 S2, No JVD, No murmurs, 2+ b/l LE edema. JVD not appreciated.   Respiratory: Lungs clear to auscultation	  Psychiatry: A & O x 3, Mood & affect appropriate  Gastrointestinal:  Soft, Non-tender, + BS	  Skin: No rashes, No ecchymoses, No cyanosis	  Neurologic: Non-focal  Extremities: Normal range of motion, No clubbing, cyanosis or edema  Vascular: Peripheral pulses palpable 2+ bilaterally        LABS:	 	    CBC Full  -  ( 27 Aug 2017 08:52 )  WBC Count : 14.80 K/uL  Hemoglobin : 7.3 g/dL  Hematocrit : 26.3 %  Platelet Count - Automated : x  Mean Cell Volume : 85.9 fl  Mean Cell Hemoglobin : 23.9 pg  Mean Cell Hemoglobin Concentration : 27.8 gm/dL  Auto Neutrophil # : x  Auto Lymphocyte # : x  Auto Monocyte # : x  Auto Eosinophil # : x  Auto Basophil # : x  Auto Neutrophil % : x  Auto Lymphocyte % : x  Auto Monocyte % : x  Auto Eosinophil % : x  Auto Basophil % : x    08-27    136  |  99  |  18  ----------------------------<  128<H>  4.6   |  23  |  0.43<L>  08-25    137  |  98  |  17  ----------------------------<  115<H>  3.8   |  24  |  0.52    Ca    9.8      27 Aug 2017 08:50  Ca    9.9      25 Aug 2017 22:24  Phos  4.0     08-27  Phos  4.8     08-26  Mg     1.8     08-27  Mg     1.6     08-26    TPro  6.9  /  Alb  2.6<L>  /  TBili  1.0  /  DBili  x   /  AST  15  /  ALT  23  /  AlkPhos  244<H>  08-25      proBNP: Serum Pro-Brain Natriuretic Peptide: 445 pg/mL (08-25-17 @ 22:24)    Lipid Profile:   HgA1c:   TSH:       CARDIAC MARKERS:            TELEMETRY: 	    ECG:  	  RADIOLOGY:  OTHER: 	    PREVIOUS DIAGNOSTIC TESTING:    [ ] Echocardiogram: < from: Transthoracic Echocardiogram (08.26.17 @ 12:50) >  Dimensions:    Normal Values:  LA:     3.5    2.0 - 4.0 cm  Ao:     2.6    2.0 - 3.8 cm  SEPTUM: 0.9    0.6 - 1.2 cm  PWT:    1.1   0.6 - 1.1 cm  LVIDd:  4.6    3.0 - 5.6 cm  LVIDs:  3.5    1.8 - 4.0 cm  Derived variables:  LVMI: 104 g/m2  RWT: 0.47  Fractional short: 24 %  EF (Zahidatz): 48 %  ------------------------------------------------------------------------  Observations:  Mitral Valve: Bileaflet mitral valve prolapse. Myxomatous  mitral valve  Mild-moderate mitral regurgitation.  Aortic Valve/Aorta: Normal trileaflet aortic valve.  Aortic Root: 2.6 cm.  Left Atrium: Moderately dilated left atrium.  LA volume  index = 46 cc/m2.  Left Ventricle: Mild global left ventricular systolic  dysfunction. Normal left ventricular internal dimensions  and wall thicknesses.  Right Heart: Normal right atrium. Normal right ventricular  size and function. Normal tricuspid valve. Mild tricuspid  regurgitation. Normal pulmonic valve.  Pericardium/Pleura: Small to moderate pericardial effusion  which measures 1 cm posterior to the left ventricle, 1.3cm  base of RV. On apical imaging no RV collapsed noted however  on the subcostal imaging there is intermittant  RV  invagination noted consistent with elevated  intrapericarsial pressure and  echocardiographic signs of  tamponade.  HR 110s, . BP was taken at start of  study.  Left pleural effusion.  Hemodynamic: Estimated right atrial pressure is 8 mm Hg.    < end of copied text >    [ ]  Catheterization:  [ ] Stress Test:  	  	  ASSESSMENT/PLAN: 	    42F w/ hodgkin's lymphoma refusing tx, presents with progressively worsening shortness of breath. found to have pericardial effusion with concern for tamponade     1. pericardial affusion  -likely malignant effusion in setting of untreated lymphoma  -BP stable in 90s systolic, pt tachycardic  -negative pulsus paradoxus, however signs of early tamponade seen on echo  -pt refusing treatemtn for lymphoma at this time, currently DNR. pt states she understands that she could die, and cont to refuse tx.   -d/w at [facility] re:cardiac tamponade and risk of acute decompensation. pt refusing pericardial drain, or pericardial window at this time. pt states she wishes to think about it for a few days. urgency of treatment was stressed to pt, however pt cont to states that she does not wish to pursue tx at this time and would like more time to think about her treatment options. pt remains DNR.   - would cont IVF at 100cc/hr to elevate BP in setting of possible tamponade.  -consider palliative care consult   -prognosis poor        Jordan Bledsoe [place] [id number]

## 2017-08-27 NOTE — PROGRESS NOTE ADULT - PROBLEM SELECTOR PLAN 2
- heme onc consult.  patiently previously refused treatment  - GOC discussion had with patient, patient DNR / DNI  - Patient wanted to know more about immunotherapy - heme onc follow up appreciated. patiently previously refused treatment  - GOC discussion had with patient, patient DNR / DNI  - awaiting further discuss with Lyn Re: Immunotherapy

## 2017-08-27 NOTE — PROGRESS NOTE ADULT - ATTENDING COMMENTS
as above--long discussion w/pt about root cause of her issues and she will need rx of underlying dx  will consider pleurx after-echo and this will allow to consider if CTS vs pulm intervention    Loi Vickers MD-Pulmonary   [phone]

## 2017-08-27 NOTE — PROGRESS NOTE ADULT - SUBJECTIVE AND OBJECTIVE BOX
MEDICINE ACCEPT NOTE - PGY1    CC: Patient is a 42y old  Female who presents with a chief complaint of shortness of breath (26 Aug 2017 05:27)    Subjective: Patient reports improvement of SOB. No CP, abd pain, D/C, N/V. Wants to go home.      MEDICATIONS  (STANDING):  dexamethasone     Tablet 4 milliGRAM(s) Oral every 6 hours  allopurinol 100 milliGRAM(s) Oral daily  ferrous    sulfate 325 milliGRAM(s) Oral daily  pantoprazole    Tablet 40 milliGRAM(s) Oral before breakfast  enoxaparin Injectable 40 milliGRAM(s) SubCutaneous daily  sodium chloride 0.9%. 1000 milliLiter(s) (100 mL/Hr) IV Continuous <Continuous>    MEDICATIONS  (PRN):    PHYSICAL EXAM:  Vital Signs Last 24 Hrs  T(C): 37 (27 Aug 2017 04:25), Max: 37 (27 Aug 2017 04:25)  T(F): 98.6 (27 Aug 2017 04:25), Max: 98.6 (27 Aug 2017 04:25)  HR: 99 (27 Aug 2017 04:00) (99 - 106)  BP: 101/72 (27 Aug 2017 04:00) (89/58 - 101/72)  BP(mean): --  RR: 18 (27 Aug 2017 04:00) (16 - 18)  SpO2: 100% (27 Aug 2017 04:00) (95% - 100%)    GENERAL: NAD, well-groomed, well-developed  HEAD:  Atraumatic, Normocephalic  EYES: EOMI, PERRLA, conjunctiva and sclera clear  ENMT: No tonsillar erythema, exudates, or enlargement; Moist mucous membranes, Good dentition  NECK: Supple, No JVD,   NERVOUS SYSTEM: AOX3, motor and sensation grossly intact in b/l UE and b/l LE  CHEST/LUNG: Decreased BS b/l LLB  HEART: Regular rate and rhythm; No murmurs, rubs, or gallops. No LE edema  ABDOMEN: Soft, Nontender, Nondistended; Bowel sounds present  EXTREMITIES:  2+ Peripheral Pulses, No clubbing, cyanosis  LYMPH: extensive LAD on left neck  SKIN: No rashes or lesions    LABS:	 	               08-27    136  |  99  |  18  ----------------------------<  128<H>  4.6   |  23  |  0.43<L>  08-25    137  |  98  |  17  ----------------------------<  115<H>  3.8   |  24  |  0.52    Ca    9.8      27 Aug 2017 08:50  Ca    9.9      25 Aug 2017 22:24  Phos  4.0     08-27  Mg     1.8     08-27    TPro  6.9  /  Alb  2.6<L>  /  TBili  1.0  /  DBili  x   /  AST  15  /  ALT  23  /  AlkPhos  244<H>  08-25      PT/INR - ( 26 Aug 2017 17:27 )   PT: 17.8 sec;   INR: 1.62 ratio         PTT - ( 26 Aug 2017 17:27 )  PTT:35.1 sec              Urinalysis Basic - ( 26 Aug 2017 08:52 )    Color: Yellow / Appearance: Clear / SG: =1.051 / pH: x  Gluc: x / Ketone: Negative  / Bili: Negative / Urobili: 1.0   Blood: x / Protein: Negative / Nitrite: Negative   Leuk Esterase: Negative / RBC: x / WBC x   Sq Epi: x / Non Sq Epi: x / Bacteria: x                              7.3    14.80 )-----------( x        ( 27 Aug 2017 08:52 )             26.3                         7.6    13.5  )-----------( 298      ( 25 Aug 2017 22:24 )             25.3     CAPILLARY BLOOD GLUCOSE CC: Patient is a 42y old  Female who presents with a chief complaint of shortness of breath (26 Aug 2017 05:27)    Subjective: Patient reports improvement of SOB. No CP, abd pain, Diarrhea/Constipatin, Nausea/Vomiting. Wants to go home.      MEDICATIONS  (STANDING):  dexamethasone     Tablet 4 milliGRAM(s) Oral every 6 hours  allopurinol 100 milliGRAM(s) Oral daily  ferrous    sulfate 325 milliGRAM(s) Oral daily  pantoprazole    Tablet 40 milliGRAM(s) Oral before breakfast  enoxaparin Injectable 40 milliGRAM(s) SubCutaneous daily  sodium chloride 0.9%. 1000 milliLiter(s) (100 mL/Hr) IV Continuous <Continuous>    MEDICATIONS  (PRN):    PHYSICAL EXAM:  Vital Signs Last 24 Hrs  T(C): 37 (27 Aug 2017 04:25), Max: 37 (27 Aug 2017 04:25)  T(F): 98.6 (27 Aug 2017 04:25), Max: 98.6 (27 Aug 2017 04:25)  HR: 99 (27 Aug 2017 04:00) (99 - 106)  BP: 101/72 (27 Aug 2017 04:00) (89/58 - 101/72)  BP(mean): --  RR: 18 (27 Aug 2017 04:00) (16 - 18)  SpO2: 100% (27 Aug 2017 04:00) (95% - 100%)    GENERAL: NAD, well-groomed, well-developed  HEAD:  Atraumatic, Normocephalic  EYES: EOMI, PERRLA, conjunctiva and sclera clear  ENMT: No tonsillar erythema, exudates, or enlargement; Moist mucous membranes, Good dentition  NECK: Supple, No JVD,   NERVOUS SYSTEM: AOX3, motor and sensation grossly intact in b/l UE and b/l LE  CHEST/LUNG: Decreased BS b/l LLB, no wheezing  HEART: Regular rate and rhythm; No murmurs, rubs, or gallops. No LE edema  ABDOMEN: Soft, Nontender, Nondistended; Bowel sounds present  EXTREMITIES:  2+ Peripheral Pulses, No clubbing, cyanosis  LYMPH: extensive LAD on left neck  SKIN: No rashes or lesions    LABS:	 	               08-27    136  |  99  |  18  ----------------------------<  128<H>  4.6   |  23  |  0.43<L>    08-25    137  |  98  |  17  ----------------------------<  115<H>  3.8   |  24  |  0.52    Ca    9.8      27 Aug 2017 08:50  Ca    9.9      25 Aug 2017 22:24  Phos  4.0     08-27  Mg     1.8     08-27    TPro  6.9  /  Alb  2.6<L>  /  TBili  1.0  /  DBili  x   /  AST  15  /  ALT  23  /  AlkPhos  244<H>  08-25      PT/INR - ( 26 Aug 2017 17:27 )   PT: 17.8 sec;   INR: 1.62 ratio         PTT - ( 26 Aug 2017 17:27 )  PTT:35.1 sec              Urinalysis Basic - ( 26 Aug 2017 08:52 )    Color: Yellow / Appearance: Clear / SG: =1.051 / pH: x  Gluc: x / Ketone: Negative  / Bili: Negative / Urobili: 1.0   Blood: x / Protein: Negative / Nitrite: Negative   Leuk Esterase: Negative / RBC: x / WBC x   Sq Epi: x / Non Sq Epi: x / Bacteria: x                              7.3    14.80 )-----------( x        ( 27 Aug 2017 08:52 )             26.3                         7.6    13.5  )-----------( 298      ( 25 Aug 2017 22:24 )             25.3     CAPILLARY BLOOD GLUCOSE        Imaging Personally Reviewed:  Consultant(s) Notes Reviewed:  Pulmonary, Cardiology  Care Discussed with Consultants/Other Providers

## 2017-08-27 NOTE — PROGRESS NOTE ADULT - SUBJECTIVE AND OBJECTIVE BOX
CHIEF COMPLAINT:    Interval Events:    REVIEW OF SYSTEMS:  Constitutional: No fevers or chills. No weight loss. No fatigue or generalized malaise.  Eyes: No itching or discharge from the eyes  ENT: No ear pain. No ear discharge. No nasal congestion. No post nasal drip. No epistaxis. No throat pain. No sore throat. No difficulty swallowing.   CV: No chest pain. No palpitations. No lightheadedness or dizziness.   Resp: No dyspnea at rest. No dyspnea on exertion. No orthopnea. No wheezing. No cough. No stridor. No sputum production. No chest pain with respiration.  GI: No nausea. No vomiting. No diarrhea.  MSK: No joint pain or pain in any extremities  Integumentary: No skin lesions. No pedal edema.  Neurological: No gross motor weakness. No sensory changes.  [ ] All other systems negative  [ ] Unable to assess ROS because ________    OBJECTIVE:  ICU Vital Signs Last 24 Hrs  T(C): 37 (27 Aug 2017 04:25), Max: 37 (27 Aug 2017 04:25)  T(F): 98.6 (27 Aug 2017 04:25), Max: 98.6 (27 Aug 2017 04:25)  HR: 99 (27 Aug 2017 04:00) (99 - 106)  BP: 101/72 (27 Aug 2017 04:00) (89/58 - 103/54)  BP(mean): --  ABP: --  ABP(mean): --  RR: 18 (27 Aug 2017 04:00) (16 - 28)  SpO2: 100% (27 Aug 2017 04:00) (95% - 100%)        08-26 @ 07:01  -  08-27 @ 05:07  --------------------------------------------------------  IN: 250 mL / OUT: 200 mL / NET: 50 mL      CAPILLARY BLOOD GLUCOSE          PHYSICAL EXAM:  General: Awake, alert, oriented X 3.   HEENT: Atraumatic, normocephalic.                 Mallampatti Grade                 No nasal congestion.                No tonsillar or pharyngeal exudates.  Lymph Nodes: No palpable lymphadenopathy  Neck: No JVD. No carotid bruit.   Respiratory: Normal chest expansion                         Normal percussion                         Normal and equal air entry                         No wheeze, rhonchi or rales.  Cardiovascular: S1 S2 normal. No murmurs, rubs or gallops.   Abdomen: Soft, non-tender, non-distended. No organomegaly.  Extremities: Warm to touch. Peripheral pulse palpable. No pedal edema.   Skin: No rashes or skin lesions  Neurological: Motor and sensory examination equal and normal in all four extremities.  Psychiatry: Appropriate mood and affect.    HOSPITAL MEDICATIONS:  MEDICATIONS  (STANDING):  dexamethasone     Tablet 4 milliGRAM(s) Oral every 6 hours  allopurinol 100 milliGRAM(s) Oral daily  ferrous    sulfate 325 milliGRAM(s) Oral daily  pantoprazole    Tablet 40 milliGRAM(s) Oral before breakfast  enoxaparin Injectable 40 milliGRAM(s) SubCutaneous daily  sodium chloride 0.9%. 1000 milliLiter(s) (100 mL/Hr) IV Continuous <Continuous>    MEDICATIONS  (PRN):      LABS:                        7.6    13.5  )-----------( 298      ( 25 Aug 2017 22:24 )             25.3     08-25    137  |  98  |  17  ----------------------------<  115<H>  3.8   |  24  |  0.52    Ca    9.9      25 Aug 2017 22:24  Phos  4.8     08-26  Mg     1.6     08-26    TPro  6.9  /  Alb  2.6<L>  /  TBili  1.0  /  DBili  x   /  AST  15  /  ALT  23  /  AlkPhos  244<H>  08-25    PT/INR - ( 26 Aug 2017 17:27 )   PT: 17.8 sec;   INR: 1.62 ratio         PTT - ( 26 Aug 2017 17:27 )  PTT:35.1 sec  Urinalysis Basic - ( 26 Aug 2017 08:52 )    Color: Yellow / Appearance: Clear / SG: =1.051 / pH: x  Gluc: x / Ketone: Negative  / Bili: Negative / Urobili: 1.0   Blood: x / Protein: Negative / Nitrite: Negative   Leuk Esterase: Negative / RBC: x / WBC x   Sq Epi: x / Non Sq Epi: x / Bacteria: x        Venous Blood Gas:  08-26 @ 01:53  7.48/36/78/26/96  VBG Lactate: 1.8  Venous Blood Gas:  08-25 @ 22:24  7.40/43/27/26/36  VBG Lactate: 3.2      MICROBIOLOGY:     RADIOLOGY:  [ ] Reviewed and interpreted by me    Point of Care Ultrasound Findings:    PFT:    EKG: CHIEF COMPLAINT:good sleep, minimal cough, stable sob    Interval Events:hem evaluation, monitored bed    REVIEW OF SYSTEMS:  Constitutional: No fevers or chills. No weight loss. No fatigue or generalized malaise.  Eyes: No itching or discharge from the eyes  ENT: No ear pain. No ear discharge. No nasal congestion. No post nasal drip. No epistaxis. No throat pain. No sore throat. No difficulty swallowing.   CV: No chest pain. No palpitations. No lightheadedness or dizziness.   Resp: No dyspnea at rest. + dyspnea on exertion. No orthopnea. No wheezing. + cough. No stridor. No sputum production. No chest pain with respiration.  GI: No nausea. No vomiting. No diarrhea.  MSK: No joint pain or pain in any extremities  Integumentary: No skin lesions. + pedal edema.  Neurological: No gross motor weakness. No sensory changes.  [ +] All other systems negative  [ ] Unable to assess ROS because ________    OBJECTIVE:  ICU Vital Signs Last 24 Hrs  T(C): 37 (27 Aug 2017 04:25), Max: 37 (27 Aug 2017 04:25)  T(F): 98.6 (27 Aug 2017 04:25), Max: 98.6 (27 Aug 2017 04:25)  HR: 99 (27 Aug 2017 04:00) (99 - 106)  BP: 101/72 (27 Aug 2017 04:00) (89/58 - 103/54)  BP(mean): --  ABP: --  ABP(mean): --  RR: 18 (27 Aug 2017 04:00) (16 - 28)  SpO2: 100% (27 Aug 2017 04:00) (95% - 100%)        08-26 @ 07:01  -  08-27 @ 05:07  --------------------------------------------------------  IN: 250 mL / OUT: 200 mL / NET: 50 mL      CAPILLARY BLOOD GLUCOSE          PHYSICAL EXAM:in bed-NAD  General: Awake, alert, oriented X 3.   HEENT: Atraumatic, normocephalic.                 Mallampatti Grade 2                No nasal congestion.                No tonsillar or pharyngeal exudates.  Lymph Nodes: tremendous palpable lymphadenopathy  Neck: No JVD. No carotid bruit.   Respiratory: Normal chest expansion-but reduced BS left side 2/3 up w/dullness to percussion                         Normal percussion                         Normal and equal air entry                         No wheeze, rhonchi or rales.  Cardiovascular: S1 S2 normal. No murmurs, rubs or gallops.   Abdomen: Soft, non-tender, non-distended. No organomegaly.  Extremities: Warm to touch. Peripheral pulse palpable. 2+ pedal edema.   Skin: No rashes or skin lesions  Neurological: Motor and sensory examination equal and normal in all four extremities.  Psychiatry: Appropriate mood and affect.    HOSPITAL MEDICATIONS:  MEDICATIONS  (STANDING):  dexamethasone     Tablet 4 milliGRAM(s) Oral every 6 hours  allopurinol 100 milliGRAM(s) Oral daily  ferrous    sulfate 325 milliGRAM(s) Oral daily  pantoprazole    Tablet 40 milliGRAM(s) Oral before breakfast  enoxaparin Injectable 40 milliGRAM(s) SubCutaneous daily  sodium chloride 0.9%. 1000 milliLiter(s) (100 mL/Hr) IV Continuous <Continuous>    MEDICATIONS  (PRN):      LABS:                        7.6    13.5  )-----------( 298      ( 25 Aug 2017 22:24 )             25.3     08-25    137  |  98  |  17  ----------------------------<  115<H>  3.8   |  24  |  0.52    Ca    9.9      25 Aug 2017 22:24  Phos  4.8     08-26  Mg     1.6     08-26    TPro  6.9  /  Alb  2.6<L>  /  TBili  1.0  /  DBili  x   /  AST  15  /  ALT  23  /  AlkPhos  244<H>  08-25    PT/INR - ( 26 Aug 2017 17:27 )   PT: 17.8 sec;   INR: 1.62 ratio         PTT - ( 26 Aug 2017 17:27 )  PTT:35.1 sec  Urinalysis Basic - ( 26 Aug 2017 08:52 )    Color: Yellow / Appearance: Clear / SG: =1.051 / pH: x  Gluc: x / Ketone: Negative  / Bili: Negative / Urobili: 1.0   Blood: x / Protein: Negative / Nitrite: Negative   Leuk Esterase: Negative / RBC: x / WBC x   Sq Epi: x / Non Sq Epi: x / Bacteria: x        Venous Blood Gas:  08-26 @ 01:53  7.48/36/78/26/96  VBG Lactate: 1.8  Venous Blood Gas:  08-25 @ 22:24  7.40/43/27/26/36  VBG Lactate: 3.2      MICROBIOLOGY:     RADIOLOGY:  [ ] Reviewed and interpreted by me    Point of Care Ultrasound Findings:    PFT:    EKG:

## 2017-08-27 NOTE — PROGRESS NOTE ADULT - PROBLEM SELECTOR PLAN 5
DVT prophlaxis:  subcutaneous lovenox or heparin as per primary team  sequential teds stockings  early ambulation  GI prophylaxis:  PPI pre breakfast  aspiration precautions-all meals are to OOB as able  PT evaluation  early ambulation  incentive spirometry

## 2017-08-27 NOTE — PROGRESS NOTE ADULT - PROBLEM SELECTOR PLAN 1
-CXR showed large right pleural effusion & extensive mediastinal lymphadenopathy with associated narrowing of the distal trachea and R mainstem bronchus compression  - 2/2 extensive lymphadenopathy/pulmonary nodules from Hodgkin's lymphoma as well as large L pleural effusion  - CTA negative for pulmonary embolism  - currently stable on 2L NC  - Patient was DNR/DNI  - Pulm eval appreciated. - Hypoxemia Improving  -CXR showed large right pleural effusion & extensive mediastinal lymphadenopathy with associated narrowing of the distal trachea and R mainstem bronchus compression  - 2/2 extensive lymphadenopathy/pulmonary nodules from Hodgkin's lymphoma as well as large L pleural effusion  - CTA negative for pulmonary embolism  - currently stable on Room Air  - Patient was DNR/DNI  - Pulm eval appreciated.

## 2017-08-28 ENCOUNTER — TRANSCRIPTION ENCOUNTER (OUTPATIENT)
Age: 42
End: 2017-08-28

## 2017-08-28 VITALS
RESPIRATION RATE: 18 BRPM | OXYGEN SATURATION: 95 % | DIASTOLIC BLOOD PRESSURE: 52 MMHG | TEMPERATURE: 97 F | HEART RATE: 70 BPM | SYSTOLIC BLOOD PRESSURE: 103 MMHG

## 2017-08-28 LAB
ANION GAP SERPL CALC-SCNC: 15 MMOL/L — SIGNIFICANT CHANGE UP (ref 5–17)
BASOPHILS # BLD AUTO: 0 K/UL — SIGNIFICANT CHANGE UP (ref 0–0.2)
BASOPHILS NFR BLD AUTO: 0 % — SIGNIFICANT CHANGE UP (ref 0–2)
BUN SERPL-MCNC: 20 MG/DL — SIGNIFICANT CHANGE UP (ref 7–23)
CALCIUM SERPL-MCNC: 9.4 MG/DL — SIGNIFICANT CHANGE UP (ref 8.4–10.5)
CHLORIDE SERPL-SCNC: 103 MMOL/L — SIGNIFICANT CHANGE UP (ref 96–108)
CO2 SERPL-SCNC: 21 MMOL/L — LOW (ref 22–31)
CREAT SERPL-MCNC: 0.35 MG/DL — LOW (ref 0.5–1.3)
EOSINOPHIL # BLD AUTO: 0 K/UL — SIGNIFICANT CHANGE UP (ref 0–0.5)
EOSINOPHIL NFR BLD AUTO: 0 % — SIGNIFICANT CHANGE UP (ref 0–6)
GIANT PLATELETS BLD QL SMEAR: PRESENT — SIGNIFICANT CHANGE UP
GLUCOSE SERPL-MCNC: 80 MG/DL — SIGNIFICANT CHANGE UP (ref 70–99)
HCT VFR BLD CALC: 19.2 % — CRITICAL LOW (ref 34.5–45)
HGB BLD-MCNC: 5.4 G/DL — CRITICAL LOW (ref 11.5–15.5)
HYPOCHROMIA BLD QL: SIGNIFICANT CHANGE UP
LG PLATELETS BLD QL AUTO: SLIGHT — SIGNIFICANT CHANGE UP
LYMPHOCYTES # BLD AUTO: 0.14 K/UL — LOW (ref 1–3.3)
LYMPHOCYTES # BLD AUTO: 1 % — LOW (ref 13–44)
MACROCYTES BLD QL: SLIGHT — SIGNIFICANT CHANGE UP
MAGNESIUM SERPL-MCNC: 1.7 MG/DL — SIGNIFICANT CHANGE UP (ref 1.6–2.6)
MANUAL SMEAR VERIFICATION: SIGNIFICANT CHANGE UP
MCHC RBC-ENTMCNC: 24.7 PG — LOW (ref 27–34)
MCHC RBC-ENTMCNC: 28.1 GM/DL — LOW (ref 32–36)
MCV RBC AUTO: 87.7 FL — SIGNIFICANT CHANGE UP (ref 80–100)
METAMYELOCYTES # FLD: 3 % — HIGH (ref 0–0)
MICROCYTES BLD QL: SLIGHT — SIGNIFICANT CHANGE UP
MONOCYTES # BLD AUTO: 0.54 K/UL — SIGNIFICANT CHANGE UP (ref 0–0.9)
MONOCYTES NFR BLD AUTO: 4 % — SIGNIFICANT CHANGE UP (ref 2–14)
MYELOCYTES NFR BLD: 2 % — HIGH (ref 0–0)
NEUTROPHILS # BLD AUTO: 12.02 K/UL — HIGH (ref 1.8–7.4)
NEUTROPHILS NFR BLD AUTO: 84 % — HIGH (ref 43–77)
NEUTS BAND # BLD: 5 % — SIGNIFICANT CHANGE UP (ref 0–8)
PHOSPHATE SERPL-MCNC: 3.3 MG/DL — SIGNIFICANT CHANGE UP (ref 2.5–4.5)
PLAT MORPH BLD: ABNORMAL
PLATELET # BLD AUTO: 200 K/UL — SIGNIFICANT CHANGE UP (ref 150–400)
POIKILOCYTOSIS BLD QL AUTO: SLIGHT — SIGNIFICANT CHANGE UP
POLYCHROMASIA BLD QL SMEAR: SIGNIFICANT CHANGE UP
POTASSIUM SERPL-MCNC: 4.5 MMOL/L — SIGNIFICANT CHANGE UP (ref 3.5–5.3)
POTASSIUM SERPL-SCNC: 4.5 MMOL/L — SIGNIFICANT CHANGE UP (ref 3.5–5.3)
RBC # BLD: 2.19 M/UL — LOW (ref 3.8–5.2)
RBC # FLD: 23.2 % — HIGH (ref 10.3–14.5)
RBC BLD AUTO: ABNORMAL
SODIUM SERPL-SCNC: 139 MMOL/L — SIGNIFICANT CHANGE UP (ref 135–145)
SPHEROCYTES BLD QL SMEAR: SLIGHT — SIGNIFICANT CHANGE UP
VARIANT LYMPHS # BLD: 1 % — SIGNIFICANT CHANGE UP (ref 0–6)
WBC # BLD: 13.51 K/UL — HIGH (ref 3.8–10.5)
WBC # FLD AUTO: 13.51 K/UL — HIGH (ref 3.8–10.5)

## 2017-08-28 PROCEDURE — 87086 URINE CULTURE/COLONY COUNT: CPT

## 2017-08-28 PROCEDURE — 80053 COMPREHEN METABOLIC PANEL: CPT

## 2017-08-28 PROCEDURE — 96374 THER/PROPH/DIAG INJ IV PUSH: CPT | Mod: XU

## 2017-08-28 PROCEDURE — 71260 CT THORAX DX C+: CPT

## 2017-08-28 PROCEDURE — 85730 THROMBOPLASTIN TIME PARTIAL: CPT

## 2017-08-28 PROCEDURE — 84550 ASSAY OF BLOOD/URIC ACID: CPT

## 2017-08-28 PROCEDURE — 99233 SBSQ HOSP IP/OBS HIGH 50: CPT | Mod: GC

## 2017-08-28 PROCEDURE — 82330 ASSAY OF CALCIUM: CPT

## 2017-08-28 PROCEDURE — 71046 X-RAY EXAM CHEST 2 VIEWS: CPT

## 2017-08-28 PROCEDURE — 71275 CT ANGIOGRAPHY CHEST: CPT

## 2017-08-28 PROCEDURE — 84100 ASSAY OF PHOSPHORUS: CPT

## 2017-08-28 PROCEDURE — 85014 HEMATOCRIT: CPT

## 2017-08-28 PROCEDURE — 85610 PROTHROMBIN TIME: CPT

## 2017-08-28 PROCEDURE — 83735 ASSAY OF MAGNESIUM: CPT

## 2017-08-28 PROCEDURE — 84295 ASSAY OF SERUM SODIUM: CPT

## 2017-08-28 PROCEDURE — 83605 ASSAY OF LACTIC ACID: CPT

## 2017-08-28 PROCEDURE — 80048 BASIC METABOLIC PNL TOTAL CA: CPT

## 2017-08-28 PROCEDURE — 82553 CREATINE MB FRACTION: CPT

## 2017-08-28 PROCEDURE — 81003 URINALYSIS AUTO W/O SCOPE: CPT

## 2017-08-28 PROCEDURE — 84484 ASSAY OF TROPONIN QUANT: CPT

## 2017-08-28 PROCEDURE — 99239 HOSP IP/OBS DSCHRG MGMT >30: CPT

## 2017-08-28 PROCEDURE — 85027 COMPLETE CBC AUTOMATED: CPT

## 2017-08-28 PROCEDURE — 93306 TTE W/DOPPLER COMPLETE: CPT

## 2017-08-28 PROCEDURE — 83880 ASSAY OF NATRIURETIC PEPTIDE: CPT

## 2017-08-28 PROCEDURE — 83615 LACTATE (LD) (LDH) ENZYME: CPT

## 2017-08-28 PROCEDURE — 99285 EMERGENCY DEPT VISIT HI MDM: CPT | Mod: 25

## 2017-08-28 PROCEDURE — 82435 ASSAY OF BLOOD CHLORIDE: CPT

## 2017-08-28 PROCEDURE — 93308 TTE F-UP OR LMTD: CPT

## 2017-08-28 PROCEDURE — 82803 BLOOD GASES ANY COMBINATION: CPT

## 2017-08-28 PROCEDURE — 82550 ASSAY OF CK (CPK): CPT

## 2017-08-28 PROCEDURE — 99497 ADVNCD CARE PLAN 30 MIN: CPT | Mod: GC

## 2017-08-28 PROCEDURE — 82947 ASSAY GLUCOSE BLOOD QUANT: CPT

## 2017-08-28 PROCEDURE — 84132 ASSAY OF SERUM POTASSIUM: CPT

## 2017-08-28 PROCEDURE — 84702 CHORIONIC GONADOTROPIN TEST: CPT

## 2017-08-28 PROCEDURE — 93005 ELECTROCARDIOGRAM TRACING: CPT

## 2017-08-28 RX ORDER — SENNA PLUS 8.6 MG/1
1 TABLET ORAL DAILY
Qty: 0 | Refills: 0 | Status: DISCONTINUED | OUTPATIENT
Start: 2017-08-28 | End: 2017-08-28

## 2017-08-28 RX ORDER — DOCUSATE SODIUM 100 MG
100 CAPSULE ORAL DAILY
Qty: 0 | Refills: 0 | Status: DISCONTINUED | OUTPATIENT
Start: 2017-08-28 | End: 2017-08-28

## 2017-08-28 RX ADMIN — SENNA PLUS 1 TABLET(S): 8.6 TABLET ORAL at 11:19

## 2017-08-28 RX ADMIN — ENOXAPARIN SODIUM 40 MILLIGRAM(S): 100 INJECTION SUBCUTANEOUS at 11:19

## 2017-08-28 RX ADMIN — Medication 100 MILLIGRAM(S): at 11:20

## 2017-08-28 RX ADMIN — Medication 4 MILLIGRAM(S): at 05:01

## 2017-08-28 RX ADMIN — Medication 100 MILLIGRAM(S): at 11:19

## 2017-08-28 RX ADMIN — Medication 325 MILLIGRAM(S): at 11:19

## 2017-08-28 RX ADMIN — PANTOPRAZOLE SODIUM 40 MILLIGRAM(S): 20 TABLET, DELAYED RELEASE ORAL at 05:01

## 2017-08-28 RX ADMIN — Medication 4 MILLIGRAM(S): at 11:20

## 2017-08-28 NOTE — DISCHARGE NOTE ADULT - CARE PLAN
Principal Discharge DX:	Acute hypoxemic respiratory failure  Goal:	Normal breathing effort, resolution  Instructions for follow-up, activity and diet:	You came in with acute respiratory failure due to your Hodgkin's lymphoma with extensive lymph node compressing on your windpipe and were found to have fluid in your lung and heart sac. You were given oxygen and water pill to help remove some of the fluid. Please follow up closely with your hematologist Dr. Baxter, pulmonologist Dr. Vickers in 1-2 weeks  Secondary Diagnosis:	Pericardial effusion  Goal:	Removal of fluid, prevention of cardiac complication  Instructions for follow-up, activity and diet:	You were found to have fluid build up in your heart sac. You were seen by cardiologist which recommended pericardia window to drain the fluid, which if  not drained will cause severe complication from the heart including compressing on the heart and limit your heart function. Please follow up with your cardiologist in 1-2 weeks  Secondary Diagnosis:	Pleural effusion  Goal:	Removal of fluid, prevention of future episode  Instructions for follow-up, activity and diet:	You came in with shortness of breath likely also cause by fluid in your lungs. You were given 1 dose of water pill to help remove some of the fluid in your lung. Please follow up closely with Dr. Vickers (pulmonologist) in 1-2 weeks  Secondary Diagnosis:	Hodgkin lymphoma  Goal:	Getting treatment.  Instructions for follow-up, activity and diet:	You were diagnosed with Hodgkin Lymphoma on 7/2017. You were seen by oncologist which recommended chemotherapy/radiation therapy. You have been refusing treatment in the past.

## 2017-08-28 NOTE — DISCHARGE NOTE ADULT - ADDITIONAL INSTRUCTIONS
Please call this number to make appointment with cardiothoracic surgery: [phone]   Please follow up with Dr. Vickers to have your pleural effusion drained, call this number [phone] to make appointment  Please follow up with Dr. Baxter to follow up for your Hodgkin's Lymphoma, please call this number to make appointment [phone]

## 2017-08-28 NOTE — PROGRESS NOTE ADULT - ATTENDING COMMENTS
Agree with above note by R1 Dr. Donovan - edited where appropriate  I spent 25 minutes at bedside from 11:55 - 12:20 discussing plan of care, goals of care, and prognosis. She shared with me her concerns about disability, income, and the stress of the diagnosis as well as her preference to avoid invasive procedures as much as possible. She expressed to me understanding that delaying a needed pericardial window or thoracocentesis can lead to cardiac arrest and death but her social concerns in her mind take precedence. I stressed to her the importance of taking care of her health before any other concerns given the risk of death. I provided emotional support pertaining to the stress of diagnosis and the overwhelming nature of treatment options. I encouraged her to take advantage of all treatments that can result in cure of illness and resumption of her normal life. I expressed my confusion at her decision to delay and my concern that her judgment may be impaired. I offered psych evaluation for possible adjustment disorder. She replied that she has strong social support with her family and a good relationship with a  at [facility] that is encouraging her to get treatment. She stated she is considering all curable treatments but has a preference for immunotherapy. She states she wishes to drain the fluid in her lungs as outpatient first and then think about draining the fluid around her heart, though the risks of that procedure scare her greatly. I emphasized the risks of worsening tamponade and she expressed understanding. I emphasized importance of heme follow up as outpatient and she agrees.   Due to the risk of worsening pericardial effusion I discussed with her that discharge will be AMA. She still wishes to be discharged from hospital. This will be arranged with all appropriate outpatient follow up.   45 minutes spent orchestrating discharge  Attending Physician Dr. Brenden Uribe [phone] Agree with above note by R1 Dr. Donovan - edited where appropriate  I spent 25 minutes at bedside from 11:55 - 12:20 discussing plan of care, goals of care, and prognosis. She shared with me her concerns about disability, income, and the stress of the diagnosis as well as her preference to avoid invasive procedures as much as possible. She expressed to me understanding that delaying a needed pericardial window or thoracocentesis can lead to cardiac arrest and death but her social concerns in her mind take precedence. I stressed to her the importance of taking care of her health before any other concerns given the risk of death. I provided emotional support pertaining to the stress of diagnosis and the overwhelming nature of treatment options. I encouraged her to take advantage of all treatments that can result in cure of illness and resumption of her normal life. I expressed my confusion at her decision to delay and my concern that her judgment may be impaired. I offered psych evaluation for possible adjustment disorder. She declines psych eval. She replied that she has strong social support with her family and a good relationship with a  at [facility] that is encouraging her to get treatment. She stated she is considering all curable treatments but has a preference for immunotherapy. She states she wishes to drain the fluid in her lungs as outpatient first and then think about draining the fluid around her heart, though the risks of that procedure scare her greatly. I emphasized the risks of worsening tamponade and she expressed understanding. I emphasized importance of heme follow up as outpatient and she agrees.   Due to the risk of worsening pericardial effusion I discussed with her that discharge will be AMA. She still wishes to be discharged from hospital. It is my assessment that she has insight into the gravity of her diagnosis and to the efficacy of treatments available. She understands the risks of leaving the hospital including death and voiced them to me. Discharge will be arranged with all appropriate outpatient follow up.   45 minutes spent orchestrating discharge  Attending Physician Dr. Brenden Uribe [phone] Agree with above note by R1 Dr. Donovan - edited where appropriate  I spent 25 minutes at bedside from 11:55 - 12:20 discussing plan of care, goals of care, and prognosis. She shared with me her concerns about disability, income, and the stress of the diagnosis as well as her preference to avoid invasive procedures as much as possible. She expressed to me understanding that delaying a needed pericardial window or thoracocentesis can lead to cardiac arrest and death but her social concerns in her mind take precedence. I stressed to her the importance of taking care of her health before any other concerns given the risk of death. I provided emotional support pertaining to the stress of diagnosis and the overwhelming nature of treatment options. I encouraged her to take advantage of all treatments that can result in cure of illness and resumption of her normal life. I expressed my confusion at her decision to delay and my concern that her judgment may be impaired. I offered psych evaluation for possible adjustment disorder. She declines psych eval. She replied that she has strong social support with her family and a good relationship with a  at [facility] that is encouraging her to get treatment. She stated she is considering all curable treatments but has a preference for immunotherapy. She states she wishes to drain the fluid in her lungs as outpatient first and then think about draining the fluid around her heart, though the risks of that procedure scare her greatly. I emphasized the risks of worsening tamponade and she expressed understanding. I emphasized importance of heme follow up as outpatient and she agrees.   Due to the risk of worsening pericardial effusion I discussed with her that discharge will be AMA. She still wishes to be discharged from hospital. It is my assessment that she has insight into the gravity of her diagnosis and to the efficacy of treatments available. She understands the risks of leaving the hospital including death and voiced them to me. Discharge will be arranged with all appropriate outpatient follow up.   45 minutes spent orchestrating discharge    Await lab results - if Hb continues to drop this will need to be discussed with patient as well.   Attending Physician Dr. Brenden Uribe [phone]

## 2017-08-28 NOTE — PROGRESS NOTE ADULT - PROBLEM SELECTOR PLAN 5
- with some tamponade effect though but pt is currently hemodynamically stable  - c/w IVF @ 100/hr  - avoid diuretics  - cards recs: need urgency for drainage of pericardial effusion for early signs of cardiac tamponade, but patient refused tx at this time.   -D/W IR and consult placed to evaluate effusion for possible pericardial window. If not amenable then CT sx consult.

## 2017-08-28 NOTE — PROGRESS NOTE ADULT - SUBJECTIVE AND OBJECTIVE BOX
24H hour events: No acute events. Patient feels well with improved breathing. She is mostly annoyed as she "already spoke to a cardiologist."    MEDICATIONS:  enoxaparin Injectable 40 milliGRAM(s) SubCutaneous daily    pantoprazole    Tablet 40 milliGRAM(s) Oral before breakfast  docusate sodium 100 milliGRAM(s) Oral daily  senna 1 Tablet(s) Oral daily    dexamethasone     Tablet 4 milliGRAM(s) Oral every 6 hours  allopurinol 100 milliGRAM(s) Oral daily    ferrous    sulfate 325 milliGRAM(s) Oral daily  sodium chloride 0.9%. 1000 milliLiter(s) IV Continuous <Continuous>      REVIEW OF SYSTEMS:  Complete 10point ROS negative.    PHYSICAL EXAM:  T(C): 36.3 (08-28-17 @ 05:00), Max: 36.3 (08-28-17 @ 05:00)  HR: 70 (08-28-17 @ 05:00) (70 - 92)  BP: 103/52 (08-28-17 @ 05:00) (94/64 - 104/74)  RR: 18 (08-28-17 @ 05:00) (18 - 19)  SpO2: 95% (08-28-17 @ 05:00) (93% - 99%)  Wt(kg): --  I&O's Summary    27 Aug 2017 07:01  -  28 Aug 2017 07:00  --------------------------------------------------------  IN: 1650 mL / OUT: 1350 mL / NET: 300 mL        Appearance: Normal	  HEENT:   Normal oral mucosa, PERRL, EOMI	  Lymphatic: No lymphadenopathy  Cardiovascular: Normal S1 S2, No JVD, No murmurs, No edema  Respiratory: Lungs clear to auscultation	  Psychiatry: A & O x 3, Mood & affect appropriate  Gastrointestinal:  Soft, Non-tender, + BS	  Skin: No rashes, No ecchymoses, No cyanosis	  Neurologic: Non-focal  Extremities: Normal range of motion, No clubbing, cyanosis or edema  Vascular: Peripheral pulses palpable 2+ bilaterally        LABS:	 	    CBC Full  -  ( 27 Aug 2017 08:52 )  WBC Count : 14.80 K/uL  Hemoglobin : 7.3 g/dL  Hematocrit : 26.3 %  Platelet Count - Automated : 248 K/uL  Mean Cell Volume : 85.9 fl  Mean Cell Hemoglobin : 23.9 pg  Mean Cell Hemoglobin Concentration : 27.8 gm/dL  Auto Neutrophil # : 12.58 K/uL  Auto Lymphocyte # : 1.18 K/uL  Auto Monocyte # : 0.44 K/uL  Auto Eosinophil # : 0.00 K/uL  Auto Basophil # : 0.00 K/uL  Auto Neutrophil % : 80.0 %  Auto Lymphocyte % : 8.0 %  Auto Monocyte % : 3.0 %  Auto Eosinophil % : 0.0 %  Auto Basophil % : 0.0 %    08-27    136  |  99  |  18  ----------------------------<  128<H>  4.6   |  23  |  0.43<L>    Ca    9.8      27 Aug 2017 08:50  Phos  4.0     08-27  Mg     1.8     08-27    proBNP: Serum Pro-Brain Natriuretic Peptide: 445 pg/mL (08-25 @ 22:24)        TELEMETRY: 	sinus     < from: Transthoracic Echocardiogram (08.26.17 @ 12:50) >  Conclusions:  1. Bileaflet mitral valve prolapse. Myxomatous mitral valve   Mild-moderate mitral regurgitation.  2. Moderately dilated left atrium.  LA volume index = 46  cc/m2.  3. Mild global left ventricular systolic dysfunction.  4. Small to moderate pericardial effusion which measures 1  cm posterior to the left ventricle, 1.3cm  base of RV. On  apical imaging no RV collapsed noted however on the  subcostal imaging there is intermittant  RV invagination  noted consistent with elevated intrapericarsial pressure  and  echocardiographic signs of tamponade.  HR 110s, BP  125. BP was taken at start of study.  5. Left pleural effusion.  6. Ascites seen.    < end of copied text >    	  ASSESSMENT/PLAN: 	    42 year old woman w/ hodgkin's lymphoma refusing tx, presents with progressively worsening shortness of breath; found to have pericardial effusion with concern for tamponade     #pericardial affusion - BP borderline low to low normal  -likely malignant effusion in setting of untreated lymphoma  -pt refusing treatemtn for lymphoma at this time, currently DNR. pt states she understands that she could die, and cont to refuse tx.   -d/w at [facility] re:cardiac tamponade and risk of acute decompensation. pt refusing pericardial drain, or pericardial window at this time. pt states she wishes to think about it for a few days. urgency of treatment was stressed to pt, however pt cont to states that she does not wish to pursue tx at this time and would like more time to think about her treatment options. pt remains DNR.   -IVF for hypotension; can continue maintenance  -consider palliative care consult   -prognosis poor    [id number] 24H hour events: No acute events. Patient feels well with improved breathing. She is mostly annoyed as she "already spoke to a cardiologist."    MEDICATIONS:  enoxaparin Injectable 40 milliGRAM(s) SubCutaneous daily    pantoprazole    Tablet 40 milliGRAM(s) Oral before breakfast  docusate sodium 100 milliGRAM(s) Oral daily  senna 1 Tablet(s) Oral daily    dexamethasone     Tablet 4 milliGRAM(s) Oral every 6 hours  allopurinol 100 milliGRAM(s) Oral daily    ferrous    sulfate 325 milliGRAM(s) Oral daily  sodium chloride 0.9%. 1000 milliLiter(s) IV Continuous <Continuous>      REVIEW OF SYSTEMS:  Complete 10point ROS negative.    PHYSICAL EXAM:  T(C): 36.3 (08-28-17 @ 05:00), Max: 36.3 (08-28-17 @ 05:00)  HR: 70 (08-28-17 @ 05:00) (70 - 92)  BP: 103/52 (08-28-17 @ 05:00) (94/64 - 104/74)  RR: 18 (08-28-17 @ 05:00) (18 - 19)  SpO2: 95% (08-28-17 @ 05:00) (93% - 99%)  Wt(kg): --  I&O's Summary    27 Aug 2017 07:01  -  28 Aug 2017 07:00  --------------------------------------------------------  IN: 1650 mL / OUT: 1350 mL / NET: 300 mL        Appearance: Normal	  HEENT:   Normal oral mucosa, PERRL, EOMI	  Lymphatic: No lymphadenopathy  Cardiovascular: Normal S1 S2, No JVD, No murmurs, No edema  Respiratory: Lungs clear to auscultation	  Psychiatry: A & O x 3, Mood & affect appropriate  Gastrointestinal:  Soft, Non-tender, + BS	  Skin: No rashes, No ecchymoses, No cyanosis	  Neurologic: Non-focal  Extremities: Normal range of motion, No clubbing, cyanosis or edema  Vascular: Peripheral pulses palpable 2+ bilaterally        LABS:	 	    CBC Full  -  ( 27 Aug 2017 08:52 )  WBC Count : 14.80 K/uL  Hemoglobin : 7.3 g/dL  Hematocrit : 26.3 %  Platelet Count - Automated : 248 K/uL  Mean Cell Volume : 85.9 fl  Mean Cell Hemoglobin : 23.9 pg  Mean Cell Hemoglobin Concentration : 27.8 gm/dL  Auto Neutrophil # : 12.58 K/uL  Auto Lymphocyte # : 1.18 K/uL  Auto Monocyte # : 0.44 K/uL  Auto Eosinophil # : 0.00 K/uL  Auto Basophil # : 0.00 K/uL  Auto Neutrophil % : 80.0 %  Auto Lymphocyte % : 8.0 %  Auto Monocyte % : 3.0 %  Auto Eosinophil % : 0.0 %  Auto Basophil % : 0.0 %    08-27    136  |  99  |  18  ----------------------------<  128<H>  4.6   |  23  |  0.43<L>    Ca    9.8      27 Aug 2017 08:50  Phos  4.0     08-27  Mg     1.8     08-27    proBNP: Serum Pro-Brain Natriuretic Peptide: 445 pg/mL (08-25 @ 22:24)        TELEMETRY: 	sinus     < from: Transthoracic Echocardiogram (08.26.17 @ 12:50) >  Conclusions:  1. Bileaflet mitral valve prolapse. Myxomatous mitral valve   Mild-moderate mitral regurgitation.  2. Moderately dilated left atrium.  LA volume index = 46  cc/m2.  3. Mild global left ventricular systolic dysfunction.  4. Small to moderate pericardial effusion which measures 1  cm posterior to the left ventricle, 1.3cm  base of RV. On  apical imaging no RV collapsed noted however on the  subcostal imaging there is intermittant  RV invagination  noted consistent with elevated intrapericarsial pressure  and  echocardiographic signs of tamponade.  HR 110s, BP  125. BP was taken at start of study.  5. Left pleural effusion.  6. Ascites seen.    < end of copied text >    	  ASSESSMENT/PLAN: 	    42 year old woman w/ hodgkin's lymphoma refusing tx, presents with progressively worsening shortness of breath; found to have pericardial effusion with concern for tamponade     #pericardial affusion - BP borderline low to low normal  -likely malignant effusion in setting of untreated lymphoma  -pt refusing treatemtn for lymphoma at this time, currently DNR. pt states she understands that she could die, and cont to refuse tx.   -treatment options previously discussed with patient - - given location of effusion, most amenable to IR for CT guided approach should patient be open to pericardiocentesis/drain  -IVF for hypotension; can continue maintenance  -consider palliative care consult     [id number]

## 2017-08-28 NOTE — PROGRESS NOTE ADULT - PROBLEM SELECTOR PLAN 1
- Hypoxemia Improving  -CXR showed large right pleural effusion & extensive mediastinal lymphadenopathy with associated narrowing of the distal trachea and R mainstem bronchus compression  - 2/2 extensive lymphadenopathy/pulmonary nodules from Hodgkin's lymphoma as well as large L pleural effusion  - currently stable on Room Air  - Patient was DNR/DNI  - Pulm rec: consider right sided pleurx catheter VS CTS

## 2017-08-28 NOTE — PROGRESS NOTE ADULT - ASSESSMENT
42 F with newly diagnosed treatment naive classical Hodgkins Lymphoma admitted with symptomatic disease a noted pleural and pericardial effusion with dyspnea on exertion.
42F PMH Hodgkin's Lymphoma (Dx 7/2017 w/ L axillary LN biopsy) presents with shortness of breath, likely 2/2 extensive LAD/pulm nodules from HL, found to have pericardial effusion, concerning for early cardiac tamponade.
42F PMH Hodgkin's Lymphoma (Dx 7/2017 w/ L axillary LN biopsy) presents with shortness of breath, likely 2/2 extensive LAD/pulm nodules from HL.
43 yo F with newly diagnosed treatment naive classical Hodgkins Lymphoma admitted with fatigue and SOB found to have pleural and pericardial effusions.
42F PMH Hodgkin's Lymphoma (Dx 7/2017 w/ L axillary LN biopsy) presents with shortness of breath, likely 2/2 extensive LAD/pulm nodules from HL.

## 2017-08-28 NOTE — PROGRESS NOTE ADULT - PROBLEM SELECTOR PLAN 2
- heme onc follow up appreciated. patiently previously refused treatment  - GOC discussion had with patient, patient DNR / DNI  - awaiting further discuss with Heme Re: Immunotherapy  - May need palliative for further GOC discussion

## 2017-08-28 NOTE — DISCHARGE NOTE ADULT - NURSING SECTION COMPLETE
Pre-Surgical Optimization Evaluation    Planned Procedure: Scheduled for Procedure(s) with comments:  ARTERIOVENOUS FISTULA WITH GRAFT - LEFT ARM AV GRAFT on 5/9/2017 with  Surgeon(s):  Corwin Goodman MDfor ESRD.     Planned Anesthesia: General  Would proceed     Pre-Op Consultations:  H&P per Dr. Goodman day of surgery     Pertinent Medical History:  1. Depression    2. Respiratory  1. MAURICE, non-compliant with CPAP  2. Diaphragmatic hernia  3. H/O PE    3. Cardiac - hydralazine 50 mg 3 times daily, nifedipine XL 60 mg daily, aspirin 81 mg daily, pravastatin 80 mg nightly, furosemide 80 mg 3 times daily  1. Severe Pulmonary Hypertension - last RVSP 33 mmHg per ECHO 3/2/2017  2. Paroxysmal atrial fibrillation - cardioversion 12/2006  3. Hypertension  4. Restrictive cardiomyopathy  5. PCI with stent to RCA 7/2014    4. Esophageal reflux - omeprazole 20 mg daily    5. CKD stage V on hemodialysis - ferrous gluconate 324 mg daily; last BUN 35 and creatinine 10.52 3/4/2017; last Hgb 10.5, HCT 31.9, RDW 14.4 3/2/2017  1. Anemia in CKD    6. Type 2 diabetes with polyneuropathy - sitagliptin 50 mg daily, Lantus insulin 10 units daily; last hemoglobin A1c 5.4 3/2/2017    7. Hypothyroidism - levothyroxine 175 MCG daily; last free T4 reflex 1.0 3/2/2017, free T3 1.7 and TSH 19.055 3/1/2017    8. Obesity class I    9. High alcohol use - drinks 2-3 cans of beer and 1/2 pint of kenyetta daily    10. Former smoker of unknown quantity for 49 years    Estimated body mass index is 31.52 kg/(m^2) as calculated from the following:    Height as of 4/13/17: 5' 11\" (1.803 m).    Weight as of 4/13/17: 102.5 kg.    Previous Anesthesia History:  Negative for past history of problematic or difficult intubations.  Negative for personal history of prior anesthesia complications or reactions.  Negative for family history of anesthesic reactions.  Positive for personal history of bleeding or clotting disorders - h/o PE.  Negative for family  history of bleeding or clotting disorders.    Pre-Surgical Optimizatio evaluation complete. Pertinent studies as follows.     ECG 4/17/2017  Unusual P axis, possible ectopic atrial bradycardia   with AV dissociation and Junctional rhythm with sinus/atrial capture   Left axis deviation   Incomplete right bundle branch block   Abnormal ECG   When compared with ECG of 10-AUG-2015 14:08,   Junctional rhythm has replaced Sinus rhythm   Vent. rate has decreased BY  38 BPM   Non-specific change in ST segment in Lateral leads   Nonspecific T wave abnormality has replaced inverted T waves in Lateral leads       ECHO 3/20/2017  Normal LV size, wall motion, and LV systolic function. LVEF 65%.  Normal right ventricular size and systolic function, RVSP 33 mmHg.  Mild aortic valve stenosis, mean gradient 13.1 mmHg, CEFERINO 2 cm².  No pericardial effusion  Compared to echo from 2013, no significant change      CXR 3/1/2017  Impression: No active disease in the chest    Most Recent Laboratory Results:    Lab Results   Component Value Date    SODIUM 137 05/04/2017    POTASSIUM 3.7 05/04/2017    BUN 32 (H) 05/04/2017    CREATININE 6.17 (H) 05/04/2017    GLUCOSE 113 (H) 05/04/2017    WBC 7.5 05/04/2017    HGB 10.3 (L) 05/04/2017    HCT 32.1 (L) 05/04/2017     05/04/2017       Past Medical History:      Acquired coagulation factor deficiency                        Hypertension                                                  Retinopathy                                                   Asthma                                                        CHF (congestive heart failure)                                Chronic kidney disease (CKD), stage III (moder*               Multinodular goiter                                           Vitamin D deficiency                                          Obesity                                                       Pulmonary hypertension                                        Gout                                                           Depression                                                    PE (pulmonary embolism)                                       Diabetic gastroparesis                                        Paroxysmal atrial fibrillation                                GI bleed                                        8/2009          Comment: Coumadin stopped    Diabetes mellitus                                               Comment: retinopathy with neuropathy    Schizophrenia                                                 Other and unspecified hyperlipidemia                          Pneumonia                                                     Esophageal reflux                                               Comment: denies    Anxiety                                                       Atrial fibrillation                                           MAURICE (obstructive sleep apnea)                                   Comment: has machine but was told he doesn't need                anymore    Hypothyroidism                                  3/8/2015      Arthritis                                                       Comment: legs    Blood clot associated with vein wall inflammat*                 Comment: 2000    Other chronic pain                                              Comment: back pain     Coronary artery disease, PCI RCA 7/2014 12/5/2016     Hemodialysis patient                                            Comment: M-W-F at [facility] [phone]                 // [employer]    Memory deficit                                                  Comment: some memory difficulty      Past Surgical History:     COLONOSCOPY W/ POLYPECTOMY                      08/06/2009    ESOPHAGOGASTRODUODENOSCOPY TRANSORAL FLEX W/BX* 01/12/2006      Comment: EGD with Bx    CORONARY ANGIOGRAM - CV                         12/11/2006      Comment: nl cor, sev PHTN    CARDIOVERSION                                    12/12/2006    ECHOCARDIOGRAM                                  7/9/2010        Comment: EF 62%    LACERATION REPAIR                               1970            Comment: Stitches left arm, steel fell on his left arm                while he was at work, he was 30 years old.    COLONOSCOPY DIAGNOSTIC                          10/2/14         Comment: 3yr recall, 1 polyp tubular adenoma    THYROIDECTOMY                                   10/14/2014      Comment: total thyroidectomy     AV FISTULA PLACEMENT                            11/11/2015      Comment: clotted since 11/2016    PERMACATH                                       03/03/2017      Comment: placed for dialysis while in hospital    5/8/2017  HITESH Trejo   Patient/Caregiver provided printed discharge information.

## 2017-08-28 NOTE — DISCHARGE NOTE ADULT - MEDICATION SUMMARY - MEDICATIONS TO TAKE
I will START or STAY ON the medications listed below when I get home from the hospital:    dexamethasone 4 mg oral tablet  -- 1 tab(s) by mouth once a day  -- Indication: For SVC syndrome    ferrous sulfate 324 mg (65 mg elemental iron) oral tablet  -- 1 tab(s) by mouth 2 times a day  -- Indication: For Iron deficiency anemia    pantoprazole 40 mg oral delayed release tablet  -- 1 tab(s) by mouth once a day, As Needed  -- Indication: For GERD    Vitamin D3 50,000 intl units oral capsule  -- 1 cap(s) by mouth once a week  -- Indication: For Supplement    Vitamin D2 50,000 intl units (1.25 mg) oral capsule  -- 1 cap(s) by mouth once a week on Wednesday.   -- Indication: For Supplement

## 2017-08-28 NOTE — PROGRESS NOTE ADULT - PROBLEM SELECTOR PROBLEM 1
Acute hypoxemic respiratory failure
Other classical Hodgkin lymphoma of lymph nodes of multiple regions
Pleural effusion

## 2017-08-28 NOTE — PROGRESS NOTE ADULT - PROBLEM SELECTOR PLAN 4
- large right pleural effusion, presumed malignant   - pulm consult appreciated - possible pleurex catheter placement

## 2017-08-28 NOTE — PROGRESS NOTE ADULT - ATTENDING COMMENTS
Currently no hemodynamic findings of pericardial tamponade, but concern for progression of effusion with untreated likely cause. Patient insists want to go home for outpatient follow-up and risks and concerns have been addressed by multiple physicians.

## 2017-08-28 NOTE — PROGRESS NOTE ADULT - ATTENDING COMMENTS
Agree with above. Patient known to our consult service. She has not pursued treatment of her Hodgkins Lymphoma to this point and will continue to have worsening of her symptoms without treatment. She feels a little better today and wants to go home. She is competent and understands the risks of not pursuing treatment including progression of her LAD, worsening of her effusions and possible death. She does not want a thoracentesis at this time but is willing to "consider" it. She is breathing comfortably. At this point there is little we have to offer from a pulmonary standpoint unless she is agreeable to interventions.

## 2017-08-28 NOTE — DISCHARGE NOTE ADULT - CARE PROVIDERS DIRECT ADDRESSES
,[email or web address]

## 2017-08-28 NOTE — PROGRESS NOTE ADULT - PROBLEM SELECTOR PLAN 1
-   - - feeling better from a respiratory standpoint, SOB improved, persistent LE edema  - pt would benefit from draining of pleural effusion (pleurx vs thoracentesis), however, they are likely to recurr given her underlying disease and her current refusal of treatment for her lymphoma  - I described a thoracentesis and outlined the risks and benefits   - pt is interested in pursuing this course but would like to go home today and follow up with Dr. Vickers as an outpt   - incentive spirometer - feeling better from a respiratory standpoint, SOB improved, persistent LE edema  - pt would benefit from draining of pleural effusion (pleurx vs thoracentesis), however, they are likely to recurr given her underlying disease and her current refusal of treatment for her lymphoma  - I described a thoracentesis and outlined the risks and benefits   - pt is interested in pursuing this course but would like to go home today and follow up with Dr. Vickers as an outpt   - incentive spirometer, OOB as tolerated  - ok for d/c as pt prefers to consider all of these interventions before pursuing them

## 2017-08-28 NOTE — CHART NOTE - NSCHARTNOTEFT_GEN_A_CORE
Patient signed out AMA prior to her morning lab result. Patient was found to have hgb of 5.4. Spoke with patient over the phone regarding the lab result. Spoke with patient that she need to come back emergently to get blood transfusion. Patient states that she will think about it. Spoke with her daughter over the phone as well. Daughter states that she will call the ambulance to bring patient back to the Emergency room. Several attempt was made to call the Core lab this morning and was told that the specimen was received at 12:30pm by the Core lab and will take 4-6 hours turnover time to get back the result.   Patient signed out AMA prior to her morning lab result. When the result came back, patient was found to have hgb of 5.4. I immediately call the patient over the phone. I spoke with patient over the phone regarding the lab result. Spoke with patient that she need to come back emergently to get blood transfusion. I explained to her that if she does not come back, she can potentially die. Patient states she will think about it and questioning about increasing her iron pill dose. I explained to her that it will not rapidly increase her hgb. I also spoke and explained the result with her daughter Herminia over the phone. Daughter states that she will call the ambulance to bring patient back to the Emergency room.

## 2017-08-28 NOTE — PROGRESS NOTE ADULT - PROBLEM SELECTOR PLAN 2
- also likley secondary to her lymphoma  - currently no clinical signs of tamponade   - pt is currently thinking about pericardial drain/window  - she prefers to drain the lung first and then decide about pericardial drain   - appreciate cardiology recs

## 2017-08-28 NOTE — DISCHARGE NOTE ADULT - HOSPITAL COURSE
History of Present Illness: 	  42F PMH Hodgkin's Lymphoma (Dx 7/2017 w/ L axillary LN biopsy, treatment naive) presents with shortness of breath.  Patient states she has had worsening shortness of breath for 2 months. Denies fevers, chills, chest pain.  Also with worsening lower extremity edema.  Patient had recent admission 7/27 - 7/29 for worsening facial swelling and hypotension.  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  Patient has been refusing conventional medical treatment and wishes to try alternative medicine.  Has been experiencing worsening RUE swelling for the past two months.  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 During past admission she was still refusing chemotherapy and radiation, she was discharged home as she did not want further treatment for her cancer, she was continued on dexamethasone 4mg q6 and allopurinol.    In the ED VS Temp 99.5 -140 BP 90 - 108 / 60-70 RR 20-40 O2 90-95% 2LNC  Labs notable for leukocytosis (WBC 13.5), anemia (Hgb 7.6). Elevated Alk Phos (244), hyperphosphatemia (4.8)   Bedside TTE showed moderate pericardial effusion w/ RV scalloping concerning for tamponade.    Of note, had GOC discussion with patient, patient electing to be DNR/DNI (see goals of care conversation note).    Hospital Course:  #1 Acute hypoxemic respiratory failure      #2 Hodgkin lymphoma      #3 Pleural effusion      #4 Pericardial effusion History of Present Illness: 	  42F PMH Hodgkin's Lymphoma (Dx 7/2017 w/ L axillary LN biopsy, treatment naive) presents with shortness of breath.  Patient states she has had worsening shortness of breath for 2 months. Denies fevers, chills, chest pain.  Also with worsening lower extremity edema.  Patient had recent admission 7/27 - 7/29 for worsening facial swelling and hypotension.  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  Patient has been refusing conventional medical treatment and wishes to try alternative medicine.  Has been experiencing worsening RUE swelling for the past two months.  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 During past admission she was still refusing chemotherapy and radiation, she was discharged home as she did not want further treatment for her cancer, she was continued on dexamethasone 4mg q6 and allopurinol.    In the ED VS Temp 99.5 -140 BP 90 - 108 / 60-70 RR 20-40 O2 90-95% 2LNC  Labs notable for leukocytosis (WBC 13.5), anemia (Hgb 7.6). Elevated Alk Phos (244), hyperphosphatemia (4.8)   Bedside TTE showed moderate pericardial effusion w/ RV scalloping concerning for tamponade.    Of note, had GOC discussion with patient, patient electing to be DNR/DNI (see goals of care conversation note).    Hospital Course:  #1 Acute hypoxemic respiratory failure  Patient came in with acute hypoxemic respiratory failure with RR of 40 SpO2 of 90%. Patient was given oxygen and 1 dose of lasix. Patient's respiratory failure likely 2/2 extensive LAD/pulm nodules from HL and pleural effusion. Patient has been actively refusing treatment during previous admission, and adamantly refusing chemo/radiation as well during this admission. Patient's breathing improved required no use of oxygen, sating well on RA.     #2 Hodgkin lymphoma  Patient was diagnosed with Hodgkin lymphoma after an excisional lymph node biopsy on 7/12 of the left axillary. Patient has long discussion with heme/onc Dr. Baxter but continue to refuse chemo/radiation therapy, though patient now amenable to learn more about immunotherapy. Patient had capacity and was able to refuse. Patient wish to follow up with Dr. Baxter outpatient and refuse treatment during this hospitalization    #3 Pleural effusion  Patient was found to have large right pleural effusion, likely malignant. Pulmonologist recommended possible pleurex catheter to relieve her symptom, but patient refused.     #4 Pericardial effusion  Patient was found to have pericardial effusion on bedside TTE, likely malignant effusion in setting of untreated lymphoma. Formal TTE confirmed pericardial effusion concerning for early cardiac tamponade. Cardiologist offered pericardial window but patient refused treatment. History of Present Illness: 	  42F PMH Hodgkin's Lymphoma (Dx 7/2017 w/ L axillary LN biopsy, treatment naive) presents with shortness of breath.  Patient states she has had worsening shortness of breath for 2 months. Denies fevers, chills, chest pain.  Also with worsening lower extremity edema.  Patient had recent admission 7/27 - 7/29 for worsening facial swelling and hypotension.  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  Patient has been refusing conventional medical treatment and wishes to try alternative medicine.  Has been experiencing worsening RUE swelling for the past two months.  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 During past admission she was still refusing chemotherapy and radiation, she was discharged home as she did not want further treatment for her cancer, she was continued on dexamethasone 4mg q6 and allopurinol.    In the ED VS Temp 99.5 -140 BP 90 - 108 / 60-70 RR 20-40 O2 90-95% 2LNC  Labs notable for leukocytosis (WBC 13.5), anemia (Hgb 7.6). Elevated Alk Phos (244), hyperphosphatemia (4.8)   Bedside TTE showed moderate pericardial effusion w/ RV scalloping concerning for tamponade.    Of note, had GOC discussion with patient, patient electing to be DNR/DNI (see goals of care conversation note).    Hospital Course:  #1 Acute hypoxemic respiratory failure  Patient came in with acute hypoxemic respiratory failure with RR of 40 SpO2 of 90%. Patient was given oxygen and 1 dose of lasix. Patient's respiratory failure likely 2/2 extensive LAD/pulm nodules from HL and pleural effusion. Patient has been actively refusing treatment during previous admission, and adamantly refusing chemo/radiation as well during this admission. Patient's breathing improved required no use of oxygen, sating well on RA.     #2 Hodgkin lymphoma  Patient was diagnosed with Hodgkin lymphoma after an excisional lymph node biopsy on 7/12 of the left axillary. Patient has long discussion with heme/onc Dr. Baxter but continue to refuse chemo/radiation therapy, though patient now amenable to learn more about immunotherapy. Patient had capacity and was able to refuse. Patient wish to follow up with Dr. Baxter outpatient and refuse treatment during this hospitalization    #3 Pleural effusion  Patient was found to have large right pleural effusion, likely malignant. Pulmonologist recommended possible pleurex catheter to relieve her symptom, but patient refused.     #4 Pericardial effusion  Patient was found to have pericardial effusion on bedside TTE, likely malignant effusion in setting of untreated lymphoma. Formal TTE confirmed pericardial effusion concerning for early cardiac tamponade. Cardiologist offered pericardial window but patient refused treatment.    Addendum:  After several physician has been talking to patient regarding the plan and if patient does not get treatment in the hospital, patient continued to refuse treatment, and adamantly wanted to leave AMA. Risk and benefits of treatment has been conveyed to patient and she understood that if she leave AMA, she can likely die from her disease. Patient has full capacity to make medical decision and she's deem competent to leave AMA. History of Present Illness: 	  42F PMH Hodgkin's Lymphoma (Dx 7/2017 w/ L axillary LN biopsy, treatment naive) presents with shortness of breath.  Patient states she has had worsening shortness of breath for 2 months. Denies fevers, chills, chest pain.  Also with worsening lower extremity edema.  Patient had recent admission 7/27 - 7/29 for worsening facial swelling and hypotension.  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  Patient has been refusing conventional medical treatment and wishes to try alternative medicine.  Has been experiencing worsening RUE swelling for the past two months.  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 During past admission she was still refusing chemotherapy and radiation, she was discharged home as she did not want further treatment for her cancer, she was continued on dexamethasone 4mg q6 and allopurinol.    In the ED VS Temp 99.5 -140 BP 90 - 108 / 60-70 RR 20-40 O2 90-95% 2LNC  Labs notable for leukocytosis (WBC 13.5), anemia (Hgb 7.6). Elevated Alk Phos (244), hyperphosphatemia (4.8)   Bedside TTE showed moderate pericardial effusion w/ RV scalloping concerning for tamponade.    Of note, had GOC discussion with patient, patient electing to be DNR/DNI (see goals of care conversation note).    Hospital Course:  #1 Acute hypoxemic respiratory failure  Patient came in with acute hypoxemic respiratory failure with RR of 40 SpO2 of 90%. Patient was given oxygen and 1 dose of lasix. Patient's respiratory failure likely 2/2 extensive LAD/pulm nodules from HL and pleural effusion. Patient has been actively refusing treatment during previous admission, and adamantly refusing chemo/radiation as well during this admission. Patient's breathing improved required no use of oxygen, sating well on RA.     #2 Hodgkin lymphoma  Patient was diagnosed with Hodgkin lymphoma after an excisional lymph node biopsy on 7/12 of the left axillary. Patient has long discussion with heme/onc Dr. Baxter but continue to refuse chemo/radiation therapy, though patient now amenable to learn more about immunotherapy. Patient had capacity and was able to refuse. Patient wish to follow up with Dr. Baxter outpatient and refuse treatment during this hospitalization    #3 Pleural effusion  Patient was found to have large right pleural effusion, likely malignant. Pulmonologist recommended possible pleurex catheter to relieve her symptom, but patient refused.     #4 Pericardial effusion  Patient was found to have pericardial effusion on bedside TTE, likely malignant effusion in setting of untreated lymphoma. Formal TTE confirmed pericardial effusion concerning for early cardiac tamponade. Cardiologist offered pericardial window but patient refused treatment.    Addendum:  After several physician has been talking to patient regarding the plan and if patient does not get treatment in the hospital, she could potentially die. Patient continued to refuse treatment, and adamantly wanted to leave AMA. Risk and benefits of treatment has been conveyed to patient and she understood that if she leave AMA, she can likely die from her disease. Patient has full capacity to make medical decision and she's deem competent to leave AMA. History of Present Illness: 	  42F PMH Hodgkin's Lymphoma (Dx 7/2017 w/ L axillary LN biopsy, treatment naive) presents with shortness of breath.  Patient states she has had worsening shortness of breath for 2 months. Denies fevers, chills, chest pain.  Also with worsening lower extremity edema.  Patient had recent admission 7/27 - 7/29 for worsening facial swelling and hypotension.  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  Patient has been refusing conventional medical treatment and wishes to try alternative medicine.  Has been experiencing worsening RUE swelling for the past two months.  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 During past admission she was still refusing chemotherapy and radiation, she was discharged home as she did not want further treatment for her cancer, she was continued on dexamethasone 4mg q6 and allopurinol.    In the ED VS Temp 99.5 -140 BP 90 - 108 / 60-70 RR 20-40 O2 90-95% 2LNC  Labs notable for leukocytosis (WBC 13.5), anemia (Hgb 7.6). Elevated Alk Phos (244), hyperphosphatemia (4.8)   Bedside TTE showed moderate pericardial effusion w/ RV scalloping concerning for tamponade.    Of note, had GOC discussion with patient, patient electing to be DNR/DNI (see goals of care conversation note).    Hospital Course:  #1 Acute hypoxemic respiratory failure  Patient came in with acute hypoxemic respiratory failure with RR of 40 SpO2 of 90%. Patient was given oxygen and 1 dose of lasix. Patient's respiratory failure likely 2/2 extensive LAD/pulm nodules from HL and pleural effusion. Patient has been actively refusing treatment during previous admission, and adamantly refusing chemo/radiation as well during this admission. Patient's breathing improved required no use of oxygen, sating well on RA.     #2 Hodgkin lymphoma  Patient was diagnosed with Hodgkin lymphoma after an excisional lymph node biopsy on 7/12 of the left axillary. Patient has long discussion with heme/onc Dr. Baxter but continue to refuse chemo/radiation therapy, though patient now amenable to learn more about immunotherapy. Patient had capacity and was able to refuse. Patient wish to follow up with Dr. Baxter outpatient and refuse treatment during this hospitalization    #3 Pleural effusion  Patient was found to have large right pleural effusion, likely malignant. Pulmonologist recommended possible pleurex catheter to relieve her symptom, but patient refused.     #4 Pericardial effusion  Patient was found to have pericardial effusion on bedside TTE, likely malignant effusion in setting of untreated lymphoma. Formal TTE confirmed pericardial effusion concerning for early cardiac tamponade. Cardiologist offered pericardial window but patient refused treatment.    Addendum:  After several physician has been talking to patient regarding the plan and if patient does not get treatment in the hospital, she could potentially die. Patient continued to refuse treatment, and adamantly wanted to leave AMA. Risk and benefits of treatment has been conveyed to patient and she understood that if she leave AMA, she can likely die from her disease. Patient has full capacity to make medical decision and she's deem competent to leave AMA.     Attending Addendum  Discussed w/pt r/b of tx as outlined in my note from today.   Pt signed out AMA prior to labs returning - I wished for her to stay until labs came back. Hb is 5.4, pt was called and advised to return to ED for blood transfusion. This was also discussed w/pt's daughter who will call ambulance.

## 2017-08-28 NOTE — DISCHARGE NOTE ADULT - CARE PROVIDER_API CALL
Loi Vickers), Internal Medicine; Pulmonary Disease  [address]  [address]  [place] [address]  Phone: [phone]  Fax: [phone]    Gabrielle Baxter (), Hematology; Internal Medicine; Medical Oncology  [address] [place] [address]  Phone: [phone]  Fax: [phone]

## 2017-08-28 NOTE — PROGRESS NOTE ADULT - SUBJECTIVE AND OBJECTIVE BOX
CHIEF COMPLAINT: SOB, fatigue    Interval Events:  no o/n events      REVIEW OF SYSTEMS:  Constitutional: [ ] fevers [ ] chills [ ] weight loss [ ] weight gain  HEENT: [ ] dry eyes [ ] eye irritation [ ] postnasal drip [ ] nasal congestion  CV: [ ] chest pain [ ] orthopnea [ ] palpitations [ ] murmur  Resp: [ ] cough [ ] shortness of breath [ ] dyspnea [ ] wheezing [ ] sputum [ ] hemoptysis  GI: [ ] nausea [ ] vomiting [ ] diarrhea [ ] constipation [ ] abd pain [ ] dysphagia   : [ ] dysuria [ ] nocturia [ ] hematuria [ ] increased urinary frequency  Musculoskeletal: [ ] back pain [ ] myalgias [ ] arthralgias [ ] fracture  Skin: [ ] rash [ ] itch  Neurological: [ ] headache [ ] dizziness [ ] syncope [ ] weakness [ ] numbness  Psychiatric: [ ] anxiety [ ] depression  Endocrine: [ ] diabetes [ ] thyroid problem  Hematologic/Lymphatic: [ ] anemia [ ] bleeding problem  Allergic/Immunologic: [ ] itchy eyes [ ] nasal discharge [ ] hives [ ] angioedema  [ x] All other systems negative  [ ] Unable to assess ROS because ________    OBJECTIVE:  ICU Vital Signs Last 24 Hrs  T(C): 36.3 (28 Aug 2017 05:00), Max: 36.3 (28 Aug 2017 05:00)  T(F): 97.4 (28 Aug 2017 05:00), Max: 97.4 (28 Aug 2017 05:00)  HR: 70 (28 Aug 2017 05:00) (70 - 92)  BP: 103/52 (28 Aug 2017 05:00) (94/64 - 104/74)  BP(mean): --  ABP: --  ABP(mean): --  RR: 18 (28 Aug 2017 05:00) (18 - 19)  SpO2: 95% (28 Aug 2017 05:00) (93% - 100%)        08-27 @ 07:01  -  08-28 @ 07:00  --------------------------------------------------------  IN: 1650 mL / OUT: 1350 mL / NET: 300 mL      CAPILLARY BLOOD GLUCOSE          PHYSICAL EXAM:  General:   HEENT:   Lymph Nodes:  Neck:   Respiratory:   Cardiovascular:   Abdomen:   Extremities:   Skin:   Neurological:  Psychiatry:    HOSPITAL MEDICATIONS:  MEDICATIONS  (STANDING):  dexamethasone     Tablet 4 milliGRAM(s) Oral every 6 hours  allopurinol 100 milliGRAM(s) Oral daily  ferrous    sulfate 325 milliGRAM(s) Oral daily  pantoprazole    Tablet 40 milliGRAM(s) Oral before breakfast  enoxaparin Injectable 40 milliGRAM(s) SubCutaneous daily  sodium chloride 0.9%. 1000 milliLiter(s) (100 mL/Hr) IV Continuous <Continuous>  docusate sodium 100 milliGRAM(s) Oral daily  senna 1 Tablet(s) Oral daily    MEDICATIONS  (PRN):      LABS:                        7.3    14.80 )-----------( 248      ( 27 Aug 2017 08:52 )             26.3     Hgb Trend: 7.3<--, 7.6<--  08-27    136  |  99  |  18  ----------------------------<  128<H>  4.6   |  23  |  0.43<L>    Ca    9.8      27 Aug 2017 08:50  Phos  4.0     08-27  Mg     1.8     08-27      Creatinine Trend: 0.43<--, 0.52<--  PT/INR - ( 26 Aug 2017 17:27 )   PT: 17.8 sec;   INR: 1.62 ratio         PTT - ( 26 Aug 2017 17:27 )  PTT:35.1 sec  Urinalysis Basic - ( 26 Aug 2017 08:52 )    Color: Yellow / Appearance: Clear / SG: =1.051 / pH: x  Gluc: x / Ketone: Negative  / Bili: Negative / Urobili: 1.0   Blood: x / Protein: Negative / Nitrite: Negative   Leuk Esterase: Negative / RBC: x / WBC x   Sq Epi: x / Non Sq Epi: x / Bacteria: x            MICROBIOLOGY:     RADIOLOGY:  [ ] Reviewed and interpreted by me    PULMONARY FUNCTION TESTS:    EKG: CHIEF COMPLAINT: SOB, fatigue    Interval Events:  no o/n events  feeling better today, SOB improving, LE swelling seems improved as well  wants to go home today    REVIEW OF SYSTEMS:  Constitutional: [- ] fevers [- ] chills [ ] weight loss [ ] weight gain  CV: [- ] chest pain [ ] orthopnea [ ] palpitations [ ] murmur  Resp: [- ] cough [- ] shortness of breath [- ] dyspnea [ ] wheezing [ ] sputum [ ] hemoptysis  GI: [- ] nausea [- ] vomiting [ ] diarrhea [ ] constipation [ ] abd pain [ ] dysphagia [+ ] abd distension  Musculoskeletal: [ ] back pain [ ] myalgias [ ] arthralgias [ ] fracture  Skin: [ ] rash [ ] itch  [ x] All other systems negative  [ ] Unable to assess ROS because ________    OBJECTIVE:  ICU Vital Signs Last 24 Hrs  T(C): 36.3 (28 Aug 2017 05:00), Max: 36.3 (28 Aug 2017 05:00)  T(F): 97.4 (28 Aug 2017 05:00), Max: 97.4 (28 Aug 2017 05:00)  HR: 70 (28 Aug 2017 05:00) (70 - 92)  BP: 103/52 (28 Aug 2017 05:00) (94/64 - 104/74)  BP(mean): --  ABP: --  ABP(mean): --  RR: 18 (28 Aug 2017 05:00) (18 - 19)  SpO2: 95% (28 Aug 2017 05:00) (93% - 100%)        08-27 @ 07:01  -  08-28 @ 07:00  --------------------------------------------------------  IN: 1650 mL / OUT: 1350 mL / NET: 300 mL      CAPILLARY BLOOD GLUCOSE          PHYSICAL EXAM:  General: NAD, non-toxic appearing  HEENT: MMM  Lymph Nodes: visible LAD, cervical, supraclavicular  Neck: supple  Respiratory: no BS right base to mid lung field, left CTA  Cardiovascular: s1s2 tachycardic  Abdomen: soft, mild distension, non tender  Extremities: warm, +2 pitting edema b/l LE  Skin: no rash  Neurological: no focal deficits  Psychiatry: AAOx3    HOSPITAL MEDICATIONS:  MEDICATIONS  (STANDING):  dexamethasone     Tablet 4 milliGRAM(s) Oral every 6 hours  allopurinol 100 milliGRAM(s) Oral daily  ferrous    sulfate 325 milliGRAM(s) Oral daily  pantoprazole    Tablet 40 milliGRAM(s) Oral before breakfast  enoxaparin Injectable 40 milliGRAM(s) SubCutaneous daily  sodium chloride 0.9%. 1000 milliLiter(s) (100 mL/Hr) IV Continuous <Continuous>  docusate sodium 100 milliGRAM(s) Oral daily  senna 1 Tablet(s) Oral daily    MEDICATIONS  (PRN):      LABS:                        7.3    14.80 )-----------( 248      ( 27 Aug 2017 08:52 )             26.3     Hgb Trend: 7.3<--, 7.6<--  08-27    136  |  99  |  18  ----------------------------<  128<H>  4.6   |  23  |  0.43<L>    Ca    9.8      27 Aug 2017 08:50  Phos  4.0     08-27  Mg     1.8     08-27      Creatinine Trend: 0.43<--, 0.52<--  PT/INR - ( 26 Aug 2017 17:27 )   PT: 17.8 sec;   INR: 1.62 ratio         PTT - ( 26 Aug 2017 17:27 )  PTT:35.1 sec  Urinalysis Basic - ( 26 Aug 2017 08:52 )    Color: Yellow / Appearance: Clear / SG: =1.051 / pH: x  Gluc: x / Ketone: Negative  / Bili: Negative / Urobili: 1.0   Blood: x / Protein: Negative / Nitrite: Negative   Leuk Esterase: Negative / RBC: x / WBC x   Sq Epi: x / Non Sq Epi: x / Bacteria: x            MICROBIOLOGY:     RADIOLOGY:  [ ] Reviewed and interpreted by me    PULMONARY FUNCTION TESTS:    EKG:

## 2017-08-28 NOTE — DISCHARGE NOTE ADULT - PLAN OF CARE
Normal breathing effort, resolution You came in with acute respiratory failure due to your Hodgkin's lymphoma with extensive lymph node compressing on your windpipe and were found to have fluid in your lung and heart sac. You were given oxygen and water pill to help remove some of the fluid. Please follow up closely with your hematologist Dr. Baxter, pulmonologist Dr. Vickers in 1-2 weeks Removal of fluid, prevention of cardiac complication You were found to have fluid build up in your heart sac. You were seen by cardiologist which recommended pericardia window to drain the fluid, which if  not drained will cause severe complication from the heart including compressing on the heart and limit your heart function. Please follow up with your cardiologist in 1-2 weeks Removal of fluid, prevention of future episode You came in with shortness of breath likely also cause by fluid in your lungs. You were given 1 dose of water pill to help remove some of the fluid in your lung. Please follow up closely with Dr. Vickers (pulmonologist) in 1-2 weeks Getting treatment. You were diagnosed with Hodgkin Lymphoma on 7/2017. You were seen by oncologist which recommended chemotherapy/radiation therapy. You have been refusing treatment in the past.

## 2017-08-28 NOTE — PROGRESS NOTE ADULT - SUBJECTIVE AND OBJECTIVE BOX
Medicine Team 4  Progress Note- PGY1  CONTACT INFO  Pasquale Donovan M.D., PGY-1  Pager: NS- [phone], VENUJ- [phone]  Spectra: [phone]    Mon-Fri: pager covered by day team 7am-7pm;   ***Academic conferences M-F 8am-9am & 12pm-1pm- page ONLY if URGENT or if Consultant  /Cheryl: see chart, primary physician assigned available 7am-12pm  Sat/[facility] Coverage 12pm-7pm: NS- page [phone] for Team1-4, [facility]- pager forwarded to covering Resident  For Night coverage 7pm-7am: NS- page [phone] Team1-3, page [phone] Team4 & Care Model      Chief Complaint: Patient is a 42y old  Female who presents with a chief complaint of shortness of breath (26 Aug 2017 05:27)    ================================================  INTERVAL HPI/OVERNIGHT EVENTS:         MEDICATIONS  (STANDING):  dexamethasone     Tablet 4 milliGRAM(s) Oral every 6 hours  allopurinol 100 milliGRAM(s) Oral daily  ferrous    sulfate 325 milliGRAM(s) Oral daily  pantoprazole    Tablet 40 milliGRAM(s) Oral before breakfast  enoxaparin Injectable 40 milliGRAM(s) SubCutaneous daily  sodium chloride 0.9%. 1000 milliLiter(s) (100 mL/Hr) IV Continuous <Continuous>  docusate sodium 100 milliGRAM(s) Oral daily  senna 1 Tablet(s) Oral daily    MEDICATIONS  (PRN):      Vital Signs Last 24 Hrs  T(C): 36.3 (28 Aug 2017 05:00), Max: 36.3 (28 Aug 2017 05:00)  T(F): 97.4 (28 Aug 2017 05:00), Max: 97.4 (28 Aug 2017 05:00)  HR: 70 (28 Aug 2017 05:00) (70 - 92)  BP: 103/52 (28 Aug 2017 05:00) (94/64 - 104/74)  BP(mean): --  RR: 18 (28 Aug 2017 05:00) (18 - 19)  SpO2: 95% (28 Aug 2017 05:00) (93% - 100%)  Supplemental O2: [ ] No, on Room Air [ ] Yes,     I&O's Detail    27 Aug 2017 07:01  -  28 Aug 2017 07:00  --------------------------------------------------------  IN:    Oral Fluid: 450 mL    sodium chloride 0.9%.: 1200 mL  Total IN: 1650 mL    OUT:    Voided: 1350 mL  Total OUT: 1350 mL    Total NET: 300 mL        CAPILLARY BLOOD GLUCOSE          PHYSICAL EXAM:  Daily Height in cm: 167.64 (27 Aug 2017 08:07)    Daily Weight in k.8 (27 Aug 2017 09:41)   GENERAL: NAD,   HEAD:  NC/AT  EYES: PERRLA, EOMI, white sclerae  ENMT: MMM, no oropharyneal lesions or erythema appreciated, dentition well appearing  Neck: trachea midline, no appreciable masses  Pulm: normal work of breathing, CTA b/l  CV: S1&S2+, rrr, no m/r/g appreciated  ABDOMEN: bs+, soft, nt, nd, no appreciable organomegaly  : no cva tenderness, no bhatt placed  EXTREMITIES:  2+ peripheral pulses, no appreciable edema in b/l LE  LYMPH: no appreciable lymphadenopathy in head or neck  Neuro: A&Ox3, no focal deficits  SKIN: warm and dry, no visible rash    LABS:                        7.3    14.80 )-----------( 248      ( 27 Aug 2017 08:52 )             26.3         136  |  99  |  18  ----------------------------<  128<H>  4.6   |  23  |  0.43<L>    Ca    9.8      27 Aug 2017 08:50  Phos  4.0     -  Mg     1.8           LIVER FUNCTIONS - ( 25 Aug 2017 22:24 )  Alb: 2.6 g/dL / Pro: 6.9 g/dL / ALK PHOS: 244 U/L / ALT: 23 U/L RC / AST: 15 U/L / GGT: x     / T. Bili 1.0 mg/dL / D. Bili x         PT/INR - ( 26 Aug 2017 17:27 )   PT: 17.8 sec;   INR: 1.62 ratio         PTT - ( 26 Aug 2017 17:27 )  PTT:35.1 sec  Urinalysis Basic - ( 26 Aug 2017 08:52 )    Color: Yellow / Appearance: Clear / SG: =1.051 / pH: x  Gluc: x / Ketone: Negative  / Bili: Negative / Urobili: 1.0   Blood: x / Protein: Negative / Nitrite: Negative   Leuk Esterase: Negative / RBC: x / WBC x   Sq Epi: x / Non Sq Epi: x / Bacteria: x            Urinalysis Basic - ( 26 Aug 2017 08:52 )    Color: Yellow / Appearance: Clear / SG: =1.051 / pH: x  Gluc: x / Ketone: Negative  / Bili: Negative / Urobili: 1.0   Blood: x / Protein: Negative / Nitrite: Negative   Leuk Esterase: Negative / RBC: x / WBC x   Sq Epi: x / Non Sq Epi: x / Bacteria: x      Microbiology:    RADIOLOGY & ADDITIONAL TESTS:    Xray -   CT -  MRI -     Imaging Personally Reviewed:  [ ] YES  [ ] NO    Consultant(s) Notes Reviewed:  [ ] YES  [ ] NO    Care Discussed with Consultants/Other Providers [ ] YES  [ ] NO    CONTACT INFO  Pasquale Donovan M.D., PGY-1  Pager: NS- [phone], [facility]- [phone]  Spectra: [phone]    Mon-Fri: pager covered by day team 7am-7pm;   ***Academic conferences M-F 8am-9am & 12pm-1pm- page ONLY if URGENT or if Consultant  /Cheryl: see chart, primary physician assigned available 7am-12pm  Sat/[facility] Coverage 12pm-7pm: NS- page [phone] for Team1-4, [facility]- pager forwarded to covering Resident  For Night coverage 7pm-7am: NS- page [phone] Team1-3, page [phone] Team4 & Care Model Medicine Team 4  Progress Note- PGY1  CONTACT INFO  Pasquale Donovan M.D., PGY-1  Pager: NS- [phone], VENUJ- [phone]  Spectra: [phone]    Mon-Fri: pager covered by day team 7am-7pm;   ***Academic conferences M-F 8am-9am & 12pm-1pm- page ONLY if URGENT or if Consultant  /Cheryl: see chart, primary physician assigned available 7am-12pm  Sat/[facility] Coverage 12pm-7pm: NS- page 1443 for Team1-4, [facility]- pager forwarded to covering Resident  For Night coverage 7pm-7am: NS- page [phone] Team1-3, page [phone] Team4 & Care Model      Chief Complaint: Patient is a 42y old  Female who presents with a chief complaint of shortness of breath (26 Aug 2017 05:27)    ================================================  INTERVAL HPI/OVERNIGHT EVENTS:   Patient reports feeling tired. She adamantly states that she wanted to go home today, despite several people explaining to her the risk of leaving hospital given her current condition. She states that she will follow with Dr. De La Rosa (pulmonologist) as OP to have fluid drawn in her lung. Currently denied n/v/d, CP, SOB, or abdominal pain.       MEDICATIONS  (STANDING):  dexamethasone     Tablet 4 milliGRAM(s) Oral every 6 hours  allopurinol 100 milliGRAM(s) Oral daily  ferrous    sulfate 325 milliGRAM(s) Oral daily  pantoprazole    Tablet 40 milliGRAM(s) Oral before breakfast  enoxaparin Injectable 40 milliGRAM(s) SubCutaneous daily  sodium chloride 0.9%. 1000 milliLiter(s) (100 mL/Hr) IV Continuous <Continuous>  docusate sodium 100 milliGRAM(s) Oral daily  senna 1 Tablet(s) Oral daily    MEDICATIONS  (PRN):      Vital Signs Last 24 Hrs  T(C): 36.3 (28 Aug 2017 05:00), Max: 36.3 (28 Aug 2017 05:00)  T(F): 97.4 (28 Aug 2017 05:00), Max: 97.4 (28 Aug 2017 05:00)  HR: 70 (28 Aug 2017 05:00) (70 - 92)  BP: 103/52 (28 Aug 2017 05:00) (94/64 - 104/74)  BP(mean): --  RR: 18 (28 Aug 2017 05:00) (18 - 19)  SpO2: 95% (28 Aug 2017 05:00) (93% - 100%)  Supplemental O2: [ ] No, on Room Air [ ] Yes,     I&O's Detail    27 Aug 2017 07:01  -  28 Aug 2017 07:00  --------------------------------------------------------  IN:    Oral Fluid: 450 mL    sodium chloride 0.9%.: 1200 mL  Total IN: 1650 mL    OUT:    Voided: 1350 mL  Total OUT: 1350 mL    Total NET: 300 mL        CAPILLARY BLOOD GLUCOSE          PHYSICAL EXAM:  GENERAL: NAD, well-groomed, well-developed  HEAD:  Atraumatic, Normocephalic  EYES: EOMI, PERRLA, conjunctiva and sclera clear  ENMT: No tonsillar erythema, exudates, or enlargement; Moist mucous membranes, Good dentition  NECK: Supple, No JVD,   NERVOUS SYSTEM: AOX3, motor and sensation grossly intact in b/l UE and b/l LE  CHEST/LUNG: +decreased breath sounds R side. no wheezing  HEART: Regular rate and rhythm; No murmurs, rubs, or gallops. No LE edema  ABDOMEN: Soft, Nontender, Nondistended; Bowel sounds present  EXTREMITIES:  2+ Peripheral Pulses, No clubbing, cyanosis  LYMPH: extensive LAD on left neck  SKIN: No rashes or lesions    LABS:                        7.3    14.80 )-----------( 248      ( 27 Aug 2017 08:52 )             26.3     08-27    136  |  99  |  18  ----------------------------<  128<H>  4.6   |  23  |  0.43<L>    Ca    9.8      27 Aug 2017 08:50  Phos  4.0     08-27  Mg     1.8     08-27      LIVER FUNCTIONS - ( 25 Aug 2017 22:24 )  Alb: 2.6 g/dL / Pro: 6.9 g/dL / ALK PHOS: 244 U/L / ALT: 23 U/L RC / AST: 15 U/L / GGT: x     / T. Bili 1.0 mg/dL / D. Bili x         PT/INR - ( 26 Aug 2017 17:27 )   PT: 17.8 sec;   INR: 1.62 ratio         PTT - ( 26 Aug 2017 17:27 )  PTT:35.1 sec  Urinalysis Basic - ( 26 Aug 2017 08:52 )    Color: Yellow / Appearance: Clear / SG: =1.051 / pH: x  Gluc: x / Ketone: Negative  / Bili: Negative / Urobili: 1.0   Blood: x / Protein: Negative / Nitrite: Negative   Leuk Esterase: Negative / RBC: x / WBC x   Sq Epi: x / Non Sq Epi: x / Bacteria: x            Urinalysis Basic - ( 26 Aug 2017 08:52 )    Color: Yellow / Appearance: Clear / SG: =1.051 / pH: x  Gluc: x / Ketone: Negative  / Bili: Negative / Urobili: 1.0   Blood: x / Protein: Negative / Nitrite: Negative   Leuk Esterase: Negative / RBC: x / WBC x   Sq Epi: x / Non Sq Epi: x / Bacteria: x        Imaging Personally Reviewed:  [ ] YES  [ ] NO    Consultant(s) Notes Reviewed:  [X ] YES  [ ] NO    Care Discussed with Consultants/Other Providers [ ] YES  [ ] NO    CONTACT INFO  Pasquale Donovan M.D., PGY-1  Pager: NS- [phone], [facility]- [phone]  Spectra: [phone]    Mon-Fri: pager covered by day team 7am-7pm;   ***Academic conferences M-F 8am-9am & 12pm-1pm- page ONLY if URGENT or if Consultant  /Cheryl: see chart, primary physician assigned available 7am-12pm  Sat/[facility] Coverage 12pm-7pm: NS- page 1443 for Team1-4, [facility]- pager forwarded to covering Resident  For Night coverage 7pm-7am: NS- page 1443 Team1-3, page 1442 Team4 & Care Model Medicine Team 4  Progress Note- PGY1  CONTACT INFO  Pasquale Donovan M.D., PGY-1  Pager: NS- [phone], VENUJ- [phone]  Spectra: [phone]    Mon-Fri: pager covered by day team 7am-7pm;   ***Academic conferences M-F 8am-9am & 12pm-1pm- page ONLY if URGENT or if Consultant  /Cheryl: see chart, primary physician assigned available 7am-12pm  Sat/[facility] Coverage 12pm-7pm: NS- page 1443 for Team1-4, [facility]- pager forwarded to covering Resident  For Night coverage 7pm-7am: NS- page 1443 Team1-3, page 1446 Team4 & Care Model      Chief Complaint: Patient is a 42y old  Female who presents with a chief complaint of shortness of breath (26 Aug 2017 05:27)    ================================================  INTERVAL HPI/OVERNIGHT EVENTS:   Patient reports feeling tired. She adamantly states that she wanted to go home today, despite several people explaining to her the risk of leaving hospital given her current condition. She states that she will follow with Dr. Vickers (pulmonologist) as OP to have fluid drawn in her lung. Currently denied n/v/d, CP, SOB, or abdominal pain.       MEDICATIONS  (STANDING):  dexamethasone     Tablet 4 milliGRAM(s) Oral every 6 hours  allopurinol 100 milliGRAM(s) Oral daily  ferrous    sulfate 325 milliGRAM(s) Oral daily  pantoprazole    Tablet 40 milliGRAM(s) Oral before breakfast  enoxaparin Injectable 40 milliGRAM(s) SubCutaneous daily  sodium chloride 0.9%. 1000 milliLiter(s) (100 mL/Hr) IV Continuous <Continuous>  docusate sodium 100 milliGRAM(s) Oral daily  senna 1 Tablet(s) Oral daily    MEDICATIONS  (PRN):      Vital Signs Last 24 Hrs  T(C): 36.3 (28 Aug 2017 05:00), Max: 36.3 (28 Aug 2017 05:00)  T(F): 97.4 (28 Aug 2017 05:00), Max: 97.4 (28 Aug 2017 05:00)  HR: 70 (28 Aug 2017 05:00) (70 - 92)  BP: 103/52 (28 Aug 2017 05:00) (94/64 - 104/74)  BP(mean): --  RR: 18 (28 Aug 2017 05:00) (18 - 19)  SpO2: 95% (28 Aug 2017 05:00) (93% - 100%)  Supplemental O2: [ ] No, on Room Air [ ] Yes,     I&O's Detail    27 Aug 2017 07:01  -  28 Aug 2017 07:00  --------------------------------------------------------  IN:    Oral Fluid: 450 mL    sodium chloride 0.9%.: 1200 mL  Total IN: 1650 mL    OUT:    Voided: 1350 mL  Total OUT: 1350 mL    Total NET: 300 mL        CAPILLARY BLOOD GLUCOSE          PHYSICAL EXAM:  GENERAL: NAD, well-groomed, well-developed  HEAD:  Atraumatic, Normocephalic  EYES: EOMI, PERRLA, conjunctiva and sclera clear  ENMT: No tonsillar erythema, exudates, or enlargement; Moist mucous membranes, Good dentition  NECK: Supple, No JVD,   NERVOUS SYSTEM: AOX3, motor and sensation grossly intact in b/l UE and b/l LE  CHEST/LUNG: +decreased breath sounds R side. no wheezing  HEART: Regular rate and rhythm; No murmurs, rubs, or gallops. No LE edema  ABDOMEN: Soft, Nontender, Nondistended; Bowel sounds present  EXTREMITIES:  2+ Peripheral Pulses, No clubbing, cyanosis  LYMPH: extensive LAD on left neck  SKIN: No rashes or lesions    LABS:                        7.3    14.80 )-----------( 248      ( 27 Aug 2017 08:52 )             26.3     08-27    136  |  99  |  18  ----------------------------<  128<H>  4.6   |  23  |  0.43<L>    Ca    9.8      27 Aug 2017 08:50  Phos  4.0     08-27  Mg     1.8     08-27      LIVER FUNCTIONS - ( 25 Aug 2017 22:24 )  Alb: 2.6 g/dL / Pro: 6.9 g/dL / ALK PHOS: 244 U/L / ALT: 23 U/L RC / AST: 15 U/L / GGT: x     / T. Bili 1.0 mg/dL / D. Bili x         PT/INR - ( 26 Aug 2017 17:27 )   PT: 17.8 sec;   INR: 1.62 ratio         PTT - ( 26 Aug 2017 17:27 )  PTT:35.1 sec  Urinalysis Basic - ( 26 Aug 2017 08:52 )    Color: Yellow / Appearance: Clear / SG: =1.051 / pH: x  Gluc: x / Ketone: Negative  / Bili: Negative / Urobili: 1.0   Blood: x / Protein: Negative / Nitrite: Negative   Leuk Esterase: Negative / RBC: x / WBC x   Sq Epi: x / Non Sq Epi: x / Bacteria: x            Urinalysis Basic - ( 26 Aug 2017 08:52 )    Color: Yellow / Appearance: Clear / SG: =1.051 / pH: x  Gluc: x / Ketone: Negative  / Bili: Negative / Urobili: 1.0   Blood: x / Protein: Negative / Nitrite: Negative   Leuk Esterase: Negative / RBC: x / WBC x   Sq Epi: x / Non Sq Epi: x / Bacteria: x        Imaging Personally Reviewed:  [ ] YES  [ ] NO    Consultant(s) Notes Reviewed:  [X ] YES  [ ] NO    Care Discussed with Consultants/Other Providers [ ] YES  [ ] NO    CONTACT INFO  He Venus HART, PGY-1  Pager: NS- [phone], [facility]- [phone]  Spectra: [phone]    Mon-Fri: pager covered by day team 7am-7pm;   ***Academic conferences M-F 8am-9am & 12pm-1pm- page ONLY if URGENT or if Consultant  /Cheryl: see chart, primary physician assigned available 7am-12pm  Sat/[facility] Coverage 12pm-7pm: NS- page [phone] for Team1-4, [facility]- pager forwarded to covering Resident  For Night coverage 7pm-7am: NS- page [phone] Team1-3, page [phone] Team4 & Care Model

## 2017-08-30 ENCOUNTER — INPATIENT (INPATIENT)
Facility: HOSPITAL | Age: 42
LOS: 4 days | Discharge: HOME | DRG: 841 | End: 2017-09-04
Attending: HOSPITALIST | Admitting: STUDENT IN AN ORGANIZED HEALTH CARE EDUCATION/TRAINING PROGRAM
Payer: COMMERCIAL

## 2017-08-30 VITALS
TEMPERATURE: 98 F | RESPIRATION RATE: 20 BRPM | SYSTOLIC BLOOD PRESSURE: 100 MMHG | OXYGEN SATURATION: 97 % | HEART RATE: 124 BPM | DIASTOLIC BLOOD PRESSURE: 63 MMHG

## 2017-08-30 DIAGNOSIS — I31.3 PERICARDIAL EFFUSION (NONINFLAMMATORY): ICD-10-CM

## 2017-08-30 DIAGNOSIS — I87.1 COMPRESSION OF VEIN: ICD-10-CM

## 2017-08-30 DIAGNOSIS — Z98.890 OTHER SPECIFIED POSTPROCEDURAL STATES: Chronic | ICD-10-CM

## 2017-08-30 DIAGNOSIS — D64.9 ANEMIA, UNSPECIFIED: ICD-10-CM

## 2017-08-30 DIAGNOSIS — R06.02 SHORTNESS OF BREATH: ICD-10-CM

## 2017-08-30 DIAGNOSIS — A41.9 SEPSIS, UNSPECIFIED ORGANISM: ICD-10-CM

## 2017-08-30 DIAGNOSIS — C81.90 HODGKIN LYMPHOMA, UNSPECIFIED, UNSPECIFIED SITE: ICD-10-CM

## 2017-08-30 DIAGNOSIS — Z51.5 ENCOUNTER FOR PALLIATIVE CARE: ICD-10-CM

## 2017-08-30 DIAGNOSIS — J90 PLEURAL EFFUSION, NOT ELSEWHERE CLASSIFIED: ICD-10-CM

## 2017-08-30 DIAGNOSIS — Z29.9 ENCOUNTER FOR PROPHYLACTIC MEASURES, UNSPECIFIED: ICD-10-CM

## 2017-08-30 DIAGNOSIS — Z98.82 BREAST IMPLANT STATUS: Chronic | ICD-10-CM

## 2017-08-30 LAB
ALBUMIN SERPL ELPH-MCNC: 2.4 G/DL — LOW (ref 3.3–5)
ALBUMIN SERPL ELPH-MCNC: 2.4 G/DL — LOW (ref 3.3–5)
ALP SERPL-CCNC: 256 U/L — HIGH (ref 40–120)
ALP SERPL-CCNC: 294 U/L — HIGH (ref 40–120)
ALT FLD-CCNC: 23 U/L RC — SIGNIFICANT CHANGE UP (ref 10–45)
ALT FLD-CCNC: 28 U/L RC — SIGNIFICANT CHANGE UP (ref 10–45)
ANION GAP SERPL CALC-SCNC: 13 MMOL/L — SIGNIFICANT CHANGE UP (ref 5–17)
ANION GAP SERPL CALC-SCNC: 14 MMOL/L — SIGNIFICANT CHANGE UP (ref 5–17)
APPEARANCE UR: CLEAR — SIGNIFICANT CHANGE UP
APTT BLD: 33.2 SEC — SIGNIFICANT CHANGE UP (ref 27.5–37.4)
AST SERPL-CCNC: 16 U/L — SIGNIFICANT CHANGE UP (ref 10–40)
AST SERPL-CCNC: 35 U/L — SIGNIFICANT CHANGE UP (ref 10–40)
BASOPHILS # BLD AUTO: 0 K/UL — SIGNIFICANT CHANGE UP (ref 0–0.2)
BILIRUB SERPL-MCNC: 1 MG/DL — SIGNIFICANT CHANGE UP (ref 0.2–1.2)
BILIRUB SERPL-MCNC: 1.2 MG/DL — SIGNIFICANT CHANGE UP (ref 0.2–1.2)
BILIRUB UR-MCNC: NEGATIVE — SIGNIFICANT CHANGE UP
BLD GP AB SCN SERPL QL: NEGATIVE — SIGNIFICANT CHANGE UP
BUN SERPL-MCNC: 20 MG/DL — SIGNIFICANT CHANGE UP (ref 7–23)
BUN SERPL-MCNC: 20 MG/DL — SIGNIFICANT CHANGE UP (ref 7–23)
CALCIUM SERPL-MCNC: 9.2 MG/DL — SIGNIFICANT CHANGE UP (ref 8.4–10.5)
CALCIUM SERPL-MCNC: 9.5 MG/DL — SIGNIFICANT CHANGE UP (ref 8.4–10.5)
CHLORIDE SERPL-SCNC: 97 MMOL/L — SIGNIFICANT CHANGE UP (ref 96–108)
CHLORIDE SERPL-SCNC: 99 MMOL/L — SIGNIFICANT CHANGE UP (ref 96–108)
CO2 SERPL-SCNC: 25 MMOL/L — SIGNIFICANT CHANGE UP (ref 22–31)
CO2 SERPL-SCNC: 26 MMOL/L — SIGNIFICANT CHANGE UP (ref 22–31)
COLOR SPEC: YELLOW — SIGNIFICANT CHANGE UP
COMMENT - URINE: SIGNIFICANT CHANGE UP
CREAT SERPL-MCNC: 0.4 MG/DL — LOW (ref 0.5–1.3)
CREAT SERPL-MCNC: 0.48 MG/DL — LOW (ref 0.5–1.3)
DIFF PNL FLD: NEGATIVE — SIGNIFICANT CHANGE UP
EOSINOPHIL # BLD AUTO: 0.3 K/UL — SIGNIFICANT CHANGE UP (ref 0–0.5)
GAS PNL BLDV: SIGNIFICANT CHANGE UP
GLUCOSE SERPL-MCNC: 174 MG/DL — HIGH (ref 70–99)
GLUCOSE SERPL-MCNC: 97 MG/DL — SIGNIFICANT CHANGE UP (ref 70–99)
GLUCOSE UR QL: NEGATIVE — SIGNIFICANT CHANGE UP
HCT VFR BLD CALC: 22 % — LOW (ref 34.5–45)
HCT VFR BLD CALC: 32.8 % — LOW (ref 34.5–45)
HGB BLD-MCNC: 10 G/DL — LOW (ref 11.5–15.5)
HGB BLD-MCNC: 6.6 G/DL — CRITICAL LOW (ref 11.5–15.5)
INR BLD: 1.52 RATIO — HIGH (ref 0.88–1.16)
KETONES UR-MCNC: NEGATIVE — SIGNIFICANT CHANGE UP
LEUKOCYTE ESTERASE UR-ACNC: NEGATIVE — SIGNIFICANT CHANGE UP
LYMPHOCYTES # BLD AUTO: 0.7 K/UL — LOW (ref 1–3.3)
LYMPHOCYTES # BLD AUTO: 4 % — LOW (ref 13–44)
MAGNESIUM SERPL-MCNC: 2.1 MG/DL — SIGNIFICANT CHANGE UP (ref 1.6–2.6)
MCHC RBC-ENTMCNC: 26.3 PG — LOW (ref 27–34)
MCHC RBC-ENTMCNC: 27.1 PG — SIGNIFICANT CHANGE UP (ref 27–34)
MCHC RBC-ENTMCNC: 30.1 GM/DL — LOW (ref 32–36)
MCHC RBC-ENTMCNC: 30.5 GM/DL — LOW (ref 32–36)
MCV RBC AUTO: 87.3 FL — SIGNIFICANT CHANGE UP (ref 80–100)
MCV RBC AUTO: 88.9 FL — SIGNIFICANT CHANGE UP (ref 80–100)
MONOCYTES # BLD AUTO: 0.4 K/UL — SIGNIFICANT CHANGE UP (ref 0–0.9)
MONOCYTES NFR BLD AUTO: 4 % — SIGNIFICANT CHANGE UP (ref 2–14)
NEUTROPHILS # BLD AUTO: 11.3 K/UL — HIGH (ref 1.8–7.4)
NEUTROPHILS NFR BLD AUTO: 86 % — HIGH (ref 43–77)
NITRITE UR-MCNC: NEGATIVE — SIGNIFICANT CHANGE UP
PH UR: 5.5 — SIGNIFICANT CHANGE UP (ref 5–8)
PHOSPHATE SERPL-MCNC: 5.5 MG/DL — HIGH (ref 2.5–4.5)
PLATELET # BLD AUTO: 153 K/UL — SIGNIFICANT CHANGE UP (ref 150–400)
PLATELET # BLD AUTO: 188 K/UL — SIGNIFICANT CHANGE UP (ref 150–400)
POTASSIUM SERPL-MCNC: 3.7 MMOL/L — SIGNIFICANT CHANGE UP (ref 3.5–5.3)
POTASSIUM SERPL-MCNC: 4.4 MMOL/L — SIGNIFICANT CHANGE UP (ref 3.5–5.3)
POTASSIUM SERPL-SCNC: 3.7 MMOL/L — SIGNIFICANT CHANGE UP (ref 3.5–5.3)
POTASSIUM SERPL-SCNC: 4.4 MMOL/L — SIGNIFICANT CHANGE UP (ref 3.5–5.3)
PROT SERPL-MCNC: 6.6 G/DL — SIGNIFICANT CHANGE UP (ref 6–8.3)
PROT SERPL-MCNC: 6.8 G/DL — SIGNIFICANT CHANGE UP (ref 6–8.3)
PROT UR-MCNC: NEGATIVE — SIGNIFICANT CHANGE UP
PROTHROM AB SERPL-ACNC: 16.6 SEC — HIGH (ref 9.8–12.7)
RBC # BLD: 2.52 M/UL — LOW (ref 3.8–5.2)
RBC # BLD: 3.69 M/UL — LOW (ref 3.8–5.2)
RBC # FLD: 20.5 % — HIGH (ref 10.3–14.5)
RBC # FLD: 22.8 % — HIGH (ref 10.3–14.5)
RBC CASTS # UR COMP ASSIST: SIGNIFICANT CHANGE UP /HPF (ref 0–2)
RH IG SCN BLD-IMP: POSITIVE — SIGNIFICANT CHANGE UP
SODIUM SERPL-SCNC: 137 MMOL/L — SIGNIFICANT CHANGE UP (ref 135–145)
SODIUM SERPL-SCNC: 137 MMOL/L — SIGNIFICANT CHANGE UP (ref 135–145)
SP GR SPEC: 1.01 — SIGNIFICANT CHANGE UP (ref 1.01–1.02)
UROBILINOGEN FLD QL: NEGATIVE — SIGNIFICANT CHANGE UP
WBC # BLD: 12.7 K/UL — HIGH (ref 3.8–10.5)
WBC # BLD: 14.4 K/UL — HIGH (ref 3.8–10.5)
WBC # FLD AUTO: 12.7 K/UL — HIGH (ref 3.8–10.5)
WBC # FLD AUTO: 14.4 K/UL — HIGH (ref 3.8–10.5)
WBC UR QL: SIGNIFICANT CHANGE UP /HPF (ref 0–5)

## 2017-08-30 PROCEDURE — 12345: CPT | Mod: GC,NC

## 2017-08-30 PROCEDURE — 99233 SBSQ HOSP IP/OBS HIGH 50: CPT | Mod: GC

## 2017-08-30 PROCEDURE — 99223 1ST HOSP IP/OBS HIGH 75: CPT

## 2017-08-30 PROCEDURE — 71010: CPT | Mod: 26

## 2017-08-30 PROCEDURE — 99223 1ST HOSP IP/OBS HIGH 75: CPT | Mod: AI,GC

## 2017-08-30 PROCEDURE — 99285 EMERGENCY DEPT VISIT HI MDM: CPT | Mod: 25

## 2017-08-30 PROCEDURE — 93306 TTE W/DOPPLER COMPLETE: CPT | Mod: 26

## 2017-08-30 PROCEDURE — 99223 1ST HOSP IP/OBS HIGH 75: CPT | Mod: GC

## 2017-08-30 RX ORDER — FUROSEMIDE 40 MG
40 TABLET ORAL ONCE
Qty: 0 | Refills: 0 | Status: COMPLETED | OUTPATIENT
Start: 2017-08-30 | End: 2017-08-30

## 2017-08-30 RX ORDER — VANCOMYCIN HCL 1 G
1000 VIAL (EA) INTRAVENOUS ONCE
Qty: 0 | Refills: 0 | Status: COMPLETED | OUTPATIENT
Start: 2017-08-30 | End: 2017-08-30

## 2017-08-30 RX ORDER — ACETAMINOPHEN 500 MG
975 TABLET ORAL ONCE
Qty: 0 | Refills: 0 | Status: COMPLETED | OUTPATIENT
Start: 2017-08-30 | End: 2017-08-30

## 2017-08-30 RX ORDER — CEFEPIME 1 G/1
2000 INJECTION, POWDER, FOR SOLUTION INTRAMUSCULAR; INTRAVENOUS EVERY 8 HOURS
Qty: 0 | Refills: 0 | Status: DISCONTINUED | OUTPATIENT
Start: 2017-08-30 | End: 2017-09-02

## 2017-08-30 RX ORDER — SODIUM CHLORIDE 9 MG/ML
500 INJECTION INTRAMUSCULAR; INTRAVENOUS; SUBCUTANEOUS ONCE
Qty: 0 | Refills: 0 | Status: COMPLETED | OUTPATIENT
Start: 2017-08-30 | End: 2017-08-30

## 2017-08-30 RX ORDER — ERGOCALCIFEROL 1.25 MG/1
50000 CAPSULE ORAL
Qty: 0 | Refills: 0 | Status: DISCONTINUED | OUTPATIENT
Start: 2017-08-30 | End: 2017-09-04

## 2017-08-30 RX ORDER — VANCOMYCIN HCL 1 G
1000 VIAL (EA) INTRAVENOUS EVERY 12 HOURS
Qty: 0 | Refills: 0 | Status: DISCONTINUED | OUTPATIENT
Start: 2017-08-30 | End: 2017-08-31

## 2017-08-30 RX ORDER — DEXAMETHASONE 0.5 MG/5ML
4 ELIXIR ORAL DAILY
Qty: 0 | Refills: 0 | Status: DISCONTINUED | OUTPATIENT
Start: 2017-08-30 | End: 2017-08-30

## 2017-08-30 RX ORDER — FERROUS SULFATE 325(65) MG
325 TABLET ORAL
Qty: 0 | Refills: 0 | Status: DISCONTINUED | OUTPATIENT
Start: 2017-08-30 | End: 2017-09-04

## 2017-08-30 RX ORDER — SENNA PLUS 8.6 MG/1
2 TABLET ORAL AT BEDTIME
Qty: 0 | Refills: 0 | Status: DISCONTINUED | OUTPATIENT
Start: 2017-08-30 | End: 2017-09-04

## 2017-08-30 RX ORDER — PANTOPRAZOLE SODIUM 20 MG/1
1 TABLET, DELAYED RELEASE ORAL
Qty: 0 | Refills: 0 | COMMUNITY

## 2017-08-30 RX ORDER — DOCUSATE SODIUM 100 MG
100 CAPSULE ORAL DAILY
Qty: 0 | Refills: 0 | Status: DISCONTINUED | OUTPATIENT
Start: 2017-08-30 | End: 2017-09-04

## 2017-08-30 RX ORDER — CEFEPIME 1 G/1
2000 INJECTION, POWDER, FOR SOLUTION INTRAMUSCULAR; INTRAVENOUS ONCE
Qty: 0 | Refills: 0 | Status: COMPLETED | OUTPATIENT
Start: 2017-08-30 | End: 2017-08-30

## 2017-08-30 RX ORDER — SODIUM CHLORIDE 9 MG/ML
1000 INJECTION INTRAMUSCULAR; INTRAVENOUS; SUBCUTANEOUS ONCE
Qty: 0 | Refills: 0 | Status: DISCONTINUED | OUTPATIENT
Start: 2017-08-30 | End: 2017-08-30

## 2017-08-30 RX ORDER — DEXAMETHASONE 0.5 MG/5ML
4 ELIXIR ORAL EVERY 6 HOURS
Qty: 0 | Refills: 0 | Status: DISCONTINUED | OUTPATIENT
Start: 2017-08-30 | End: 2017-09-04

## 2017-08-30 RX ADMIN — Medication 40 MILLIGRAM(S): at 06:27

## 2017-08-30 RX ADMIN — Medication 4 MILLIGRAM(S): at 12:49

## 2017-08-30 RX ADMIN — ERGOCALCIFEROL 50000 UNIT(S): 1.25 CAPSULE ORAL at 12:49

## 2017-08-30 RX ADMIN — Medication 250 MILLIGRAM(S): at 17:48

## 2017-08-30 RX ADMIN — Medication 325 MILLIGRAM(S): at 17:48

## 2017-08-30 RX ADMIN — SODIUM CHLORIDE 500 MILLILITER(S): 9 INJECTION INTRAMUSCULAR; INTRAVENOUS; SUBCUTANEOUS at 02:02

## 2017-08-30 RX ADMIN — CEFEPIME 100 MILLIGRAM(S): 1 INJECTION, POWDER, FOR SOLUTION INTRAMUSCULAR; INTRAVENOUS at 17:41

## 2017-08-30 RX ADMIN — CEFEPIME 100 MILLIGRAM(S): 1 INJECTION, POWDER, FOR SOLUTION INTRAMUSCULAR; INTRAVENOUS at 21:50

## 2017-08-30 RX ADMIN — Medication 250 MILLIGRAM(S): at 02:29

## 2017-08-30 RX ADMIN — Medication 975 MILLIGRAM(S): at 03:30

## 2017-08-30 RX ADMIN — CEFEPIME 100 MILLIGRAM(S): 1 INJECTION, POWDER, FOR SOLUTION INTRAMUSCULAR; INTRAVENOUS at 02:02

## 2017-08-30 RX ADMIN — Medication 4 MILLIGRAM(S): at 17:48

## 2017-08-30 RX ADMIN — Medication 325 MILLIGRAM(S): at 08:58

## 2017-08-30 NOTE — CONSULT NOTE ADULT - SUBJECTIVE AND OBJECTIVE BOX
CHIEF COMPLAINT: SOB    HPI:  43 yo F. PMH Hodgkin's Lymphoma (Dx 2017 w/ L axillary LN biopsy, treatment naive), complicated by SVC syndrome, pericardial effusion, pleural effusion, presents with shortness of breath in the setting of anemia. Patient has been refusing treatment for her lymphoma, has 2 recent admissions which resulted in her leaving AMA because she did not want treatment.  She was recently admitted  -  for shortness of breath.  She was found to have SVC syndrome 2/2 mediastinal mass impinging bronchotracheal tree, found to have extensive retroperitoneal LAD on imaging.  She was also found to have pericardial effusion w/ evidence of tamponade, however she refused pericardial window offered by cardiology.  She was also found to have a right pleural effusion, likely malignant however refused pleurex catheter/thoracentesis.  She left AMA , despite several physicians speaking with her telling her she could potentially die if leaving the hospital.  Pt signed out AMA prior to labs returning, her hemoglobin was 5.4.  Per discharge note she was called to come back to ED for blood transfusion.  Patient came back today as she had worsening shortness of breath. No sick contacts.  No recent travel.  Has had cough for past few weeks.    Upon speaking with the patient, she is feeling much better since receiving a unit of blood. At this time she may be agreeable to receiving immunotherapy for her lymphoma. And she would be agreeable to thoracentesis if appropriate. +intermittent fevers. States that facial swelling is about the same as it was. Otherwise no other complaints.      PAST MEDICAL & SURGICAL HISTORY:  Hodgkin lymphoma  H/O abdominoplasty:   H/O bilateral breast implants:       FAMILY HISTORY:  Family history of cervical cancer (Sibling)  Family history of lung cancer (Father)      SOCIAL HISTORY:  Smoking: Never  Substance Use: Denied  EtOH Use: Denied  Marital Status: Lives with daughter  Occupation:   Recent Travel: none  Country of Birth: [facility]  Advance Directives: Full Code    Allergies:  No Known Allergies      HOME MEDICATIONS: reviewed      REVIEW OF SYSTEMS:  Constitutional: [+ fevers [ -] chills [ ] weight loss [ ] weight gain  HEENT: [ ] dry eyes [ ] eye irritation [ ] postnasal drip [ ] nasal congestion  CV: [ -] chest pain [ ] orthopnea [ +] palpitations [ ] murmur  Resp: [+ ] cough [+ ] shortness of breath [+ ] dyspnea [ ] wheezing [- ] sputum [= ] hemoptysis  GI: [- ] nausea [- ] vomiting [ ] diarrhea [ ] constipation [ -] abd pain [ ] dysphagia   : [ ] dysuria [ ] nocturia [ ] hematuria [ ] increased urinary frequency  Musculoskeletal: [ ] back pain [ ] myalgias [ ] arthralgias [ ] fracture  Skin: [ -] rash [ ] itch  Neurological: [ ] headache [ ] dizziness [ ] syncope [ ] weakness [ ] numbness  Psychiatric: [ ] anxiety [ ] depression  Endocrine: [ ] diabetes [ ] thyroid problem  Hematologic/Lymphatic: [+ ] anemia [ ] bleeding problem  Allergic/Immunologic: [ ] itchy eyes [ ] nasal discharge [ ] hives [ ] angioedema  [ x] All other systems negative  [ ] Unable to assess ROS because ________    OBJECTIVE:  ICU Vital Signs Last 24 Hrs  T(C): 36.4 (30 Aug 2017 14:54), Max: 39 (30 Aug 2017 01:09)  T(F): 97.6 (30 Aug 2017 14:54), Max: 102.2 (30 Aug 2017 01:09)  HR: 102 (30 Aug 2017 13:19) (71 - 124)  BP: 92/60 (30 Aug 2017 13:19) (87/53 - 108/70)  BP(mean): --  ABP: --  ABP(mean): --  RR: 20 (30 Aug 2017 13:19) (20 - 22)  SpO2: 97% (30 Aug 2017 13:19) (96% - 99%)        CAPILLARY BLOOD GLUCOSE          PHYSICAL EXAM:  General: NAD  HEENT: MMM, facial swelling   Lymph Nodes: visible and palpable LAD in cervical and supraclavicular area  Neck: supple  Respiratory: decreased BS b/l bases   Cardiovascular: s1s2 tachycardic   Abdomen: soft, NT, ND  Extremities: warm, +2 pitting edema  Skin: intact  Neurological: no focal deficits  Psychiatry: AAOx3    HOSPITAL MEDICATIONS:  MEDICATIONS  (STANDING):  ferrous    sulfate 325 milliGRAM(s) Oral two times a day with meals  ergocalciferol 22587 Unit(s) Oral every week  dexamethasone     Tablet 4 milliGRAM(s) Oral every 6 hours  cefepime  IVPB 2000 milliGRAM(s) IV Intermittent every 8 hours  vancomycin  IVPB 1000 milliGRAM(s) IV Intermittent every 12 hours    MEDICATIONS  (PRN):      LABS:                        10.0   14.4  )-----------( 153      ( 30 Aug 2017 11:00 )             32.8     Hgb Trend: 10.0<--, 6.6<--, 5.4<--, 7.3<--, 7.6<--  0830    137  |  97  |  20  ----------------------------<  174<H>  3.7   |  26  |  0.48<L>    Ca    9.5      30 Aug 2017 11:00  Phos  5.5       Mg     2.1         TPro  6.8  /  Alb  2.4<L>  /  TBili  1.2  /  DBili  x   /  AST  16  /  ALT  23  /  AlkPhos  256<H>      Creatinine Trend: 0.48<--, 0.40<--, 0.35<--, 0.43<--, 0.52<--  PT/INR - ( 30 Aug 2017 01:13 )   PT: 16.6 sec;   INR: 1.52 ratio         PTT - ( 30 Aug 2017 01:13 )  PTT:33.2 sec  Urinalysis Basic - ( 30 Aug 2017 02:16 )    Color: Yellow / Appearance: Clear / S.015 / pH: x  Gluc: x / Ketone: Negative  / Bili: Negative / Urobili: Negative   Blood: x / Protein: Negative / Nitrite: Negative   Leuk Esterase: Negative / RBC: 0-2 /HPF / WBC 0-2 /HPF   Sq Epi: x / Non Sq Epi: x / Bacteria: x        Venous Blood Gas:   @ 01:13  7.48/38/54/28/85  VBG Lactate: 1.8      MICROBIOLOGY:     RADIOLOGY:  [x ] Reviewed and interpreted by me  CXR: right pleural effusion, cardiomegaly CHIEF COMPLAINT: Called by MD to return to hospital for ANEMIA and SOB    HPI:  41 yo F. PMH Hodgkin's Lymphoma (Dx 2017 w/ L axillary LN biopsy, treatment naive), complicated by SVC syndrome, pericardial effusion, pleural effusion, presents with shortness of breath in the setting of anemia. Patient has been refusing treatment for her lymphoma, has 2 recent admissions which resulted in her leaving AMA because she did not want treatment.  She was recently admitted  -  for shortness of breath.  She was found to have SVC syndrome 2/2 mediastinal mass impinging bronchotracheal tree, found to have extensive retroperitoneal LAD on imaging.  She was also found to have pericardial effusion w/ evidence of tamponade, however she refused pericardial window offered by cardiology.  She was also found to have a right pleural effusion, likely malignant however refused pleurex catheter/thoracentesis.  She left AMA , despite several physicians speaking with her telling her she could potentially die if leaving the hospital.  Pt signed out AMA prior to labs returning, her hemoglobin was 5.4.  Per discharge note she was called to come back to ED for blood transfusion.  Patient came back today as she had worsening shortness of breath. No sick contacts.  No recent travel.  Has had cough for past few weeks.    Upon speaking with the patient, she is feeling much better since receiving a unit of blood. At this time she may be agreeable to receiving immunotherapy for her lymphoma. She does not want to receive chemotherapy. However, she would be agreeable to thoracentesis if appropriate. +intermittent fevers. States that facial swelling is about the same as it was. Otherwise no other complaints.      PAST MEDICAL & SURGICAL HISTORY:  Hodgkin lymphoma  H/O abdominoplasty:   H/O bilateral breast implants:       FAMILY HISTORY:  Family history of cervical cancer (Sibling)  Family history of lung cancer (Father)      SOCIAL HISTORY:  Smoking: Never  Substance Use: Denied  EtOH Use: Denied  Marital Status: Lives with daughter  Occupation:   Recent Travel: none  Country of Birth: [facility]  Advance Directives: Full Code    Allergies:  No Known Allergies      HOME MEDICATIONS: reviewed      REVIEW OF SYSTEMS:  Constitutional: [+ fevers [ -] chills [ ] weight loss [ ] weight gain  HEENT: [ ] dry eyes [ ] eye irritation [ ] postnasal drip [ ] nasal congestion  CV: [ -] chest pain [ ] orthopnea [ +] palpitations [ ] murmur  Resp: [+ ] cough [+ ] shortness of breath [+ ] dyspnea [ ] wheezing [- ] sputum [= ] hemoptysis  GI: [- ] nausea [- ] vomiting [ ] diarrhea [ ] constipation [ -] abd pain [ ] dysphagia   : [ ] dysuria [ ] nocturia [ ] hematuria [ ] increased urinary frequency  Musculoskeletal: [ ] back pain [ ] myalgias [ ] arthralgias [ ] fracture  Skin: [ -] rash [ ] itch  Neurological: [ ] headache [ ] dizziness [ ] syncope [ ] weakness [ ] numbness  Psychiatric: [ ] anxiety [ ] depression  Endocrine: [ ] diabetes [ ] thyroid problem  Hematologic/Lymphatic: [+ ] anemia [ ] bleeding problem  Allergic/Immunologic: [ ] itchy eyes [ ] nasal discharge [ ] hives [ ] angioedema  [ x] All other systems negative  [ ] Unable to assess ROS because ________    OBJECTIVE:  ICU Vital Signs Last 24 Hrs  T(C): 36.4 (30 Aug 2017 14:54), Max: 39 (30 Aug 2017 01:09)  T(F): 97.6 (30 Aug 2017 14:54), Max: 102.2 (30 Aug 2017 01:09)  HR: 102 (30 Aug 2017 13:19) (71 - 124)  BP: 92/60 (30 Aug 2017 13:19) (87/53 - 108/70)  BP(mean): --  ABP: --  ABP(mean): --  RR: 20 (30 Aug 2017 13:19) (20 - 22)  SpO2: 97% (30 Aug 2017 13:19) (96% - 99%)        CAPILLARY BLOOD GLUCOSE          PHYSICAL EXAM:  General: NAD  Eyes: Anicteric, EOMI  HEENT: MMM, facial swelling   Lymph Nodes: visible and palpable LAD in cervical and supraclavicular area  Neck: supple  Respiratory: decreased BS b/l bases  Cardiovascular: s1s2 tachycardic   Abdomen: soft, NT, ND  Extremities: warm, +2 pitting edema  Skin: intact  Neurological: no focal deficits  Psychiatry: AAOx3    HOSPITAL MEDICATIONS:  MEDICATIONS  (STANDING):  ferrous    sulfate 325 milliGRAM(s) Oral two times a day with meals  ergocalciferol 00879 Unit(s) Oral every week  dexamethasone     Tablet 4 milliGRAM(s) Oral every 6 hours  cefepime  IVPB 2000 milliGRAM(s) IV Intermittent every 8 hours  vancomycin  IVPB 1000 milliGRAM(s) IV Intermittent every 12 hours    MEDICATIONS  (PRN):      LABS:                        10.0   14.4  )-----------( 153      ( 30 Aug 2017 11:00 )             32.8     Hgb Trend: 10.0<--, 6.6<--, 5.4<--, 7.3<--, 7.6<--  30    137  |  97  |  20  ----------------------------<  174<H>  3.7   |  26  |  0.48<L>    Ca    9.5      30 Aug 2017 11:00  Phos  5.5       Mg     2.1         TPro  6.8  /  Alb  2.4<L>  /  TBili  1.2  /  DBili  x   /  AST  16  /  ALT  23  /  AlkPhos  256<H>      Creatinine Trend: 0.48<--, 0.40<--, 0.35<--, 0.43<--, 0.52<--  PT/INR - ( 30 Aug 2017 01:13 )   PT: 16.6 sec;   INR: 1.52 ratio         PTT - ( 30 Aug 2017 01:13 )  PTT:33.2 sec  Urinalysis Basic - ( 30 Aug 2017 02:16 )    Color: Yellow / Appearance: Clear / S.015 / pH: x  Gluc: x / Ketone: Negative  / Bili: Negative / Urobili: Negative   Blood: x / Protein: Negative / Nitrite: Negative   Leuk Esterase: Negative / RBC: 0-2 /HPF / WBC 0-2 /HPF   Sq Epi: x / Non Sq Epi: x / Bacteria: x        Venous Blood Gas:   @ 01:13  7.48/38/54/28/85  VBG Lactate: 1.8      MICROBIOLOGY:     RADIOLOGY:  [x ] Reviewed and interpreted by me  CXR: right pleural effusion, cardiomegaly

## 2017-08-30 NOTE — PROGRESS NOTE ADULT - PROBLEM SELECTOR PLAN 4
Await Psych eval.  If patient continues to refuse conventional therapies and is deemed to have the capacity to make these choices , we will initiate discussions surrounding goals of care, advance directives and possible home with hospice.

## 2017-08-30 NOTE — CONSULT NOTE ADULT - ASSESSMENT
42F PMH recently diagnosed Hodgkin's lymphoma, lymphocyte depleted with bulky disease presents with progressive disease, needing treatment.

## 2017-08-30 NOTE — H&P ADULT - ASSESSMENT
42F PMH Hodgkin's Lymphoma (Dx 7/2017 w/ L axillary LN biopsy, treatment naive), complicated by SVC syndrome, pericardial effusion, pleural effusion, presents with shortness of breath, likely multifactorial given anemia, pleural effusion, pericardial effusion, SVC syndrome, lymphoma w/ extensive mediastinal lymphadenopathy.

## 2017-08-30 NOTE — H&P ADULT - NSHPREVIEWOFSYSTEMS_GEN_ALL_CORE
REVIEW OF SYSTEMS:  CONSTITUTIONAL: No fever, weight loss, or fatigue  EYES: No eye pain, visual disturbances, or discharge  ENMT:  No difficulty hearing, tinnitus, vertigo; No sinus or throat pain  RESPIRATORY: No cough, wheezing, chills or hemoptysis; No shortness of breath  CARDIOVASCULAR: No chest pain, palpitations, dizziness, or leg swelling  GASTROINTESTINAL: No abdominal or epigastric pain. No nausea, vomiting, or hematemesis; No diarrhea or constipation. No melena or hematochezia.  GENITOURINARY: No dysuria, frequency, hematuria, or incontinence  NEUROLOGICAL: No headaches, loss of strength, numbness, or tremors  SKIN: No itching, burning, rashes, or lesions   LYMPH NODES: No enlarged glands  ENDOCRINE: No heat or cold intolerance; No polydipsia or polyuria  MUSCULOSKELETAL: No joint pain or swelling;   PSYCHIATRIC: Denies depression, anxiety  HEME/LYMPH: No easy bruising, or bleeding gums  ALLERGY AND IMMUNOLOGIC: No hives or eczema REVIEW OF SYSTEMS:  CONSTITUTIONAL: +fever, +generalized weakness   ENMT: no sinus/ throat pain  RESPIRATORY: +cough (chronic), +wheezing. +SOB   CARDIOVASCULAR: +palpitations  GASTROINTESTINAL: No abdominal or epigastric pain. No nausea, vomiting, or hematemesis; No diarrhea or constipation. No melena or hematochezia.  GENITOURINARY: No dysuria, frequency, hematuria, or incontinence  NEUROLOGICAL: No headaches, loss of strength, numbness, or tremors  SKIN: No itching, burning, rashes, or lesions   LYMPH NODES: +LAD bilateral axilla, bilateral supraclavicular.  ENDOCRINE: No heat or cold intolerance; No polydipsia or polyuria  MUSCULOSKELETAL: +bilateral lower extremity edema   PSYCHIATRIC: Denies depression, anxiety  HEME/LYMPH: No easy bruising, or bleeding gums

## 2017-08-30 NOTE — PROGRESS NOTE ADULT - PROBLEM SELECTOR PLAN 1
- has symptomatic anemia w/ Hgb 6.6- now up to 10.  - MCV 87- likely 2/2 lymphoma w/ ?bone marrow involvement  - s/p 2 U PRBCs w/ lasix between  - trend CBC  - no signs/symptoms of bleeding

## 2017-08-30 NOTE — H&P ADULT - PROBLEM SELECTOR PLAN 1
- has symptomatic anemia w/ Hgb 6.6  - MCV 87- likely 2/2 lymphoma w/ ?bone marrow involvement  - s/p 2 U PRBCs w/ lasix between  - trend CBC  - no signs/symptoms of bleeding

## 2017-08-30 NOTE — CONSULT NOTE ADULT - PROBLEM SELECTOR RECOMMENDATION 9
- continues to refuse treatment with conventional chemotherapy, as she is concerned about side effects  -Brentuximab-vedotin immunotherapy could be offered in a first line setting, but patient must strictly adhere to treatment protocol. Currently approved in relapsed disease only, making speculation about efficacy and prognosis difficult.  -Recommend palliative and ethics consults to help ellicit patient's beliefs and goals of care.  -She has been counseled repeatedly by the heme/onc team that standard first-line chemotherapy is ABVD or AVD, and is potentially life-saving. She verbalizes understanding about refusing standard of care, which includes significant potential morbidity and even death.  -If an agreement to adhere to immunotherapy treatment can be made, would consider transfer to [address] [place] [address] for initiation of therapy, as patient has rapidly progressive disease with poor prognostic features.  -c/w dexamethasone, which is merely a temporizing measure.  -Agree with PRBC transfusion. Keep Hgb >7g, Plt >10,000.
- pt does not appear to be in respiratory distress at this time   - no acute indication for thoracentesis now  - pt is more agreeable to starting immunotherapy for her lymphoma and may begin as early as tomorrow   - I explained to the pt that she may eventually require a thoracentesis especially as treatment begins but she does not require any interventions now and we will continue to monitor her; she was in agreement with this plan  - no indication for pleurx catheter at this time as we have not shown that this effusion is recurrent; would begin with a thoracentesis if/when indicated and observe if fluid would reaccumulate - this was also explained to the pt and her daughter   - keep sats >92%, encourage ambulation, OOB to chair, incentive spirometer

## 2017-08-30 NOTE — PROGRESS NOTE ADULT - PROBLEM SELECTOR PLAN 4
- worsened R pleural effusion  - pulm consult - had previously offered pleurex but patient refused, however now more amenable R pleural effusion  - pulm consult - had previously offered pleurex but patient refused, however now more amenable; currently with small right pleural effusion on most recent cxray

## 2017-08-30 NOTE — PROGRESS NOTE ADULT - PROBLEM SELECTOR PLAN 3
- patient previously refused treatment  - - onc consult to discuss treatment - patient states she may be interested in immunotherapy  - palliative care consult to assess for goals of care and DNR/DNI decision  - psych consult to evaluate for capacity to refuse treatment  - consider palliative consult to discuss treatment vs hospice options with patient

## 2017-08-30 NOTE — ED ADULT NURSE REASSESSMENT NOTE - NS ED NURSE REASSESS COMMENT FT1
At baseline, patient states BP is low. Patient BP lower to 95/65 At baseline, patient states BP is low. Patient BP lower to 95/65. Repeat BP 98/71. Attending Kenny NGUYỄN notified, MD to bedside to assess patient. Patient states she feels better. Denies dyspnea and sob.    Admitted bed assignment changed. Patient slotted for 5 Arpit. Report called to 5 Arpit Mendez. Vanessa NAVA notified that first unit PRBCs complete. Vanessa NAVA notified 40mg lasix IV and second unit PRBC need to be administered. RN notifed of soft BP.

## 2017-08-30 NOTE — ED PROVIDER NOTE - MEDICAL DECISION MAKING DETAILS
***Attending Papa Cox MD***   42F hx Hodgkin's Lymphoma (Dx 7/2017) treatment naive, complicated by SVC syndrome s/p mediastinal window, pericardial effusion, pleural effusion, presents with shortness of breath.  Recent admissions with patient refusing treatment including pericardial window for effusion.  Patient found to be anemic during most recent visit but signed out AMA.  called at told Hbg <6 and recommended return for transfusion.  pt originally refused but developed worsening sob.  Additionally reports intermittent fevers and worsening cough.  Denies melena/hematochezia.  Tachycardic, borderline hypotensive (however pt maintains she "runs low"), febrile.  No respiratory distress.  likely 2/2 previously dx anemia +/- infectious etiology most likely respiratory in nature.  will obtain labs including cbc, kourtney, coags, type and screen, cultures.  cxr, TTE to eval for pericardial effusion given low-voltage on ekg.  broad spectrum abx likely transfusion.  to be admitted

## 2017-08-30 NOTE — H&P ADULT - PROBLEM SELECTOR PLAN 3
- patient previously refused treatment  - onc consult to discuss treatment - patient states she may be interested in immunotherapy  - consider palliative consult to discuss treatment vs hospice options with patient

## 2017-08-30 NOTE — H&P ADULT - PROBLEM SELECTOR PLAN 4
- worsened R pleural effusion  - pulm consult - had previously offered pleurex but patient refused, however now more amenable

## 2017-08-30 NOTE — H&P ADULT - PROBLEM SELECTOR PLAN 6
- has SVC syndrome from extensive mediastinal lymphadenopathy   - c/w steroids - 4 q 6  - Hodgkins lymphoma treatment per onc

## 2017-08-30 NOTE — ED ADULT NURSE NOTE - OBJECTIVE STATEMENT
42y female c/o SOB. A&Ox3, neuro intact, tachycardiac. Hx of Hodgkin's lymphoma. Denies chemo and radiation. Reports SOB, cough and weakness. Daughter states PMD called on Sunday saying hemoglobin was critically low and she should come to the ED for a transfusion. 42y female c/o SOB. A&Ox3, neuro intact, tachycardiac. Hx of recent dx Hodgkin's lymphoma. Denies chemo and radiation. Reports SOB, cough and weakness. Daughter states PMD called on Sunday saying hemoglobin was critically low and she should come to the ED for a transfusion. Patient presents tachy to 120s, swelling to b/l ankles, crackles in lungs b/l. 42y female c/o SOB. A&Ox3, neuro intact, tachycardiac. Hx of recent dx Hodgkin's lymphoma. Denies chemo and radiation. Reports SOB, cough and weakness. Was recently in hospital but decided to leave without treatment. Daughter states PMD called on Sunday saying hemoglobin was critically low (5.4) and she should come to the ED for a transfusion. Patient denies bleeding. Denies chest pain, n/v/d. States fever 102 at home. Patient presents tachy to 120s, swelling to b/l ankles, crackles in lungs b/l.

## 2017-08-30 NOTE — ED ADULT NURSE REASSESSMENT NOTE - NS ED NURSE REASSESS COMMENT FT1
Antibiotics finished. PO tylenol administered. PRBCs requested from blood bank. Type and screen dictated, blood consent in chart. First unit PRBC initiated. Patient received bed assignment on [address] [place] [address]. 6 Arpit RN aware first unit not completed prior to leaving ED, aware that 40mg IV lasix due after first unit PRBC. 6 Arpit RN aware second unit PRBC needed. VSS

## 2017-08-30 NOTE — PROGRESS NOTE ADULT - SUBJECTIVE AND OBJECTIVE BOX
S: Patient seen and examined at bedside. Pt reprots subjective improvement in SOB and fatigue. No complaints of pain or any other symptomology.          ferrous    sulfate 325 milliGRAM(s) Oral two times a day with meals  ergocalciferol 86431 Unit(s) Oral every week  dexamethasone     Tablet 4 milliGRAM(s) Oral every 6 hours  cefepime  IVPB 2000 milliGRAM(s) IV Intermittent every 8 hours  vancomycin  IVPB 1000 milliGRAM(s) IV Intermittent every 12 hours        REVIEW OF SYSTEMS:  Vital Signs Last 24 Hrs  T(C): 36.5 (30 Aug 2017 05:15), Max: 39 (30 Aug 2017 01:09)  T(F): 97.7 (30 Aug 2017 05:15), Max: 102.2 (30 Aug 2017 01:09)  HR: 102 (30 Aug 2017 13:19) (71 - 124)  BP: 92/60 (30 Aug 2017 13:19) (87/53 - 108/70)  BP(mean): --  RR: 20 (30 Aug 2017 13:19) (20 - 22)  SpO2: 97% (30 Aug 2017 13:19) (96% - 99%)    PHYSICAL EXAM:  General: A/ox 3, No acute Distress  Neck: Supple, NO JVD  Cardiac: S1 S2, No M/R/G  Pulmonary: CTAB, Breathing unlabored, No Rhonchi/Rales/Wheezing  Abdomen: Soft, Non -tender, +BS   Extremities: No Rashes, No edema  Neuro: A/o x 3, No focal deficits  Psch: normal mood , normal affect    LABS:  cret                        10.0   14.4  )-----------( 153      ( 30 Aug 2017 11:00 )             32.8     08-30    137  |  97  |  20  ----------------------------<  174<H>  3.7   |  26  |  0.48<L>    Ca    9.5      30 Aug 2017 11:00  Phos  5.5     08-30  Mg     2.1     08-30    TPro  6.8  /  Alb  2.4<L>  /  TBili  1.2  /  DBili  x   /  AST  16  /  ALT  23  /  AlkPhos  256<H>  08-30    PT/INR - ( 30 Aug 2017 01:13 )   PT: 16.6 sec;   INR: 1.52 ratio         PTT - ( 30 Aug 2017 01:13 )  PTT:33.2 sec  Daily     Daily Weight in k.2 (30 Aug 2017 09:03)  I&O's Detail S: Patient seen and examined at bedside. Pt reports subjective improvement in SOB and fatigue. No complaints of pain or any other symptomology.          ferrous    sulfate 325 milliGRAM(s) Oral two times a day with meals  ergocalciferol 75059 Unit(s) Oral every week  dexamethasone     Tablet 4 milliGRAM(s) Oral every 6 hours  cefepime  IVPB 2000 milliGRAM(s) IV Intermittent every 8 hours  vancomycin  IVPB 1000 milliGRAM(s) IV Intermittent every 12 hours        REVIEW OF SYSTEMS:  Vital Signs Last 24 Hrs  T(C): 36.5 (30 Aug 2017 05:15), Max: 39 (30 Aug 2017 01:09)  T(F): 97.7 (30 Aug 2017 05:15), Max: 102.2 (30 Aug 2017 01:09)  HR: 102 (30 Aug 2017 13:19) (71 - 124)  BP: 92/60 (30 Aug 2017 13:19) (87/53 - 108/70)  BP(mean): --  RR: 20 (30 Aug 2017 13:19) (20 - 22)  SpO2: 97% (30 Aug 2017 13:19) (96% - 99%)    PHYSICAL EXAM:  General: A/ox 3, No acute Distress  Neck: Supple, NO JVD  Cardiac: S1 S2, No M/R/G  Pulmonary: CTAB, Breathing unlabored, No Rhonchi/Rales/Wheezing  Abdomen: Soft, Non -tender, +BS   Extremities: No Rashes, No edema  Neuro: A/o x 3, No focal deficits  Psch: normal mood , normal affect    LABS:  cret                        10.0   14.4  )-----------( 153      ( 30 Aug 2017 11:00 )             32.8     08-30    137  |  97  |  20  ----------------------------<  174<H>  3.7   |  26  |  0.48<L>    Ca    9.5      30 Aug 2017 11:00  Phos  5.5     08-30  Mg     2.1     08-30    TPro  6.8  /  Alb  2.4<L>  /  TBili  1.2  /  DBili  x   /  AST  16  /  ALT  23  /  AlkPhos  256<H>  08-30    PT/INR - ( 30 Aug 2017 01:13 )   PT: 16.6 sec;   INR: 1.52 ratio         PTT - ( 30 Aug 2017 01:13 )  PTT:33.2 sec  Daily     Daily Weight in k.2 (30 Aug 2017 09:03)  I&O's Detail

## 2017-08-30 NOTE — PROGRESS NOTE ADULT - ATTENDING COMMENTS
Hodgkin's lymphoma with extensive cervical, axillary and supraclavular LAD with SVC syndrome admitted with severe anemia.  - Anemia- s/p PRBC transfusions with improvement- no signs of overt GIB  -SOB; currently improved- small right pleural effusion on XRAy; would repeat 2d echo to reeval pericardial effusion, likely malignant in etioogy  Pall Care eval placed- pt has documented refusal of conventional chemotherapy/RT in recent past due to not wanting to endure the side effects of these agents. She has been deemed to have capacity to make medical decisions and has left AMA during her hospitalizations.   pt appears interested in pursuing immunotherapy, which was offered to her. hematology will discuss options again with patient today.         ferrous    sulfate 325 milliGRAM(s) Oral two times a day with meals  ergocalciferol 53846 Unit(s) Oral every week  dexamethasone     Tablet 4 milliGRAM(s) Oral every 6 hours  cefepime  IVPB 2000 milliGRAM(s) IV Intermittent every 8 hours  vancomycin  IVPB 1000 milliGRAM(s) IV Intermittent every 12 hours        REVIEW OF SYSTEMS:  Vital Signs Last 24 Hrs  T(C): 36.5 (30 Aug 2017 05:15), Max: 39 (30 Aug 2017 01:09)  T(F): 97.7 (30 Aug 2017 05:15), Max: 102.2 (30 Aug 2017 01:09)  HR: 102 (30 Aug 2017 13:19) (71 - 124)  BP: 92/60 (30 Aug 2017 13:19) (87/53 - 108/70)  BP(mean): --  RR: 20 (30 Aug 2017 13:19) (20 - 22)  SpO2: 97% (30 Aug 2017 13:19) (96% - 99%)    PHYSICAL EXAM:  General: A/ox 3, No acute Distress  Neck: Supple, NO JVD  Cardiac: S1 S2, No M/R/G  Pulmonary: CTAB, Breathing unlabored, No Rhonchi/Rales/Wheezing  Abdomen: Soft, Non -tender, +BS   Extremities: No Rashes, No edema  Neuro: A/o x 3, No focal deficits  Psch: normal mood , normal affect    LABS:  cret                        10.0   14.4  )-----------( 153      ( 30 Aug 2017 11:00 )             32.8     08-30    137  |  97  |  20  ----------------------------<  174<H>  3.7   |  26  |  0.48<L>    Ca    9.5      30 Aug 2017 11:00  Phos  5.5       Mg     2.1         TPro  6.8  /  Alb  2.4<L>  /  TBili  1.2  /  DBili  x   /  AST  16  /  ALT  23  /  AlkPhos  256<H>      PT/INR - ( 30 Aug 2017 01:13 )   PT: 16.6 sec;   INR: 1.52 ratio         PTT - ( 30 Aug 2017 01:13 )  PTT:33.2 sec  Daily     Daily Weight in k.2 (30 Aug 2017 09:03)  I&O's Detail Hodgkin's lymphoma with extensive cervical, axillary and supraclavular LAD with SVC syndrome admitted with severe anemia.  - Anemia- s/p PRBC transfusions with improvement- no signs of overt GIB  -SOB; currently improved- small right pleural effusion on XRAy; would repeat 2d echo to reeval pericardial effusion, likely malignant in etiology  Pall Care eval placed- pt has documented refusal of conventional chemotherapy/RT in recent past due to not wanting to endure the side effects of these agents. She has been deemed to have capacity to make medical decisions and has left AMA during her hospitalizations.   pt appears interested in pursuing immunotherapy, which was offered to her. hematology will discuss options again with patient today.  Psych also called to evaluate for capacity to participate in medical decision making  If pt agreeable to treatment, will likely need thoracentesis prior to this- will also have cards evaluate given known pericardial effusion          ferrous    sulfate 325 milliGRAM(s) Oral two times a day with meals  ergocalciferol 01783 Unit(s) Oral every week  dexamethasone     Tablet 4 milliGRAM(s) Oral every 6 hours  cefepime  IVPB 2000 milliGRAM(s) IV Intermittent every 8 hours  vancomycin  IVPB 1000 milliGRAM(s) IV Intermittent every 12 hours        REVIEW OF SYSTEMS:  Vital Signs Last 24 Hrs  T(C): 36.5 (30 Aug 2017 05:15), Max: 39 (30 Aug 2017 01:09)  T(F): 97.7 (30 Aug 2017 05:15), Max: 102.2 (30 Aug 2017 01:09)  HR: 102 (30 Aug 2017 13:19) (71 - 124)  BP: 92/60 (30 Aug 2017 13:19) (87/53 - 108/70)  BP(mean): --  RR: 20 (30 Aug 2017 13:19) (20 - 22)  SpO2: 97% (30 Aug 2017 13:19) (96% - 99%)    PHYSICAL EXAM:  General: A/ox 3, No acute Distress  Neck: Supple, NO JVD  Cardiac: S1 S2, No M/R/G  Pulmonary: CTAB, Breathing unlabored, No Rhonchi/Rales/Wheezing  Abdomen: Soft, Non -tender, +BS   Extremities: No Rashes, No edema  Neuro: A/o x 3, No focal deficits  Psch: normal mood , normal affect    LABS:  cret                        10.0   14.4  )-----------( 153      ( 30 Aug 2017 11:00 )             32.8     08    137  |  97  |  20  ----------------------------<  174<H>  3.7   |  26  |  0.48<L>    Ca    9.5      30 Aug 2017 11:00  Phos  5.5       Mg     2.1         TPro  6.8  /  Alb  2.4<L>  /  TBili  1.2  /  DBili  x   /  AST  16  /  ALT  23  /  AlkPhos  256<H>      PT/INR - ( 30 Aug 2017 01:13 )   PT: 16.6 sec;   INR: 1.52 ratio         PTT - ( 30 Aug 2017 01:13 )  PTT:33.2 sec  Daily     Daily Weight in k.2 (30 Aug 2017 09:03)  I&O's Detail Hodgkin's lymphoma with extensive cervical, axillary and supraclavular LAD with SVC syndrome admitted with severe anemia.  - Anemia- s/p PRBC transfusions with improvement- no signs of overt GIB  SVC syndrome- c/w decadron  -SOB; currently improved- small right pleural effusion on XRAy; would repeat 2d echo to reeval pericardial effusion, likely malignant in etiology  Pall Care eval placed- pt has documented refusal of conventional chemotherapy/RT in recent past due to not wanting to endure the side effects of these agents. She has been deemed to have capacity to make medical decisions and has left AMA during her hospitalizations.   pt appears interested in pursuing immunotherapy, which was offered to her. hematology will discuss options again with patient today.  Psych also called to evaluate for capacity to participate in medical decision making  If pt agreeable to treatment, will likely need thoracentesis prior to this- will also have cards evaluate given known pericardial effusion          ferrous    sulfate 325 milliGRAM(s) Oral two times a day with meals  ergocalciferol 10306 Unit(s) Oral every week  dexamethasone     Tablet 4 milliGRAM(s) Oral every 6 hours  cefepime  IVPB 2000 milliGRAM(s) IV Intermittent every 8 hours  vancomycin  IVPB 1000 milliGRAM(s) IV Intermittent every 12 hours        REVIEW OF SYSTEMS:  Vital Signs Last 24 Hrs  T(C): 36.5 (30 Aug 2017 05:15), Max: 39 (30 Aug 2017 01:09)  T(F): 97.7 (30 Aug 2017 05:15), Max: 102.2 (30 Aug 2017 01:09)  HR: 102 (30 Aug 2017 13:19) (71 - 124)  BP: 92/60 (30 Aug 2017 13:19) (87/53 - 108/70)  BP(mean): --  RR: 20 (30 Aug 2017 13:19) (20 - 22)  SpO2: 97% (30 Aug 2017 13:19) (96% - 99%)    PHYSICAL EXAM:  General: A/ox 3, No acute Distress  Neck: Supple, NO JVD  Cardiac: S1 S2, No M/R/G  Pulmonary: CTAB, Breathing unlabored, No Rhonchi/Rales/Wheezing  Abdomen: Soft, Non -tender, +BS   Extremities: No Rashes, No edema  Neuro: A/o x 3, No focal deficits  Psch: normal mood , normal affect    LABS:  cret                        10.0   14.4  )-----------( 153      ( 30 Aug 2017 11:00 )             32.8         137  |  97  |  20  ----------------------------<  174<H>  3.7   |  26  |  0.48<L>    Ca    9.5      30 Aug 2017 11:00  Phos  5.5       Mg     2.1         TPro  6.8  /  Alb  2.4<L>  /  TBili  1.2  /  DBili  x   /  AST  16  /  ALT  23  /  AlkPhos  256<H>      PT/INR - ( 30 Aug 2017 01:13 )   PT: 16.6 sec;   INR: 1.52 ratio         PTT - ( 30 Aug 2017 01:13 )  PTT:33.2 sec  Daily     Daily Weight in k.2 (30 Aug 2017 09:03)  I&O's Detail

## 2017-08-30 NOTE — H&P ADULT - ATTENDING COMMENTS
pt seen and examined by me.  agree with above and d/w R2 extensively, d/w day attending.  42 f with advanced lymphoma - would benefit from palliative care consult for symptom management and clarification of goals of care.  pt feels sob/palpitations have improved s/p prbc txn.

## 2017-08-30 NOTE — H&P ADULT - HISTORY OF PRESENT ILLNESS
42F PMH Hodgkin's Lymphoma (Dx 7/2017 w/ L axillary LN biopsy, treatment naive), complicated by SVC syndrome, pericardial effusion, pleural effusion, presents with shortness of breath.  Patient has been refusing treatment for her lymphoma, has 2 recent admissions which resulted in her leaving AMA because she did not want treatment.  She was recently admitted 8/26 - 8/28 for shortness of breath.  She was found to have SVC syndrome 2/2 mediastinal mass impinging bronchotrachial tree, found to have extensive retroperitoneal LAD on imaging.  She was also found to have pericardial effusion w/ evidence of tamponade, however she refused pericardial window offered by cardiology.  She was also found to have a right pleural effusion, likely malignant however refused pleurex catheter.  She left AMA 8/28, despite several physicians speaking with her telling her she could potentially die if leaving the hospital.  Pt signed out AMA prior to labs returning, her hemoglobin was 5.4.  Per discharge note she was called to come back to ED for blood transfusion.  Patient came back today as she had worsening shortness of breath.    In the ED .2 124 100/63 20 97% 2L NC   Labs notable for WBC 13.5 w/ L shift, Hgb 6.6,   INR 1.52 Cr 0.4  BUN20 Lactate 1.8 UA negative for infection  CXR w/ R pleural effusion worsened from prior 8/25.    In the ED, given vancomycin x 1, cefepime x 1.  1.5L NaCl.  40 IV lasix. 2 U PRBCs. 42F PMH Hodgkin's Lymphoma (Dx 7/2017 w/ L axillary LN biopsy, treatment naive), complicated by SVC syndrome, pericardial effusion, pleural effusion, presents with shortness of breath.  Patient has been refusing treatment for her lymphoma, has 2 recent admissions which resulted in her leaving AMA because she did not want treatment.  She was recently admitted 8/26 - 8/28 for shortness of breath.  She was found to have SVC syndrome 2/2 mediastinal mass impinging bronchotrachial tree, found to have extensive retroperitoneal LAD on imaging.  She was also found to have pericardial effusion w/ evidence of tamponade, however she refused pericardial window offered by cardiology.  She was also found to have a right pleural effusion, likely malignant however refused pleurex catheter.  She left AMA 8/28, despite several physicians speaking with her telling her she could potentially die if leaving the hospital.  Pt signed out AMA prior to labs returning, her hemoglobin was 5.4.  Per discharge note she was called to come back to ED for blood transfusion.  Patient came back today as she had worsening shortness of breath and felt her heart racing.  No fevers/chills at home.  Lives w/ daughter, no sick contacts.  No recent travel.  Has had cough for past few weeks.    In the ED .2 124 100/63 20 97% 2L NC   Labs notable for WBC 13.5 w/ L shift, Hgb 6.6,   INR 1.52 Cr 0.4  BUN20 Lactate 1.8 UA negative for infection  CXR w/ R pleural effusion worsened from prior 8/25.    In the ED, given vancomycin x 1, cefepime x 1.  1.5L NaCl. 2 U PRBCs w/ 40 mg IV lasix between.

## 2017-08-30 NOTE — CONSULT NOTE ADULT - ATTENDING COMMENTS
Agree with above. Patient well known to our service with Hodgkin's Lymphoma who has thus far refused treatment, who has known pleural and pericardial effusions as well as significant adenopathy causing SVC syndrome now presents with SOB and ANEMIA that has improve with transfusion  - HODGKINS LYMPHOMA - followup Hem/Onc regarding possible initiation of immunotherapy. Would still try and discuss with patient and her daughter the possibility of giving first line chemotherapy. I think having these discussions with the patient and her daughter, who is now trying to influence her mother to make treatment decisions, may be worthwhile.   -PLEURAL EFFUSIONS - Right > LEFT - no respiratory distress at rest. These effusions are likely malignant and related to her lymphoma. She has refused pleural interventions in the past when she was much more symptomatic. At this time she is willing to consider thoracentesis. I do believe that she should have an attempted drainage before moving to an indwelling pleural catheter. In either case, at this time she is asymptomatic and is hopeful that treatment of her lymphoma will lead to resolution of her pleural effusion  -PERICARDIAL EFFUSION - cardiology evaluation for discussion of pericardiocentesis vs pericardial window  -DVT prophylaxis  -OOB and ambulation. Maintain O2 sat > 92  -ANEMIA - likely related to her lymphoma. Transfuse as necessary. Serial CBC. No signs of bleeding. Hematology evaluation. Would consider checking hemolysis labs and iron studies if samples from before transfusion are still present.

## 2017-08-30 NOTE — PROGRESS NOTE ADULT - PROBLEM SELECTOR PLAN 1
Dr. Baxter following.  Offering patient PO immunotherapy if patient agrees to compliance.  Not first line therapy but pt continues to refuse standard treatments.  Psych to eval pt for capacity.

## 2017-08-30 NOTE — CONSULT NOTE ADULT - ASSESSMENT
41 yo F. PMH Hodgkin's Lymphoma (Dx 7/2017 w/ L axillary LN biopsy, treatment naive), complicated by SVC syndrome, pericardial effusion, pleural effusion, presents with shortness of breath in the setting of anemia.

## 2017-08-30 NOTE — CONSULT NOTE ADULT - PROBLEM SELECTOR RECOMMENDATION 2
-Small pleural effusion seen on recent CXR. Pt fairly comfortable for now  -Likely a result of progressive LAD and extrinsic compression of vasculature and airways.    Prognosis remains very guarded.    Dav Montoya MD  Hematology/Oncology Fellow  [phone]

## 2017-08-30 NOTE — ED ADULT NURSE REASSESSMENT NOTE - NS ED NURSE REASSESS COMMENT FT1
Patient provide urine sample, sent to lab. Antibiotics started. Second type and screen sent to blood bank. Awaiting T&S results prior to administering blood transfusions. VSS. Call bell placed within [facility]. Safety provided, will continue to monitor.

## 2017-08-30 NOTE — PROGRESS NOTE ADULT - ASSESSMENT
42F PMH Hodgkin's Lymphoma (Dx 7/2017 w/ L axillary LN biopsy, treatment naive), complicated by SVC syndrome, pericardial effusion, pleural effusion, presents with shortness of breath, likely multifactorial given anemia, pleural effusion, pericardial effusion, SVC syndrome, lymphoma w/ extensive mediastinal lymphadenopathy.  Pt has repeatedly refused conventional  treatment without specific reasons. Dr Baxter from Hem/Onc following

## 2017-08-30 NOTE — H&P ADULT - PROBLEM SELECTOR PLAN 2
- has fever, leukocytosis, recent hospital stay  - fever could be 2/2 lymphoma however will cover with broad spectrum abx for now.  - f/u blood cultures, urine cultures  - c/w broad spectrum Abx (vancomycin/Zosyn)

## 2017-08-30 NOTE — PROGRESS NOTE ADULT - SUBJECTIVE AND OBJECTIVE BOX
HPI:  42F PMH Hodgkin's Lymphoma (Dx 2017 w/ L axillary LN biopsy, treatment naive), complicated by SVC syndrome, pericardial effusion, pleural effusion, presents with shortness of breath.  Patient has been refusing treatment for her lymphoma, has 2 recent admissions which resulted in her leaving AMA because she did not want treatment.  She was recently admitted  -  for shortness of breath.  She was found to have SVC syndrome 2/2 mediastinal mass impinging bronchotrachial tree, found to have extensive retroperitoneal LAD on imaging.  She was also found to have pericardial effusion w/ evidence of tamponade, however she refused pericardial window offered by cardiology.  She was also found to have a right pleural effusion, likely malignant however refused pleurex catheter.  She left AMA , despite several physicians speaking with her telling her she could potentially die if leaving the hospital.  Pt signed out AMA prior to labs returning, her hemoglobin was 5.4.  Per discharge note she was called to come back to ED for blood transfusion.  Patient came back today as she had worsening shortness of breath and felt her heart racing.  No fevers/chills at home.  Lives w/ daughter, no sick contacts.  No recent travel.  Has had cough for past few weeks.    In the ED .2 124 100/63 20 97% 2L NC   Labs notable for WBC 13.5 w/ L shift, Hgb 6.6,   INR 1.52 Cr 0.4  BUN20 Lactate 1.8 UA negative for infection  CXR w/ R pleural effusion worsened from prior .    In the ED, given vancomycin x 1, cefepime x 1.  1.5L NaCl. 2 U PRBCs w/ 40 mg IV lasix between. (30 Aug 2017 05:58)      PERTINENT PM/SXH:   Hodgkin lymphoma  Fever and chills  Adenopathy  No pertinent past medical history    H/O abdominoplasty  H/O bilateral breast implants  No significant past surgical history    SOCIAL HISTORY:   Significant other/partner:  [ ] YES  [ ] NO               Children:  x[ ] YES  [ ] NO                   Yarsanism/Spirituality:Anabaptist  Substance hx:  [ ] YES   [x ] NO                   Tobacco hx:  [ ] YES  [x ] NO                       Alcohol hx: [ ] YES  [ x] NO         Home Opioid hx:  [ ] YES  [x ] NO   Living Situation: [x ] Home  [ ] Long term care  [ ] Rehab [ ] Other    FAMILY HISTORY:  Family history of cervical cancer (Sibling)  Family history of lung cancer    [x ] Family history non-contributory     BASELINE (I)ADLs (prior to admission):  Rossiter: [x ] total  [ ] moderate [ ] dependent    ADVANCE DIRECTIVES:    DNR   MOLST  [ ] YES x[ ] NO                      [ ] Completed  Health Care Proxy [ ] YES  [x ] NO   [ ] Completed  Living Will  [ ] YES [ x] NO             [ ] Surrogate  [ ] HCP  [ ] Guardian:                                                                  Phone#:    Allergies    No Known Allergies    Intolerances        MEDICATIONS  (STANDING):  ferrous    sulfate 325 milliGRAM(s) Oral two times a day with meals  ergocalciferol 82059 Unit(s) Oral every week  dexamethasone     Tablet 4 milliGRAM(s) Oral every 6 hours  cefepime  IVPB 2000 milliGRAM(s) IV Intermittent every 8 hours  vancomycin  IVPB 1000 milliGRAM(s) IV Intermittent every 12 hours    MEDICATIONS  (PRN):      PRESENT SYMPTOMS:  Source: x[ ] Patient   [ ] Family   [ ] Team     Pain:                        [x ] No [ ] Yes             [ ] Mild [ ] Moderate [ ] Severe    Onset -  Location -  Duration -  Character -  Alleviating/Aggravating -  Radiation -  Timing -      Dyspnea:                [ ] No [ x] Yes             [x ] Mild [ ] Moderate [ ] Severe    Anxiety:                  [ x] No [ ] Yes             [ ] Mild [ ] Moderate [ ] Severe    Fatigue:                  [x ] No [ ] Yes             [ ] Mild [ ] Moderate [ ] Severe    Nausea:                  [x ] No [ ] Yes             [ ] Mild [ ] Moderate [ ] Severe    Loss of appetite:   [x ] No [ ] Yes             [ ] Mild [ ] Moderate [ ] Severe    Constipation:        [x ] No [ ] Yes             [ ] Mild [ ] Moderate [ ] Severe    Other Symptoms:  [x ] All other review of systems negative   [ ] Unable to obtain due to poor mentation     Karnofsky Performance Score/Palliative Performance Status Version 2: 60    %    PHYSICAL EXAM:  Vital Signs Last 24 Hrs  T(C): 36.5 (30 Aug 2017 05:15), Max: 39 (30 Aug 2017 01:09)  T(F): 97.7 (30 Aug 2017 05:15), Max: 102.2 (30 Aug 2017 01:09)  HR: 102 (30 Aug 2017 13:19) (71 - 124)  BP: 92/60 (30 Aug 2017 13:19) (87/53 - 108/70)  BP(mean): --  RR: 20 (30 Aug 2017 13:19) (20 - 22)  SpO2: 97% (30 Aug 2017 13:19) (96% - 99%) I&O's Summary      General:  [x ] Alert  [x ] Oriented x 3     [ ] Lethargic  [ ] Agitated   [ x] Cachexia   [ ] Unarousable  x[ ] Verbal  [ ] Non-Verbal    HEENT:  [ ] Normal   [ ] Dry mouth   [ ] ET Tube    [ ] Trach  [ ] Oral lesions  x  Lungs:   [ ] Clear [ ] Tachypnea  [ ] Audible excessive secretions   [ ] Rhonchi        [ ] Right [ ] Left [ ] Bilateral  [ ] Crackles        [ ] Right [ ] Left [ ] Bilateral  [ ] Wheezing     [ ] Right [ ] Left [ ] Bilateral    Cardiovascular:  [ ] Regular [ ] Irregular [x ] Tachycardia   [ ] Bradycardia  [ ] Murmur [ ] Other    Abdomen: [ x] Soft  [ ] Distended   [ ] +BS  [ ] Non tender [ ] Tender  [ ]PEG   [ ]OGT/ NGT   Last BM:       Genitourinary: [ x] Normal [ ] Incontinent   [ ] Oliguria/Anuria   [ ] [place]ar[place]    Musculoskeletal:  [ ] Normal   [ x] Weakness  [ ] Bedbound/Wheelchair bound [ ] Edema    Neurological: x[ ] No focal deficits  [ ] Cognitive impairment  [ ] Dysphagia [ ] Dysarthria [ ] Paresis [ ] Other     Skin:DIFFUSE LYMPHADENOPATHY  [ ] Normal   [ ] Pressure ulcer(s)                  [ ] Rash    LABS:                        10.0   14.4  )-----------( 153      ( 30 Aug 2017 11:00 )             32.8     08-30    137  |  97  |  20  ----------------------------<  174<H>  3.7   |  26  |  0.48<L>    Ca    9.5      30 Aug 2017 11:00  Phos  5.5     08-30  Mg     2.1     08-30    TPro  6.8  /  Alb  2.4<L>  /  TBili  1.2  /  DBili  x   /  AST  16  /  ALT  23  /  AlkPhos  256<H>  08-30    PT/INR - ( 30 Aug 2017 01:13 )   PT: 16.6 sec;   INR: 1.52 ratio         PTT - ( 30 Aug 2017 01:13 )  PTT:33.2 sec  Urinalysis Basic - ( 30 Aug 2017 02:16 )    Color: Yellow / Appearance: Clear / S.015 / pH: x  Gluc: x / Ketone: Negative  / Bili: Negative / Urobili: Negative   Blood: x / Protein: Negative / Nitrite: Negative   Leuk Esterase: Negative / RBC: 0-2 /HPF / WBC 0-2 /HPF   Sq Epi: x / Non Sq Epi: x / Bacteria: x        Shock: [ ] Septic [ ] Cardiogenic [ ] Neurologic [ ] Hypovolemic  Vasopressors x   Inotrophs x     Protein Calorie Malnutrition: [X ] Mild [ ] Moderate [ ] Severe    Oral Intake: [ ] Unable/mouth care only [ ] Minimal [ ] Moderate [X ] Full Capability  Diet: [ ] NPO [ ] Tube feeds [ ] TPN [ ] Other     RADIOLOGY & ADDITIONAL STUDIES:    < from: CT Angio Chest w/ IV Cont (17 @ 04:20) >  LUNGS, PLEURA AND LARGE AIRWAYS: Redemonstration of mass effect on the   distal trachea, which demonstrates leftward deviation as well as mild   narrowing. There is mild additional narrowing of the right mainstem   bronchus. Redemonstration of numerous bilateral pulmonary nodules grossly   stable since prior examination. Interval increase in a moderate to large   right pleural effusion with associated compressive atelectasis. Stable   small left pleural effusion.  VESSELS: Adequate opacification of the pulmonary arteries. No evidence of   pulmonary embolism. Normal caliber thoracic aorta.   HEART: Heart size is normal. No pericardial effusion.  MEDIASTINUM AND SOBEIDA: Extensive bulky mediastinal and bilateral hilar   lymphadenopathy, without significant change since most recent   examination.   CHEST WALL AND LOWER NECK: Bilateral breast implants. Extensive bilateral   axillary and supraclavicular lymphadenopathy which is grossly unchanged.  VISUALIZED UPPER ABDOMEN: Retroperitoneal lymphadenopathy.  BONES: Within normal limits.    IMPRESSION:     No pulmonary embolism.    Interval slight increase in moderate to large right pleural effusion   since prior examination. Stable small left pleural effusion.    Relatively stable extensive mediastinal, axillary, and supraclavicular,   lymphadenopathy.     Stable bilateral pulmonary nodules and peribronchovascular disease.                  REFERRALS:   [ ] Chaplaincy  [ ] Hospice  [ ] Child Life  [ ] Social Work  [ ] Case management [ ] Holistic Therapy

## 2017-08-30 NOTE — ED PROVIDER NOTE - OBJECTIVE STATEMENT
43yo female Hodgkin's Lymphoma (Dx 7/2017 w/ L axillary LN biopsy, treatment naive) presents with shortness of breath and tachycardia. Pt. was discahrged after

## 2017-08-30 NOTE — CONSULT NOTE ADULT - SUBJECTIVE AND OBJECTIVE BOX
Hematology Consult Note  42F PMH recently diagnosed Hodgkin's lymphoma, lymphocyte depleted with bulky disease with extensive involvement in her lung with prior evidence of bronchial narrowing/compression, noted to be at least stage III/IV disease with poor risk features including HB less than 10, low lymphocyte count < 8% and WBC > 15. who presents after leaving AMA a day ago, noted to have worsening anemia, and called back to hospital. She reports chills and nightsweats but denies fevers. Denies headache/photophobia/neck stiffness. Denies N/V/diarrhea, abdominal pain, dysuria, BRBPR/melena/hematuria/hematemesis/epistaxis.   Regarding patient's hematologic history, patient was diagnosed after an excisional lymph node biopsy on 7/12 of the left axillary, which showed diffuse proliferation of atypical/frank miriam cells, foci of necrosis. Cells were positive for CD30, Notus-5, Mum-1, negative for CD3, CD4, CD15, CD20, CD79a, CD43, KIMBERLEY. Ki67 was 20%.  When seen by Dr. Baxter in the office in late July patient appeared to be in denial of her diagnosis and felt that her disease will regress with herbal remedies. She had evidence of SVC syndrome at that time as well as hypotension tachycardia, tachypnea, LE swelling. Dr. Baxter discussed with her risks associated with refusing treatment including death and the patient was adamant against any chemotherapy at that time. Her Healthcare proxy Dilip was at odds with this decision, however, patient had capacity and was able to refuse. Patient was agreeable at that time to admission for IV fluids, steroids for symptomatic management. Dr. Baxter hoped to treat her with AVD if she agreed, the alternative being Brentuximab, immunotherapy, but it was unclear if it would be approved as first line. She was to follow up after discharge but has not yet. She was to continue dexamethasone and allopurinol at the time of recent discharge.       Today, complains of fatigue. Continues to refuse traditional chemotherapy, but is thinking about brentuximab immunotherapy.      Allergies    No Known Allergies    Intolerances      MEDICATIONS  (STANDING):  ferrous    sulfate 325 milliGRAM(s) Oral two times a day with meals  ergocalciferol 48002 Unit(s) Oral every week  dexamethasone     Tablet 4 milliGRAM(s) Oral every 6 hours  cefepime  IVPB 2000 milliGRAM(s) IV Intermittent every 8 hours  vancomycin  IVPB 1000 milliGRAM(s) IV Intermittent every 12 hours    MEDICATIONS  (PRN):      PAST MEDICAL & SURGICAL HISTORY:  Hodgkin lymphoma  H/O abdominoplasty: 2004  H/O bilateral breast implants: 2004      FAMILY HISTORY:  Family history of cervical cancer (Sibling)  Family history of lung cancer      SOCIAL HISTORY: No EtOH, no tobacco    REVIEW OF SYSTEMS:    CONSTITUTIONAL: weakness, fatigue, chills  EYES/ENT: No visual changes;  No vertigo or throat pain   NECK: No pain or stiffness  RESPIRATORY: cough, SOB. No wheezing or hemoptysis;  CARDIOVASCULAR: No chest pain or palpitations  GASTROINTESTINAL: No abdominal or epigastric pain. No nausea, vomiting, or hematemesis; No diarrhea or constipation. No melena or hematochezia.  LYMPH: Diffuse LAD, increasing in size  GENITOURINARY: No dysuria, frequency or hematuria  NEUROLOGICAL: No numbness or weakness  VASCULAR: b/l LE edema  SKIN: No itching, burning, rashes, or lesions   All other review of systems is negative unless indicated above.        T(F): 97.7 (08-30-17 @ 05:15), Max: 102.2 (08-30-17 @ 01:09)  HR: 71 (08-30-17 @ 06:20)  BP: 87/53 (08-30-17 @ 06:20)  RR: 20 (08-30-17 @ 06:20)  SpO2: 99% (08-30-17 @ 06:20)  Wt(kg): --    GENERAL: Alert, awake, NAD, well-developed  HEAD:  Atraumatic, Normocephalic  EYES: EOMI, PERRLA, conjunctiva and sclera clear  CHEST/LUNG: Clear to auscultation bilaterally; No wheeze  HEART: Regular rate and rhythm; No murmurs, rubs, or gallops  ABDOMEN: Soft, Nontender, Nondistended; Bowel sounds present  EXTREMITIES:  2+ Peripheral Pulses, No clubbing, cyanosis. 3+ pitting edema above knees b/l LE  NEUROLOGY: non-focal  SKIN: No rashes or lesions  LYMPH: Diffuse palpable LAD- b/l submandibular, b/l supraclavicular, b/l axillary and b/l inguinal                          10.0   14.4  )-----------( 153      ( 30 Aug 2017 11:00 )             32.8       08-30    137  |  97  |  20  ----------------------------<  174<H>  3.7   |  26  |  0.48<L>    Ca    9.5      30 Aug 2017 11:00  Phos  5.5     08-30  Mg     2.1     08-30    TPro  6.8  /  Alb  2.4<L>  /  TBili  1.2  /  DBili  x   /  AST  16  /  ALT  23  /  AlkPhos  256<H>  08-30      Magnesium, Serum: 2.1 mg/dL (08-30 @ 11:00)  Phosphorus Level, Serum: 5.5 mg/dL (08-30 @ 11:00)

## 2017-08-30 NOTE — H&P ADULT - NSHPLABSRESULTS_GEN_ALL_CORE
Labs notable for WBC 13.5 w/ L shift, Hgb 6.6,   INR 1.52 Cr 0.4  BUN20 Lactate 1.8 UA negative for infection  CXR w/ R pleural effusion worsened from prior 8/25.

## 2017-08-31 DIAGNOSIS — F43.20 ADJUSTMENT DISORDER, UNSPECIFIED: ICD-10-CM

## 2017-08-31 DIAGNOSIS — C81.38: ICD-10-CM

## 2017-08-31 LAB
ALBUMIN SERPL ELPH-MCNC: 2.5 G/DL — LOW (ref 3.3–5)
ALP SERPL-CCNC: 194 U/L — HIGH (ref 40–120)
ALT FLD-CCNC: 15 U/L RC — SIGNIFICANT CHANGE UP (ref 10–45)
ANION GAP SERPL CALC-SCNC: 10 MMOL/L — SIGNIFICANT CHANGE UP (ref 5–17)
AST SERPL-CCNC: 7 U/L — LOW (ref 10–40)
BASOPHILS # BLD AUTO: 0 K/UL — SIGNIFICANT CHANGE UP (ref 0–0.2)
BASOPHILS NFR BLD AUTO: 0 % — SIGNIFICANT CHANGE UP (ref 0–2)
BILIRUB SERPL-MCNC: 0.9 MG/DL — SIGNIFICANT CHANGE UP (ref 0.2–1.2)
BUN SERPL-MCNC: 15 MG/DL — SIGNIFICANT CHANGE UP (ref 7–23)
CALCIUM SERPL-MCNC: 9.4 MG/DL — SIGNIFICANT CHANGE UP (ref 8.4–10.5)
CHLORIDE SERPL-SCNC: 97 MMOL/L — SIGNIFICANT CHANGE UP (ref 96–108)
CO2 SERPL-SCNC: 29 MMOL/L — SIGNIFICANT CHANGE UP (ref 22–31)
CREAT SERPL-MCNC: 0.34 MG/DL — LOW (ref 0.5–1.3)
CULTURE RESULTS: SIGNIFICANT CHANGE UP
EOSINOPHIL # BLD AUTO: 0.2 K/UL — SIGNIFICANT CHANGE UP (ref 0–0.5)
EOSINOPHIL NFR BLD AUTO: 2 % — SIGNIFICANT CHANGE UP (ref 0–6)
GLUCOSE SERPL-MCNC: 92 MG/DL — SIGNIFICANT CHANGE UP (ref 70–99)
HCT VFR BLD CALC: 32 % — LOW (ref 34.5–45)
HGB BLD-MCNC: 9.7 G/DL — LOW (ref 11.5–15.5)
LYMPHOCYTES # BLD AUTO: 0.9 K/UL — LOW (ref 1–3.3)
LYMPHOCYTES # BLD AUTO: 3 % — LOW (ref 13–44)
MAGNESIUM SERPL-MCNC: 2 MG/DL — SIGNIFICANT CHANGE UP (ref 1.6–2.6)
MCHC RBC-ENTMCNC: 27.2 PG — SIGNIFICANT CHANGE UP (ref 27–34)
MCHC RBC-ENTMCNC: 30.2 GM/DL — LOW (ref 32–36)
MCV RBC AUTO: 89.8 FL — SIGNIFICANT CHANGE UP (ref 80–100)
MONOCYTES # BLD AUTO: 0.2 K/UL — SIGNIFICANT CHANGE UP (ref 0–0.9)
MONOCYTES NFR BLD AUTO: 2 % — SIGNIFICANT CHANGE UP (ref 2–14)
NEUTROPHILS # BLD AUTO: 13.6 K/UL — HIGH (ref 1.8–7.4)
NEUTROPHILS NFR BLD AUTO: 84 % — HIGH (ref 43–77)
PHOSPHATE SERPL-MCNC: 3.4 MG/DL — SIGNIFICANT CHANGE UP (ref 2.5–4.5)
PLATELET # BLD AUTO: 153 K/UL — SIGNIFICANT CHANGE UP (ref 150–400)
POTASSIUM SERPL-MCNC: 4.4 MMOL/L — SIGNIFICANT CHANGE UP (ref 3.5–5.3)
POTASSIUM SERPL-SCNC: 4.4 MMOL/L — SIGNIFICANT CHANGE UP (ref 3.5–5.3)
PROT SERPL-MCNC: 6.7 G/DL — SIGNIFICANT CHANGE UP (ref 6–8.3)
RAPID RVP RESULT: DETECTED
RBC # BLD: 3.56 M/UL — LOW (ref 3.8–5.2)
RBC # FLD: 21.2 % — HIGH (ref 10.3–14.5)
RV+EV RNA SPEC QL NAA+PROBE: DETECTED
SODIUM SERPL-SCNC: 136 MMOL/L — SIGNIFICANT CHANGE UP (ref 135–145)
SPECIMEN SOURCE: SIGNIFICANT CHANGE UP
VANCOMYCIN TROUGH SERPL-MCNC: 9.8 UG/ML — LOW (ref 10–20)
WBC # BLD: 15 K/UL — HIGH (ref 3.8–10.5)
WBC # FLD AUTO: 15 K/UL — HIGH (ref 3.8–10.5)

## 2017-08-31 PROCEDURE — 99232 SBSQ HOSP IP/OBS MODERATE 35: CPT | Mod: GC

## 2017-08-31 PROCEDURE — 99233 SBSQ HOSP IP/OBS HIGH 50: CPT | Mod: GC

## 2017-08-31 PROCEDURE — 99221 1ST HOSP IP/OBS SF/LOW 40: CPT

## 2017-08-31 RX ORDER — ENOXAPARIN SODIUM 100 MG/ML
40 INJECTION SUBCUTANEOUS DAILY
Qty: 0 | Refills: 0 | Status: DISCONTINUED | OUTPATIENT
Start: 2017-08-31 | End: 2017-09-04

## 2017-08-31 RX ORDER — POLYETHYLENE GLYCOL 3350 17 G/17G
17 POWDER, FOR SOLUTION ORAL DAILY
Qty: 0 | Refills: 0 | Status: DISCONTINUED | OUTPATIENT
Start: 2017-08-31 | End: 2017-09-04

## 2017-08-31 RX ORDER — VANCOMYCIN HCL 1 G
1250 VIAL (EA) INTRAVENOUS EVERY 12 HOURS
Qty: 0 | Refills: 0 | Status: DISCONTINUED | OUTPATIENT
Start: 2017-08-31 | End: 2017-09-01

## 2017-08-31 RX ADMIN — CEFEPIME 100 MILLIGRAM(S): 1 INJECTION, POWDER, FOR SOLUTION INTRAMUSCULAR; INTRAVENOUS at 23:27

## 2017-08-31 RX ADMIN — Medication 325 MILLIGRAM(S): at 09:24

## 2017-08-31 RX ADMIN — SENNA PLUS 2 TABLET(S): 8.6 TABLET ORAL at 21:04

## 2017-08-31 RX ADMIN — Medication 4 MILLIGRAM(S): at 12:49

## 2017-08-31 RX ADMIN — Medication 4 MILLIGRAM(S): at 19:00

## 2017-08-31 RX ADMIN — Medication 166.67 MILLIGRAM(S): at 19:00

## 2017-08-31 RX ADMIN — POLYETHYLENE GLYCOL 3350 17 GRAM(S): 17 POWDER, FOR SOLUTION ORAL at 21:04

## 2017-08-31 RX ADMIN — Medication 325 MILLIGRAM(S): at 19:00

## 2017-08-31 RX ADMIN — ENOXAPARIN SODIUM 40 MILLIGRAM(S): 100 INJECTION SUBCUTANEOUS at 12:49

## 2017-08-31 RX ADMIN — Medication 250 MILLIGRAM(S): at 06:48

## 2017-08-31 RX ADMIN — Medication 4 MILLIGRAM(S): at 00:10

## 2017-08-31 RX ADMIN — Medication 4 MILLIGRAM(S): at 06:48

## 2017-08-31 RX ADMIN — Medication 4 MILLIGRAM(S): at 23:27

## 2017-08-31 RX ADMIN — Medication 100 MILLIGRAM(S): at 12:49

## 2017-08-31 RX ADMIN — CEFEPIME 100 MILLIGRAM(S): 1 INJECTION, POWDER, FOR SOLUTION INTRAMUSCULAR; INTRAVENOUS at 06:39

## 2017-08-31 RX ADMIN — CEFEPIME 100 MILLIGRAM(S): 1 INJECTION, POWDER, FOR SOLUTION INTRAMUSCULAR; INTRAVENOUS at 14:52

## 2017-08-31 NOTE — PROGRESS NOTE ADULT - PROBLEM SELECTOR PLAN 1
- had symptomatic anemia on admission (Hgb 6.6)  - s/p 2 PRBC transfusion,  Hgb now 10  - MCV 87- likely 2/2 lymphoma w/ ?bone marrow involvement  - trend CBC  - no signs/symptoms of bleeding - had symptomatic anemia on admission (Hgb 6.6)  - s/p 2 PRBC transfusion,  Hgb now 10    - trend CBC  - no signs/symptoms of bleeding

## 2017-08-31 NOTE — PROGRESS NOTE ADULT - ASSESSMENT
42F PMH Hodgkin's Lymphoma (Dx 7/2017 w/ L axillary LN biopsy, treatment naive), complicated by SVC syndrome, pericardial effusion, pleural effusion, presents with shortness of breath, likely multifactorial given anemia, pleural effusion, pericardial effusion, SVC syndrome, lymphoma w/ extensive mediastinal lymphadenopathy, now considering treatment for lymphoma.

## 2017-08-31 NOTE — PROGRESS NOTE ADULT - ATTENDING COMMENTS
Hodgkin's lymphoma with extensive LAD/SVC syndrome with pleural/pericardial effusions a/w severe anemia- now improved  c/w empriic abx and f/u cultures; c/w steroids for SVC syndrome-   pericardial effusion improved on echo and no signs of tamponade  - hematology to discuss again with pt regarding immunotherapy- pt has endorsed side effects and being home alone as concerns for not having opted treatment in the past- she does appear to be more amenable to immunotherapy and this will be further discussed with pt and hematology team. It was conveyed to pt that she needs to stay committed with the treatment in order to optimize efficacy. It was explained to pt that she will die if she does not receive treatment. palliative care team is following  Psych called to eval for capacity

## 2017-08-31 NOTE — BEHAVIORAL HEALTH ASSESSMENT NOTE - SUMMARY
Patient is a 43 yo [place] female with no significant PPHx and a significant PMHx of Hodgkin lymphoma (Dx 07/17 w/ L axillary LN biopsy, treatment naive) complicated by SVC syndrome, pericardial effusion, and pleural effusion. Patient had been refusing any treatment with 2 recent hospital admissions where she left AMA. Patient presented to the ED on 08/30 complaining of worsening SOB and palpitations. Psychiatry was consulted to evaluate for decisional capacity to refuse treatment.     Patient was able to explain disease and discuss risks and benefits of treatment. She, however, stated she didn't want to move too aggressively and decided to start on immunotherapy. She feels she does not fully understand details of treatment and requests heme/onc to explain further details.

## 2017-08-31 NOTE — PROGRESS NOTE ADULT - SUBJECTIVE AND OBJECTIVE BOX
INTERVAL HPI/OVERNIGHT EVENTS:  Patient S&E at bedside. No o/n events, she DOES not want chemotherapy, she only wants immunotherapy.     VITAL SIGNS:  T(F): 97.8 (17 @ 01:53)  HR: 73 (17 @ 01:53)  BP: 94/60 (17 @ 01:53)  RR: 18 (17 @ 01:53)  SpO2: 98% (17 @ 01:53)  Wt(kg): --    PHYSICAL EXAM:    Constitutional: NAD  Eyes: EOMI, sclera non-icteric  Neck: b/l LAD  Respiratory: CTA b/l, good air entry b/l  Cardiovascular: RRR, no M/R/G  Gastrointestinal: soft, NTND, no masses palpable, + BS, no hepatosplenomegaly  Extremities: no c/c/e  Neurological: AAOx3      MEDICATIONS  (STANDING):  ferrous    sulfate 325 milliGRAM(s) Oral two times a day with meals  ergocalciferol 90836 Unit(s) Oral every week  dexamethasone     Tablet 4 milliGRAM(s) Oral every 6 hours  cefepime  IVPB 2000 milliGRAM(s) IV Intermittent every 8 hours  vancomycin  IVPB 1000 milliGRAM(s) IV Intermittent every 12 hours  docusate sodium 100 milliGRAM(s) Oral daily  senna 2 Tablet(s) Oral at bedtime  enoxaparin Injectable 40 milliGRAM(s) SubCutaneous daily    MEDICATIONS  (PRN):      Allergies    No Known Allergies    Intolerances        LABS:                        9.7    15.0  )-----------( 153      ( 31 Aug 2017 07:06 )             32.0         136  |  97  |  15  ----------------------------<  92  4.4   |  29  |  0.34<L>    Ca    9.4      31 Aug 2017 07:06  Phos  3.4       Mg     2.0         TPro  6.7  /  Alb  2.5<L>  /  TBili  0.9  /  DBili  x   /  AST  7<L>  /  ALT  15  /  AlkPhos  194<H>      PT/INR - ( 30 Aug 2017 01:13 )   PT: 16.6 sec;   INR: 1.52 ratio         PTT - ( 30 Aug 2017 01:13 )  PTT:33.2 sec  Urinalysis Basic - ( 30 Aug 2017 02:16 )    Color: Yellow / Appearance: Clear / S.015 / pH: x  Gluc: x / Ketone: Negative  / Bili: Negative / Urobili: Negative   Blood: x / Protein: Negative / Nitrite: Negative   Leuk Esterase: Negative / RBC: 0-2 /HPF / WBC 0-2 /HPF   Sq Epi: x / Non Sq Epi: x / Bacteria: x        RADIOLOGY & ADDITIONAL TESTS:  Studies reviewed. INTERVAL HPI/OVERNIGHT EVENTS:  Patient S&E at bedside. No o/n events, she says she is willing to try immunotherapy.     VITAL SIGNS:  T(F): 97.8 (17 @ 01:53)  HR: 73 (17 @ 01:53)  BP: 94/60 (17 @ 01:53)  RR: 18 (17 @ 01:53)  SpO2: 98% (17 @ 01:53)  Wt(kg): --    PHYSICAL EXAM:    Constitutional: NAD  Eyes: EOMI, sclera non-icteric  Neck: b/l LAD  Respiratory: CTA b/l, good air entry b/l  Cardiovascular: RRR, no M/R/G  Gastrointestinal: soft, NTND, no masses palpable, + BS, no hepatosplenomegaly  Extremities: no c/c/e  Neurological: AAOx3      MEDICATIONS  (STANDING):  ferrous    sulfate 325 milliGRAM(s) Oral two times a day with meals  ergocalciferol 32229 Unit(s) Oral every week  dexamethasone     Tablet 4 milliGRAM(s) Oral every 6 hours  cefepime  IVPB 2000 milliGRAM(s) IV Intermittent every 8 hours  vancomycin  IVPB 1000 milliGRAM(s) IV Intermittent every 12 hours  docusate sodium 100 milliGRAM(s) Oral daily  senna 2 Tablet(s) Oral at bedtime  enoxaparin Injectable 40 milliGRAM(s) SubCutaneous daily    MEDICATIONS  (PRN):      Allergies    No Known Allergies    Intolerances        LABS:                        9.7    15.0  )-----------( 153      ( 31 Aug 2017 07:06 )             32.0         136  |  97  |  15  ----------------------------<  92  4.4   |  29  |  0.34<L>    Ca    9.4      31 Aug 2017 07:06  Phos  3.4       Mg     2.0         TPro  6.7  /  Alb  2.5<L>  /  TBili  0.9  /  DBili  x   /  AST  7<L>  /  ALT  15  /  AlkPhos  194<H>      PT/INR - ( 30 Aug 2017 01:13 )   PT: 16.6 sec;   INR: 1.52 ratio         PTT - ( 30 Aug 2017 01:13 )  PTT:33.2 sec  Urinalysis Basic - ( 30 Aug 2017 02:16 )    Color: Yellow / Appearance: Clear / S.015 / pH: x  Gluc: x / Ketone: Negative  / Bili: Negative / Urobili: Negative   Blood: x / Protein: Negative / Nitrite: Negative   Leuk Esterase: Negative / RBC: 0-2 /HPF / WBC 0-2 /HPF   Sq Epi: x / Non Sq Epi: x / Bacteria: x        RADIOLOGY & ADDITIONAL TESTS:  Studies reviewed.

## 2017-08-31 NOTE — BEHAVIORAL HEALTH ASSESSMENT NOTE - NSBHCHARTREVIEWINVESTIGATE_PSY_A_CORE FT
Ventricular Rate 117 BPM    Atrial Rate 117 BPM    P-R Interval 130 ms    QRS Duration 72 ms     ms    QTc 351 ms    P Axis 51 degrees    R Axis 35 degrees    T Axis 38 degrees    Diagnosis Line SINUS TACHYCARDIA  LOW VOLTAGE QRS  CANNOT RULE OUT ANTERIOR INFARCT , AGE UNDETERMINED  ABNORMAL ECG

## 2017-08-31 NOTE — PROGRESS NOTE ADULT - PROBLEM SELECTOR PLAN 3
- patient previously refused treatment  - onc recs: patient amenable to chemo, will reassess today  - palliative care consult to assess for goals of care and DNR/DNI decision  - psych consult to evaluate for capacity to refuse treatment

## 2017-08-31 NOTE — BEHAVIORAL HEALTH ASSESSMENT NOTE - HPI (INCLUDE ILLNESS QUALITY, SEVERITY, DURATION, TIMING, CONTEXT, MODIFYING FACTORS, ASSOCIATED SIGNS AND SYMPTOMS)
Patient is a 41 yo [place] female with no significant PPHx and a significant PMHx of Hodgkin lymphoma (Dx 07/17 w/ L axillary LN biopsy, treatment naive) complicated by SVC syndrome, pericardial effusion, and pleural effusion. Patient had been refusing any treatment with 2 recent hospital admissions where she left AMA. Patient presented to the ED on 08/30 complaining of worsening SOB and palpitations. Psychiatry was consulted to evaluate for decisional capacity to refuse treatment.     Patient was sitting comfortably in chair and AO x 3. She stated that she prefers immunotherapy over chemotherapy. Patient was able to explain disease and discuss risks and benefits of treatment, but stated, "I don't want to move too aggressively". When questioned, patient explained that she lives alone and "can't afford to get too weak" because there is no one to take care of her. She is also concerned about the side effects of chemotherapy (i.e. losing hair). Patient states she does not fully understand the details of treatment and requests heme/onc team to explain further details.     Patient admits to decreased energy levels 2/2 to current medical condition. Patient denies depressed mood, change in appetite, past PPHx, and past psychiatric hospitalizations. On MMSE, patient demonstrated good concentration and judgement.     Patient lives alone but currently her 21 yo daughter is visiting from [place] and helping to take care of her. Patient engaged in an arranged marriage but split up with her ex- when her children were about 5 or 5 yo. She previously worked as a  but was let go from her position following the expiration of her medical leave. She is currently unemployed and looking to apply for disability.

## 2017-08-31 NOTE — PROGRESS NOTE ADULT - PROBLEM SELECTOR PLAN 4
- R sided pleural effusion  - Pulm consult - had previously offered pleurex but patient refused, however now more amenable; currently with right pleural effusion - R sided pleural effusion  - Pulm consult - had previously offered pleurex but patient refused- currently effusion is stable

## 2017-08-31 NOTE — BEHAVIORAL HEALTH ASSESSMENT NOTE - NSBHCHARTREVIEWVS_PSY_A_CORE FT
Vital Signs Last 24 Hrs  T(C): 36.6 (31 Aug 2017 01:53), Max: 36.7 (30 Aug 2017 20:32)  T(F): 97.8 (31 Aug 2017 01:53), Max: 98.1 (30 Aug 2017 20:32)  HR: 73 (31 Aug 2017 01:53) (71 - 102)  BP: 94/60 (31 Aug 2017 01:53) (92/60 - 94/60)  BP(mean): --  RR: 18 (31 Aug 2017 01:53) (18 - 20)  SpO2: 98% (31 Aug 2017 01:53) (97% - 99%)

## 2017-08-31 NOTE — BEHAVIORAL HEALTH ASSESSMENT NOTE - NSBHCHARTREVIEWIMAGING_PSY_A_CORE FT
no vomiting/no nausea/no constipation/no diarrhea IMPRESSION:  Small right pleural effusion with adjacent atelectasis.

## 2017-08-31 NOTE — PROGRESS NOTE ADULT - PROBLEM SELECTOR PLAN 1
continues to refuse treatment with conventional chemotherapy, as she is concerned about side effects  -Brentuximab-vedotin immunotherapy could be offered in a first line setting, but patient must strictly adhere to treatment protocol. Currently approved in relapsed disease only, making speculation about efficacy and prognosis difficult.  -Recommend palliative and ethics consults to help ellicit patient's beliefs and goals of care.  -She has been counseled repeatedly by the heme/onc team that standard first-line chemotherapy is ABVD or AVD, and is potentially life-saving. She verbalizes understanding about refusing standard of care, which includes significant potential morbidity and even death.  -If an agreement to adhere to immunotherapy treatment can be made, would consider transfer to [address] [place] [address] for initiation of therapy, as patient has rapidly progressive disease with poor prognostic features.  -c/w dexamethasone, which is merely a temporizing measure.  -Agree with PRBC transfusion. Keep Hgb >7g, Plt >10,000. continues to refuse treatment with conventional chemotherapy, as she is concerned about side effects. She has discussed with Dr. Baxter and she has agreed to receive Brentuximab-vedotin immunotherapy,  it is currently approved in relapsed disease only, making speculation about efficacy and prognosis difficult.  -Recommend palliative and ethics consults to help ellicit patient's beliefs and goals of care.  -She has been counseled repeatedly by the heme/onc team that standard first-line chemotherapy is ABVD or AVD, and is potentially life-saving. She verbalizes understanding about refusing standard of care, which includes significant potential morbidity and even death.  - will start therapy tomorrow, 9/1/17, please give hydration IVF prior and needs to get rasburicase prior to chemo as well   -c/w dexamethasone, which is merely a temporizing measure.  -Agree with PRBC transfusion. Keep Hgb >7g, Plt >10,000.

## 2017-08-31 NOTE — BEHAVIORAL HEALTH ASSESSMENT NOTE - AXIS III
Hodgkin lymphoma (Dx 07/17 w/ L axillary LN biopsy, treatment naive) complicated by SVC syndrome, pericardial effusion, and pleural effusion

## 2017-08-31 NOTE — PROGRESS NOTE ADULT - SUBJECTIVE AND OBJECTIVE BOX
S: Patient seen and examined at bedside. Pt has no acute complaints overnight. Denies any fevers, chills, nausea, vomiting. Subjectively feels better today.          ferrous    sulfate 325 milliGRAM(s) Oral two times a day with meals  ergocalciferol 70822 Unit(s) Oral every week  dexamethasone     Tablet 4 milliGRAM(s) Oral every 6 hours  cefepime  IVPB 2000 milliGRAM(s) IV Intermittent every 8 hours  vancomycin  IVPB 1000 milliGRAM(s) IV Intermittent every 12 hours  docusate sodium 100 milliGRAM(s) Oral daily  senna 2 Tablet(s) Oral at bedtime        REVIEW OF SYSTEMS:  Vital Signs Last 24 Hrs  T(C): 36.6 (31 Aug 2017 01:53), Max: 36.7 (30 Aug 2017 20:32)  T(F): 97.8 (31 Aug 2017 01:53), Max: 98.1 (30 Aug 2017 20:32)  HR: 73 (31 Aug 2017 01:53) (71 - 102)  BP: 94/60 (31 Aug 2017 01:53) (92/60 - 94/60)  BP(mean): --  RR: 18 (31 Aug 2017 01:53) (18 - 20)  SpO2: 98% (31 Aug 2017 01:53) (97% - 99%)    PHYSICAL EXAM:  General: A/ox 3, No acute Distress  Neck: Supple, NO JVD  Cardiac: S1 S2, No M/R/G  Pulmonary: CTAB, Breathing unlabored, No Rhonchi/Rales/Wheezing  Abdomen: Soft, Non -tender, +BS   Extremities: No Rashes, No edema  Neuro: A/o x 3, No focal deficits  Psch: normal mood , normal affect    LABS:  cret                        10.0   14.4  )-----------( 153      ( 30 Aug 2017 11:00 )             32.8     08-30    137  |  97  |  20  ----------------------------<  174<H>  3.7   |  26  |  0.48<L>    Ca    9.5      30 Aug 2017 11:00  Phos  5.5     08-30  Mg     2.1     08-30    TPro  6.8  /  Alb  2.4<L>  /  TBili  1.2  /  DBili  x   /  AST  16  /  ALT  23  /  AlkPhos  256<H>  08-30    PT/INR - ( 30 Aug 2017 01:13 )   PT: 16.6 sec;   INR: 1.52 ratio         PTT - ( 30 Aug 2017 01:13 )  PTT:33.2 sec  Daily     Daily Weight in k.2 (30 Aug 2017 09:03)  I&O's Detail S: Patient seen and examined at bedside. Pt has no acute complaints overnight. Denies any fevers, chills, nausea, vomiting. Subjectively feels better today.          ferrous    sulfate 325 milliGRAM(s) Oral two times a day with meals  ergocalciferol 20999 Unit(s) Oral every week  dexamethasone     Tablet 4 milliGRAM(s) Oral every 6 hours  cefepime  IVPB 2000 milliGRAM(s) IV Intermittent every 8 hours  vancomycin  IVPB 1000 milliGRAM(s) IV Intermittent every 12 hours  docusate sodium 100 milliGRAM(s) Oral daily  senna 2 Tablet(s) Oral at bedtime        REVIEW OF SYSTEMS:  Vital Signs Last 24 Hrs  T(C): 36.6 (31 Aug 2017 01:53), Max: 36.7 (30 Aug 2017 20:32)  T(F): 97.8 (31 Aug 2017 01:53), Max: 98.1 (30 Aug 2017 20:32)  HR: 73 (31 Aug 2017 01:53) (71 - 102)  BP: 94/60 (31 Aug 2017 01:53) (92/60 - 94/60)  BP(mean): --  RR: 18 (31 Aug 2017 01:53) (18 - 20)  SpO2: 98% (31 Aug 2017 01:53) (97% - 99%)    PHYSICAL EXAM:  General: A/ox 3, No acute Distress  Neck: Supple, NO JVD; palpable LAD supraclavicular, axillary  Cardiac: S1 S2, No M/R/G  Pulmonary: CTAB, Breathing unlabored, No Rhonchi/Rales/Wheezing  Abdomen: Soft, Non -tender, +BS   Extremities: No Rashes, No edema  Neuro: A/o x 3, No focal deficits  Psych: normal mood , normal affect    LABS:  cret                        10.0   14.4  )-----------( 153      ( 30 Aug 2017 11:00 )             32.8     08-30    137  |  97  |  20  ----------------------------<  174<H>  3.7   |  26  |  0.48<L>    Ca    9.5      30 Aug 2017 11:00  Phos  5.5     08-30  Mg     2.1     08-30    TPro  6.8  /  Alb  2.4<L>  /  TBili  1.2  /  DBili  x   /  AST  16  /  ALT  23  /  AlkPhos  256<H>  08-30    PT/INR - ( 30 Aug 2017 01:13 )   PT: 16.6 sec;   INR: 1.52 ratio         PTT - ( 30 Aug 2017 01:13 )  PTT:33.2 sec  Daily     Daily Weight in k.2 (30 Aug 2017 09:03)  I&O's Detail

## 2017-08-31 NOTE — BEHAVIORAL HEALTH ASSESSMENT NOTE - NSBHCHARTREVIEWLAB_PSY_A_CORE FT
9.7    15.0  )-----------( 153      ( 31 Aug 2017 07:06 )             32.0         136  |  97  |  15  ----------------------------<  92  4.4   |  29  |  0.34<L>    Ca    9.4      31 Aug 2017 07:06  Phos  3.4       Mg     2.0         TPro  6.7  /  Alb  2.5<L>  /  TBili  0.9  /  DBili  x   /  AST  7<L>  /  ALT  15  /  AlkPhos  194<H>      Urinalysis Basic - ( 30 Aug 2017 02:16 )    Color: Yellow / Appearance: Clear / S.015 / pH: x  Gluc: x / Ketone: Negative  / Bili: Negative / Urobili: Negative   Blood: x / Protein: Negative / Nitrite: Negative   Leuk Esterase: Negative / RBC: 0-2 /HPF / WBC 0-2 /HPF   Sq Epi: x / Non Sq Epi: x / Bacteria: x

## 2017-08-31 NOTE — PROGRESS NOTE ADULT - ATTENDING COMMENTS
41 y/o woman with advanced Hodgkin disease refusing chemotherapy despite long discussions with several health care givers including us.  She saw Dr Baxter today and she agreed to receive Brentuximab Vodotin.

## 2017-09-01 DIAGNOSIS — B34.8 OTHER VIRAL INFECTIONS OF UNSPECIFIED SITE: ICD-10-CM

## 2017-09-01 LAB
ALBUMIN SERPL ELPH-MCNC: 2.3 G/DL — LOW (ref 3.3–5)
ALBUMIN SERPL ELPH-MCNC: 2.7 G/DL — LOW (ref 3.3–5)
ALP SERPL-CCNC: 181 U/L — HIGH (ref 40–120)
ALP SERPL-CCNC: 218 U/L — HIGH (ref 40–120)
ALT FLD-CCNC: 22 U/L RC — SIGNIFICANT CHANGE UP (ref 10–45)
ALT FLD-CCNC: 9 U/L — LOW (ref 10–45)
ANION GAP SERPL CALC-SCNC: 10 MMOL/L — SIGNIFICANT CHANGE UP (ref 5–17)
ANION GAP SERPL CALC-SCNC: 11 MMOL/L — SIGNIFICANT CHANGE UP (ref 5–17)
AST SERPL-CCNC: 12 U/L — SIGNIFICANT CHANGE UP (ref 10–40)
AST SERPL-CCNC: 25 U/L — SIGNIFICANT CHANGE UP (ref 10–40)
BASOPHILS # BLD AUTO: 0 K/UL — SIGNIFICANT CHANGE UP (ref 0–0.2)
BASOPHILS NFR BLD AUTO: 0 % — SIGNIFICANT CHANGE UP (ref 0–2)
BILIRUB SERPL-MCNC: 0.8 MG/DL — SIGNIFICANT CHANGE UP (ref 0.2–1.2)
BILIRUB SERPL-MCNC: 1 MG/DL — SIGNIFICANT CHANGE UP (ref 0.2–1.2)
BUN SERPL-MCNC: 18 MG/DL — SIGNIFICANT CHANGE UP (ref 7–23)
BUN SERPL-MCNC: 20 MG/DL — SIGNIFICANT CHANGE UP (ref 7–23)
CALCIUM SERPL-MCNC: 9.2 MG/DL — SIGNIFICANT CHANGE UP (ref 8.4–10.5)
CALCIUM SERPL-MCNC: 9.5 MG/DL — SIGNIFICANT CHANGE UP (ref 8.4–10.5)
CHLORIDE SERPL-SCNC: 96 MMOL/L — SIGNIFICANT CHANGE UP (ref 96–108)
CHLORIDE SERPL-SCNC: 97 MMOL/L — SIGNIFICANT CHANGE UP (ref 96–108)
CO2 SERPL-SCNC: 26 MMOL/L — SIGNIFICANT CHANGE UP (ref 22–31)
CO2 SERPL-SCNC: 29 MMOL/L — SIGNIFICANT CHANGE UP (ref 22–31)
CREAT SERPL-MCNC: 0.44 MG/DL — LOW (ref 0.5–1.3)
CREAT SERPL-MCNC: 0.47 MG/DL — LOW (ref 0.5–1.3)
EOSINOPHIL # BLD AUTO: 0.27 K/UL — SIGNIFICANT CHANGE UP (ref 0–0.5)
EOSINOPHIL NFR BLD AUTO: 1.8 % — SIGNIFICANT CHANGE UP (ref 0–6)
GLUCOSE SERPL-MCNC: 114 MG/DL — HIGH (ref 70–99)
GLUCOSE SERPL-MCNC: 93 MG/DL — SIGNIFICANT CHANGE UP (ref 70–99)
HCT VFR BLD CALC: 28.9 % — LOW (ref 34.5–45)
HGB BLD-MCNC: 8.8 G/DL — LOW (ref 11.5–15.5)
LDH SERPL L TO P-CCNC: 196 U/L — SIGNIFICANT CHANGE UP (ref 50–242)
LDH SERPL L TO P-CCNC: 256 U/L — HIGH (ref 50–242)
LYMPHOCYTES # BLD AUTO: 1.06 K/UL — SIGNIFICANT CHANGE UP (ref 1–3.3)
LYMPHOCYTES # BLD AUTO: 7 % — LOW (ref 13–44)
MAGNESIUM SERPL-MCNC: 2.1 MG/DL — SIGNIFICANT CHANGE UP (ref 1.6–2.6)
MAGNESIUM SERPL-MCNC: 2.1 MG/DL — SIGNIFICANT CHANGE UP (ref 1.6–2.6)
MCHC RBC-ENTMCNC: 26.3 PG — LOW (ref 27–34)
MCHC RBC-ENTMCNC: 30.4 GM/DL — LOW (ref 32–36)
MCV RBC AUTO: 86.5 FL — SIGNIFICANT CHANGE UP (ref 80–100)
MONOCYTES # BLD AUTO: 0.27 K/UL — SIGNIFICANT CHANGE UP (ref 0–0.9)
MONOCYTES NFR BLD AUTO: 1.8 % — LOW (ref 2–14)
NEUTROPHILS # BLD AUTO: 13.34 K/UL — HIGH (ref 1.8–7.4)
NEUTROPHILS NFR BLD AUTO: 86.8 % — HIGH (ref 43–77)
PHOSPHATE SERPL-MCNC: 3.5 MG/DL — SIGNIFICANT CHANGE UP (ref 2.5–4.5)
PHOSPHATE SERPL-MCNC: 3.5 MG/DL — SIGNIFICANT CHANGE UP (ref 2.5–4.5)
PLATELET # BLD AUTO: 149 K/UL — LOW (ref 150–400)
POTASSIUM SERPL-MCNC: 4.6 MMOL/L — SIGNIFICANT CHANGE UP (ref 3.5–5.3)
POTASSIUM SERPL-MCNC: 4.7 MMOL/L — SIGNIFICANT CHANGE UP (ref 3.5–5.3)
POTASSIUM SERPL-SCNC: 4.6 MMOL/L — SIGNIFICANT CHANGE UP (ref 3.5–5.3)
POTASSIUM SERPL-SCNC: 4.7 MMOL/L — SIGNIFICANT CHANGE UP (ref 3.5–5.3)
PROT SERPL-MCNC: 6.6 G/DL — SIGNIFICANT CHANGE UP (ref 6–8.3)
PROT SERPL-MCNC: 6.8 G/DL — SIGNIFICANT CHANGE UP (ref 6–8.3)
RBC # BLD: 3.34 M/UL — LOW (ref 3.8–5.2)
RBC # FLD: 21.8 % — HIGH (ref 10.3–14.5)
SODIUM SERPL-SCNC: 134 MMOL/L — LOW (ref 135–145)
SODIUM SERPL-SCNC: 135 MMOL/L — SIGNIFICANT CHANGE UP (ref 135–145)
URATE SERPL-MCNC: 1.8 MG/DL — LOW (ref 2.5–7)
URATE SERPL-MCNC: 3.5 MG/DL — SIGNIFICANT CHANGE UP (ref 2.5–7)
WBC # BLD: 15.21 K/UL — HIGH (ref 3.8–10.5)
WBC # FLD AUTO: 15.21 K/UL — HIGH (ref 3.8–10.5)

## 2017-09-01 PROCEDURE — 99233 SBSQ HOSP IP/OBS HIGH 50: CPT | Mod: GC

## 2017-09-01 RX ORDER — RASBURICASE 7.5 MG
3 KIT INTRAVENOUS ONCE
Qty: 0 | Refills: 0 | Status: COMPLETED | OUTPATIENT
Start: 2017-09-01 | End: 2017-09-01

## 2017-09-01 RX ORDER — BRENTUXIMAB VEDOTIN 50 MG/10.5ML
100 INJECTION, POWDER, LYOPHILIZED, FOR SOLUTION INTRAVENOUS ONCE
Qty: 0 | Refills: 0 | Status: DISCONTINUED | OUTPATIENT
Start: 2017-09-01 | End: 2017-09-01

## 2017-09-01 RX ORDER — SODIUM CHLORIDE 9 MG/ML
1000 INJECTION INTRAMUSCULAR; INTRAVENOUS; SUBCUTANEOUS
Qty: 0 | Refills: 0 | Status: DISCONTINUED | OUTPATIENT
Start: 2017-09-01 | End: 2017-09-03

## 2017-09-01 RX ORDER — ONDANSETRON 8 MG/1
8 TABLET, FILM COATED ORAL EVERY 8 HOURS
Qty: 0 | Refills: 0 | Status: DISCONTINUED | OUTPATIENT
Start: 2017-09-01 | End: 2017-09-04

## 2017-09-01 RX ADMIN — Medication 4 MILLIGRAM(S): at 17:41

## 2017-09-01 RX ADMIN — CEFEPIME 100 MILLIGRAM(S): 1 INJECTION, POWDER, FOR SOLUTION INTRAMUSCULAR; INTRAVENOUS at 07:21

## 2017-09-01 RX ADMIN — CEFEPIME 100 MILLIGRAM(S): 1 INJECTION, POWDER, FOR SOLUTION INTRAMUSCULAR; INTRAVENOUS at 21:17

## 2017-09-01 RX ADMIN — CEFEPIME 100 MILLIGRAM(S): 1 INJECTION, POWDER, FOR SOLUTION INTRAMUSCULAR; INTRAVENOUS at 14:28

## 2017-09-01 RX ADMIN — Medication 4 MILLIGRAM(S): at 12:06

## 2017-09-01 RX ADMIN — SODIUM CHLORIDE 150 MILLILITER(S): 9 INJECTION INTRAMUSCULAR; INTRAVENOUS; SUBCUTANEOUS at 12:06

## 2017-09-01 RX ADMIN — Medication 100 MILLIGRAM(S): at 12:06

## 2017-09-01 RX ADMIN — ENOXAPARIN SODIUM 40 MILLIGRAM(S): 100 INJECTION SUBCUTANEOUS at 12:06

## 2017-09-01 RX ADMIN — Medication 4 MILLIGRAM(S): at 23:10

## 2017-09-01 RX ADMIN — POLYETHYLENE GLYCOL 3350 17 GRAM(S): 17 POWDER, FOR SOLUTION ORAL at 12:05

## 2017-09-01 RX ADMIN — RASBURICASE 104 MILLIGRAM(S): KIT at 12:45

## 2017-09-01 RX ADMIN — Medication 4 MILLIGRAM(S): at 05:20

## 2017-09-01 RX ADMIN — Medication 325 MILLIGRAM(S): at 17:41

## 2017-09-01 RX ADMIN — Medication 325 MILLIGRAM(S): at 10:06

## 2017-09-01 RX ADMIN — Medication 166.67 MILLIGRAM(S): at 05:20

## 2017-09-01 NOTE — PROGRESS NOTE ADULT - ATTENDING COMMENTS
To start CTx today, monitor for signs of tumor lysis.  Blood, Urine Cx unremarkable, would observe off abx for now

## 2017-09-01 NOTE — PROGRESS NOTE ADULT - PROBLEM SELECTOR PLAN 6
- had symptomatic anemia on admission (Hgb 6.6)  - s/p 2 PRBC transfusion,  Hgb now 10    - trend CBC  - no signs/symptoms of bleeding Likely cause of fever on admission. No signs of sepsis. No fever currently. Blood cultures negative.   - D/c vancomycin today

## 2017-09-01 NOTE — PROGRESS NOTE ADULT - PROBLEM SELECTOR PLAN 5
- has fever, leukocytosis, recent hospital stay  - fever could be 2/2 lymphoma however will cover with broad spectrum abx for now.  - f/u blood cultures, urine cultures (NGTD)  - c/w broad spectrum Abx (vancomycin/Cefepime) - had symptomatic anemia on admission (Hgb 6.6)  - s/p 2 PRBC transfusion,  Hgb now 10    - trend CBC  - no signs/symptoms of bleeding

## 2017-09-01 NOTE — PROGRESS NOTE ADULT - SUBJECTIVE AND OBJECTIVE BOX
Progress Note/Accept Note    Patient is a 42y old  Female who presents with a chief complaint of sent by md for low blood count (30 Aug 2017 06:11)    SUBJECTIVE / OVERNIGHT EVENTS:  Patient was transferred from [facility] as she started chemotherapy to [facility].         MEDICATIONS  (STANDING):  ferrous    sulfate 325 milliGRAM(s) Oral two times a day with meals  ergocalciferol 70340 Unit(s) Oral every week  dexamethasone     Tablet 4 milliGRAM(s) Oral every 6 hours  cefepime  IVPB 2000 milliGRAM(s) IV Intermittent every 8 hours  docusate sodium 100 milliGRAM(s) Oral daily  senna 2 Tablet(s) Oral at bedtime  enoxaparin Injectable 40 milliGRAM(s) SubCutaneous daily  vancomycin  IVPB 1250 milliGRAM(s) IV Intermittent every 12 hours  polyethylene glycol 3350 17 Gram(s) Oral daily    MEDICATIONS  (PRN):      CAPILLARY BLOOD GLUCOSE        I&O's Summary    31 Aug 2017 07:01  -  01 Sep 2017 07:00  --------------------------------------------------------  IN: 590 mL / OUT: 0 mL / NET: 590 mL        T(C): 36.4 (08-31-17 @ 20:54), Max: 36.8 (08-31-17 @ 20:40)  HR: 98 (08-31-17 @ 20:54) (78 - 98)  BP: 103/75 (08-31-17 @ 20:54) (97/65 - 103/75)  RR: 18 (08-31-17 @ 20:54) (18 - 18)  SpO2: 99% (08-31-17 @ 20:54) (94% - 99%)    PHYSICAL EXAM:  GENERAL: NAD, well-developed  HEAD:  Atraumatic, Normocephalic  EYES: EOMI, PERRLA, conjunctiva and sclera clear  NECK: Supple, No JVD  CHEST/LUNG: Clear to auscultation bilaterally; No wheeze  HEART: Regular rate and rhythm; No murmurs, rubs, or gallops  ABDOMEN: Soft, Nontender, Nondistended; Bowel sounds present  EXTREMITIES:  2+ Peripheral Pulses, No clubbing, cyanosis, or edema  PSYCH: AAOx3  NEUROLOGY: non-focal  SKIN: No rashes or lesions    LABS:                        9.7    15.0  )-----------( 153      ( 31 Aug 2017 07:06 )             32.0     08-31    136  |  97  |  15  ----------------------------<  92  4.4   |  29  |  0.34<L>    Ca    9.4      31 Aug 2017 07:06  Phos  3.4     08-31  Mg     2.0     08-31    TPro  6.7  /  Alb  2.5<L>  /  TBili  0.9  /  DBili  x   /  AST  7<L>  /  ALT  15  /  AlkPhos  194<H>  08-31              RADIOLOGY & ADDITIONAL TESTS:    Imaging Personally Reviewed:    Consultant(s) Notes Reviewed:      Care Discussed with Consultants/Other Providers: Progress Note/Accept Note    Patient is a 42y old  Female who presents with a chief complaint of sent by md for low blood count (30 Aug 2017 06:11)    SUBJECTIVE / OVERNIGHT EVENTS:  [facility] as [facility] started chemotherapy to [facility]. Feels better today. No fevers or chills. No sweats. Feels that she has gained weight from fluid retention. No chest pain. Palpitations have resolved after blood transfusion on admission.         MEDICATIONS  (STANDING):  ferrous    sulfate 325 milliGRAM(s) Oral two times a day with meals  ergocalciferol 28741 Unit(s) Oral every week  dexamethasone     Tablet 4 milliGRAM(s) Oral every 6 hours  cefepime  IVPB 2000 milliGRAM(s) IV Intermittent every 8 hours  docusate sodium 100 milliGRAM(s) Oral daily  senna 2 Tablet(s) Oral at bedtime  enoxaparin Injectable 40 milliGRAM(s) SubCutaneous daily  vancomycin  IVPB 1250 milliGRAM(s) IV Intermittent every 12 hours  polyethylene glycol 3350 17 Gram(s) Oral daily    Vital Signs Last 24 Hrs  T(C): 36.6 (01 Sep 2017 07:44), Max: 36.8 (31 Aug 2017 20:40)  T(F): 97.9 (01 Sep 2017 07:44), Max: 98.2 (31 Aug 2017 20:40)  HR: 68 (01 Sep 2017 07:44) (68 - 98)  BP: 103/63 (01 Sep 2017 07:44) (97/65 - 103/75)  BP(mean): --  RR: 18 (01 Sep 2017 07:44) (18 - 18)  SpO2: 98% (01 Sep 2017 07:44) (94% - 99%)    I&O's Summary    31 Aug 2017 07:01  -  01 Sep 2017 07:00  --------------------------------------------------------  IN: 590 mL / OUT: 0 mL / NET: 590 mL        T(C): 36.4 (08-31-17 @ 20:54), Max: 36.8 (08-31-17 @ 20:40)  HR: 98 (08-31-17 @ 20:54) (78 - 98)  BP: 103/75 (08-31-17 @ 20:54) (97/65 - 103/75)  RR: 18 (08-31-17 @ 20:54) (18 - 18)  SpO2: 99% (08-31-17 @ 20:54) (94% - 99%)    PHYSICAL EXAM:  GENERAL: NAD  EYES: EOMI  NECK: Notable LAD  CHEST/LUNG: Diffuse wheezes. Decreased breath sounds on R posterior to mid thorax  HEART: Regular rate and rhythm (mildly tachycardic); no murmurs, rubs, or gallops  ABDOMEN: Soft, Nontender, Nondistended; Bowel sounds present  EXTREMITIES:  Notable pitting lower extremity edema  PSYCH: AAOx3      LABS:                        9.7    15.0  )-----------( 153      ( 31 Aug 2017 07:06 )             32.0     08-31    136  |  97  |  15  ----------------------------<  92  4.4   |  29  |  0.34<L>    Ca    9.4      31 Aug 2017 07:06  Phos  3.4     08-31  Mg     2.0     08-31    TPro  6.7  /  Alb  2.5<L>  /  TBili  0.9  /  DBili  x   /  AST  7<L>  /  ALT  15  /  AlkPhos  194<H>  08-31

## 2017-09-01 NOTE — PROGRESS NOTE ADULT - PROBLEM SELECTOR PLAN 3
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 No respiratory distress. Initially refused pleurex on prior admission. Pulmonary consulted recommending thoracentesis prior to pleurex. No acute indication for thoracentesis currently.

## 2017-09-01 NOTE — PROGRESS NOTE ADULT - PROBLEM SELECTOR PLAN 4
- TTE shows small pericardial effusion with no tamponade physiology TTE shows small pericardial effusion with no tamponade physiology.  - Consider cardiology consult for further work-up. Had refused pericardial window in prior admission.

## 2017-09-01 NOTE — PROGRESS NOTE ADULT - PROBLEM SELECTOR PLAN 1
Patient refused standard of care treatment per oncology note. Now amenable to immunotherapy.   - palliative care consult to assess for goals of care and DNR/DNI decision  - psych consult to evaluate for capacity to refuse treatment Patient refused standard of care treatment per oncology note. Now amenable to immunotherapy (Brentuximab-vedotin).   - Psychiatry documenting patient was able to explain disease and discuss risks and benefits of treatments, but had requested further details from heme-onc. Heme-onc documenting long patient discussion. Now proceeding with immunotherapy.   - Pending goals of care and DNR/DNI decision

## 2017-09-01 NOTE — PROGRESS NOTE ADULT - ASSESSMENT
Pending    Andra Savage MD  Internal Medicine, Intern  Pager [phone] 42F PMH Hodgkin's Lymphoma (Dx 7/2017 w/ L axillary LN biopsy, treatment naive), complicated by SVC syndrome, pericardial effusion, pleural effusion, presents with shortness of breath, likely multifactorial (anemia, pleural effusion, pericardial effusion, SVC syndrome) and lymphoma w/ extensive mediastinal lymphadenopathy now starting immunotherapy    **INCOMPLETE** 42F PMH Hodgkin's Lymphoma (Dx 7/2017 w/ L axillary LN biopsy, treatment naive), complicated by SVC syndrome, pericardial effusion, pleural effusion, presents with shortness of breath, likely multifactorial (anemia, pleural effusion, pericardial effusion, SVC syndrome) and lymphoma w/ extensive mediastinal lymphadenopathy now starting immunotherapy

## 2017-09-01 NOTE — PROGRESS NOTE ADULT - PROBLEM SELECTOR PLAN 1
-Planning on treatment with brentuximab vedotin today, 1.8mg/kg. Side effects including, but not limited to neuropathy, anaphylaxis, cytopenias and infections, tumor lysis syndrome and liver abnormalities discussed. Patient verbalized understanding and is willing to proceed with treatment.  -Aggressive IVF fluid hydration over the next 24-48hrs, started prior to therapy, to reduce risk of SOFIA and tumor lysis syndrome  -Rasburicase 3mg IV x1 prior to treatment. May require repeat dosing over the weekend  -Please monitor TLS labs (CMP, Mg, Phos, uric acid, LDH) every 4 hours overnight tonight, and if normal, may space out to BID through weekend.  -Next treatment due 9/22/17 as outpatient with Dr. Baxter.   -Further disposition decisions pending response to treatment and any unforeseen complications experienced this weekend.

## 2017-09-01 NOTE — PROGRESS NOTE ADULT - ATTENDING COMMENTS
Patient received brentuximab vedotin today, no compications.    Plan: f/up closely over the weekend. Check daily labs  for tumor lysis sd.

## 2017-09-01 NOTE — ADVANCED PRACTICE NURSE CONSULT - ASSESSMENT
Cycle 1, day 1/1,Height and weight verified. Lab results as per Md roberto mansfield aware of same. Vital signs stable prior to chemotherapy, and  within accepectable parameters, see sunrise.  Pt education done re chemo regimen, drug effects and potential side effects, written materials provided, pt states understanding. . Pt with # 20 lower forearm  line, site free from signs and symptoms of infection, good blood return obtained. bemtiximab vedotin 1.8 mg/kg =100 mg iv over 30 min without incident

## 2017-09-01 NOTE — PROGRESS NOTE ADULT - SUBJECTIVE AND OBJECTIVE BOX
Hematology Follow-up    INTERVAL HPI/OVERNIGHT EVENTS:  Patient S&E at bedside. No o/n events, patient resting comfortably. No complaints at this time. Patient denies fever, chills, dizziness, weakness, CP, palpitations, SOB, cough, N/V/D/C, dysuria, changes in bowel movements. Eager to start treatment today.    VITAL SIGNS:  T(F): 97.9 (09-01-17 @ 07:44)  HR: 68 (09-01-17 @ 07:44)  BP: 103/63 (09-01-17 @ 07:44)  RR: 18 (09-01-17 @ 07:44)  SpO2: 98% (09-01-17 @ 07:44)  Wt(kg): --    PHYSICAL EXAM:    Constitutional: AAOx3, NAD  Eyes: PERRL, EOMI, sclera non-icteric  Neck: supple,  Respiratory: CTA b/l, good air entry b/l, no wheezing, rhonchi, rales, with normal respiratory effort and no intercostal retractions  Cardiovascular: RRR, normal S1S2, no M/R/G  Gastrointestinal: soft, NTND, no masses palpable, BS normal in all four quadrants, no HSM  Extremities:  no c/c/e  Neurological: Grossly intact  Skin: Normal temperature    MEDICATIONS  (STANDING):  ferrous    sulfate 325 milliGRAM(s) Oral two times a day with meals  ergocalciferol 65914 Unit(s) Oral every week  dexamethasone     Tablet 4 milliGRAM(s) Oral every 6 hours  cefepime  IVPB 2000 milliGRAM(s) IV Intermittent every 8 hours  docusate sodium 100 milliGRAM(s) Oral daily  senna 2 Tablet(s) Oral at bedtime  enoxaparin Injectable 40 milliGRAM(s) SubCutaneous daily  polyethylene glycol 3350 17 Gram(s) Oral daily  sodium chloride 0.9%. 1000 milliLiter(s) (150 mL/Hr) IV Continuous <Continuous>  rasburicase IVPB 3 milliGRAM(s) IV Intermittent once    MEDICATIONS  (PRN):      No Known Allergies      LABS:                        9.7    15.0  )-----------( 153      ( 31 Aug 2017 07:06 )             32.0     09-01    134<L>  |  97  |  20  ----------------------------<  114<H>  4.7   |  26  |  0.47<L>    Ca    9.5      01 Sep 2017 09:09  Phos  3.5     09-01  Mg     2.1     09-01    TPro  6.6  /  Alb  2.3<L>  /  TBili  0.8  /  DBili  x   /  AST  12  /  ALT  9<L>  /  AlkPhos  181<H>  09-01     Lactate Dehydrogenase, Serum: 256 U/L (09-01 @ 09:09)        RADIOLOGY & ADDITIONAL TESTS:  Studies reviewed. Hematology Follow-up    INTERVAL HPI/OVERNIGHT EVENTS:  Patient S&E at bedside. No o/n events, patient resting comfortably. No complaints at this time. Patient denies fever, chills, dizziness, weakness, CP, palpitations, SOB, cough, N/V/D/C, dysuria, changes in bowel movements. Eager to start treatment today.    VITAL SIGNS:  T(F): 97.9 (09-01-17 @ 07:44)  HR: 68 (09-01-17 @ 07:44)  BP: 103/63 (09-01-17 @ 07:44)  RR: 18 (09-01-17 @ 07:44)  SpO2: 98% (09-01-17 @ 07:44)  Wt(kg): --    PHYSICAL EXAM:    Constitutional: AAOx3, NAD  Eyes: PERRL, EOMI, sclera non-icteric  Neck: supple  Respiratory: Decreased breath sounds in right lung fields, CTA on left, all lung fields  Cardiovascular: RRR, normal S1S2, no M/R/G  Gastrointestinal: soft, NTND, no masses palpable, BS normal in all four quadrants  Extremities:  3+ pitting edema b/l LE  Neurological: Grossly intact  Lymph: b/l supraclavicular, axillary, inguinal LAD  Skin: Normal temperature    MEDICATIONS  (STANDING):  ferrous    sulfate 325 milliGRAM(s) Oral two times a day with meals  ergocalciferol 83960 Unit(s) Oral every week  dexamethasone     Tablet 4 milliGRAM(s) Oral every 6 hours  cefepime  IVPB 2000 milliGRAM(s) IV Intermittent every 8 hours  docusate sodium 100 milliGRAM(s) Oral daily  senna 2 Tablet(s) Oral at bedtime  enoxaparin Injectable 40 milliGRAM(s) SubCutaneous daily  polyethylene glycol 3350 17 Gram(s) Oral daily  sodium chloride 0.9%. 1000 milliLiter(s) (150 mL/Hr) IV Continuous <Continuous>  rasburicase IVPB 3 milliGRAM(s) IV Intermittent once    MEDICATIONS  (PRN):      No Known Allergies      LABS:                        9.7    15.0  )-----------( 153      ( 31 Aug 2017 07:06 )             32.0     09-01    134<L>  |  97  |  20  ----------------------------<  114<H>  4.7   |  26  |  0.47<L>    Ca    9.5      01 Sep 2017 09:09  Phos  3.5     09-01  Mg     2.1     09-01    TPro  6.6  /  Alb  2.3<L>  /  TBili  0.8  /  DBili  x   /  AST  12  /  ALT  9<L>  /  AlkPhos  181<H>  09-01     Lactate Dehydrogenase, Serum: 256 U/L (09-01 @ 09:09)        RADIOLOGY & ADDITIONAL TESTS:  Studies reviewed.

## 2017-09-02 DIAGNOSIS — R06.02 SHORTNESS OF BREATH: ICD-10-CM

## 2017-09-02 LAB
ALBUMIN SERPL ELPH-MCNC: 2.5 G/DL — LOW (ref 3.3–5)
ALBUMIN SERPL ELPH-MCNC: 2.6 G/DL — LOW (ref 3.3–5)
ALBUMIN SERPL ELPH-MCNC: 2.7 G/DL — LOW (ref 3.3–5)
ALP SERPL-CCNC: 236 U/L — HIGH (ref 40–120)
ALP SERPL-CCNC: 248 U/L — HIGH (ref 40–120)
ALP SERPL-CCNC: 266 U/L — HIGH (ref 40–120)
ALT FLD-CCNC: 21 U/L RC — SIGNIFICANT CHANGE UP (ref 10–45)
ALT FLD-CCNC: 25 U/L RC — SIGNIFICANT CHANGE UP (ref 10–45)
ALT FLD-CCNC: 27 U/L RC — SIGNIFICANT CHANGE UP (ref 10–45)
ANION GAP SERPL CALC-SCNC: 13 MMOL/L — SIGNIFICANT CHANGE UP (ref 5–17)
ANION GAP SERPL CALC-SCNC: 14 MMOL/L — SIGNIFICANT CHANGE UP (ref 5–17)
ANION GAP SERPL CALC-SCNC: 15 MMOL/L — SIGNIFICANT CHANGE UP (ref 5–17)
AST SERPL-CCNC: 17 U/L — SIGNIFICANT CHANGE UP (ref 10–40)
AST SERPL-CCNC: 23 U/L — SIGNIFICANT CHANGE UP (ref 10–40)
AST SERPL-CCNC: 26 U/L — SIGNIFICANT CHANGE UP (ref 10–40)
BASOPHILS # BLD AUTO: 0 K/UL — SIGNIFICANT CHANGE UP (ref 0–0.2)
BASOPHILS NFR BLD AUTO: 0 % — SIGNIFICANT CHANGE UP (ref 0–2)
BILIRUB SERPL-MCNC: 0.9 MG/DL — SIGNIFICANT CHANGE UP (ref 0.2–1.2)
BILIRUB SERPL-MCNC: 1 MG/DL — SIGNIFICANT CHANGE UP (ref 0.2–1.2)
BILIRUB SERPL-MCNC: 1.1 MG/DL — SIGNIFICANT CHANGE UP (ref 0.2–1.2)
BUN SERPL-MCNC: 17 MG/DL — SIGNIFICANT CHANGE UP (ref 7–23)
BUN SERPL-MCNC: 18 MG/DL — SIGNIFICANT CHANGE UP (ref 7–23)
BUN SERPL-MCNC: 19 MG/DL — SIGNIFICANT CHANGE UP (ref 7–23)
CALCIUM SERPL-MCNC: 8.9 MG/DL — SIGNIFICANT CHANGE UP (ref 8.4–10.5)
CALCIUM SERPL-MCNC: 9.1 MG/DL — SIGNIFICANT CHANGE UP (ref 8.4–10.5)
CALCIUM SERPL-MCNC: 9.2 MG/DL — SIGNIFICANT CHANGE UP (ref 8.4–10.5)
CHLORIDE SERPL-SCNC: 95 MMOL/L — LOW (ref 96–108)
CHLORIDE SERPL-SCNC: 95 MMOL/L — LOW (ref 96–108)
CHLORIDE SERPL-SCNC: 96 MMOL/L — SIGNIFICANT CHANGE UP (ref 96–108)
CO2 SERPL-SCNC: 24 MMOL/L — SIGNIFICANT CHANGE UP (ref 22–31)
CO2 SERPL-SCNC: 24 MMOL/L — SIGNIFICANT CHANGE UP (ref 22–31)
CO2 SERPL-SCNC: 26 MMOL/L — SIGNIFICANT CHANGE UP (ref 22–31)
CREAT SERPL-MCNC: 0.4 MG/DL — LOW (ref 0.5–1.3)
CREAT SERPL-MCNC: 0.42 MG/DL — LOW (ref 0.5–1.3)
CREAT SERPL-MCNC: 0.63 MG/DL — SIGNIFICANT CHANGE UP (ref 0.5–1.3)
EOSINOPHIL # BLD AUTO: 0.15 K/UL — SIGNIFICANT CHANGE UP (ref 0–0.5)
EOSINOPHIL NFR BLD AUTO: 0.9 % — SIGNIFICANT CHANGE UP (ref 0–6)
GLUCOSE SERPL-MCNC: 114 MG/DL — HIGH (ref 70–99)
GLUCOSE SERPL-MCNC: 147 MG/DL — HIGH (ref 70–99)
GLUCOSE SERPL-MCNC: 150 MG/DL — HIGH (ref 70–99)
HCT VFR BLD CALC: 29.2 % — LOW (ref 34.5–45)
HGB BLD-MCNC: 8.7 G/DL — LOW (ref 11.5–15.5)
LDH SERPL L TO P-CCNC: 205 U/L — SIGNIFICANT CHANGE UP (ref 50–242)
LDH SERPL L TO P-CCNC: 212 U/L — SIGNIFICANT CHANGE UP (ref 50–242)
LDH SERPL L TO P-CCNC: 263 U/L — HIGH (ref 50–242)
LYMPHOCYTES # BLD AUTO: 0.44 K/UL — LOW (ref 1–3.3)
LYMPHOCYTES # BLD AUTO: 2.7 % — LOW (ref 13–44)
MAGNESIUM SERPL-MCNC: 1.9 MG/DL — SIGNIFICANT CHANGE UP (ref 1.6–2.6)
MCHC RBC-ENTMCNC: 25.9 PG — LOW (ref 27–34)
MCHC RBC-ENTMCNC: 29.8 GM/DL — LOW (ref 32–36)
MCV RBC AUTO: 86.9 FL — SIGNIFICANT CHANGE UP (ref 80–100)
MONOCYTES # BLD AUTO: 0.29 K/UL — SIGNIFICANT CHANGE UP (ref 0–0.9)
MONOCYTES NFR BLD AUTO: 1.8 % — LOW (ref 2–14)
NEUTROPHILS # BLD AUTO: 14.21 K/UL — HIGH (ref 1.8–7.4)
NEUTROPHILS NFR BLD AUTO: 80.3 % — HIGH (ref 43–77)
PHOSPHATE SERPL-MCNC: 3.3 MG/DL — SIGNIFICANT CHANGE UP (ref 2.5–4.5)
PHOSPHATE SERPL-MCNC: 3.6 MG/DL — SIGNIFICANT CHANGE UP (ref 2.5–4.5)
PHOSPHATE SERPL-MCNC: 3.6 MG/DL — SIGNIFICANT CHANGE UP (ref 2.5–4.5)
PLATELET # BLD AUTO: 167 K/UL — SIGNIFICANT CHANGE UP (ref 150–400)
POTASSIUM SERPL-MCNC: 4.4 MMOL/L — SIGNIFICANT CHANGE UP (ref 3.5–5.3)
POTASSIUM SERPL-MCNC: 4.4 MMOL/L — SIGNIFICANT CHANGE UP (ref 3.5–5.3)
POTASSIUM SERPL-MCNC: 4.6 MMOL/L — SIGNIFICANT CHANGE UP (ref 3.5–5.3)
POTASSIUM SERPL-SCNC: 4.4 MMOL/L — SIGNIFICANT CHANGE UP (ref 3.5–5.3)
POTASSIUM SERPL-SCNC: 4.4 MMOL/L — SIGNIFICANT CHANGE UP (ref 3.5–5.3)
POTASSIUM SERPL-SCNC: 4.6 MMOL/L — SIGNIFICANT CHANGE UP (ref 3.5–5.3)
PROT SERPL-MCNC: 6.3 G/DL — SIGNIFICANT CHANGE UP (ref 6–8.3)
PROT SERPL-MCNC: 6.5 G/DL — SIGNIFICANT CHANGE UP (ref 6–8.3)
PROT SERPL-MCNC: 6.9 G/DL — SIGNIFICANT CHANGE UP (ref 6–8.3)
RBC # BLD: 3.36 M/UL — LOW (ref 3.8–5.2)
RBC # FLD: 22 % — HIGH (ref 10.3–14.5)
SODIUM SERPL-SCNC: 132 MMOL/L — LOW (ref 135–145)
SODIUM SERPL-SCNC: 135 MMOL/L — SIGNIFICANT CHANGE UP (ref 135–145)
SODIUM SERPL-SCNC: 135 MMOL/L — SIGNIFICANT CHANGE UP (ref 135–145)
URATE SERPL-MCNC: 1.6 MG/DL — LOW (ref 2.5–7)
URATE SERPL-MCNC: 1.8 MG/DL — LOW (ref 2.5–7)
URATE SERPL-MCNC: 2.1 MG/DL — LOW (ref 2.5–7)
WBC # BLD: 16.26 K/UL — HIGH (ref 3.8–10.5)
WBC # FLD AUTO: 16.26 K/UL — HIGH (ref 3.8–10.5)

## 2017-09-02 PROCEDURE — 99232 SBSQ HOSP IP/OBS MODERATE 35: CPT

## 2017-09-02 PROCEDURE — 71010: CPT | Mod: 26

## 2017-09-02 PROCEDURE — 99233 SBSQ HOSP IP/OBS HIGH 50: CPT | Mod: GC

## 2017-09-02 RX ADMIN — CEFEPIME 100 MILLIGRAM(S): 1 INJECTION, POWDER, FOR SOLUTION INTRAMUSCULAR; INTRAVENOUS at 06:20

## 2017-09-02 RX ADMIN — Medication 325 MILLIGRAM(S): at 09:29

## 2017-09-02 RX ADMIN — Medication 100 MILLIGRAM(S): at 14:47

## 2017-09-02 RX ADMIN — Medication 4 MILLIGRAM(S): at 09:30

## 2017-09-02 RX ADMIN — POLYETHYLENE GLYCOL 3350 17 GRAM(S): 17 POWDER, FOR SOLUTION ORAL at 14:46

## 2017-09-02 RX ADMIN — Medication 4 MILLIGRAM(S): at 22:47

## 2017-09-02 RX ADMIN — SENNA PLUS 2 TABLET(S): 8.6 TABLET ORAL at 22:47

## 2017-09-02 RX ADMIN — Medication 4 MILLIGRAM(S): at 14:46

## 2017-09-02 RX ADMIN — Medication 325 MILLIGRAM(S): at 18:49

## 2017-09-02 RX ADMIN — ENOXAPARIN SODIUM 40 MILLIGRAM(S): 100 INJECTION SUBCUTANEOUS at 14:47

## 2017-09-02 NOTE — PROGRESS NOTE ADULT - PROBLEM SELECTOR PLAN 5
- had symptomatic anemia on admission (Hgb 6.6)  - s/p 2 PRBC transfusion,  Hgb now 8.7  -Continue to monitor and transfuse for hgb < 7    - trend CBC  - no signs/symptoms of bleeding

## 2017-09-02 NOTE — PROGRESS NOTE ADULT - SUBJECTIVE AND OBJECTIVE BOX
Patient is a 42y old  Female who presents with a chief complaint of sent by md for low blood count (30 Aug 2017 06:11)      SUBJECTIVE / OVERNIGHT EVENTS:  Continues to feel short of breath and is uncomfortable from fluid retention. Denies nausea.    Vital Signs Last 24 Hrs  T(C): 36.6 (02 Sep 2017 10:07), Max: 36.9 (01 Sep 2017 21:26)  T(F): 97.9 (02 Sep 2017 10:07), Max: 98.4 (01 Sep 2017 21:26)  HR: 94 (02 Sep 2017 10:07) (71 - 94)  BP: 100/66 (02 Sep 2017 10:07) (96/64 - 110/73)  BP(mean): --  RR: 18 (02 Sep 2017 10:07) (16 - 18)  SpO2: 95% (02 Sep 2017 10:07) (95% - 97%)    MEDICATIONS  (STANDING):  ferrous    sulfate 325 milliGRAM(s) Oral two times a day with meals  ergocalciferol 79174 Unit(s) Oral every week  dexamethasone     Tablet 4 milliGRAM(s) Oral every 6 hours  cefepime  IVPB 2000 milliGRAM(s) IV Intermittent every 8 hours  docusate sodium 100 milliGRAM(s) Oral daily  senna 2 Tablet(s) Oral at bedtime  enoxaparin Injectable 40 milliGRAM(s) SubCutaneous daily  polyethylene glycol 3350 17 Gram(s) Oral daily  sodium chloride 0.9%. 1000 milliLiter(s) (150 mL/Hr) IV Continuous <Continuous>  Brentuximab (ADCENTRIS) 100 milliGRAM(s),sodium chloride 0.9% 100 milliLiter(s) 100 milliGRAM(s) IV Intermittent once    MEDICATIONS  (PRN):  ondansetron Injectable 8 milliGRAM(s) IV Push every 8 hours PRN Nausea and/or Vomiting      CAPILLARY BLOOD GLUCOSE    Vital Signs Last 24 Hrs  T(C): 36.9 (01 Sep 2017 21:26), Max: 36.9 (01 Sep 2017 21:26)  T(F): 98.4 (01 Sep 2017 21:26), Max: 98.4 (01 Sep 2017 21:26)  HR: 91 (01 Sep 2017 21:26) (68 - 91)  BP: 96/64 (01 Sep 2017 21:26) (96/64 - 110/73)  BP(mean): --  RR: 18 (01 Sep 2017 21:26) (16 - 18)  SpO2: 97% (01 Sep 2017 21:26) (97% - 98%)    I&O's Summary    01 Sep 2017 07:01  -  02 Sep 2017 07:00  --------------------------------------------------------  IN: 540 mL / OUT: 0 mL / NET: 540 mL        PHYSICAL EXAM:  GENERAL: NAD, well-developed  HEAD:  Atraumatic, Normocephalic  CHEST/LUNG: Decreased breath sounds on right posteriorly  HEART: Regular rate and rhythm; No murmurs, rubs, or gallops  ABDOMEN: Mild tenderness, soft, no guarding or rigidity  EXTREMITIES:  Significant pitting edema  NEUROLOGY: non-focal  SKIN: No rashes or lesions    LABS:                        8.7    16.26 )-----------( 167      ( 02 Sep 2017 09:32 )             29.2       09-02    135  |  96  |  18  ----------------------------<  114<H>  4.4   |  24  |  0.42<L>    Ca    8.9      02 Sep 2017 07:07  Phos  3.6     09-02  Mg     1.9     09-02    TPro  6.3  /  Alb  2.6<L>  /  TBili  0.9  /  DBili  x   /  AST  17  /  ALT  21  /  AlkPhos  236<H>  09-02

## 2017-09-02 NOTE — PROGRESS NOTE ADULT - PROBLEM SELECTOR PLAN 3
Right sided, likely malignant pleural effusion. No respiratory distress. Initially refused pleurex on prior admission. Pulmonary consulted recommending thoracentesis prior to pleurex.   - Consider therapeutic thoracentesis if worsening on CXR

## 2017-09-02 NOTE — PROGRESS NOTE ADULT - SUBJECTIVE AND OBJECTIVE BOX
Patient is a 42y old  Female who presents with a chief complaint of sent by md for low blood count (30 Aug 2017 06:11)      SUBJECTIVE / OVERNIGHT EVENTS:    MEDICATIONS  (STANDING):  ferrous    sulfate 325 milliGRAM(s) Oral two times a day with meals  ergocalciferol 71740 Unit(s) Oral every week  dexamethasone     Tablet 4 milliGRAM(s) Oral every 6 hours  cefepime  IVPB 2000 milliGRAM(s) IV Intermittent every 8 hours  docusate sodium 100 milliGRAM(s) Oral daily  senna 2 Tablet(s) Oral at bedtime  enoxaparin Injectable 40 milliGRAM(s) SubCutaneous daily  polyethylene glycol 3350 17 Gram(s) Oral daily  sodium chloride 0.9%. 1000 milliLiter(s) (150 mL/Hr) IV Continuous <Continuous>  Brentuximab (ADCENTRIS) 100 milliGRAM(s),sodium chloride 0.9% 100 milliLiter(s) 100 milliGRAM(s) IV Intermittent once    MEDICATIONS  (PRN):  ondansetron Injectable 8 milliGRAM(s) IV Push every 8 hours PRN Nausea and/or Vomiting      CAPILLARY BLOOD GLUCOSE    Vital Signs Last 24 Hrs  T(C): 36.9 (01 Sep 2017 21:26), Max: 36.9 (01 Sep 2017 21:26)  T(F): 98.4 (01 Sep 2017 21:26), Max: 98.4 (01 Sep 2017 21:26)  HR: 91 (01 Sep 2017 21:26) (68 - 91)  BP: 96/64 (01 Sep 2017 21:26) (96/64 - 110/73)  BP(mean): --  RR: 18 (01 Sep 2017 21:26) (16 - 18)  SpO2: 97% (01 Sep 2017 21:26) (97% - 98%)    I&O's Summary    01 Sep 2017 07:01  -  02 Sep 2017 07:00  --------------------------------------------------------  IN: 540 mL / OUT: 0 mL / NET: 540 mL        PHYSICAL EXAM:  GENERAL: NAD, well-developed  HEAD:  Atraumatic, Normocephalic  EYES:  NECK:   CHEST/LUNG: Clear to auscultation bilaterally; No wheezes, rhonchi or gallops  HEART: Regular rate and rhythm; No murmurs, rubs, or gallops  ABDOMEN: Soft, Nontender, Nondistended; Bowel sounds present  EXTREMITIES:  2+ Peripheral Pulses, No clubbing, cyanosis, or edema  PSYCH: Appropriate  NEUROLOGY: non-focal  SKIN: No rashes or lesions    LABS:                        8.8    15.21 )-----------( 149      ( 01 Sep 2017 09:15 )             28.9     09-01    135  |  95<L>  |  19  ----------------------------<  147<H>  4.4   |  26  |  0.63    Ca    9.2      01 Sep 2017 23:46  Phos  3.6     09-01  Mg     1.9     09-01    TPro  6.9  /  Alb  2.7<L>  /  TBili  1.0  /  DBili  x   /  AST  26  /  ALT  27  /  AlkPhos  248<H>  09-01              RADIOLOGY & ADDITIONAL TESTS:    Imaging Personally Reviewed:    Consultant(s) Notes Reviewed:      Care Discussed with Consultants/Other Providers: Patient is a 42y old  Female who presents with a chief complaint of sent by md for low blood count (30 Aug 2017 06:11)      SUBJECTIVE / OVERNIGHT EVENTS:  Continues to feel short of breath. Feels pressure from enlarged breast on right on chest. Feels tired today.     MEDICATIONS  (STANDING):  ferrous    sulfate 325 milliGRAM(s) Oral two times a day with meals  ergocalciferol 20551 Unit(s) Oral every week  dexamethasone     Tablet 4 milliGRAM(s) Oral every 6 hours  cefepime  IVPB 2000 milliGRAM(s) IV Intermittent every 8 hours  docusate sodium 100 milliGRAM(s) Oral daily  senna 2 Tablet(s) Oral at bedtime  enoxaparin Injectable 40 milliGRAM(s) SubCutaneous daily  polyethylene glycol 3350 17 Gram(s) Oral daily  sodium chloride 0.9%. 1000 milliLiter(s) (150 mL/Hr) IV Continuous <Continuous>  Brentuximab (ADCENTRIS) 100 milliGRAM(s),sodium chloride 0.9% 100 milliLiter(s) 100 milliGRAM(s) IV Intermittent once    MEDICATIONS  (PRN):  ondansetron Injectable 8 milliGRAM(s) IV Push every 8 hours PRN Nausea and/or Vomiting      CAPILLARY BLOOD GLUCOSE    Vital Signs Last 24 Hrs  T(C): 36.9 (01 Sep 2017 21:26), Max: 36.9 (01 Sep 2017 21:26)  T(F): 98.4 (01 Sep 2017 21:26), Max: 98.4 (01 Sep 2017 21:26)  HR: 91 (01 Sep 2017 21:26) (68 - 91)  BP: 96/64 (01 Sep 2017 21:26) (96/64 - 110/73)  BP(mean): --  RR: 18 (01 Sep 2017 21:26) (16 - 18)  SpO2: 97% (01 Sep 2017 21:26) (97% - 98%)    I&O's Summary    01 Sep 2017 07:01  -  02 Sep 2017 07:00  --------------------------------------------------------  IN: 540 mL / OUT: 0 mL / NET: 540 mL        PHYSICAL EXAM:  GENERAL: NAD, well-developed  HEAD:  Atraumatic, Normocephalic  CHEST/LUNG: Decreased breath sounds on right posteriorly  HEART: Regular rate and rhythm; No murmurs, rubs, or gallops  ABDOMEN: Mild tenderness, soft, no guarding or rigidity  EXTREMITIES:  Significant pitting edema  NEUROLOGY: non-focal  SKIN: No rashes or lesions    LABS:                        8.8    15.21 )-----------( 149      ( 01 Sep 2017 09:15 )             28.9     09-01    135  |  95<L>  |  19  ----------------------------<  147<H>  4.4   |  26  |  0.63    Ca    9.2      01 Sep 2017 23:46  Phos  3.6     09-01  Mg     1.9     09-01    TPro  6.9  /  Alb  2.7<L>  /  TBili  1.0  /  DBili  x   /  AST  26  /  ALT  27  /  AlkPhos  248<H>  09-01 Patient is a 42y old  Female who presents with a chief complaint of sent by md for low blood count (30 Aug 2017 06:11)      SUBJECTIVE / OVERNIGHT EVENTS:  Continues to feel short of breath and ALFARO. Feels assoc. pressure from enlarged breast on right on chest which she feels "has water retention". Feels tired today.     MEDICATIONS  (STANDING):  ferrous    sulfate 325 milliGRAM(s) Oral two times a day with meals  ergocalciferol 35771 Unit(s) Oral every week  dexamethasone     Tablet 4 milliGRAM(s) Oral every 6 hours  docusate sodium 100 milliGRAM(s) Oral daily  senna 2 Tablet(s) Oral at bedtime  enoxaparin Injectable 40 milliGRAM(s) SubCutaneous daily  polyethylene glycol 3350 17 Gram(s) Oral daily  sodium chloride 0.9%. 1000 milliLiter(s) (125 mL/Hr) IV Continuous <Continuous>  Brentuximab (ADCENTRIS) 100 milliGRAM(s),sodium chloride 0.9% 100 milliLiter(s) 100 milliGRAM(s) IV Intermittent once    MEDICATIONS  (PRN):  ondansetron Injectable 8 milliGRAM(s) IV Push every 8 hours PRN Nausea and/or Vomiting      CAPILLARY BLOOD GLUCOSE    Vital Signs Last 24 Hrs  T(C): 36.9 (01 Sep 2017 21:26), Max: 36.9 (01 Sep 2017 21:26)  T(F): 98.4 (01 Sep 2017 21:26), Max: 98.4 (01 Sep 2017 21:26)  HR: 91 (01 Sep 2017 21:26) (68 - 91)  BP: 96/64 (01 Sep 2017 21:26) (96/64 - 110/73)  BP(mean): --  RR: 18 (01 Sep 2017 21:26) (16 - 18)  SpO2: 97% (01 Sep 2017 21:26) (97% - 98%)    I&O's Summary    01 Sep 2017 07:01  -  02 Sep 2017 07:00  --------------------------------------------------------  IN: 540 mL / OUT: 0 mL / NET: 540 mL    PHYSICAL EXAM:  GENERAL: NAD, well-developed  HEAD:  Atraumatic, Normocephalic  CHEST/LUNG: Decreased breath sounds on right posteriorly  HEART: Regular rate and rhythm; No murmurs, rubs, or gallops  ABDOMEN: No TTP, soft, no guarding or rigidity  EXTREMITIES:  Significant LE pitting edema  NEUROLOGY: non-focal  SKIN: No rashes or lesions    LABS:                         8.7    16.26 )-----------( 167      ( 02 Sep 2017 09:32 )             29.2     09-02    135  |  96  |  18  ----------------------------<  114<H>  4.4   |  24  |  0.42<L>    Ca    8.9      02 Sep 2017 07:07  Phos  3.6     09-02  Mg     1.9     09-02    TPro  6.3  /  Alb  2.6<L>  /  TBili  0.9  /  DBili  x   /  AST  17  /  ALT  21  /  AlkPhos  236<H>  09-02    Labs reviewed remarkable for elev WBC 16 from 15, hgb stable, plt improved now WNL, TLS negative thus far.   CXR personally reviewed showing R pleural effusion, minimal to mild improvement compared to prior CXR 8/30.

## 2017-09-02 NOTE — PROGRESS NOTE ADULT - PROBLEM SELECTOR PLAN 1
Patient refused standard of care treatment per oncology note.   - Started Brentuximab-vedotin 9/1  - Continue to monitor tumor lysis labs   -Will start to decrease IVF as no evidence TLS to date

## 2017-09-02 NOTE — PROGRESS NOTE ADULT - PROBLEM SELECTOR PLAN 7
Lovenox ppx Likely cause of fever on admission. No signs of sepsis. No fever currently. Blood cultures negative. Now off vanc/cefepime.

## 2017-09-02 NOTE — PROGRESS NOTE ADULT - PROBLEM SELECTOR PLAN 5
- had symptomatic anemia on admission (Hgb 6.6)  - s/p 2 PRBC transfusion,  Hgb now 10    - trend CBC  - no signs/symptoms of bleeding TTE shows small pericardial effusion with no tamponade physiology.  - Consider cardiology consult for further work-up. Had refused pericardial window in prior admission.

## 2017-09-02 NOTE — PROGRESS NOTE ADULT - PROBLEM SELECTOR PLAN 4
TTE shows small pericardial effusion with no tamponade physiology.  - Consider cardiology consult for further work-up. Had refused pericardial window in prior admission. Right sided, likely malignant pleural effusion. Initially refused pleurex on prior admission. Pulmonary consulted recommending thoracentesis prior to pleurex.   - Consider therapeutic thoracentesis if worsening on CXR (repeat 9/2 mild improvement of r effusion)  - Decreasing IVF (for TLS preventino) due to concern of shortness of breath

## 2017-09-02 NOTE — PROGRESS NOTE ADULT - PROBLEM SELECTOR PLAN 2
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  - monitor resp status, currently off O2

## 2017-09-02 NOTE — PROGRESS NOTE ADULT - PROBLEM SELECTOR PLAN 3
Right sided, likely malignant pleural effusion. No respiratory distress. Initially refused pleurex on prior admission. Pulmonary consulted recommending thoracentesis prior to pleurex. No acute indication for thoracentesis currently. Right sided, likely malignant pleural effusion. No respiratory distress. Initially refused pleurex on prior admission. Pulmonary consulted recommending thoracentesis prior to pleurex.   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

## 2017-09-02 NOTE — PROGRESS NOTE ADULT - ASSESSMENT
42F PMH Hodgkin's Lymphoma (Dx 7/2017 w/ L axillary LN biopsy, treatment naive), complicated by SVC syndrome, pericardial effusion, pleural effusion, presents with shortness of breath, likely multifactorial (anemia, pleural effusion, pericardial effusion, SVC syndrome) and lymphoma w/ extensive mediastinal lymphadenopathy now starting immunotherapy

## 2017-09-02 NOTE — PROGRESS NOTE ADULT - PROBLEM SELECTOR PLAN 1
Patient refused standard of care treatment per oncology note.   - Started Brentuximab-vedotin yesterday  - Continue to monitor tumor lysis labs   - Psychiatry documenting patient was able to explain disease and discuss risks and benefits of treatments, but had requested further details from heme-onc. Heme-onc documenting long patient discussion. Now proceeding with immunotherapy.   - Pending goals of care and DNR/DNI decision Patient refused standard of care treatment per oncology note.   - Started Brentuximab-vedotin yesterday 9/1, NEXT tx 9/22 outpt. Watch for neuropathy, cytopenia, infection, TLS.  - Continue to monitor tumor lysis labs, can decr to q12h now  - Psychiatry documenting patient was able to explain disease and discuss risks and benefits of treatments, but had requested further details from heme-onc. Heme-onc documenting long patient discussion. Now proceeding with immunotherapy.   - Pending goals of care and DNR/DNI decision

## 2017-09-02 NOTE — PROGRESS NOTE ADULT - PROBLEM SELECTOR PLAN 6
Likely cause of fever on admission. No signs of sepsis. No fever currently. Blood cultures negative.   - D/c vancomycin today Likely cause of fever on admission. No signs of sepsis. No fever currently. Blood cultures negative.   - D/c vancomycin/cefepime - had symptomatic anemia on admission (Hgb 6.6), likely related to cancer anemia of chronic disease.  - s/p 2 PRBC transfusion,  Hgb now 10.  - trend CBC  - no signs/symptoms of bleeding

## 2017-09-02 NOTE — PROGRESS NOTE ADULT - PROBLEM SELECTOR PLAN 6
Likely cause of fever on admission. No signs of sepsis. No fever currently. Blood cultures negative.

## 2017-09-02 NOTE — PROGRESS NOTE ADULT - ASSESSMENT
42F PMH Hodgkin's Lymphoma (Dx 7/2017 w/ L axillary LN biopsy, treatment naive), complicated by SVC syndrome, pericardial effusion, pleural effusion, presents with shortness of breath, likely multifactorial (anemia, pleural effusion, pericardial effusion, SVC syndrome) and lymphoma w/ extensive mediastinal lymphadenopathy now starting immunotherapy 41 y/o F PMH Hodgkin's Lymphoma (Dx 7/2017 w/ L axillary LN biopsy, treatment naive), complicated by SVC syndrome, pericardial effusion, pleural effusion, presents with shortness of breath, likely multifactorial (anemia, pleural effusion, pericardial effusion, SVC syndrome) and lymphoma w/ extensive mediastinal lymphadenopathy now starting immunotherapy (9/1)

## 2017-09-02 NOTE — PROGRESS NOTE ADULT - PROBLEM SELECTOR PLAN 2
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

## 2017-09-03 DIAGNOSIS — B37.0 CANDIDAL STOMATITIS: ICD-10-CM

## 2017-09-03 LAB
ALBUMIN SERPL ELPH-MCNC: 2.5 G/DL — LOW (ref 3.3–5)
ALP SERPL-CCNC: 275 U/L — HIGH (ref 40–120)
ALT FLD-CCNC: 20 U/L RC — SIGNIFICANT CHANGE UP (ref 10–45)
ANION GAP SERPL CALC-SCNC: 10 MMOL/L — SIGNIFICANT CHANGE UP (ref 5–17)
AST SERPL-CCNC: 18 U/L — SIGNIFICANT CHANGE UP (ref 10–40)
BASOPHILS # BLD AUTO: 0 K/UL — SIGNIFICANT CHANGE UP (ref 0–0.2)
BASOPHILS NFR BLD AUTO: 0 % — SIGNIFICANT CHANGE UP (ref 0–2)
BILIRUB SERPL-MCNC: 0.9 MG/DL — SIGNIFICANT CHANGE UP (ref 0.2–1.2)
BUN SERPL-MCNC: 15 MG/DL — SIGNIFICANT CHANGE UP (ref 7–23)
CALCIUM SERPL-MCNC: 9.1 MG/DL — SIGNIFICANT CHANGE UP (ref 8.4–10.5)
CHLORIDE SERPL-SCNC: 101 MMOL/L — SIGNIFICANT CHANGE UP (ref 96–108)
CO2 SERPL-SCNC: 28 MMOL/L — SIGNIFICANT CHANGE UP (ref 22–31)
CREAT SERPL-MCNC: 0.36 MG/DL — LOW (ref 0.5–1.3)
EOSINOPHIL # BLD AUTO: 0.14 K/UL — SIGNIFICANT CHANGE UP (ref 0–0.5)
EOSINOPHIL NFR BLD AUTO: 1 % — SIGNIFICANT CHANGE UP (ref 0–6)
GLUCOSE SERPL-MCNC: 124 MG/DL — HIGH (ref 70–99)
HCT VFR BLD CALC: 30.1 % — LOW (ref 34.5–45)
HGB BLD-MCNC: 8.7 G/DL — LOW (ref 11.5–15.5)
LDH SERPL L TO P-CCNC: 289 U/L — HIGH (ref 50–242)
LYMPHOCYTES # BLD AUTO: 0.84 K/UL — LOW (ref 1–3.3)
LYMPHOCYTES # BLD AUTO: 6 % — LOW (ref 13–44)
MAGNESIUM SERPL-MCNC: 2 MG/DL — SIGNIFICANT CHANGE UP (ref 1.6–2.6)
MCHC RBC-ENTMCNC: 25.3 PG — LOW (ref 27–34)
MCHC RBC-ENTMCNC: 28.9 GM/DL — LOW (ref 32–36)
MCV RBC AUTO: 87.5 FL — SIGNIFICANT CHANGE UP (ref 80–100)
MONOCYTES # BLD AUTO: 0.14 K/UL — SIGNIFICANT CHANGE UP (ref 0–0.9)
MONOCYTES NFR BLD AUTO: 1 % — LOW (ref 2–14)
NEUTROPHILS # BLD AUTO: 12.26 K/UL — HIGH (ref 1.8–7.4)
NEUTROPHILS NFR BLD AUTO: 84 % — HIGH (ref 43–77)
PHOSPHATE SERPL-MCNC: 3 MG/DL — SIGNIFICANT CHANGE UP (ref 2.5–4.5)
PLATELET # BLD AUTO: 180 K/UL — SIGNIFICANT CHANGE UP (ref 150–400)
POTASSIUM SERPL-MCNC: 4.5 MMOL/L — SIGNIFICANT CHANGE UP (ref 3.5–5.3)
POTASSIUM SERPL-SCNC: 4.5 MMOL/L — SIGNIFICANT CHANGE UP (ref 3.5–5.3)
PROT SERPL-MCNC: 6.5 G/DL — SIGNIFICANT CHANGE UP (ref 6–8.3)
RBC # BLD: 3.44 M/UL — LOW (ref 3.8–5.2)
RBC # FLD: 21.8 % — HIGH (ref 10.3–14.5)
SODIUM SERPL-SCNC: 139 MMOL/L — SIGNIFICANT CHANGE UP (ref 135–145)
URATE SERPL-MCNC: 2.5 MG/DL — SIGNIFICANT CHANGE UP (ref 2.5–7)
WBC # BLD: 13.93 K/UL — HIGH (ref 3.8–10.5)
WBC # FLD AUTO: 13.93 K/UL — HIGH (ref 3.8–10.5)

## 2017-09-03 PROCEDURE — 99232 SBSQ HOSP IP/OBS MODERATE 35: CPT

## 2017-09-03 PROCEDURE — 99233 SBSQ HOSP IP/OBS HIGH 50: CPT | Mod: GC

## 2017-09-03 RX ORDER — NYSTATIN 500MM UNIT
5 POWDER (EA) MISCELLANEOUS
Qty: 0 | Refills: 0 | Status: DISCONTINUED | OUTPATIENT
Start: 2017-09-03 | End: 2017-09-04

## 2017-09-03 RX ORDER — SODIUM CHLORIDE 9 MG/ML
1000 INJECTION INTRAMUSCULAR; INTRAVENOUS; SUBCUTANEOUS
Qty: 0 | Refills: 0 | Status: DISCONTINUED | OUTPATIENT
Start: 2017-09-03 | End: 2017-09-04

## 2017-09-03 RX ADMIN — SODIUM CHLORIDE 75 MILLILITER(S): 9 INJECTION INTRAMUSCULAR; INTRAVENOUS; SUBCUTANEOUS at 21:58

## 2017-09-03 RX ADMIN — Medication 4 MILLIGRAM(S): at 10:39

## 2017-09-03 RX ADMIN — Medication 4 MILLIGRAM(S): at 05:15

## 2017-09-03 RX ADMIN — SENNA PLUS 2 TABLET(S): 8.6 TABLET ORAL at 21:59

## 2017-09-03 RX ADMIN — Medication 5 MILLILITER(S): at 18:01

## 2017-09-03 RX ADMIN — Medication 4 MILLIGRAM(S): at 18:01

## 2017-09-03 RX ADMIN — Medication 325 MILLIGRAM(S): at 18:02

## 2017-09-03 RX ADMIN — Medication 4 MILLIGRAM(S): at 21:59

## 2017-09-03 RX ADMIN — Medication 100 MILLIGRAM(S): at 18:02

## 2017-09-03 RX ADMIN — Medication 325 MILLIGRAM(S): at 08:03

## 2017-09-03 RX ADMIN — SODIUM CHLORIDE 125 MILLILITER(S): 9 INJECTION INTRAMUSCULAR; INTRAVENOUS; SUBCUTANEOUS at 05:16

## 2017-09-03 RX ADMIN — ENOXAPARIN SODIUM 40 MILLIGRAM(S): 100 INJECTION SUBCUTANEOUS at 18:01

## 2017-09-03 NOTE — PROGRESS NOTE ADULT - PROBLEM SELECTOR PLAN 1
Patient refused standard of care treatment  - Started Brentuximab-vedotin 9/1  - Continue to monitor tumor lysis labs   -Will  decrease IVF as no evidence TLS to date  If continues to do well start d/c planning

## 2017-09-03 NOTE — PROGRESS NOTE ADULT - PROBLEM SELECTOR PLAN 1
Patient refused standard of care treatment per oncology note.   - Started Brentuximab-vedotin yesterday 9/1, NEXT tx 9/22 outpt. Watch for neuropathy, cytopenia, infection, TLS.  - Continue to monitor tumor lysis labs, can decr to q12h now  - Psychiatry documenting patient was able to explain disease and discuss risks and benefits of treatments, but had requested further details from heme-onc. Heme-onc documenting long patient discussion. Now proceeding with immunotherapy.   - Pending goals of care and DNR/DNI decision  -monitor tumor lysis labs q12 new thrush, noticed today 9/3, likely in setting of immunosuppression.  - start nystatin s&s and monitor

## 2017-09-03 NOTE — PROGRESS NOTE ADULT - ASSESSMENT
41 y/o F PMH Hodgkin's Lymphoma (Dx 7/2017 w/ L axillary LN biopsy, treatment naive), complicated by SVC syndrome, pericardial effusion, pleural effusion, presents with shortness of breath, likely multifactorial (anemia, pleural effusion, pericardial effusion, SVC syndrome) and lymphoma w/ extensive mediastinal lymphadenopathy now starting immunotherapy (9/1) 41 y/o F PMH Hodgkin's Lymphoma (Dx 7/2017 w/ L axillary LN biopsy, treatment naive), complicated by SVC syndrome, pericardial effusion, pleural effusion, presents with shortness of breath, likely multifactorial (anemia, pleural effusion, pericardial effusion, SVC syndrome) and lymphoma w/ extensive mediastinal lymphadenopathy now s/p immunotherapy (9/1), monitor TLS labs.

## 2017-09-03 NOTE — PROGRESS NOTE ADULT - PROBLEM SELECTOR PLAN 8
Lovenox for DVT ppx Likely cause of fever on admission. No signs of sepsis. No fever currently. Blood cultures negative. Now off vanc/cefepime.

## 2017-09-03 NOTE — PROGRESS NOTE ADULT - PROBLEM SELECTOR PLAN 7
Likely cause of fever on admission. No signs of sepsis. No fever currently. Blood cultures negative. Now off vanc/cefepime. had symptomatic anemia on admission (Hgb 6.6), likely related to cancer anemia of chronic disease. s/p 2 PRBC transfusion  - trend CBC  - no signs/symptoms of bleeding

## 2017-09-03 NOTE — PROGRESS NOTE ADULT - PROBLEM SELECTOR PLAN 6
- had symptomatic anemia on admission (Hgb 6.6), likely related to cancer anemia of chronic disease.  - s/p 2 PRBC transfusion,  Hgb now 10.  - trend CBC  - no signs/symptoms of bleeding TTE shows small pericardial effusion with no tamponade physiology.  - Consider cardiology consult for further work-up. Had refused pericardial window in prior admission.

## 2017-09-03 NOTE — PROGRESS NOTE ADULT - SUBJECTIVE AND OBJECTIVE BOX
Patient is a 42y old  Female who presents with a chief complaint of sent by md for low blood count (30 Aug 2017 06:11)      SUBJECTIVE / OVERNIGHT EVENTS:  Still having stable SOB. No CP. No other complaints.    MEDICATIONS  (STANDING):  ferrous    sulfate 325 milliGRAM(s) Oral two times a day with meals  ergocalciferol 95910 Unit(s) Oral every week  dexamethasone     Tablet 4 milliGRAM(s) Oral every 6 hours  docusate sodium 100 milliGRAM(s) Oral daily  senna 2 Tablet(s) Oral at bedtime  enoxaparin Injectable 40 milliGRAM(s) SubCutaneous daily  polyethylene glycol 3350 17 Gram(s) Oral daily  Brentuximab (ADCENTRIS) 100 milliGRAM(s),sodium chloride 0.9% 100 milliLiter(s) 100 milliGRAM(s) IV Intermittent once  sodium chloride 0.9%. 1000 milliLiter(s) (75 mL/Hr) IV Continuous <Continuous>    MEDICATIONS  (PRN):  ondansetron Injectable 8 milliGRAM(s) IV Push every 8 hours PRN Nausea and/or Vomiting        Vital Signs Last 24 Hrs  T(C): 36.5 (03 Sep 2017 07:21), Max: 36.8 (02 Sep 2017 20:00)  T(F): 97.7 (03 Sep 2017 07:21), Max: 98.2 (02 Sep 2017 20:00)  HR: 71 (03 Sep 2017 07:21) (71 - 78)  BP: 96/66 (03 Sep 2017 07:21) (96/66 - 105/57)  BP(mean): 73 (02 Sep 2017 20:00) (73 - 73)  RR: 18 (03 Sep 2017 07:21) (18 - 18)  SpO2: 94% (03 Sep 2017 07:21) (94% - 95%)    PHYSICAL EXAM:  GENERAL: NAD, well-developed  HEAD:  Atraumatic, Normocephalic  CHEST/LUNG: Decreased breath sounds on right posteriorly  HEART: Regular rate and rhythm; No murmurs, rubs, or gallops  ABDOMEN: No TTP, soft, no guarding or rigidity  EXTREMITIES:  2+ LE pitting edema  NEUROLOGY: non-focal  SKIN: No rashes or lesions    LABS:                       09-03    139  |  101  |  15  ----------------------------<  124<H>  4.5   |  28  |  0.36<L>  09-02    132<L>  |  95<L>  |  17  ----------------------------<  150<H>  4.6   |  24  |  0.40<L>  09-02    135  |  96  |  18  ----------------------------<  114<H>  4.4   |  24  |  0.42<L>    Ca    9.1      03 Sep 2017 09:46  Ca    9.1      02 Sep 2017 18:32  Ca    8.9      02 Sep 2017 07:07  Phos  3.0     09-03  Mg     2.0     09-03    TPro  6.5  /  Alb  2.5<L>  /  TBili  0.9  /  DBili  x   /  AST  18  /  ALT  20  /  AlkPhos  275<H>  09-03  TPro  6.5  /  Alb  2.5<L>  /  TBili  1.1  /  DBili  x   /  AST  23  /  ALT  25  /  AlkPhos  266<H>  09-02  TPro  6.3  /  Alb  2.6<L>  /  TBili  0.9  /  DBili  x   /  AST  17  /  ALT  21  /  AlkPhos  236<H>  09-02                                              8.7    16.26 )-----------( 167      ( 02 Sep 2017 09:32 )             29.2                         8.8    15.21 )-----------( 149      ( 01 Sep 2017 09:15 )             28.9     CAPILLARY BLOOD GLUCOSE Patient is a 42y old  Female who presents with a chief complaint of sent by md for low blood count (30 Aug 2017 06:11)      SUBJECTIVE / OVERNIGHT EVENTS:  Still having stable SOB. No CP. No other complaints. Feels well. LE edema is improved. Ambulating. Today she noticed white plaques on tongue.    MEDICATIONS  (STANDING):  ferrous    sulfate 325 milliGRAM(s) Oral two times a day with meals  ergocalciferol 08884 Unit(s) Oral every week  dexamethasone     Tablet 4 milliGRAM(s) Oral every 6 hours  docusate sodium 100 milliGRAM(s) Oral daily  senna 2 Tablet(s) Oral at bedtime  enoxaparin Injectable 40 milliGRAM(s) SubCutaneous daily  polyethylene glycol 3350 17 Gram(s) Oral daily  Brentuximab (ADCENTRIS) 100 milliGRAM(s),  sodium chloride 0.9%. 1000 milliLiter(s) (75 mL/Hr) IV Continuous <Continuous>  nystatin    Suspension 5 milliLiter(s) Oral four times a day    MEDICATIONS  (PRN):  ondansetron Injectable 8 milliGRAM(s) IV Push every 8 hours PRN Nausea and/or Vomiting      Vital Signs Last 24 Hrs  T(C): 36.5 (03 Sep 2017 07:21), Max: 36.8 (02 Sep 2017 20:00)  T(F): 97.7 (03 Sep 2017 07:21), Max: 98.2 (02 Sep 2017 20:00)  HR: 71 (03 Sep 2017 07:21) (71 - 78)  BP: 96/66 (03 Sep 2017 07:21) (96/66 - 105/57)  BP(mean): 73 (02 Sep 2017 20:00) (73 - 73)  RR: 18 (03 Sep 2017 07:21) (18 - 18)  SpO2: 94% (03 Sep 2017 07:21) (94% - 95%)    PHYSICAL EXAM:  GENERAL: NAD, well-developed  HEAD:  Atraumatic, Normocephalic.  ENMT: thrush on mouth  CHEST/LUNG: Decreased breath sounds on right posteriorly  HEART: Regular rate and rhythm; No murmurs, rubs, or gallops  ABDOMEN: No TTP, soft, no guarding or rigidity  EXTREMITIES:  2+ LE pitting edema, minimally improved from yesterday  NEUROLOGY: non-focal  SKIN: No rashes or lesions      LABS:                         8.7    13.93 )-----------( 180      ( 03 Sep 2017 12:31 )             30.1     09-03    139  |  101  |  15  ----------------------------<  124<H>  4.5   |  28  |  0.36<L>    Ca    9.1      03 Sep 2017 09:46  Phos  3.0     09-03  Mg     2.0     09-03    TPro  6.5  /  Alb  2.5<L>  /  TBili  0.9  /  DBili  x   /  AST  18  /  ALT  20  /  AlkPhos  275<H>  09-03      Labs reviewed remarkable for STABLE HGB, WBC DOWNTRENDING, LDH MILDLY ELEV.

## 2017-09-03 NOTE — PROGRESS NOTE ADULT - PROBLEM SELECTOR PLAN 4
Right sided, likely malignant pleural effusion. Initially refused pleurex on prior admission. Pulmonary consulted recommending thoracentesis prior to pleurex.   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

## 2017-09-03 NOTE — PROGRESS NOTE ADULT - PROBLEM SELECTOR PLAN 5
TTE shows small pericardial effusion with no tamponade physiology.  - Consider cardiology consult for further work-up. Had refused pericardial window in prior admission. Right sided, likely malignant pleural effusion. Initially refused pleurex on prior admission. Pulmonary consulted recommending thoracentesis prior to pleurex.   - Consider therapeutic thoracentesis if worsening on CXR (repeat 9/2 mild improvement of r effusion)  - Decreasing IVF (for TLS prevention) due to concern of shortness of breath

## 2017-09-03 NOTE — PROGRESS NOTE ADULT - PROBLEM SELECTOR PLAN 3
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 Now improved.  - monitor resp status, currently off O2  - consider thoracentesis if SOB does not improve.

## 2017-09-03 NOTE — PROGRESS NOTE ADULT - PROBLEM SELECTOR PLAN 2
With incr tachypnea in AM, repeat CXR shows improvement in R pleural effusion.  - monitor resp status, currently off O2  -consider thoracentesis if SOB does not improve. Patient refused standard of care treatment per oncology note.   - Started Brentuximab-vedotin 9/1, NEXT tx 9/22 outpt. Watch for neuropathy, cytopenia, infection, TLS.  - Continue to monitor tumor lysis labs, C/W tls labs q12h now. If WNL, can hold off on TLS labs tomorrow AM to expedite d/c.  - Psychiatry documenting patient was able to explain disease and discuss risks and benefits of treatments, but had requested further details from heme-onc. Heme-onc documenting long patient discussion. Now proceeding with immunotherapy.   - Pending goals of care and DNR/DNI decision

## 2017-09-04 ENCOUNTER — TRANSCRIPTION ENCOUNTER (OUTPATIENT)
Age: 42
End: 2017-09-04

## 2017-09-04 VITALS
TEMPERATURE: 98 F | HEART RATE: 73 BPM | OXYGEN SATURATION: 99 % | RESPIRATION RATE: 18 BRPM | SYSTOLIC BLOOD PRESSURE: 109 MMHG | DIASTOLIC BLOOD PRESSURE: 68 MMHG

## 2017-09-04 LAB
ALBUMIN SERPL ELPH-MCNC: 2.6 G/DL — LOW (ref 3.3–5)
ALP SERPL-CCNC: 256 U/L — HIGH (ref 40–120)
ALT FLD-CCNC: 25 U/L — SIGNIFICANT CHANGE UP (ref 10–45)
ANION GAP SERPL CALC-SCNC: 13 MMOL/L — SIGNIFICANT CHANGE UP (ref 5–17)
AST SERPL-CCNC: 17 U/L — SIGNIFICANT CHANGE UP (ref 10–40)
BILIRUB SERPL-MCNC: 0.7 MG/DL — SIGNIFICANT CHANGE UP (ref 0.2–1.2)
BUN SERPL-MCNC: 17 MG/DL — SIGNIFICANT CHANGE UP (ref 7–23)
CALCIUM SERPL-MCNC: 9.5 MG/DL — SIGNIFICANT CHANGE UP (ref 8.4–10.5)
CHLORIDE SERPL-SCNC: 100 MMOL/L — SIGNIFICANT CHANGE UP (ref 96–108)
CO2 SERPL-SCNC: 23 MMOL/L — SIGNIFICANT CHANGE UP (ref 22–31)
CREAT SERPL-MCNC: 0.41 MG/DL — LOW (ref 0.5–1.3)
CULTURE RESULTS: SIGNIFICANT CHANGE UP
CULTURE RESULTS: SIGNIFICANT CHANGE UP
GLUCOSE SERPL-MCNC: 135 MG/DL — HIGH (ref 70–99)
LDH SERPL L TO P-CCNC: 201 U/L — SIGNIFICANT CHANGE UP (ref 50–242)
LDH SERPL L TO P-CCNC: 443 U/L — HIGH (ref 50–242)
MAGNESIUM SERPL-MCNC: 1.9 MG/DL — SIGNIFICANT CHANGE UP (ref 1.6–2.6)
MAGNESIUM SERPL-MCNC: 2 MG/DL — SIGNIFICANT CHANGE UP (ref 1.6–2.6)
PHOSPHATE SERPL-MCNC: 3.3 MG/DL — SIGNIFICANT CHANGE UP (ref 2.5–4.5)
PHOSPHATE SERPL-MCNC: 3.3 MG/DL — SIGNIFICANT CHANGE UP (ref 2.5–4.5)
POTASSIUM SERPL-MCNC: 4.5 MMOL/L — SIGNIFICANT CHANGE UP (ref 3.5–5.3)
POTASSIUM SERPL-SCNC: 4.5 MMOL/L — SIGNIFICANT CHANGE UP (ref 3.5–5.3)
PROT SERPL-MCNC: 6.7 G/DL — SIGNIFICANT CHANGE UP (ref 6–8.3)
SODIUM SERPL-SCNC: 136 MMOL/L — SIGNIFICANT CHANGE UP (ref 135–145)
SPECIMEN SOURCE: SIGNIFICANT CHANGE UP
SPECIMEN SOURCE: SIGNIFICANT CHANGE UP
URATE SERPL-MCNC: 2.6 MG/DL — SIGNIFICANT CHANGE UP (ref 2.5–7)
URATE SERPL-MCNC: 2.7 MG/DL — SIGNIFICANT CHANGE UP (ref 2.5–7)
URATE SERPL-MCNC: 2.9 MG/DL — SIGNIFICANT CHANGE UP (ref 2.5–7)

## 2017-09-04 PROCEDURE — 82803 BLOOD GASES ANY COMBINATION: CPT

## 2017-09-04 PROCEDURE — 85610 PROTHROMBIN TIME: CPT

## 2017-09-04 PROCEDURE — 93005 ELECTROCARDIOGRAM TRACING: CPT

## 2017-09-04 PROCEDURE — 81001 URINALYSIS AUTO W/SCOPE: CPT

## 2017-09-04 PROCEDURE — 87798 DETECT AGENT NOS DNA AMP: CPT

## 2017-09-04 PROCEDURE — 99238 HOSP IP/OBS DSCHRG MGMT 30/<: CPT

## 2017-09-04 PROCEDURE — 36430 TRANSFUSION BLD/BLD COMPNT: CPT

## 2017-09-04 PROCEDURE — 82947 ASSAY GLUCOSE BLOOD QUANT: CPT

## 2017-09-04 PROCEDURE — 82435 ASSAY OF BLOOD CHLORIDE: CPT

## 2017-09-04 PROCEDURE — 86923 COMPATIBILITY TEST ELECTRIC: CPT

## 2017-09-04 PROCEDURE — 86901 BLOOD TYPING SEROLOGIC RH(D): CPT

## 2017-09-04 PROCEDURE — 84132 ASSAY OF SERUM POTASSIUM: CPT

## 2017-09-04 PROCEDURE — 99285 EMERGENCY DEPT VISIT HI MDM: CPT | Mod: 25

## 2017-09-04 PROCEDURE — 99239 HOSP IP/OBS DSCHRG MGMT >30: CPT

## 2017-09-04 PROCEDURE — 87486 CHLMYD PNEUM DNA AMP PROBE: CPT

## 2017-09-04 PROCEDURE — 80202 ASSAY OF VANCOMYCIN: CPT

## 2017-09-04 PROCEDURE — 85027 COMPLETE CBC AUTOMATED: CPT

## 2017-09-04 PROCEDURE — 83605 ASSAY OF LACTIC ACID: CPT

## 2017-09-04 PROCEDURE — 82330 ASSAY OF CALCIUM: CPT

## 2017-09-04 PROCEDURE — 80053 COMPREHEN METABOLIC PANEL: CPT

## 2017-09-04 PROCEDURE — 84295 ASSAY OF SERUM SODIUM: CPT

## 2017-09-04 PROCEDURE — 87581 M.PNEUMON DNA AMP PROBE: CPT

## 2017-09-04 PROCEDURE — 86900 BLOOD TYPING SEROLOGIC ABO: CPT

## 2017-09-04 PROCEDURE — 87040 BLOOD CULTURE FOR BACTERIA: CPT

## 2017-09-04 PROCEDURE — 71045 X-RAY EXAM CHEST 1 VIEW: CPT

## 2017-09-04 PROCEDURE — 84100 ASSAY OF PHOSPHORUS: CPT

## 2017-09-04 PROCEDURE — P9016: CPT

## 2017-09-04 PROCEDURE — 84550 ASSAY OF BLOOD/URIC ACID: CPT

## 2017-09-04 PROCEDURE — 85730 THROMBOPLASTIN TIME PARTIAL: CPT

## 2017-09-04 PROCEDURE — 93306 TTE W/DOPPLER COMPLETE: CPT

## 2017-09-04 PROCEDURE — 87633 RESP VIRUS 12-25 TARGETS: CPT

## 2017-09-04 PROCEDURE — 96375 TX/PRO/DX INJ NEW DRUG ADDON: CPT | Mod: XU

## 2017-09-04 PROCEDURE — 86850 RBC ANTIBODY SCREEN: CPT

## 2017-09-04 PROCEDURE — 83615 LACTATE (LD) (LDH) ENZYME: CPT

## 2017-09-04 PROCEDURE — 83735 ASSAY OF MAGNESIUM: CPT

## 2017-09-04 PROCEDURE — 87086 URINE CULTURE/COLONY COUNT: CPT

## 2017-09-04 PROCEDURE — 85014 HEMATOCRIT: CPT

## 2017-09-04 PROCEDURE — 96374 THER/PROPH/DIAG INJ IV PUSH: CPT | Mod: XU

## 2017-09-04 RX ORDER — NYSTATIN 500MM UNIT
5 POWDER (EA) MISCELLANEOUS
Qty: 280 | Refills: 0 | OUTPATIENT
Start: 2017-09-04 | End: 2017-09-18

## 2017-09-04 RX ORDER — DOCUSATE SODIUM 100 MG
1 CAPSULE ORAL
Qty: 0 | Refills: 0 | COMMUNITY
Start: 2017-09-04

## 2017-09-04 RX ORDER — SENNA PLUS 8.6 MG/1
2 TABLET ORAL
Qty: 0 | Refills: 0 | COMMUNITY
Start: 2017-09-04

## 2017-09-04 RX ORDER — DEXAMETHASONE 0.5 MG/5ML
1 ELIXIR ORAL
Qty: 0 | Refills: 0 | COMMUNITY

## 2017-09-04 RX ORDER — POLYETHYLENE GLYCOL 3350 17 G/17G
17 POWDER, FOR SOLUTION ORAL
Qty: 238 | Refills: 0 | OUTPATIENT
Start: 2017-09-04 | End: 2017-09-18

## 2017-09-04 RX ADMIN — Medication 5 MILLILITER(S): at 04:17

## 2017-09-04 RX ADMIN — Medication 5 MILLILITER(S): at 09:28

## 2017-09-04 RX ADMIN — Medication 100 MILLIGRAM(S): at 13:08

## 2017-09-04 RX ADMIN — Medication 4 MILLIGRAM(S): at 04:17

## 2017-09-04 RX ADMIN — Medication 325 MILLIGRAM(S): at 09:28

## 2017-09-04 RX ADMIN — Medication 5 MILLILITER(S): at 13:08

## 2017-09-04 RX ADMIN — Medication 4 MILLIGRAM(S): at 09:29

## 2017-09-04 RX ADMIN — POLYETHYLENE GLYCOL 3350 17 GRAM(S): 17 POWDER, FOR SOLUTION ORAL at 13:09

## 2017-09-04 NOTE — DISCHARGE NOTE ADULT - PLAN OF CARE
Resolution/Improvement You were started on Brentuximab-vedotin on 9/1. Your laboratories were followed, and there was no concern of tumor lysis syndrome. Please follow up with Dr. Baxter at [facility] in 7-10 days. Your next treatment is on 9/22/17. You received 5 days of steroids during admission, you do not need steroids on discharge today. Please follow-up with cardiology by calling [phone] to make an appointment at the Cardiology Clinic at [facility]. If you have difficulty setting up an appointment please call: [phone] for the referral line.     Please seek urgent medical attention if you pass out, feel chest pain, or have worsening of your breathing. You have a pleural effusion (fluid surrounding your right lung). This is likely from your malignancy. You were seen by pulmonology, but did not need urgent drainage of your lung. Please follow-up with pulmonology. If your shortness of breath becomes worse, you can have a thoracentesis (drainage of fluid) which should help you with your symptoms.     Please call [phone] to make an appointment with Dr. Loi Vickers, who saw you at the hospital.

## 2017-09-04 NOTE — PROGRESS NOTE ADULT - PROBLEM SELECTOR PLAN 3
With incr tachypnea 9/2 AM, repeat CXR shows improvement in R pleural effusion. Now improved.  - monitor resp status, currently off O2  - consider thoracentesis if SOB does not improve.

## 2017-09-04 NOTE — PROGRESS NOTE ADULT - PROBLEM SELECTOR PLAN 5
- had symptomatic anemia on admission (Hgb 6.6)  - s/p 2 PRBC transfusion,  Hgb now stable at 8.7      - trend CBC  - no signs/symptoms of bleeding

## 2017-09-04 NOTE — PROGRESS NOTE ADULT - PROBLEM SELECTOR PLAN 9
Lovenox for DVT ppx    Andra Savage MD  Internal Medicine, Intern  Pager [phone]
Lovenox for DVT ppx

## 2017-09-04 NOTE — DISCHARGE NOTE ADULT - CARE PLAN
Principal Discharge DX:	Hodgkin lymphoma  Secondary Diagnosis:	Pericardial effusion  Secondary Diagnosis:	Shortness of breath  Secondary Diagnosis:	Thrush  Secondary Diagnosis:	SVC syndrome Principal Discharge DX:	Hodgkin lymphoma  Goal:	Resolution/Improvement  Instructions for follow-up, activity and diet:	You were started on Brentuximab-vedotin on 9/1. Your laboratories were followed, and there was no concern of tumor lysis syndrome. Please follow up with Dr. Baxter at [facility] in 7-10 days. Your next treatment is on 9/22/17.  Secondary Diagnosis:	Pericardial effusion  Instructions for follow-up, activity and diet:	Please follow-up with cardiology by calling [phone] to make an appointment at the Cardiology Clinic at [facility]. If you have difficulty setting up an appointment please call: [phone] for the referral line.     Please seek urgent medical attention if you pass out, feel chest pain, or have worsening of your breathing.  Secondary Diagnosis:	Shortness of breath  Instructions for follow-up, activity and diet:	You have a pleural effusion (fluid surrounding your right lung). This is likely from your malignancy. You were seen by pulmonology, but did not need urgent drainage of your lung. Please follow-up with pulmonology. If your shortness of breath becomes worse, you can have a thoracentesis (drainage of fluid) which should help you with your symptoms.     Please call [phone] to make an appointment with Dr. Loi Vickers, who saw you at the hospital.  Secondary Diagnosis:	Thrush  Secondary Diagnosis:	SVC syndrome  Instructions for follow-up, activity and diet:	You received 5 days of steroids during admission, you do not need steroids on discharge today. Principal Discharge DX:	Hodgkin lymphoma  Goal:	Resolution/Improvement  Instructions for follow-up, activity and diet:	You were started on Brentuximab-vedotin on 9/1. Your laboratories were followed, and there was no concern of tumor lysis syndrome. Please follow up with Dr. Baxter at [facility] in 7-10 days. Your next treatment is on 9/22/17.  Secondary Diagnosis:	Pericardial effusion  Instructions for follow-up, activity and diet:	Please follow-up with cardiology by calling [phone] to make an appointment at the Cardiology Clinic at [facility]. If you have difficulty setting up an appointment please call: [phone] for the referral line.     Please seek urgent medical attention if you pass out, feel chest pain, or have worsening of your breathing.  Secondary Diagnosis:	Shortness of breath  Instructions for follow-up, activity and diet:	You have a pleural effusion (fluid surrounding your right lung). This is likely from your malignancy. You were seen by pulmonology, but did not need urgent drainage of your lung. Please follow-up with pulmonology. If your shortness of breath becomes worse, you can have a thoracentesis (drainage of fluid) which should help you with your symptoms.     Please call [phone] to make an appointment with Dr. Loi Vickers, who saw you at the hospital.  Secondary Diagnosis:	Thrush  Secondary Diagnosis:	SVC syndrome  Instructions for follow-up, activity and diet:	You received 5 days of steroids during admission, you do not need steroids on discharge today. Principal Discharge DX:	Hodgkin lymphoma  Goal:	Resolution/Improvement  Instructions for follow-up, activity and diet:	You were started on Brentuximab-vedotin on 9/1. Your laboratories were followed, and there was no concern of tumor lysis syndrome. Please follow up with Dr. Baxter at [facility] in 7-10 days. Your next treatment is on 9/22/17.  Secondary Diagnosis:	Pericardial effusion  Instructions for follow-up, activity and diet:	Please follow-up with cardiology by calling [phone] to make an appointment at the Cardiology Clinic at [facility]. If you have difficulty setting up an appointment please call: [phone] for the referral line.     Please seek urgent medical attention if you pass out, feel chest pain, or have worsening of your breathing.  Secondary Diagnosis:	Shortness of breath  Instructions for follow-up, activity and diet:	You have a pleural effusion (fluid surrounding your right lung). This is likely from your malignancy. You were seen by pulmonology, but did not need urgent drainage of your lung. Please follow-up with pulmonology. If your shortness of breath becomes worse, you can have a thoracentesis (drainage of fluid) which should help you with your symptoms.     Please call [phone] to make an appointment with Dr. Loi Vickers, who saw you at the hospital.  Secondary Diagnosis:	Thrush  Secondary Diagnosis:	SVC syndrome  Instructions for follow-up, activity and diet:	You received 5 days of steroids during admission, you do not need steroids on discharge today.

## 2017-09-04 NOTE — PROGRESS NOTE ADULT - ASSESSMENT
42F PMH Hodgkin's Lymphoma (Dx 7/2017 w/ L axillary LN biopsy, treatment naive), complicated by SVC syndrome, pericardial effusion, pleural effusion, presents with shortness of breath, likely multifactorial (anemia, pleural effusion, pericardial effusion, SVC syndrome) and lymphoma w/ extensive mediastinal lymphadenopathy now starting immunotherapy 42F PMH Hodgkin's Lymphoma (Dx 7/2017 w/ L axillary LN biopsy, treatment naive), complicated by SVC syndrome, pericardial effusion, pleural effusion, presents with shortness of breath, likely multifactorial (anemia, pleural effusion, pericardial effusion, SVC syndrome) and lymphoma w/ extensive mediastinal lymphadenopathy S/P 1 dose of immunotherapy 9/1

## 2017-09-04 NOTE — DISCHARGE NOTE ADULT - MEDICATION SUMMARY - MEDICATIONS TO STOP TAKING
I will STOP taking the medications listed below when I get home from the hospital:    dexamethasone 4 mg oral tablet  -- 1 tab(s) by mouth once a day

## 2017-09-04 NOTE — PROGRESS NOTE ADULT - PROBLEM SELECTOR PLAN 3
Right sided, likely malignant pleural effusion.   Oxygen 98%, lungs clear, decreased shortness of breath.  Continue to monitor and will have followup imaging as outpatient.

## 2017-09-04 NOTE — PROGRESS NOTE ADULT - PROBLEM SELECTOR PLAN 4
TTE shows small pericardial effusion with no tamponade physiology. TTE shows small pericardial effusion with no tamponade physiology. VSS, no symptoms.

## 2017-09-04 NOTE — PROGRESS NOTE ADULT - PROBLEM SELECTOR PLAN 5
Right sided, likely malignant pleural effusion. Initially refused pleurex on prior admission. Pulmonary consulted recommending thoracentesis prior to pleurex.   - Consider therapeutic thoracentesis if worsening on CXR (repeat 9/2 mild improvement of r effusion)  - Decreasing IVF (for TLS prevention) due to concern of shortness of breath

## 2017-09-04 NOTE — PROGRESS NOTE ADULT - SUBJECTIVE AND OBJECTIVE BOX
Patient is a 42y old  Female who presents with a chief complaint of sent by md for low blood count (30 Aug 2017 06:11)      SUBJECTIVE / OVERNIGHT EVENTS:  Feeling better with decreased shortness of breath and less LE edema. No nausea.    Vital Signs Last 24 Hrs  T(C): 36.2 (04 Sep 2017 08:08), Max: 36.7 (03 Sep 2017 21:58)  T(F): 97.2 (04 Sep 2017 08:08), Max: 98 (03 Sep 2017 21:58)  HR: 51 (04 Sep 2017 08:08) (51 - 89)  BP: 110/70 (04 Sep 2017 08:08) (102/68 - 110/70)  BP(mean): --  RR: 18 (04 Sep 2017 08:08) (18 - 18)  SpO2: 98% (04 Sep 2017 08:08) (96% - 98%)    MEDICATIONS  (STANDING):  ferrous    sulfate 325 milliGRAM(s) Oral two times a day with meals  ergocalciferol 70654 Unit(s) Oral every week  dexamethasone     Tablet 4 milliGRAM(s) Oral every 6 hours  cefepime  IVPB 2000 milliGRAM(s) IV Intermittent every 8 hours  docusate sodium 100 milliGRAM(s) Oral daily  senna 2 Tablet(s) Oral at bedtime  enoxaparin Injectable 40 milliGRAM(s) SubCutaneous daily  polyethylene glycol 3350 17 Gram(s) Oral daily  sodium chloride 0.9%. 1000 milliLiter(s) (150 mL/Hr) IV Continuous <Continuous>  Brentuximab (ADCENTRIS) 100 milliGRAM(s),sodium chloride 0.9% 100 milliLiter(s) 100 milliGRAM(s) IV Intermittent once    MEDICATIONS  (PRN):  ondansetron Injectable 8 milliGRAM(s) IV Push every 8 hours PRN Nausea and/or Vomiting      CAPILLARY BLOOD GLUCOSE    Vital Signs Last 24 Hrs  T(C): 36.9 (01 Sep 2017 21:26), Max: 36.9 (01 Sep 2017 21:26)  T(F): 98.4 (01 Sep 2017 21:26), Max: 98.4 (01 Sep 2017 21:26)  HR: 91 (01 Sep 2017 21:26) (68 - 91)  BP: 96/64 (01 Sep 2017 21:26) (96/64 - 110/73)  BP(mean): --  RR: 18 (01 Sep 2017 21:26) (16 - 18)  SpO2: 97% (01 Sep 2017 21:26) (97% - 98%)    I&O's Summary    01 Sep 2017 07:01  -  02 Sep 2017 07:00  --------------------------------------------------------  IN: 540 mL / OUT: 0 mL / NET: 540 mL        PHYSICAL EXAM:  GENERAL: NAD, well-developed  HEAD:  Atraumatic, Normocephalic  CHEST/LUNG: Decreased breath sounds on right posteriorly  HEART: Regular rate and rhythm; No murmurs, rubs, or gallops  ABDOMEN: Mild tenderness, soft, no guarding or rigidity  EXTREMITIES:  Significant pitting edema  NEUROLOGY: non-focal  SKIN: No rashes or lesions    LABS:                        8.7    13.93 )-----------( 180      ( 03 Sep 2017 12:31 )             30.1   09-03    136  |  100  |  17  ----------------------------<  135<H>  4.5   |  23  |  0.41<L>    Ca    9.5      03 Sep 2017 23:27  Phos  3.3     09-04  Mg     1.9     09-04    TPro  6.7  /  Alb  2.6<L>  /  TBili  0.7  /  DBili  x   /  AST  17  /  ALT  25  /  AlkPhos  256<H>  09-03 Patient is a 42y old  Female who presents with a chief complaint of sent by md for low blood count (30 Aug 2017 06:11)      SUBJECTIVE / OVERNIGHT EVENTS:  Feeling better with decreased shortness of breath and less LE edema. No nausea.    Vital Signs Last 24 Hrs  T(C): 36.2 (04 Sep 2017 08:08), Max: 36.7 (03 Sep 2017 21:58)  T(F): 97.2 (04 Sep 2017 08:08), Max: 98 (03 Sep 2017 21:58)  HR: 51 (04 Sep 2017 08:08) (51 - 89)  BP: 110/70 (04 Sep 2017 08:08) (102/68 - 110/70)  BP(mean): --  RR: 18 (04 Sep 2017 08:08) (18 - 18)  SpO2: 98% (04 Sep 2017 08:08) (96% - 98%)    MEDICATIONS  (STANDING):  ferrous    sulfate 325 milliGRAM(s) Oral two times a day with meals  ergocalciferol 13346 Unit(s) Oral every week  dexamethasone     Tablet 4 milliGRAM(s) Oral every 6 hours  cefepime  IVPB 2000 milliGRAM(s) IV Intermittent every 8 hours  docusate sodium 100 milliGRAM(s) Oral daily  senna 2 Tablet(s) Oral at bedtime  enoxaparin Injectable 40 milliGRAM(s) SubCutaneous daily  polyethylene glycol 3350 17 Gram(s) Oral daily  sodium chloride 0.9%. 1000 milliLiter(s) (150 mL/Hr) IV Continuous <Continuous>  Brentuximab (ADCENTRIS) 100 milliGRAM(s),sodium chloride 0.9% 100 milliLiter(s) 100 milliGRAM(s) IV Intermittent once    MEDICATIONS  (PRN):  ondansetron Injectable 8 milliGRAM(s) IV Push every 8 hours PRN Nausea and/or Vomiting      CAPILLARY BLOOD GLUCOSE    Vital Signs Last 24 Hrs  T(C): 36.9 (01 Sep 2017 21:26), Max: 36.9 (01 Sep 2017 21:26)  T(F): 98.4 (01 Sep 2017 21:26), Max: 98.4 (01 Sep 2017 21:26)  HR: 91 (01 Sep 2017 21:26) (68 - 91)  BP: 96/64 (01 Sep 2017 21:26) (96/64 - 110/73)  BP(mean): --  RR: 18 (01 Sep 2017 21:26) (16 - 18)  SpO2: 97% (01 Sep 2017 21:26) (97% - 98%)    I&O's Summary    01 Sep 2017 07:01  -  02 Sep 2017 07:00  --------------------------------------------------------  IN: 540 mL / OUT: 0 mL / NET: 540 mL        PHYSICAL EXAM:  GENERAL: NAD, well-developed  HEAD:  Atraumatic, Normocephalic  NECK: diffuse large bulky LAD  CHEST/LUNG: Decreased breath sounds on right posteriorly  HEART: Regular rate and rhythm; No murmurs, rubs, or gallops  ABDOMEN: soft, no guarding or rigidity  EXTREMITIES:  Significant pitting edema 3+ up to knees, improved per pt  NEUROLOGY: non-focal  SKIN: No rashes or lesions    LABS:                         8.7    13.93 )-----------( 180      ( 03 Sep 2017 12:31 )             30.1     09-03    136  |  100  |  17  ----------------------------<  135<H>  4.5   |  23  |  0.41<L>    Ca    9.5      03 Sep 2017 23:27  Phos  3.3     09-04  Mg     1.9     09-04    TPro  6.7  /  Alb  2.6<L>  /  TBili  0.7  /  DBili  x   /  AST  17  /  ALT  25  /  AlkPhos  256<H>  09-03            Records reviewed from prior hospitalization.  Labs reviewed improvement in LDH now wnl, TLS labs normal.

## 2017-09-04 NOTE — DISCHARGE NOTE ADULT - PATIENT PORTAL LINK FT
“You can access the Follow[employer] Patient Portal, offered by [facility], by registering with the following website: [email or web address]”

## 2017-09-04 NOTE — DISCHARGE NOTE ADULT - CARE PROVIDERS DIRECT ADDRESSES
[email or web address] [email or web address]

## 2017-09-04 NOTE — PROGRESS NOTE ADULT - PROBLEM SELECTOR PLAN 8
Likely cause of fever on admission. No signs of sepsis. No fever currently. Blood cultures negative. Now off vanc/cefepime.

## 2017-09-04 NOTE — DISCHARGE NOTE ADULT - HOSPITAL COURSE
HPI:    42F PMH Hodgkin's Lymphoma (Dx 7/2017 w/ L axillary LN biopsy, treatment naive), complicated by SVC syndrome, pericardial effusion, pleural effusion, presents with shortness of breath.  Patient has been refusing treatment for her lymphoma, has 2 recent admissions which resulted in her leaving AMA because she did not want treatment.  She was recently admitted 8/26 - 8/28 for shortness of breath.  She was found to have SVC syndrome 2/2 mediastinal mass impinging bronchotrachial tree, found to have extensive retroperitoneal LAD on imaging.  She was also found to have pericardial effusion w/ evidence of tamponade, however she refused pericardial window offered by cardiology.  She was also found to have a right pleural effusion, likely malignant however refused pleurex catheter.  She left AMA 8/28, despite several physicians speaking with her telling her she could potentially die if leaving the hospital.  Pt signed out AMA prior to labs returning, her hemoglobin was 5.4.  Per discharge note she was called to come back to ED for blood transfusion.  Patient came back today as she had worsening shortness of breath and felt her heart racing.  No fevers/chills at home.  Lives w/ daughter, no sick contacts.  No recent travel.  Has had cough for past few weeks.    In the ED .2 124 100/63 20 97% 2L NC   Labs notable for WBC 13.5 w/ L shift, Hgb 6.6,   INR 1.52 Cr 0.4  BUN20 Lactate 1.8 UA negative for infection  CXR w/ R pleural effusion worsened from prior 8/25.    In the ED, given vancomycin x 1, cefepime x 1.  1.5L NaCl. 2 U PRBCs w/ 40 mg IV lasix between.         Hospital Course:  Patient was admitted for shortness of breath and anemia. She received 2 units pRBCs with appropriate rise in hemoglobin. She was amenable on discussion with heme-onc to begin brentuximab immunotherapy. (She did not wish to pursue first line therapy). She had her first treatment on Sept 1st. Tumor lysis labs were followed, without evidence of tumor lysis syndrome. She completed 5 days of steroids for SVC syndrome. She was also started on nystatin for oral thrush.     Regarding her pleural effusion, pulmonary was consulted. She did not need an urgent thoracentesis, however they recommended that she have a thoracentesis prior to pleurex catheter. Her shortness of breath improved prior to discharge.    She was also noted to have a pericardial effusion HPI:    42F PMH Hodgkin's Lymphoma (Dx 7/2017 w/ L axillary LN biopsy, treatment naive), complicated by SVC syndrome, pericardial effusion, pleural effusion, presents with shortness of breath.  Patient has been refusing treatment for her lymphoma, has 2 recent admissions which resulted in her leaving AMA because she did not want treatment.  She was recently admitted 8/26 - 8/28 for shortness of breath.  She was found to have SVC syndrome 2/2 mediastinal mass impinging bronchotrachial tree, found to have extensive retroperitoneal LAD on imaging.  She was also found to have pericardial effusion w/ evidence of tamponade, however she refused pericardial window offered by cardiology.  She was also found to have a right pleural effusion, likely malignant however refused pleurex catheter.  She left AMA 8/28, despite several physicians speaking with her telling her she could potentially die if leaving the hospital.  Pt signed out AMA prior to labs returning, her hemoglobin was 5.4.  Per discharge note she was called to come back to ED for blood transfusion.  Patient came back today as she had worsening shortness of breath and felt her heart racing.  No fevers/chills at home.  Lives w/ daughter, no sick contacts.  No recent travel.  Has had cough for past few weeks.    In the ED .2 124 100/63 20 97% 2L NC   Labs notable for WBC 13.5 w/ L shift, Hgb 6.6,   INR 1.52 Cr 0.4  BUN20 Lactate 1.8 UA negative for infection  CXR w/ R pleural effusion worsened from prior 8/25.    In the ED, given vancomycin x 1, cefepime x 1.  1.5L NaCl. 2 U PRBCs w/ 40 mg IV lasix between.         Hospital Course:  Patient was admitted for shortness of breath and anemia. She received 2 units pRBCs with appropriate rise in hemoglobin. She was amenable on discussion with heme-onc to begin brentuximab immunotherapy. (She did not wish to pursue first line therapy). She had her first treatment on Sept 1st. Tumor lysis labs were followed, without evidence of tumor lysis syndrome. She completed 5 days of steroids for SVC syndrome. She was also started on nystatin for oral thrush.     Regarding her pleural effusion, pulmonary was consulted. She did not need an urgent thoracentesis, however they recommended that she have a thoracentesis prior to pleurex catheter. Her shortness of breath improved prior to discharge.    She was also noted to have a pericardial effusion during prior admission with signs of tamponade. She refused pericardial window and left AMA during that admission. During this admission was noted to have small pericardial effusion without evidence of tamponade. She remained hemodynamically stable during her admission and on discharge. HPI:    42F PMH Hodgkin's Lymphoma (Dx 7/2017 w/ L axillary LN biopsy, treatment naive), complicated by SVC syndrome, pericardial effusion, pleural effusion, presents with shortness of breath.  Patient has been refusing treatment for her lymphoma, has 2 recent admissions which resulted in her leaving AMA because she did not want treatment.  She was recently admitted 8/26 - 8/28 for shortness of breath.  She was found to have SVC syndrome 2/2 mediastinal mass impinging bronchotrachial tree, found to have extensive retroperitoneal LAD on imaging.  She was also found to have pericardial effusion w/ evidence of tamponade, however she refused pericardial window offered by cardiology.  She was also found to have a right pleural effusion, likely malignant however refused pleurex catheter.  She left AMA 8/28, despite several physicians speaking with her telling her she could potentially die if leaving the hospital.  Pt signed out AMA prior to labs returning, her hemoglobin was 5.4.  Per discharge note she was called to come back to ED for blood transfusion.  Patient came back today as she had worsening shortness of breath and felt her heart racing.  No fevers/chills at home.  Lives w/ daughter, no sick contacts.  No recent travel.  Has had cough for past few weeks.    In the ED .2 124 100/63 20 97% 2L NC   Labs notable for WBC 13.5 w/ L shift, Hgb 6.6,   INR 1.52 Cr 0.4  BUN20 Lactate 1.8 UA negative for infection  CXR w/ R pleural effusion worsened from prior 8/25.    In the ED, given vancomycin x 1, cefepime x 1.  1.5L NaCl. 2 U PRBCs w/ 40 mg IV lasix between.         Hospital Course:  Patient was admitted for shortness of breath and anemia. She received 2 units pRBCs with appropriate rise in hemoglobin. She was amenable on discussion with heme-onc to begin brentuximab immunotherapy. (She did not wish to pursue first line therapy). She had her first treatment on Sept 1st. Tumor lysis labs were followed, without evidence of tumor lysis syndrome. She completed 5 days of steroids for SVC syndrome. She was also started on nystatin for oral thrush.     Regarding her pleural effusion, pulmonary was consulted. She did not need an urgent thoracentesis. Her shortness of breath improved prior to discharge.    She was also noted to have a pericardial effusion during prior admission with signs of tamponade. She refused pericardial window and left AMA during that admission. During this admission was noted to have small pericardial effusion without evidence of tamponade. She remained hemodynamically stable during her admission and on discharge. She will be provided phone numbers for follow-up.

## 2017-09-04 NOTE — PROGRESS NOTE ADULT - PROBLEM SELECTOR PLAN 6
Likely due to steroids.  Would discharge with nystatin 5 cc qid swish and swallow. Likely due to steroids.  Would discharge with nystatin 5 cc qid swish and swallow.    50 min spent coordinating discharge plans with patient with >50% of time reviewing hospital course with patient, counseling, and discussing future care.

## 2017-09-04 NOTE — DISCHARGE NOTE ADULT - PROVIDER TOKENS
RUTH:'[id number]' TOKEN:'[id number]',FREE:[LAST:[Cardiology Clinic],PHONE:[(   )    -],FAX:[(   )    -],ADDRESS:[Phone: [phone]]],TOKEN:'[id number]'

## 2017-09-04 NOTE — PROGRESS NOTE ADULT - PROBLEM SELECTOR PLAN 2
- has SVC syndrome from extensive mediastinal lymphadenopathy  - clinically appears improved after 5 days of steroids and Brentuximab and Vedotin 9/1.

## 2017-09-04 NOTE — PROGRESS NOTE ADULT - ASSESSMENT
43 y/o F PMH Hodgkin's Lymphoma (Dx 7/2017 w/ L axillary LN biopsy, treatment naive), complicated by SVC syndrome, pericardial effusion, pleural effusion, presents with shortness of breath, likely multifactorial (anemia, pleural effusion, pericardial effusion, SVC syndrome) and lymphoma w/ extensive mediastinal lymphadenopathy now s/p immunotherapy (9/1), monitor TLS labs.

## 2017-09-04 NOTE — DISCHARGE NOTE ADULT - ADDITIONAL INSTRUCTIONS
Please follow-up with hematology/oncology (Dr. Baxter) within 7-10 days. Your next treatment is on 9/22. Please call both cardiology and pulmonary tomorrow and make the earliest available appointments with each (within the next two weeks). If you have worsening shortness of breath or chest pain please seek medical attention.

## 2017-09-04 NOTE — PROGRESS NOTE ADULT - PROBLEM SELECTOR PLAN 2
Patient refused standard of care treatment per oncology note.   - Started Brentuximab-vedotin 9/1, NEXT tx 9/22 outpt. Watch for neuropathy, cytopenia, infection, TLS.  - Continue to monitor tumor lysis labs, C/W tls labs q12h now. If WNL, can hold off on TLS labs tomorrow AM to expedite d/c.  - Psychiatry documenting patient was able to explain disease and discuss risks and benefits of treatments, but had requested further details from heme-onc. Heme-onc documenting long patient discussion. Now proceeding with immunotherapy.   - Pending goals of care and DNR/DNI decision

## 2017-09-04 NOTE — PROGRESS NOTE ADULT - PROBLEM SELECTOR PLAN 1
new thrush, noticed today 9/3, likely in setting of immunosuppression.  - start nystatin s&s and monitor

## 2017-09-04 NOTE — PROGRESS NOTE ADULT - SUBJECTIVE AND OBJECTIVE BOX
Patient is a 42y old  Female who presents with a chief complaint of sent by md for low blood count (30 Aug 2017 06:11)      SUBJECTIVE / OVERNIGHT EVENTS:    MEDICATIONS  (STANDING):  ferrous    sulfate 325 milliGRAM(s) Oral two times a day with meals  ergocalciferol 91695 Unit(s) Oral every week  dexamethasone     Tablet 4 milliGRAM(s) Oral every 6 hours  docusate sodium 100 milliGRAM(s) Oral daily  senna 2 Tablet(s) Oral at bedtime  enoxaparin Injectable 40 milliGRAM(s) SubCutaneous daily  polyethylene glycol 3350 17 Gram(s) Oral daily  Brentuximab (ADCENTRIS) 100 milliGRAM(s),sodium chloride 0.9% 100 milliLiter(s) 100 milliGRAM(s) IV Intermittent once  sodium chloride 0.9%. 1000 milliLiter(s) (75 mL/Hr) IV Continuous <Continuous>  nystatin    Suspension 5 milliLiter(s) Oral four times a day    MEDICATIONS  (PRN):  ondansetron Injectable 8 milliGRAM(s) IV Push every 8 hours PRN Nausea and/or Vomiting      Vital Signs Last 24 Hrs  T(C): 36.7 (03 Sep 2017 21:58), Max: 36.7 (03 Sep 2017 21:58)  T(F): 98 (03 Sep 2017 21:58), Max: 98 (03 Sep 2017 21:58)  HR: 89 (03 Sep 2017 21:58) (70 - 89)  BP: 110/65 (03 Sep 2017 21:58) (102/68 - 110/65)  BP(mean): --  RR: 18 (03 Sep 2017 21:58) (18 - 18)  SpO2: 98% (03 Sep 2017 21:58) (96% - 98%)        I&O's Summary    03 Sep 2017 07:01  -  04 Sep 2017 07:00  --------------------------------------------------------  IN: 1150 mL / OUT: 600 mL / NET: 550 mL        PHYSICAL EXAM:  GENERAL: NAD, well-developed  HEAD:  Atraumatic, Normocephalic  EYES:  NECK:   CHEST/LUNG: Clear to auscultation bilaterally; No wheezes, rhonchi or gallops  HEART: Regular rate and rhythm; No murmurs, rubs, or gallops  ABDOMEN: Soft, Nontender, Nondistended; Bowel sounds present  EXTREMITIES:  2+ Peripheral Pulses, No clubbing, cyanosis, or edema  PSYCH: Appropriate  NEUROLOGY: non-focal  SKIN: No rashes or lesions    LABS:                        8.7    13.93 )-----------( 180      ( 03 Sep 2017 12:31 )             30.1     09-03    136  |  100  |  17  ----------------------------<  135<H>  4.5   |  23  |  0.41<L>    Ca    9.5      03 Sep 2017 23:27  Phos  3.3     09-03  Mg     2.0     09-03    TPro  6.7  /  Alb  2.6<L>  /  TBili  0.7  /  DBili  x   /  AST  17  /  ALT  25  /  AlkPhos  256<H>  09-03              RADIOLOGY & ADDITIONAL TESTS:    Imaging Personally Reviewed:    Consultant(s) Notes Reviewed:      Care Discussed with Consultants/Other Providers: Patient is a 42y old  Female who presents with a chief complaint of sent by md for low blood count (30 Aug 2017 06:11)      SUBJECTIVE / OVERNIGHT EVENTS:  Feels shortness of breath has improved and cough has improved. No chest pain or palpitations. No fevers or chills. No rashes.      MEDICATIONS  (STANDING):  ferrous    sulfate 325 milliGRAM(s) Oral two times a day with meals  ergocalciferol 78147 Unit(s) Oral every week  dexamethasone     Tablet 4 milliGRAM(s) Oral every 6 hours  docusate sodium 100 milliGRAM(s) Oral daily  senna 2 Tablet(s) Oral at bedtime  enoxaparin Injectable 40 milliGRAM(s) SubCutaneous daily  polyethylene glycol 3350 17 Gram(s) Oral daily  Brentuximab (ADCENTRIS) 100 milliGRAM(s),sodium chloride 0.9% 100 milliLiter(s) 100 milliGRAM(s) IV Intermittent once  sodium chloride 0.9%. 1000 milliLiter(s) (75 mL/Hr) IV Continuous <Continuous>  nystatin    Suspension 5 milliLiter(s) Oral four times a day    MEDICATIONS  (PRN):  ondansetron Injectable 8 milliGRAM(s) IV Push every 8 hours PRN Nausea and/or Vomiting      Vital Signs Last 24 Hrs  T(C): 36.7 (03 Sep 2017 21:58), Max: 36.7 (03 Sep 2017 21:58)  T(F): 98 (03 Sep 2017 21:58), Max: 98 (03 Sep 2017 21:58)  HR: 89 (03 Sep 2017 21:58) (70 - 89)  BP: 110/65 (03 Sep 2017 21:58) (102/68 - 110/65)  BP(mean): --  RR: 18 (03 Sep 2017 21:58) (18 - 18)  SpO2: 98% (03 Sep 2017 21:58) (96% - 98%)        I&O's Summary    03 Sep 2017 07:01  -  04 Sep 2017 07:00  --------------------------------------------------------  IN: 1150 mL / OUT: 600 mL / NET: 550 mL        PHYSICAL EXAM:  GENERAL: NAD, well-developed  HEAD:  Atraumatic, Normocephalic  CHEST/LUNG: Improved posterior breath sounds, mild wheeze  HEART: Regular rate and rhythm; No murmurs, rubs, or gallops  ABDOMEN: Soft, Nontender, Nondistended; Bowel sounds present  EXTREMITIES: Edema  PSYCH: Appropriate    LABS:                        8.7    13.93 )-----------( 180      ( 03 Sep 2017 12:31 )             30.1     09-03    136  |  100  |  17  ----------------------------<  135<H>  4.5   |  23  |  0.41<L>    Ca    9.5      03 Sep 2017 23:27  Phos  3.3     09-03  Mg     2.0     09-03    TPro  6.7  /  Alb  2.6<L>  /  TBili  0.7  /  DBili  x   /  AST  17  /  ALT  25  /  AlkPhos  256<H>  09-03              RADIOLOGY & ADDITIONAL TESTS:    Imaging Personally Reviewed:    Consultant(s) Notes Reviewed:      Care Discussed with Consultants/Other Providers:

## 2017-09-04 NOTE — DISCHARGE NOTE ADULT - CARE PROVIDER_API CALL
Gabrielle Baxter (DO), Hematology; Internal Medicine; Medical Oncology  [address] [place] [address]  Phone: [phone]  Fax: [phone] Gabrielle Baxter (), Hematology; Internal Medicine; Medical Oncology  [address] [place] [address]  Phone: [phone]  Fax: [phone]    Cardiology Clinic,   Phone: [phone]  Phone: (   )    -  Fax: (   )    -    Loi Vickers), Internal Medicine; Pulmonary Disease  [address] [address]  Phone: [phone]  Fax: [phone]

## 2017-09-04 NOTE — PROGRESS NOTE ADULT - PROBLEM SELECTOR PLAN 6
TTE shows small pericardial effusion with no tamponade physiology.  - Consider cardiology consult for further work-up. Had refused pericardial window in prior admission.

## 2017-09-04 NOTE — DISCHARGE NOTE ADULT - MEDICATION SUMMARY - MEDICATIONS TO TAKE
I will START or STAY ON the medications listed below when I get home from the hospital:    nystatin 100,000 units/mL oral suspension  -- 5 milliliter(s) by mouth 4 times a day  -- Indication: For Oral thrush    ferrous sulfate 324 mg (65 mg elemental iron) oral tablet  -- 1 tab(s) by mouth 2 times a day  -- Indication: For Iron/anemia    polyethylene glycol 3350 oral powder for reconstitution  -- 17 gram(s) by mouth once a day  -- Indication: For Constipation    docusate sodium 100 mg oral capsule  -- 1 cap(s) by mouth once a day  -- Indication: For Constipation    senna oral tablet  -- 2 tab(s) by mouth once a day (at bedtime)  -- Indication: For Constipation    Vitamin D2 50,000 intl units (1.25 mg) oral capsule  -- 1 cap(s) by mouth once a week on Wednesday.   -- Indication: For Vitamin D deficiency    Vitamin D3 50,000 intl units oral capsule  -- 1 cap(s) by mouth once a week  -- Indication: For Vitamin D deficiency

## 2017-09-04 NOTE — PROGRESS NOTE ADULT - PROBLEM SELECTOR PLAN 7
had symptomatic anemia on admission (Hgb 6.6), likely related to cancer anemia of chronic disease. s/p 2 PRBC transfusion  - trend CBC  - no signs/symptoms of bleeding

## 2017-09-06 ENCOUNTER — OUTPATIENT (OUTPATIENT)
Dept: OUTPATIENT SERVICES | Facility: HOSPITAL | Age: 42
LOS: 1 days | Discharge: HOME | End: 2017-09-06

## 2017-09-06 DIAGNOSIS — C81.90 HODGKIN LYMPHOMA, UNSPECIFIED, UNSPECIFIED SITE: ICD-10-CM

## 2017-09-06 DIAGNOSIS — Z98.82 BREAST IMPLANT STATUS: Chronic | ICD-10-CM

## 2017-09-06 DIAGNOSIS — Z98.890 OTHER SPECIFIED POSTPROCEDURAL STATES: Chronic | ICD-10-CM

## 2017-09-08 ENCOUNTER — LABORATORY RESULT (OUTPATIENT)
Age: 42
End: 2017-09-08

## 2017-09-08 ENCOUNTER — RESULT REVIEW (OUTPATIENT)
Age: 42
End: 2017-09-08

## 2017-09-08 ENCOUNTER — APPOINTMENT (OUTPATIENT)
Dept: HEMATOLOGY ONCOLOGY | Facility: CLINIC | Age: 42
End: 2017-09-08
Payer: COMMERCIAL

## 2017-09-08 VITALS
OXYGEN SATURATION: 100 % | RESPIRATION RATE: 16 BRPM | DIASTOLIC BLOOD PRESSURE: 74 MMHG | BODY MASS INDEX: 17.62 KG/M2 | WEIGHT: 109.13 LBS | SYSTOLIC BLOOD PRESSURE: 105 MMHG | HEART RATE: 108 BPM | TEMPERATURE: 98.2 F

## 2017-09-08 LAB
BASOPHILS # BLD AUTO: 0 K/UL — SIGNIFICANT CHANGE UP (ref 0–0.2)
BASOPHILS NFR BLD AUTO: 0.1 % — SIGNIFICANT CHANGE UP (ref 0–2)
EOSINOPHIL # BLD AUTO: 0.1 K/UL — SIGNIFICANT CHANGE UP (ref 0–0.5)
EOSINOPHIL NFR BLD AUTO: 1.2 % — SIGNIFICANT CHANGE UP (ref 0–6)
HCT VFR BLD CALC: 28.2 % — LOW (ref 34.5–45)
HGB BLD-MCNC: 9 G/DL — LOW (ref 11.5–15.5)
LYMPHOCYTES # BLD AUTO: 1 K/UL — SIGNIFICANT CHANGE UP (ref 1–3.3)
LYMPHOCYTES # BLD AUTO: 9.7 % — LOW (ref 13–44)
MCHC RBC-ENTMCNC: 28.2 PG — SIGNIFICANT CHANGE UP (ref 27–34)
MCHC RBC-ENTMCNC: 32.1 G/DL — SIGNIFICANT CHANGE UP (ref 32–36)
MCV RBC AUTO: 87.9 FL — SIGNIFICANT CHANGE UP (ref 80–100)
MONOCYTES # BLD AUTO: 0.2 K/UL — SIGNIFICANT CHANGE UP (ref 0–0.9)
MONOCYTES NFR BLD AUTO: 2.4 % — SIGNIFICANT CHANGE UP (ref 2–14)
NEUTROPHILS # BLD AUTO: 8.7 K/UL — HIGH (ref 1.8–7.4)
NEUTROPHILS NFR BLD AUTO: 86.6 % — HIGH (ref 43–77)
PLATELET # BLD AUTO: 285 K/UL — SIGNIFICANT CHANGE UP (ref 150–400)
RBC # BLD: 3.2 M/UL — LOW (ref 3.8–5.2)
RBC # FLD: 21.9 % — HIGH (ref 10.3–14.5)
WBC # BLD: 10.1 K/UL — SIGNIFICANT CHANGE UP (ref 3.8–10.5)
WBC # FLD AUTO: 10.1 K/UL — SIGNIFICANT CHANGE UP (ref 3.8–10.5)

## 2017-09-08 PROCEDURE — 99215 OFFICE O/P EST HI 40 MIN: CPT

## 2017-09-12 ENCOUNTER — MEDICATION RENEWAL (OUTPATIENT)
Age: 42
End: 2017-09-12

## 2017-09-21 ENCOUNTER — RESULT REVIEW (OUTPATIENT)
Age: 42
End: 2017-09-21

## 2017-09-21 ENCOUNTER — APPOINTMENT (OUTPATIENT)
Dept: INFUSION THERAPY | Facility: HOSPITAL | Age: 42
End: 2017-09-21

## 2017-09-21 LAB
BASOPHILS # BLD AUTO: 0 K/UL — SIGNIFICANT CHANGE UP (ref 0–0.2)
BASOPHILS NFR BLD AUTO: 0.4 % — SIGNIFICANT CHANGE UP (ref 0–2)
EOSINOPHIL # BLD AUTO: 0.2 K/UL — SIGNIFICANT CHANGE UP (ref 0–0.5)
EOSINOPHIL NFR BLD AUTO: 2.7 % — SIGNIFICANT CHANGE UP (ref 0–6)
HCT VFR BLD CALC: 31.5 % — LOW (ref 34.5–45)
HGB BLD-MCNC: 9.7 G/DL — LOW (ref 11.5–15.5)
LYMPHOCYTES # BLD AUTO: 1.6 K/UL — SIGNIFICANT CHANGE UP (ref 1–3.3)
LYMPHOCYTES # BLD AUTO: 25.2 % — SIGNIFICANT CHANGE UP (ref 13–44)
MCHC RBC-ENTMCNC: 26.8 PG — LOW (ref 27–34)
MCHC RBC-ENTMCNC: 30.7 G/DL — LOW (ref 32–36)
MCV RBC AUTO: 87.2 FL — SIGNIFICANT CHANGE UP (ref 80–100)
MONOCYTES # BLD AUTO: 0.5 K/UL — SIGNIFICANT CHANGE UP (ref 0–0.9)
MONOCYTES NFR BLD AUTO: 8.2 % — SIGNIFICANT CHANGE UP (ref 2–14)
NEUTROPHILS # BLD AUTO: 4 K/UL — SIGNIFICANT CHANGE UP (ref 1.8–7.4)
NEUTROPHILS NFR BLD AUTO: 63.5 % — SIGNIFICANT CHANGE UP (ref 43–77)
PLATELET # BLD AUTO: 321 K/UL — SIGNIFICANT CHANGE UP (ref 150–400)
RBC # BLD: 3.61 M/UL — LOW (ref 3.8–5.2)
RBC # FLD: 22.2 % — HIGH (ref 10.3–14.5)
WBC # BLD: 6.3 K/UL — SIGNIFICANT CHANGE UP (ref 3.8–10.5)
WBC # FLD AUTO: 6.3 K/UL — SIGNIFICANT CHANGE UP (ref 3.8–10.5)

## 2017-09-22 DIAGNOSIS — Z51.11 ENCOUNTER FOR ANTINEOPLASTIC CHEMOTHERAPY: ICD-10-CM

## 2017-09-25 ENCOUNTER — MEDICATION RENEWAL (OUTPATIENT)
Age: 42
End: 2017-09-25

## 2017-09-25 RX ORDER — ERGOCALCIFEROL 1.25 MG/1
1.25 MG CAPSULE, LIQUID FILLED ORAL
Qty: 4 | Refills: 2 | Status: ACTIVE | COMMUNITY
Start: 2017-05-16 | End: 1900-01-01

## 2017-09-28 ENCOUNTER — TRANSCRIPTION ENCOUNTER (OUTPATIENT)
Age: 42
End: 2017-09-28

## 2017-09-29 ENCOUNTER — RESULT REVIEW (OUTPATIENT)
Age: 42
End: 2017-09-29

## 2017-09-29 ENCOUNTER — APPOINTMENT (OUTPATIENT)
Dept: HEMATOLOGY ONCOLOGY | Facility: CLINIC | Age: 42
End: 2017-09-29
Payer: COMMERCIAL

## 2017-09-29 VITALS
WEIGHT: 112.43 LBS | BODY MASS INDEX: 18.16 KG/M2 | TEMPERATURE: 98 F | SYSTOLIC BLOOD PRESSURE: 97 MMHG | OXYGEN SATURATION: 100 % | HEART RATE: 87 BPM | RESPIRATION RATE: 16 BRPM | DIASTOLIC BLOOD PRESSURE: 64 MMHG

## 2017-09-29 LAB
BASOPHILS # BLD AUTO: 0 K/UL — SIGNIFICANT CHANGE UP (ref 0–0.2)
BASOPHILS NFR BLD AUTO: 0.1 % — SIGNIFICANT CHANGE UP (ref 0–2)
EOSINOPHIL # BLD AUTO: 0.2 K/UL — SIGNIFICANT CHANGE UP (ref 0–0.5)
EOSINOPHIL NFR BLD AUTO: 3.4 % — SIGNIFICANT CHANGE UP (ref 0–6)
HCT VFR BLD CALC: 31.1 % — LOW (ref 34.5–45)
HGB BLD-MCNC: 9.7 G/DL — LOW (ref 11.5–15.5)
LYMPHOCYTES # BLD AUTO: 1.2 K/UL — SIGNIFICANT CHANGE UP (ref 1–3.3)
LYMPHOCYTES # BLD AUTO: 22.3 % — SIGNIFICANT CHANGE UP (ref 13–44)
MCHC RBC-ENTMCNC: 27.3 PG — SIGNIFICANT CHANGE UP (ref 27–34)
MCHC RBC-ENTMCNC: 31.2 G/DL — LOW (ref 32–36)
MCV RBC AUTO: 87.6 FL — SIGNIFICANT CHANGE UP (ref 80–100)
MONOCYTES # BLD AUTO: 0.5 K/UL — SIGNIFICANT CHANGE UP (ref 0–0.9)
MONOCYTES NFR BLD AUTO: 8.8 % — SIGNIFICANT CHANGE UP (ref 2–14)
NEUTROPHILS # BLD AUTO: 3.5 K/UL — SIGNIFICANT CHANGE UP (ref 1.8–7.4)
NEUTROPHILS NFR BLD AUTO: 65.3 % — SIGNIFICANT CHANGE UP (ref 43–77)
PLATELET # BLD AUTO: 263 K/UL — SIGNIFICANT CHANGE UP (ref 150–400)
RBC # BLD: 3.56 M/UL — LOW (ref 3.8–5.2)
RBC # FLD: 21.4 % — HIGH (ref 10.3–14.5)
WBC # BLD: 5.4 K/UL — SIGNIFICANT CHANGE UP (ref 3.8–10.5)
WBC # FLD AUTO: 5.4 K/UL — SIGNIFICANT CHANGE UP (ref 3.8–10.5)

## 2017-09-29 PROCEDURE — 99214 OFFICE O/P EST MOD 30 MIN: CPT

## 2017-09-29 RX ORDER — NYSTATIN 100000 [USP'U]/ML
100000 SUSPENSION ORAL 3 TIMES DAILY
Qty: 1 | Refills: 0 | Status: DISCONTINUED | COMMUNITY
Start: 2017-09-12 | End: 2017-09-29

## 2017-09-29 RX ORDER — CHLORHEXIDINE GLUCONATE 4 %
325 (65 FE) LIQUID (ML) TOPICAL
Qty: 30 | Refills: 5 | Status: DISCONTINUED | COMMUNITY
Start: 2017-05-16 | End: 2017-09-29

## 2017-09-29 RX ORDER — POTASSIUM CHLORIDE 750 MG/1
10 CAPSULE, EXTENDED RELEASE ORAL DAILY
Qty: 20 | Refills: 0 | Status: DISCONTINUED | COMMUNITY
Start: 2017-09-08 | End: 2017-09-29

## 2017-09-29 RX ORDER — FUROSEMIDE 20 MG/1
20 TABLET ORAL DAILY
Qty: 20 | Refills: 0 | Status: DISCONTINUED | COMMUNITY
Start: 2017-09-08 | End: 2017-09-29

## 2017-10-09 ENCOUNTER — OUTPATIENT (OUTPATIENT)
Dept: OUTPATIENT SERVICES | Facility: HOSPITAL | Age: 42
LOS: 1 days | Discharge: HOME | End: 2017-10-09

## 2017-10-09 DIAGNOSIS — Z98.890 OTHER SPECIFIED POSTPROCEDURAL STATES: Chronic | ICD-10-CM

## 2017-10-09 DIAGNOSIS — C81.90 HODGKIN LYMPHOMA, UNSPECIFIED, UNSPECIFIED SITE: ICD-10-CM

## 2017-10-09 DIAGNOSIS — Z98.82 BREAST IMPLANT STATUS: Chronic | ICD-10-CM

## 2017-10-13 ENCOUNTER — APPOINTMENT (OUTPATIENT)
Dept: INFUSION THERAPY | Facility: HOSPITAL | Age: 42
End: 2017-10-13

## 2017-10-13 ENCOUNTER — RESULT REVIEW (OUTPATIENT)
Age: 42
End: 2017-10-13

## 2017-10-13 LAB
BASOPHILS # BLD AUTO: 0 K/UL — SIGNIFICANT CHANGE UP (ref 0–0.2)
BASOPHILS NFR BLD AUTO: 0.4 % — SIGNIFICANT CHANGE UP (ref 0–2)
EOSINOPHIL # BLD AUTO: 0.7 K/UL — HIGH (ref 0–0.5)
EOSINOPHIL NFR BLD AUTO: 10.3 % — HIGH (ref 0–6)
HCT VFR BLD CALC: 34.6 % — SIGNIFICANT CHANGE UP (ref 34.5–45)
HGB BLD-MCNC: 11.3 G/DL — LOW (ref 11.5–15.5)
LYMPHOCYTES # BLD AUTO: 1.3 K/UL — SIGNIFICANT CHANGE UP (ref 1–3.3)
LYMPHOCYTES # BLD AUTO: 18.5 % — SIGNIFICANT CHANGE UP (ref 13–44)
MCHC RBC-ENTMCNC: 28 PG — SIGNIFICANT CHANGE UP (ref 27–34)
MCHC RBC-ENTMCNC: 32.8 G/DL — SIGNIFICANT CHANGE UP (ref 32–36)
MCV RBC AUTO: 85.4 FL — SIGNIFICANT CHANGE UP (ref 80–100)
MONOCYTES # BLD AUTO: 0.7 K/UL — SIGNIFICANT CHANGE UP (ref 0–0.9)
MONOCYTES NFR BLD AUTO: 9.7 % — SIGNIFICANT CHANGE UP (ref 2–14)
NEUTROPHILS # BLD AUTO: 4.2 K/UL — SIGNIFICANT CHANGE UP (ref 1.8–7.4)
NEUTROPHILS NFR BLD AUTO: 61 % — SIGNIFICANT CHANGE UP (ref 43–77)
PLATELET # BLD AUTO: 266 K/UL — SIGNIFICANT CHANGE UP (ref 150–400)
RBC # BLD: 4.05 M/UL — SIGNIFICANT CHANGE UP (ref 3.8–5.2)
RBC # FLD: 19.6 % — HIGH (ref 10.3–14.5)
WBC # BLD: 6.9 K/UL — SIGNIFICANT CHANGE UP (ref 3.8–10.5)
WBC # FLD AUTO: 6.9 K/UL — SIGNIFICANT CHANGE UP (ref 3.8–10.5)

## 2017-10-16 DIAGNOSIS — R11.2 NAUSEA WITH VOMITING, UNSPECIFIED: ICD-10-CM

## 2017-10-16 DIAGNOSIS — Z51.11 ENCOUNTER FOR ANTINEOPLASTIC CHEMOTHERAPY: ICD-10-CM

## 2017-11-02 LAB
ALBUMIN SERPL ELPH-MCNC: 3.5 G/DL
ALP BLD-CCNC: 147 U/L
ALT SERPL-CCNC: 19 U/L
ANION GAP SERPL CALC-SCNC: 12 MMOL/L
AST SERPL-CCNC: 19 U/L
BILIRUB SERPL-MCNC: 0.7 MG/DL
BUN SERPL-MCNC: 15 MG/DL
CALCIUM SERPL-MCNC: 9.4 MG/DL
CHLORIDE SERPL-SCNC: 106 MMOL/L
CO2 SERPL-SCNC: 23 MMOL/L
CREAT SERPL-MCNC: 0.56 MG/DL
GLUCOSE SERPL-MCNC: 78 MG/DL
LDH SERPL-CCNC: 203 U/L
POTASSIUM SERPL-SCNC: 4.4 MMOL/L
PROT SERPL-MCNC: 8.1 G/DL
SODIUM SERPL-SCNC: 141 MMOL/L
URATE SERPL-MCNC: 3.9 MG/DL

## 2017-11-03 ENCOUNTER — RESULT REVIEW (OUTPATIENT)
Age: 42
End: 2017-11-03

## 2017-11-03 ENCOUNTER — APPOINTMENT (OUTPATIENT)
Dept: INFUSION THERAPY | Facility: HOSPITAL | Age: 42
End: 2017-11-03

## 2017-11-03 LAB
BASOPHILS # BLD AUTO: 0 K/UL — SIGNIFICANT CHANGE UP (ref 0–0.2)
BASOPHILS NFR BLD AUTO: 0.4 % — SIGNIFICANT CHANGE UP (ref 0–2)
EOSINOPHIL # BLD AUTO: 0.7 K/UL — HIGH (ref 0–0.5)
EOSINOPHIL NFR BLD AUTO: 14.2 % — HIGH (ref 0–6)
HCT VFR BLD CALC: 50.5 % — HIGH (ref 34.5–45)
HGB BLD-MCNC: 16.4 G/DL — HIGH (ref 11.5–15.5)
LYMPHOCYTES # BLD AUTO: 1.1 K/UL — SIGNIFICANT CHANGE UP (ref 1–3.3)
LYMPHOCYTES # BLD AUTO: 24.1 % — SIGNIFICANT CHANGE UP (ref 13–44)
MCHC RBC-ENTMCNC: 26.4 PG — LOW (ref 27–34)
MCHC RBC-ENTMCNC: 32.4 G/DL — SIGNIFICANT CHANGE UP (ref 32–36)
MCV RBC AUTO: 81.4 FL — SIGNIFICANT CHANGE UP (ref 80–100)
MONOCYTES # BLD AUTO: 0.6 K/UL — SIGNIFICANT CHANGE UP (ref 0–0.9)
MONOCYTES NFR BLD AUTO: 11.8 % — SIGNIFICANT CHANGE UP (ref 2–14)
NEUTROPHILS # BLD AUTO: 2.3 K/UL — SIGNIFICANT CHANGE UP (ref 1.8–7.4)
NEUTROPHILS NFR BLD AUTO: 49.5 % — SIGNIFICANT CHANGE UP (ref 43–77)
PLATELET # BLD AUTO: 178 K/UL — SIGNIFICANT CHANGE UP (ref 150–400)
RBC # BLD: 6.2 M/UL — HIGH (ref 3.8–5.2)
RBC # FLD: 18.3 % — HIGH (ref 10.3–14.5)
WBC # BLD: 4.7 K/UL — SIGNIFICANT CHANGE UP (ref 3.8–10.5)
WBC # FLD AUTO: 4.7 K/UL — SIGNIFICANT CHANGE UP (ref 3.8–10.5)

## 2017-11-08 ENCOUNTER — RESULT REVIEW (OUTPATIENT)
Age: 42
End: 2017-11-08

## 2017-11-08 ENCOUNTER — APPOINTMENT (OUTPATIENT)
Dept: HEMATOLOGY ONCOLOGY | Facility: CLINIC | Age: 42
End: 2017-11-08
Payer: COMMERCIAL

## 2017-11-08 VITALS
DIASTOLIC BLOOD PRESSURE: 74 MMHG | TEMPERATURE: 97 F | HEART RATE: 85 BPM | OXYGEN SATURATION: 100 % | WEIGHT: 122.13 LBS | SYSTOLIC BLOOD PRESSURE: 106 MMHG | RESPIRATION RATE: 16 BRPM | BODY MASS INDEX: 19.72 KG/M2

## 2017-11-08 LAB
BASOPHILS # BLD AUTO: 0 K/UL — SIGNIFICANT CHANGE UP (ref 0–0.2)
BASOPHILS NFR BLD AUTO: 0.2 % — SIGNIFICANT CHANGE UP (ref 0–2)
EOSINOPHIL # BLD AUTO: 0.7 K/UL — HIGH (ref 0–0.5)
EOSINOPHIL NFR BLD AUTO: 13.8 % — HIGH (ref 0–6)
HCT VFR BLD CALC: 37.2 % — SIGNIFICANT CHANGE UP (ref 34.5–45)
HGB BLD-MCNC: 12.5 G/DL — SIGNIFICANT CHANGE UP (ref 11.5–15.5)
LYMPHOCYTES # BLD AUTO: 1 K/UL — SIGNIFICANT CHANGE UP (ref 1–3.3)
LYMPHOCYTES # BLD AUTO: 20 % — SIGNIFICANT CHANGE UP (ref 13–44)
MCHC RBC-ENTMCNC: 26.4 PG — LOW (ref 27–34)
MCHC RBC-ENTMCNC: 33.5 G/DL — SIGNIFICANT CHANGE UP (ref 32–36)
MCV RBC AUTO: 78.8 FL — LOW (ref 80–100)
MONOCYTES # BLD AUTO: 0.6 K/UL — SIGNIFICANT CHANGE UP (ref 0–0.9)
MONOCYTES NFR BLD AUTO: 12.3 % — SIGNIFICANT CHANGE UP (ref 2–14)
NEUTROPHILS # BLD AUTO: 2.8 K/UL — SIGNIFICANT CHANGE UP (ref 1.8–7.4)
NEUTROPHILS NFR BLD AUTO: 53.7 % — SIGNIFICANT CHANGE UP (ref 43–77)
PLATELET # BLD AUTO: 251 K/UL — SIGNIFICANT CHANGE UP (ref 150–400)
RBC # BLD: 4.72 M/UL — SIGNIFICANT CHANGE UP (ref 3.8–5.2)
RBC # FLD: 17.4 % — HIGH (ref 10.3–14.5)
WBC # BLD: 5.2 K/UL — SIGNIFICANT CHANGE UP (ref 3.8–10.5)
WBC # FLD AUTO: 5.2 K/UL — SIGNIFICANT CHANGE UP (ref 3.8–10.5)

## 2017-11-08 PROCEDURE — 99214 OFFICE O/P EST MOD 30 MIN: CPT

## 2017-11-16 ENCOUNTER — FORM ENCOUNTER (OUTPATIENT)
Age: 42
End: 2017-11-16

## 2017-11-17 ENCOUNTER — APPOINTMENT (OUTPATIENT)
Dept: NUCLEAR MEDICINE | Facility: IMAGING CENTER | Age: 42
End: 2017-11-17
Payer: COMMERCIAL

## 2017-11-17 ENCOUNTER — OUTPATIENT (OUTPATIENT)
Dept: OUTPATIENT SERVICES | Facility: HOSPITAL | Age: 42
LOS: 1 days | Discharge: HOME | End: 2017-11-17

## 2017-11-17 ENCOUNTER — OUTPATIENT (OUTPATIENT)
Dept: OUTPATIENT SERVICES | Facility: HOSPITAL | Age: 42
LOS: 1 days | End: 2017-11-17
Payer: MEDICAID

## 2017-11-17 DIAGNOSIS — Z98.890 OTHER SPECIFIED POSTPROCEDURAL STATES: Chronic | ICD-10-CM

## 2017-11-17 DIAGNOSIS — C81.90 HODGKIN LYMPHOMA, UNSPECIFIED, UNSPECIFIED SITE: ICD-10-CM

## 2017-11-17 DIAGNOSIS — Z98.82 BREAST IMPLANT STATUS: Chronic | ICD-10-CM

## 2017-11-17 PROCEDURE — A9552: CPT

## 2017-11-17 PROCEDURE — 78815 PET IMAGE W/CT SKULL-THIGH: CPT

## 2017-11-17 PROCEDURE — 78815 PET IMAGE W/CT SKULL-THIGH: CPT | Mod: 26,PS

## 2017-11-24 ENCOUNTER — APPOINTMENT (OUTPATIENT)
Dept: INFUSION THERAPY | Facility: HOSPITAL | Age: 42
End: 2017-11-24

## 2017-11-24 ENCOUNTER — RESULT REVIEW (OUTPATIENT)
Age: 42
End: 2017-11-24

## 2017-11-24 LAB
EOSINOPHIL # BLD AUTO: 0.6 K/UL — HIGH (ref 0–0.5)
EOSINOPHIL NFR BLD AUTO: 7 % — HIGH (ref 0–6)
HCT VFR BLD CALC: 37.4 % — SIGNIFICANT CHANGE UP (ref 34.5–45)
HGB BLD-MCNC: 12.7 G/DL — SIGNIFICANT CHANGE UP (ref 11.5–15.5)
LYMPHOCYTES # BLD AUTO: 1.2 K/UL — SIGNIFICANT CHANGE UP (ref 1–3.3)
LYMPHOCYTES # BLD AUTO: 29 % — SIGNIFICANT CHANGE UP (ref 13–44)
MCHC RBC-ENTMCNC: 26.2 PG — LOW (ref 27–34)
MCHC RBC-ENTMCNC: 33.9 G/DL — SIGNIFICANT CHANGE UP (ref 32–36)
MCV RBC AUTO: 77.3 FL — LOW (ref 80–100)
MONOCYTES # BLD AUTO: 1 K/UL — HIGH (ref 0–0.9)
MONOCYTES NFR BLD AUTO: 15 % — HIGH (ref 2–14)
NEUTROPHILS # BLD AUTO: 2.8 K/UL — SIGNIFICANT CHANGE UP (ref 1.8–7.4)
NEUTROPHILS NFR BLD AUTO: 49 % — SIGNIFICANT CHANGE UP (ref 43–77)
PLAT MORPH BLD: NORMAL — SIGNIFICANT CHANGE UP
PLATELET # BLD AUTO: 274 K/UL — SIGNIFICANT CHANGE UP (ref 150–400)
RBC # BLD: 4.84 M/UL — SIGNIFICANT CHANGE UP (ref 3.8–5.2)
RBC # FLD: 17.7 % — HIGH (ref 10.3–14.5)
RBC BLD AUTO: SIGNIFICANT CHANGE UP
WBC # BLD: 5.6 K/UL — SIGNIFICANT CHANGE UP (ref 3.8–10.5)
WBC # FLD AUTO: 5.6 K/UL — SIGNIFICANT CHANGE UP (ref 3.8–10.5)

## 2017-11-30 DIAGNOSIS — Z51.11 ENCOUNTER FOR ANTINEOPLASTIC CHEMOTHERAPY: ICD-10-CM

## 2017-12-15 ENCOUNTER — RESULT REVIEW (OUTPATIENT)
Age: 42
End: 2017-12-15

## 2017-12-15 ENCOUNTER — APPOINTMENT (OUTPATIENT)
Dept: INFUSION THERAPY | Facility: HOSPITAL | Age: 42
End: 2017-12-15

## 2017-12-15 LAB
BASOPHILS # BLD AUTO: 0 K/UL — SIGNIFICANT CHANGE UP (ref 0–0.2)
BASOPHILS NFR BLD AUTO: 0.3 % — SIGNIFICANT CHANGE UP (ref 0–2)
EOSINOPHIL # BLD AUTO: 0.4 K/UL — SIGNIFICANT CHANGE UP (ref 0–0.5)
EOSINOPHIL NFR BLD AUTO: 7.6 % — HIGH (ref 0–6)
HCT VFR BLD CALC: 37.2 % — SIGNIFICANT CHANGE UP (ref 34.5–45)
HGB BLD-MCNC: 12.2 G/DL — SIGNIFICANT CHANGE UP (ref 11.5–15.5)
LYMPHOCYTES # BLD AUTO: 1 K/UL — SIGNIFICANT CHANGE UP (ref 1–3.3)
LYMPHOCYTES # BLD AUTO: 17.3 % — SIGNIFICANT CHANGE UP (ref 13–44)
MCHC RBC-ENTMCNC: 24.3 PG — LOW (ref 27–34)
MCHC RBC-ENTMCNC: 32.9 G/DL — SIGNIFICANT CHANGE UP (ref 32–36)
MCV RBC AUTO: 74 FL — LOW (ref 80–100)
MONOCYTES # BLD AUTO: 0.9 K/UL — SIGNIFICANT CHANGE UP (ref 0–0.9)
MONOCYTES NFR BLD AUTO: 15.2 % — HIGH (ref 2–14)
NEUTROPHILS # BLD AUTO: 3.5 K/UL — SIGNIFICANT CHANGE UP (ref 1.8–7.4)
NEUTROPHILS NFR BLD AUTO: 59.7 % — SIGNIFICANT CHANGE UP (ref 43–77)
PLATELET # BLD AUTO: 311 K/UL — SIGNIFICANT CHANGE UP (ref 150–400)
RBC # BLD: 5.03 M/UL — SIGNIFICANT CHANGE UP (ref 3.8–5.2)
RBC # FLD: 17.6 % — HIGH (ref 10.3–14.5)
WBC # BLD: 5.9 K/UL — SIGNIFICANT CHANGE UP (ref 3.8–10.5)
WBC # FLD AUTO: 5.9 K/UL — SIGNIFICANT CHANGE UP (ref 3.8–10.5)

## 2017-12-22 ENCOUNTER — OUTPATIENT (OUTPATIENT)
Dept: OUTPATIENT SERVICES | Facility: HOSPITAL | Age: 42
LOS: 1 days | Discharge: HOME | End: 2017-12-22

## 2017-12-22 DIAGNOSIS — Z98.82 BREAST IMPLANT STATUS: Chronic | ICD-10-CM

## 2017-12-22 DIAGNOSIS — C81.90 HODGKIN LYMPHOMA, UNSPECIFIED, UNSPECIFIED SITE: ICD-10-CM

## 2017-12-22 DIAGNOSIS — Z98.890 OTHER SPECIFIED POSTPROCEDURAL STATES: Chronic | ICD-10-CM

## 2017-12-28 NOTE — H&P PST ADULT - FAMILY HISTORY
IBS (irritable bowel syndrome) Father  Still living? No  Family history of lung cancer, Age at diagnosis: Age Unknown

## 2017-12-29 ENCOUNTER — OTHER (OUTPATIENT)
Age: 42
End: 2017-12-29

## 2018-01-03 ENCOUNTER — RESULT REVIEW (OUTPATIENT)
Age: 43
End: 2018-01-03

## 2018-01-03 ENCOUNTER — APPOINTMENT (OUTPATIENT)
Dept: HEMATOLOGY ONCOLOGY | Facility: CLINIC | Age: 43
End: 2018-01-03
Payer: MEDICAID

## 2018-01-03 VITALS
DIASTOLIC BLOOD PRESSURE: 69 MMHG | WEIGHT: 123.99 LBS | BODY MASS INDEX: 20.02 KG/M2 | HEART RATE: 95 BPM | TEMPERATURE: 100.1 F | SYSTOLIC BLOOD PRESSURE: 100 MMHG | OXYGEN SATURATION: 99 % | RESPIRATION RATE: 16 BRPM

## 2018-01-03 LAB
BASOPHILS # BLD AUTO: 0 K/UL — SIGNIFICANT CHANGE UP (ref 0–0.2)
BASOPHILS NFR BLD AUTO: 0.6 % — SIGNIFICANT CHANGE UP (ref 0–2)
EOSINOPHIL # BLD AUTO: 0.5 K/UL — SIGNIFICANT CHANGE UP (ref 0–0.5)
EOSINOPHIL NFR BLD AUTO: 7.4 % — HIGH (ref 0–6)
HCT VFR BLD CALC: 33.6 % — LOW (ref 34.5–45)
HGB BLD-MCNC: 11.1 G/DL — LOW (ref 11.5–15.5)
LYMPHOCYTES # BLD AUTO: 1 K/UL — SIGNIFICANT CHANGE UP (ref 1–3.3)
LYMPHOCYTES # BLD AUTO: 15.3 % — SIGNIFICANT CHANGE UP (ref 13–44)
MCHC RBC-ENTMCNC: 24.2 PG — LOW (ref 27–34)
MCHC RBC-ENTMCNC: 32.9 G/DL — SIGNIFICANT CHANGE UP (ref 32–36)
MCV RBC AUTO: 73.5 FL — LOW (ref 80–100)
MONOCYTES # BLD AUTO: 0.9 K/UL — SIGNIFICANT CHANGE UP (ref 0–0.9)
MONOCYTES NFR BLD AUTO: 13.9 % — SIGNIFICANT CHANGE UP (ref 2–14)
NEUTROPHILS # BLD AUTO: 4.3 K/UL — SIGNIFICANT CHANGE UP (ref 1.8–7.4)
NEUTROPHILS NFR BLD AUTO: 62.8 % — SIGNIFICANT CHANGE UP (ref 43–77)
PLATELET # BLD AUTO: 307 K/UL — SIGNIFICANT CHANGE UP (ref 150–400)
RBC # BLD: 4.58 M/UL — SIGNIFICANT CHANGE UP (ref 3.8–5.2)
RBC # FLD: 18.8 % — HIGH (ref 10.3–14.5)
WBC # BLD: 6.8 K/UL — SIGNIFICANT CHANGE UP (ref 3.8–10.5)
WBC # FLD AUTO: 6.8 K/UL — SIGNIFICANT CHANGE UP (ref 3.8–10.5)

## 2018-01-03 PROCEDURE — 99214 OFFICE O/P EST MOD 30 MIN: CPT

## 2018-01-18 ENCOUNTER — APPOINTMENT (OUTPATIENT)
Dept: INFUSION THERAPY | Facility: HOSPITAL | Age: 43
End: 2018-01-18

## 2018-01-18 ENCOUNTER — RESULT REVIEW (OUTPATIENT)
Age: 43
End: 2018-01-18

## 2018-01-18 LAB
BASOPHILS # BLD AUTO: 0 K/UL — SIGNIFICANT CHANGE UP (ref 0–0.2)
BASOPHILS NFR BLD AUTO: 0.4 % — SIGNIFICANT CHANGE UP (ref 0–2)
EOSINOPHIL # BLD AUTO: 0.5 K/UL — SIGNIFICANT CHANGE UP (ref 0–0.5)
EOSINOPHIL NFR BLD AUTO: 5.3 % — SIGNIFICANT CHANGE UP (ref 0–6)
HCT VFR BLD CALC: 33.6 % — LOW (ref 34.5–45)
HGB BLD-MCNC: 11.1 G/DL — LOW (ref 11.5–15.5)
LYMPHOCYTES # BLD AUTO: 1.3 K/UL — SIGNIFICANT CHANGE UP (ref 1–3.3)
LYMPHOCYTES # BLD AUTO: 13 % — SIGNIFICANT CHANGE UP (ref 13–44)
MCHC RBC-ENTMCNC: 24 PG — LOW (ref 27–34)
MCHC RBC-ENTMCNC: 32.8 G/DL — SIGNIFICANT CHANGE UP (ref 32–36)
MCV RBC AUTO: 73.1 FL — LOW (ref 80–100)
MONOCYTES # BLD AUTO: 0.9 K/UL — SIGNIFICANT CHANGE UP (ref 0–0.9)
MONOCYTES NFR BLD AUTO: 9.4 % — SIGNIFICANT CHANGE UP (ref 2–14)
NEUTROPHILS # BLD AUTO: 7 K/UL — SIGNIFICANT CHANGE UP (ref 1.8–7.4)
NEUTROPHILS NFR BLD AUTO: 71.9 % — SIGNIFICANT CHANGE UP (ref 43–77)
PLATELET # BLD AUTO: 445 K/UL — HIGH (ref 150–400)
RBC # BLD: 4.6 M/UL — SIGNIFICANT CHANGE UP (ref 3.8–5.2)
RBC # FLD: 19.9 % — HIGH (ref 10.3–14.5)
WBC # BLD: 9.8 K/UL — SIGNIFICANT CHANGE UP (ref 3.8–10.5)
WBC # FLD AUTO: 9.8 K/UL — SIGNIFICANT CHANGE UP (ref 3.8–10.5)

## 2018-01-19 DIAGNOSIS — Z51.11 ENCOUNTER FOR ANTINEOPLASTIC CHEMOTHERAPY: ICD-10-CM

## 2018-01-30 ENCOUNTER — OUTPATIENT (OUTPATIENT)
Dept: OUTPATIENT SERVICES | Facility: HOSPITAL | Age: 43
LOS: 1 days | Discharge: HOME | End: 2018-01-30

## 2018-01-30 DIAGNOSIS — Z98.890 OTHER SPECIFIED POSTPROCEDURAL STATES: Chronic | ICD-10-CM

## 2018-01-30 DIAGNOSIS — Z98.82 BREAST IMPLANT STATUS: Chronic | ICD-10-CM

## 2018-01-30 DIAGNOSIS — C81.90 HODGKIN LYMPHOMA, UNSPECIFIED, UNSPECIFIED SITE: ICD-10-CM

## 2018-02-07 ENCOUNTER — APPOINTMENT (OUTPATIENT)
Dept: HEMATOLOGY ONCOLOGY | Facility: CLINIC | Age: 43
End: 2018-02-07
Payer: MEDICAID

## 2018-02-07 ENCOUNTER — RESULT REVIEW (OUTPATIENT)
Age: 43
End: 2018-02-07

## 2018-02-07 VITALS
BODY MASS INDEX: 19.4 KG/M2 | DIASTOLIC BLOOD PRESSURE: 70 MMHG | SYSTOLIC BLOOD PRESSURE: 110 MMHG | OXYGEN SATURATION: 99 % | RESPIRATION RATE: 16 BRPM | TEMPERATURE: 99 F | HEART RATE: 82 BPM | WEIGHT: 120.15 LBS

## 2018-02-07 LAB
BASOPHILS # BLD AUTO: 0 K/UL — SIGNIFICANT CHANGE UP (ref 0–0.2)
BASOPHILS NFR BLD AUTO: 0.2 % — SIGNIFICANT CHANGE UP (ref 0–2)
EOSINOPHIL # BLD AUTO: 0.1 K/UL — SIGNIFICANT CHANGE UP (ref 0–0.5)
EOSINOPHIL NFR BLD AUTO: 2 % — SIGNIFICANT CHANGE UP (ref 0–6)
HCT VFR BLD CALC: 29.3 % — LOW (ref 34.5–45)
HGB BLD-MCNC: 9.4 G/DL — LOW (ref 11.5–15.5)
LYMPHOCYTES # BLD AUTO: 0.8 K/UL — LOW (ref 1–3.3)
LYMPHOCYTES # BLD AUTO: 12.3 % — LOW (ref 13–44)
MCHC RBC-ENTMCNC: 23.4 PG — LOW (ref 27–34)
MCHC RBC-ENTMCNC: 32 G/DL — SIGNIFICANT CHANGE UP (ref 32–36)
MCV RBC AUTO: 73.1 FL — LOW (ref 80–100)
MONOCYTES # BLD AUTO: 0.6 K/UL — SIGNIFICANT CHANGE UP (ref 0–0.9)
MONOCYTES NFR BLD AUTO: 9.1 % — SIGNIFICANT CHANGE UP (ref 2–14)
NEUTROPHILS # BLD AUTO: 5 K/UL — SIGNIFICANT CHANGE UP (ref 1.8–7.4)
NEUTROPHILS NFR BLD AUTO: 76.3 % — SIGNIFICANT CHANGE UP (ref 43–77)
PLATELET # BLD AUTO: 473 K/UL — HIGH (ref 150–400)
RBC # BLD: 4 M/UL — SIGNIFICANT CHANGE UP (ref 3.8–5.2)
RBC # FLD: 21 % — HIGH (ref 10.3–14.5)
WBC # BLD: 6.6 K/UL — SIGNIFICANT CHANGE UP (ref 3.8–10.5)
WBC # FLD AUTO: 6.6 K/UL — SIGNIFICANT CHANGE UP (ref 3.8–10.5)

## 2018-02-07 PROCEDURE — XXXXX: CPT

## 2018-02-20 ENCOUNTER — RESULT REVIEW (OUTPATIENT)
Age: 43
End: 2018-02-20

## 2018-02-20 ENCOUNTER — APPOINTMENT (OUTPATIENT)
Dept: INFUSION THERAPY | Facility: HOSPITAL | Age: 43
End: 2018-02-20

## 2018-02-20 LAB
BASOPHILS # BLD AUTO: 0 K/UL — SIGNIFICANT CHANGE UP (ref 0–0.2)
BASOPHILS NFR BLD AUTO: 0.1 % — SIGNIFICANT CHANGE UP (ref 0–2)
EOSINOPHIL # BLD AUTO: 0.7 K/UL — HIGH (ref 0–0.5)
EOSINOPHIL NFR BLD AUTO: 7.9 % — HIGH (ref 0–6)
HCT VFR BLD CALC: 27.9 % — LOW (ref 34.5–45)
HGB BLD-MCNC: 8.9 G/DL — LOW (ref 11.5–15.5)
LYMPHOCYTES # BLD AUTO: 0.9 K/UL — LOW (ref 1–3.3)
LYMPHOCYTES # BLD AUTO: 11.3 % — LOW (ref 13–44)
MCHC RBC-ENTMCNC: 23.1 PG — LOW (ref 27–34)
MCHC RBC-ENTMCNC: 31.8 G/DL — LOW (ref 32–36)
MCV RBC AUTO: 72.7 FL — LOW (ref 80–100)
MONOCYTES # BLD AUTO: 0.7 K/UL — SIGNIFICANT CHANGE UP (ref 0–0.9)
MONOCYTES NFR BLD AUTO: 8.8 % — SIGNIFICANT CHANGE UP (ref 2–14)
NEUTROPHILS # BLD AUTO: 6 K/UL — SIGNIFICANT CHANGE UP (ref 1.8–7.4)
NEUTROPHILS NFR BLD AUTO: 71.9 % — SIGNIFICANT CHANGE UP (ref 43–77)
PLATELET # BLD AUTO: 330 K/UL — SIGNIFICANT CHANGE UP (ref 150–400)
RBC # BLD: 3.84 M/UL — SIGNIFICANT CHANGE UP (ref 3.8–5.2)
RBC # FLD: 21.2 % — HIGH (ref 10.3–14.5)
WBC # BLD: 8.4 K/UL — SIGNIFICANT CHANGE UP (ref 3.8–10.5)
WBC # FLD AUTO: 8.4 K/UL — SIGNIFICANT CHANGE UP (ref 3.8–10.5)

## 2018-02-21 DIAGNOSIS — Z51.11 ENCOUNTER FOR ANTINEOPLASTIC CHEMOTHERAPY: ICD-10-CM

## 2018-02-28 ENCOUNTER — OUTPATIENT (OUTPATIENT)
Dept: OUTPATIENT SERVICES | Facility: HOSPITAL | Age: 43
LOS: 1 days | End: 2018-02-28
Payer: MEDICAID

## 2018-02-28 ENCOUNTER — RESULT REVIEW (OUTPATIENT)
Age: 43
End: 2018-02-28

## 2018-02-28 ENCOUNTER — APPOINTMENT (OUTPATIENT)
Dept: HEMATOLOGY ONCOLOGY | Facility: CLINIC | Age: 43
End: 2018-02-28
Payer: MEDICAID

## 2018-02-28 VITALS
DIASTOLIC BLOOD PRESSURE: 63 MMHG | BODY MASS INDEX: 19.15 KG/M2 | WEIGHT: 118.61 LBS | HEART RATE: 121 BPM | OXYGEN SATURATION: 99 % | RESPIRATION RATE: 17 BRPM | SYSTOLIC BLOOD PRESSURE: 91 MMHG | TEMPERATURE: 99.1 F

## 2018-02-28 DIAGNOSIS — C81.90 HODGKIN LYMPHOMA, UNSPECIFIED, UNSPECIFIED SITE: ICD-10-CM

## 2018-02-28 DIAGNOSIS — Z98.890 OTHER SPECIFIED POSTPROCEDURAL STATES: Chronic | ICD-10-CM

## 2018-02-28 DIAGNOSIS — Z98.82 BREAST IMPLANT STATUS: Chronic | ICD-10-CM

## 2018-02-28 LAB
BASOPHILS # BLD AUTO: 0 K/UL — SIGNIFICANT CHANGE UP (ref 0–0.2)
BASOPHILS NFR BLD AUTO: 0.1 % — SIGNIFICANT CHANGE UP (ref 0–2)
BLD GP AB SCN SERPL QL: NEGATIVE — SIGNIFICANT CHANGE UP
EOSINOPHIL # BLD AUTO: 0.1 K/UL — SIGNIFICANT CHANGE UP (ref 0–0.5)
EOSINOPHIL NFR BLD AUTO: 1.7 % — SIGNIFICANT CHANGE UP (ref 0–6)
HCT VFR BLD CALC: 26.9 % — LOW (ref 34.5–45)
HGB BLD-MCNC: 8.7 G/DL — LOW (ref 11.5–15.5)
LYMPHOCYTES # BLD AUTO: 0.4 K/UL — LOW (ref 1–3.3)
LYMPHOCYTES # BLD AUTO: 6.7 % — LOW (ref 13–44)
MCHC RBC-ENTMCNC: 23.8 PG — LOW (ref 27–34)
MCHC RBC-ENTMCNC: 32.2 G/DL — SIGNIFICANT CHANGE UP (ref 32–36)
MCV RBC AUTO: 73.7 FL — LOW (ref 80–100)
MONOCYTES # BLD AUTO: 0.4 K/UL — SIGNIFICANT CHANGE UP (ref 0–0.9)
MONOCYTES NFR BLD AUTO: 7.2 % — SIGNIFICANT CHANGE UP (ref 2–14)
NEUTROPHILS # BLD AUTO: 5.1 K/UL — SIGNIFICANT CHANGE UP (ref 1.8–7.4)
NEUTROPHILS NFR BLD AUTO: 84.2 % — HIGH (ref 43–77)
PLATELET # BLD AUTO: 339 K/UL — SIGNIFICANT CHANGE UP (ref 150–400)
RBC # BLD: 3.64 M/UL — LOW (ref 3.8–5.2)
RBC # FLD: 21.7 % — HIGH (ref 10.3–14.5)
RH IG SCN BLD-IMP: POSITIVE — SIGNIFICANT CHANGE UP
WBC # BLD: 6.1 K/UL — SIGNIFICANT CHANGE UP (ref 3.8–10.5)
WBC # FLD AUTO: 6.1 K/UL — SIGNIFICANT CHANGE UP (ref 3.8–10.5)

## 2018-02-28 PROCEDURE — 99215 OFFICE O/P EST HI 40 MIN: CPT

## 2018-03-01 ENCOUNTER — OUTPATIENT (OUTPATIENT)
Dept: OUTPATIENT SERVICES | Facility: HOSPITAL | Age: 43
LOS: 1 days | Discharge: HOME | End: 2018-03-01

## 2018-03-01 DIAGNOSIS — C81.90 HODGKIN LYMPHOMA, UNSPECIFIED, UNSPECIFIED SITE: ICD-10-CM

## 2018-03-01 DIAGNOSIS — Z98.890 OTHER SPECIFIED POSTPROCEDURAL STATES: Chronic | ICD-10-CM

## 2018-03-01 DIAGNOSIS — Z98.82 BREAST IMPLANT STATUS: Chronic | ICD-10-CM

## 2018-03-01 PROCEDURE — 86850 RBC ANTIBODY SCREEN: CPT

## 2018-03-01 PROCEDURE — 86900 BLOOD TYPING SEROLOGIC ABO: CPT

## 2018-03-01 PROCEDURE — 86901 BLOOD TYPING SEROLOGIC RH(D): CPT

## 2018-03-01 PROCEDURE — 86923 COMPATIBILITY TEST ELECTRIC: CPT

## 2018-03-02 ENCOUNTER — LABORATORY RESULT (OUTPATIENT)
Age: 43
End: 2018-03-02

## 2018-03-02 ENCOUNTER — APPOINTMENT (OUTPATIENT)
Dept: HEMATOLOGY ONCOLOGY | Facility: CLINIC | Age: 43
End: 2018-03-02

## 2018-03-02 ENCOUNTER — APPOINTMENT (OUTPATIENT)
Dept: INFUSION THERAPY | Facility: HOSPITAL | Age: 43
End: 2018-03-02

## 2018-03-05 DIAGNOSIS — Z51.89 ENCOUNTER FOR OTHER SPECIFIED AFTERCARE: ICD-10-CM

## 2018-03-13 ENCOUNTER — APPOINTMENT (OUTPATIENT)
Dept: INFUSION THERAPY | Facility: HOSPITAL | Age: 43
End: 2018-03-13

## 2018-03-22 ENCOUNTER — MOBILE ON CALL (OUTPATIENT)
Age: 43
End: 2018-03-22

## 2018-03-22 ENCOUNTER — INPATIENT (INPATIENT)
Facility: HOSPITAL | Age: 43
LOS: 3 days | Discharge: HOME | DRG: 872 | End: 2018-03-26
Attending: INTERNAL MEDICINE | Admitting: HOSPITALIST
Payer: MEDICAID

## 2018-03-22 VITALS
TEMPERATURE: 103 F | HEART RATE: 153 BPM | OXYGEN SATURATION: 97 % | RESPIRATION RATE: 20 BRPM | SYSTOLIC BLOOD PRESSURE: 96 MMHG | WEIGHT: 119.93 LBS | DIASTOLIC BLOOD PRESSURE: 67 MMHG

## 2018-03-22 DIAGNOSIS — Z98.890 OTHER SPECIFIED POSTPROCEDURAL STATES: Chronic | ICD-10-CM

## 2018-03-22 DIAGNOSIS — A41.9 SEPSIS, UNSPECIFIED ORGANISM: ICD-10-CM

## 2018-03-22 DIAGNOSIS — Z98.82 BREAST IMPLANT STATUS: Chronic | ICD-10-CM

## 2018-03-22 LAB
ALBUMIN SERPL ELPH-MCNC: 3 G/DL — LOW (ref 3.3–5)
ALP SERPL-CCNC: 419 U/L — HIGH (ref 40–120)
ALT FLD-CCNC: 15 U/L RC — SIGNIFICANT CHANGE UP (ref 10–45)
ANION GAP SERPL CALC-SCNC: 16 MMOL/L — SIGNIFICANT CHANGE UP (ref 5–17)
APPEARANCE UR: CLEAR — SIGNIFICANT CHANGE UP
AST SERPL-CCNC: 14 U/L — SIGNIFICANT CHANGE UP (ref 10–40)
BASOPHILS # BLD AUTO: 0.1 K/UL — SIGNIFICANT CHANGE UP (ref 0–0.2)
BILIRUB SERPL-MCNC: 1.6 MG/DL — HIGH (ref 0.2–1.2)
BILIRUB UR-MCNC: NEGATIVE — SIGNIFICANT CHANGE UP
BUN SERPL-MCNC: 10 MG/DL — SIGNIFICANT CHANGE UP (ref 7–23)
CALCIUM SERPL-MCNC: 9.1 MG/DL — SIGNIFICANT CHANGE UP (ref 8.4–10.5)
CHLORIDE SERPL-SCNC: 89 MMOL/L — LOW (ref 96–108)
CK MB CFR SERPL CALC: 1.2 NG/ML — SIGNIFICANT CHANGE UP (ref 0–3.8)
CK SERPL-CCNC: 63 U/L — SIGNIFICANT CHANGE UP (ref 25–170)
CO2 SERPL-SCNC: 23 MMOL/L — SIGNIFICANT CHANGE UP (ref 22–31)
COLOR SPEC: SIGNIFICANT CHANGE UP
CREAT SERPL-MCNC: 0.57 MG/DL — SIGNIFICANT CHANGE UP (ref 0.5–1.3)
DIFF PNL FLD: NEGATIVE — SIGNIFICANT CHANGE UP
EOSINOPHIL # BLD AUTO: 0.2 K/UL — SIGNIFICANT CHANGE UP (ref 0–0.5)
EOSINOPHIL NFR BLD AUTO: 5 % — SIGNIFICANT CHANGE UP (ref 0–6)
GAS PNL BLDV: SIGNIFICANT CHANGE UP
GLUCOSE SERPL-MCNC: 111 MG/DL — HIGH (ref 70–99)
GLUCOSE UR QL: NEGATIVE — SIGNIFICANT CHANGE UP
HCT VFR BLD CALC: 28 % — LOW (ref 34.5–45)
HGB BLD-MCNC: 9.2 G/DL — LOW (ref 11.5–15.5)
KETONES UR-MCNC: NEGATIVE — SIGNIFICANT CHANGE UP
LEUKOCYTE ESTERASE UR-ACNC: NEGATIVE — SIGNIFICANT CHANGE UP
LYMPHOCYTES # BLD AUTO: 0.8 K/UL — LOW (ref 1–3.3)
LYMPHOCYTES # BLD AUTO: 3 % — LOW (ref 13–44)
MCHC RBC-ENTMCNC: 25.9 PG — LOW (ref 27–34)
MCHC RBC-ENTMCNC: 32.7 GM/DL — SIGNIFICANT CHANGE UP (ref 32–36)
MCV RBC AUTO: 79.2 FL — LOW (ref 80–100)
MONOCYTES # BLD AUTO: 0.5 K/UL — SIGNIFICANT CHANGE UP (ref 0–0.9)
MONOCYTES NFR BLD AUTO: 4 % — SIGNIFICANT CHANGE UP (ref 2–14)
NEUTROPHILS # BLD AUTO: 7.2 K/UL — SIGNIFICANT CHANGE UP (ref 1.8–7.4)
NEUTROPHILS NFR BLD AUTO: 83 % — HIGH (ref 43–77)
NITRITE UR-MCNC: NEGATIVE — SIGNIFICANT CHANGE UP
NT-PROBNP SERPL-SCNC: 393 PG/ML — HIGH (ref 0–300)
PH UR: 6 — SIGNIFICANT CHANGE UP (ref 5–8)
PLATELET # BLD AUTO: 348 K/UL — SIGNIFICANT CHANGE UP (ref 150–400)
POTASSIUM SERPL-MCNC: 3.7 MMOL/L — SIGNIFICANT CHANGE UP (ref 3.5–5.3)
POTASSIUM SERPL-SCNC: 3.7 MMOL/L — SIGNIFICANT CHANGE UP (ref 3.5–5.3)
PROT SERPL-MCNC: 7.9 G/DL — SIGNIFICANT CHANGE UP (ref 6–8.3)
PROT UR-MCNC: NEGATIVE — SIGNIFICANT CHANGE UP
RAPID RVP RESULT: DETECTED
RBC # BLD: 3.54 M/UL — LOW (ref 3.8–5.2)
RBC # FLD: 21.4 % — HIGH (ref 10.3–14.5)
RBC CASTS # UR COMP ASSIST: SIGNIFICANT CHANGE UP /HPF (ref 0–2)
RSV RNA SPEC QL NAA+PROBE: DETECTED
SODIUM SERPL-SCNC: 128 MMOL/L — LOW (ref 135–145)
SP GR SPEC: 1.01 — LOW (ref 1.01–1.02)
TROPONIN T SERPL-MCNC: <0.01 NG/ML — SIGNIFICANT CHANGE UP (ref 0–0.06)
UROBILINOGEN FLD QL: NEGATIVE — SIGNIFICANT CHANGE UP
WBC # BLD: 8.9 K/UL — SIGNIFICANT CHANGE UP (ref 3.8–10.5)
WBC # FLD AUTO: 8.9 K/UL — SIGNIFICANT CHANGE UP (ref 3.8–10.5)
WBC UR QL: SIGNIFICANT CHANGE UP /HPF (ref 0–5)

## 2018-03-22 PROCEDURE — 99223 1ST HOSP IP/OBS HIGH 75: CPT

## 2018-03-22 PROCEDURE — 71046 X-RAY EXAM CHEST 2 VIEWS: CPT | Mod: 26

## 2018-03-22 PROCEDURE — 93010 ELECTROCARDIOGRAM REPORT: CPT

## 2018-03-22 PROCEDURE — 99285 EMERGENCY DEPT VISIT HI MDM: CPT | Mod: 25

## 2018-03-22 RX ORDER — SODIUM CHLORIDE 9 MG/ML
1000 INJECTION INTRAMUSCULAR; INTRAVENOUS; SUBCUTANEOUS ONCE
Qty: 0 | Refills: 0 | Status: COMPLETED | OUTPATIENT
Start: 2018-03-22 | End: 2018-03-22

## 2018-03-22 RX ORDER — ACETAMINOPHEN 500 MG
975 TABLET ORAL ONCE
Qty: 0 | Refills: 0 | Status: COMPLETED | OUTPATIENT
Start: 2018-03-22 | End: 2018-03-22

## 2018-03-22 RX ORDER — VANCOMYCIN HCL 1 G
1000 VIAL (EA) INTRAVENOUS ONCE
Qty: 0 | Refills: 0 | Status: COMPLETED | OUTPATIENT
Start: 2018-03-22 | End: 2018-03-22

## 2018-03-22 RX ORDER — PIPERACILLIN AND TAZOBACTAM 4; .5 G/20ML; G/20ML
3.38 INJECTION, POWDER, LYOPHILIZED, FOR SOLUTION INTRAVENOUS ONCE
Qty: 0 | Refills: 0 | Status: COMPLETED | OUTPATIENT
Start: 2018-03-22 | End: 2018-03-22

## 2018-03-22 RX ADMIN — PIPERACILLIN AND TAZOBACTAM 200 GRAM(S): 4; .5 INJECTION, POWDER, LYOPHILIZED, FOR SOLUTION INTRAVENOUS at 17:48

## 2018-03-22 RX ADMIN — SODIUM CHLORIDE 1000 MILLILITER(S): 9 INJECTION INTRAMUSCULAR; INTRAVENOUS; SUBCUTANEOUS at 20:02

## 2018-03-22 RX ADMIN — SODIUM CHLORIDE 1000 MILLILITER(S): 9 INJECTION INTRAMUSCULAR; INTRAVENOUS; SUBCUTANEOUS at 17:47

## 2018-03-22 RX ADMIN — Medication 250 MILLIGRAM(S): at 20:02

## 2018-03-22 RX ADMIN — Medication 975 MILLIGRAM(S): at 17:48

## 2018-03-22 NOTE — ED PROVIDER NOTE - ATTENDING CONTRIBUTION TO CARE
Attending MD Soria: I personally have seen and examined this patient.  Resident note reviewed and agree on plan of care and except where noted.  See below for details.    42F with PMH Hodgkin's lymphoma on immunomodulators until February not on chemo PSH breast augmentation and abdominoplasty presents to the ED with over a month of fevers, increased weakness, fatigue and 5 days of nonproductive cough.  Reports had recent dental work on 3/16 (completion of a root canal) prior to which she had been on Amoxicillin for ?5-7 days.  Reports received a transfusion on 3/13/18.  Reports had not contacted her Heme Dr. Gabrielle Baxter until today about these 4 weeks of fever.  LMP 8/2016.  Denies dysuria, hematuria, change in urinary habits including frequency, urgency. Denies chest pain, shortness of breath, palpitations. Denies abdominal pain, nausea, vomiting, diarrhea, blood in stools. Denies calf tenderness, swelling, erythema or warmth.  On exam, head NCAT, PERRL, FROM at neck, no tenderness to palpation or stepoffs along length of spine, lungs CTAB with good inspiratory effort, +S1S2, no m/r/g, abdomen soft with +BS, NT, ND, no CVAT, moving all extremities with 5/5 strength bilateral upper and lower extremities, good and equal  strength bilaterally, no calf tenderness, swelling, erythema or warmth.  A/P: 42F meeting sepsis criteria based on vitals, will treat as such, will obtain complete infectious work up, labs, blood cultures, UA, UrCx, will cover with empiric antibiotics, given report of cough, will need admission

## 2018-03-22 NOTE — H&P ADULT - ATTENDING COMMENTS
Patient was previously unknown to me. My involvement in this case consisted of initial history, physical and management plan. Primary day team to to assume care in AM. Case discussed in detail with overnight medicine NP Patient was previously unknown to me. My involvement in this case consisted of initial history, physical and management plan. Primary day team to to assume care in AM. Case discussed in detail with overnight medicine covering 8MONTI NP, Sin [phone]

## 2018-03-22 NOTE — ED ADULT NURSE NOTE - OBJECTIVE STATEMENT
pt to room 22 via ambulance stretcher c/o fever , of 101 over pAST MONTH NOW WITH NONPRODUCTIVE COUGH. PT WITH H/O LYMPHOMA NOT ON CHEMO. PT SPOKE WITH ONCOLOGIST TODAY. PT AWAKE ALERT COLOR GOOD SKIN WARM DRY LUNGS CLEAR WITH NON PRODUVTIVE COUGH rr 18 AND REGULAR cm ST @138. PT DENIES CHEST PAIN. PT WITH RECENT DENTALMWORK N TRATED WITH AMOXICILLIN

## 2018-03-22 NOTE — ED PROVIDER NOTE - PHYSICAL EXAMINATION
General: well appearing female in no acute distress  HEENT: no tenderness to palpation, no areas of redness or induration, no sign of abscess   Respiratory: normal work of breathing, lungs clear to auscultation bilaterally   Cardiac: tachycardic, regular rhythm   Abdomen: soft, non-tender, no CVA tenderness   MSK: no swelling or tenderness of lower extremities   Neuro: A&Ox3, cranial nerves grossly intact

## 2018-03-22 NOTE — H&P ADULT - NSHPPHYSICALEXAM_GEN_ALL_CORE
Vital Signs Last 24 Hrs  T(C): 36.4 (22 Mar 2018 20:56), Max: 39.2 (22 Mar 2018 16:57)  T(F): 97.5 (22 Mar 2018 20:56), Max: 102.6 (22 Mar 2018 16:57)  HR: 99 (22 Mar 2018 20:56) (99 - 153)  BP: 101/63 (22 Mar 2018 20:56) (90/53 - 101/63)  BP(mean): --  RR: 18 (22 Mar 2018 20:56) (18 - 20)  SpO2: 100% (22 Mar 2018 20:56) (97% - 100%) Vital Signs Last 24 Hrs  T(C): 36.4 (22 Mar 2018 20:56), Max: 39.2 (22 Mar 2018 16:57)  T(F): 97.5 (22 Mar 2018 20:56), Max: 102.6 (22 Mar 2018 16:57)  HR: 99 (22 Mar 2018 20:56) (99 - 153)  BP: 101/63 (22 Mar 2018 20:56) (90/53 - 101/63)  BP(mean): --  RR: 18 (22 Mar 2018 20:56) (18 - 20)  SpO2: 100% (22 Mar 2018 20:56) (97% - 100%)      PHYSICAL EXAM:  GENERAL: NAD, well-groomed, well-developed  HEAD:  Atraumatic, Normocephalic  EYES: EOMI, PERRLA, conjunctiva and sclera clear  ENMT: No tonsillar erythema, exudates, or enlargement; Moist mucous membranes, Good dentition, No lesions  NECK: Supple, No JVD, Normal thyroid  NERVOUS SYSTEM:  Alert & Oriented X3, Good concentration; Motor Strength 5/5 B/L upper and lower extremities; DTRs 2+ intact and symmetric  CHEST/LUNG: Clear to percussion bilaterally; Scant intermittent expiratory wheezing. No rubs. No rales. No ronchi. Respirations nonlabored  HEART: Tachycardia +S1 S2; No murmurs, rubs, or gallops  ABDOMEN: Soft, Nontender, Nondistended; Bowel sounds present  EXTREMITIES:  2+ Peripheral Pulses, No clubbing, cyanosis, or edema  LYMPH: No lymphadenopathy noted  SKIN: No rashes or lesions

## 2018-03-22 NOTE — H&P ADULT - ASSESSMENT
41 yo woman with PMH of Hodgkin's Lymphoma on Immunotherapy Brentuximab Vedotin (Last dose 2/20), presenting to the ED in setting of fever as high as 101 at home with cough of 5 days found with RSV infection

## 2018-03-22 NOTE — H&P ADULT - PROBLEM SELECTOR PLAN 3
Heme/Onc team aware of patient being admitted: reported persistent disease  Primary day team to f/u with Heme/Onc team in AM

## 2018-03-22 NOTE — H&P ADULT - HISTORY OF PRESENT ILLNESS
41 yo woman with PMH of Hodgkin's Lymphoma on Immunotherapy Brentuximab Vedotin (Last dose 2/20), presenting to the ED in setting of fever as high as 101 at home with cough of 5 days. With regards to fever patient has had subjective fevers in the month, but has not really checked temps. Denies chills. Endorses nightly sweats. Patient also endorse dry, nonproductive cough ~5 days with rhinorrhea. Denies sore throat States sick contact her daughter with flu a few weeks ago.  Patient states she had dental work done ~2 weeks ago (root canal and cavity filling) and was treated with Amoxicillin 1 week prior to procedure. Patient denies any mouth sores or pain. Denies headache, neck pain, abdominal pain, nausea/emesis, diarrhea, dysuria

## 2018-03-22 NOTE — H&P ADULT - FAMILY HISTORY
Family history of lung cancer     Sibling  Still living? Unknown  Family history of cervical cancer, Age at diagnosis: Age Unknown

## 2018-03-22 NOTE — ED PROVIDER NOTE - MEDICAL DECISION MAKING DETAILS
42F with hodgkin lymphoma presenting with fever, cough and increasing weakness. positive for sepsis, concern for pulmonary etiology. plan for cbc, cmp, vbg, blood culture, ua, urine cultures, cxr, fluids, broad spectrum antibiotics. will contact heme/onc. likely admission.

## 2018-03-22 NOTE — H&P ADULT - NSHPLABSRESULTS_GEN_ALL_CORE
Personally reviewed labs as noted in detail below: Personally reviewed labs as noted in detail below:    Personally reviewed EKG: ST in 130s    Personally reviewed CXR: no appreciable consolidations

## 2018-03-22 NOTE — ED PROVIDER NOTE - OBJECTIVE STATEMENT
42F, PMH of hodgkins lymphoma (not yet on chemo) presenting with fever. Patient reports she has had daily fever (tmax 101) for the past month but did not tell her oncologist. Two weeks ago she had a root canal done, was placed on Amoxicillin for one week prior to the procedure. Since then she has had numbness at the site. for the past week she has had increasing non-productive cough. Reports general weakness and fatigue. Denies headache, changes in vision, chest pain, abdominal pain, burning with urination. Had a blood transfusion two weeks ago. Stopped immunotherapy one month ago 2/2 lack of effectiveness and dental procedures. Planned to start chem after completion of dental procedures.     Oncologist Dr. Baxter

## 2018-03-22 NOTE — H&P ADULT - NSHPREVIEWOFSYSTEMS_GEN_ALL_CORE
REVIEW OF SYSTEMS:  CONSTITUTIONAL: see HPI   EYES: No eye pain, visual disturbances, or discharge  ENMT:  see HPI  NECK: No pain or stiffness  RESPIRATORY: See HPI No shortness of breath  CARDIOVASCULAR: No chest pain, palpitations, dizziness, or leg swelling  GASTROINTESTINAL: No abdominal pain. No nausea, vomiting, or hematemesis; No diarrhea or constipation. No melena or hematochezia.  GENITOURINARY: No dysuria, frequency  NEUROLOGICAL: No headaches  SKIN: No rashes, or lesions   LYMPH NODES: Chronic LAD in setting of Hodgkin's  MUSCULOSKELETAL: No joint pain or swelling; No muscle, back, or extremity pain  HEME/LYMPH: No easy bruising, or bleeding gums  ALLERGY AND IMMUNOLOGIC: No reactions to meds

## 2018-03-22 NOTE — H&P ADULT - PROBLEM SELECTOR PLAN 2
Patient febrile to 102 and tachycardia. No leukoycytosis yet bandemia noted  In setting of patient given immuno/chemotherapy in setting of recent dental work can not rule out bacterial source  Initiated on Vanc and Zosyn in ED  Will continue with Vanc and Zosyn empircally pending Blood cultures and urine Cultures  Supportive care in setting of RSV

## 2018-03-22 NOTE — ED PROVIDER NOTE - PROGRESS NOTE DETAILS
updated patient on available results. awaiting urine. - resident Melchor Orr spoke with oncology fellow. patient to be admitted to hospitalist and oncology to follow. - resident Melchor Orr

## 2018-03-23 DIAGNOSIS — Z29.9 ENCOUNTER FOR PROPHYLACTIC MEASURES, UNSPECIFIED: ICD-10-CM

## 2018-03-23 DIAGNOSIS — A41.9 SEPSIS, UNSPECIFIED ORGANISM: ICD-10-CM

## 2018-03-23 DIAGNOSIS — B97.4 RESPIRATORY SYNCYTIAL VIRUS AS THE CAUSE OF DISEASES CLASSIFIED ELSEWHERE: ICD-10-CM

## 2018-03-23 DIAGNOSIS — D64.9 ANEMIA, UNSPECIFIED: ICD-10-CM

## 2018-03-23 DIAGNOSIS — R50.9 FEVER, UNSPECIFIED: ICD-10-CM

## 2018-03-23 DIAGNOSIS — C81.90 HODGKIN LYMPHOMA, UNSPECIFIED, UNSPECIFIED SITE: ICD-10-CM

## 2018-03-23 LAB
ANION GAP SERPL CALC-SCNC: 16 MMOL/L — SIGNIFICANT CHANGE UP (ref 5–17)
BASOPHILS # BLD AUTO: 0.01 K/UL — SIGNIFICANT CHANGE UP (ref 0–0.2)
BASOPHILS NFR BLD AUTO: 0.1 % — SIGNIFICANT CHANGE UP (ref 0–2)
BUN SERPL-MCNC: 6 MG/DL — LOW (ref 7–23)
CALCIUM SERPL-MCNC: 8.7 MG/DL — SIGNIFICANT CHANGE UP (ref 8.4–10.5)
CHLORIDE SERPL-SCNC: 101 MMOL/L — SIGNIFICANT CHANGE UP (ref 96–108)
CO2 SERPL-SCNC: 18 MMOL/L — LOW (ref 22–31)
CREAT SERPL-MCNC: 0.45 MG/DL — LOW (ref 0.5–1.3)
CULTURE RESULTS: NO GROWTH — SIGNIFICANT CHANGE UP
EOSINOPHIL # BLD AUTO: 0.51 K/UL — HIGH (ref 0–0.5)
EOSINOPHIL NFR BLD AUTO: 6.3 % — HIGH (ref 0–6)
GLUCOSE SERPL-MCNC: 110 MG/DL — HIGH (ref 70–99)
HCT VFR BLD CALC: 27.1 % — LOW (ref 34.5–45)
HGB BLD-MCNC: 8.4 G/DL — LOW (ref 11.5–15.5)
IMM GRANULOCYTES NFR BLD AUTO: 1.1 % — SIGNIFICANT CHANGE UP (ref 0–1.5)
LACTATE SERPL-SCNC: 2 MMOL/L — SIGNIFICANT CHANGE UP (ref 0.7–2)
LDH SERPL L TO P-CCNC: 255 U/L — HIGH (ref 50–242)
LYMPHOCYTES # BLD AUTO: 0.65 K/UL — LOW (ref 1–3.3)
LYMPHOCYTES # BLD AUTO: 8.1 % — LOW (ref 13–44)
MAGNESIUM SERPL-MCNC: 1.7 MG/DL — SIGNIFICANT CHANGE UP (ref 1.6–2.6)
MCHC RBC-ENTMCNC: 24.3 PG — LOW (ref 27–34)
MCHC RBC-ENTMCNC: 31 GM/DL — LOW (ref 32–36)
MCV RBC AUTO: 78.6 FL — LOW (ref 80–100)
MONOCYTES # BLD AUTO: 0.49 K/UL — SIGNIFICANT CHANGE UP (ref 0–0.9)
MONOCYTES NFR BLD AUTO: 6.1 % — SIGNIFICANT CHANGE UP (ref 2–14)
NEUTROPHILS # BLD AUTO: 6.3 K/UL — SIGNIFICANT CHANGE UP (ref 1.8–7.4)
NEUTROPHILS NFR BLD AUTO: 78.3 % — HIGH (ref 43–77)
PHOSPHATE SERPL-MCNC: 3.3 MG/DL — SIGNIFICANT CHANGE UP (ref 2.5–4.5)
PLATELET # BLD AUTO: 262 K/UL — SIGNIFICANT CHANGE UP (ref 150–400)
POTASSIUM SERPL-MCNC: 3.4 MMOL/L — LOW (ref 3.5–5.3)
POTASSIUM SERPL-SCNC: 3.4 MMOL/L — LOW (ref 3.5–5.3)
RBC # BLD: 3.45 M/UL — LOW (ref 3.8–5.2)
RBC # FLD: 22.2 % — HIGH (ref 10.3–14.5)
SODIUM SERPL-SCNC: 135 MMOL/L — SIGNIFICANT CHANGE UP (ref 135–145)
SPECIMEN SOURCE: SIGNIFICANT CHANGE UP
URATE SERPL-MCNC: 2.8 MG/DL — SIGNIFICANT CHANGE UP (ref 2.5–7)
WBC # BLD: 8.05 K/UL — SIGNIFICANT CHANGE UP (ref 3.8–10.5)
WBC # FLD AUTO: 8.05 K/UL — SIGNIFICANT CHANGE UP (ref 3.8–10.5)

## 2018-03-23 PROCEDURE — 93010 ELECTROCARDIOGRAM REPORT: CPT

## 2018-03-23 PROCEDURE — 99223 1ST HOSP IP/OBS HIGH 75: CPT | Mod: GC

## 2018-03-23 PROCEDURE — 99233 SBSQ HOSP IP/OBS HIGH 50: CPT

## 2018-03-23 RX ORDER — ACETAMINOPHEN 500 MG
1000 TABLET ORAL ONCE
Qty: 0 | Refills: 0 | Status: COMPLETED | OUTPATIENT
Start: 2018-03-23 | End: 2018-03-23

## 2018-03-23 RX ORDER — ERGOCALCIFEROL 1.25 MG/1
1 CAPSULE ORAL
Qty: 0 | Refills: 0 | COMMUNITY

## 2018-03-23 RX ORDER — ACETAMINOPHEN 500 MG
650 TABLET ORAL EVERY 6 HOURS
Qty: 0 | Refills: 0 | Status: DISCONTINUED | OUTPATIENT
Start: 2018-03-23 | End: 2018-03-26

## 2018-03-23 RX ORDER — ENOXAPARIN SODIUM 100 MG/ML
40 INJECTION SUBCUTANEOUS EVERY 24 HOURS
Qty: 0 | Refills: 0 | Status: DISCONTINUED | OUTPATIENT
Start: 2018-03-23 | End: 2018-03-26

## 2018-03-23 RX ORDER — POTASSIUM CHLORIDE 20 MEQ
20 PACKET (EA) ORAL ONCE
Qty: 0 | Refills: 0 | Status: COMPLETED | OUTPATIENT
Start: 2018-03-23 | End: 2018-03-23

## 2018-03-23 RX ORDER — VANCOMYCIN HCL 1 G
1000 VIAL (EA) INTRAVENOUS EVERY 12 HOURS
Qty: 0 | Refills: 0 | Status: DISCONTINUED | OUTPATIENT
Start: 2018-03-23 | End: 2018-03-24

## 2018-03-23 RX ORDER — PIPERACILLIN AND TAZOBACTAM 4; .5 G/20ML; G/20ML
3.38 INJECTION, POWDER, LYOPHILIZED, FOR SOLUTION INTRAVENOUS EVERY 8 HOURS
Qty: 0 | Refills: 0 | Status: DISCONTINUED | OUTPATIENT
Start: 2018-03-23 | End: 2018-03-25

## 2018-03-23 RX ORDER — SODIUM CHLORIDE 9 MG/ML
1000 INJECTION INTRAMUSCULAR; INTRAVENOUS; SUBCUTANEOUS
Qty: 0 | Refills: 0 | Status: DISCONTINUED | OUTPATIENT
Start: 2018-03-23 | End: 2018-03-26

## 2018-03-23 RX ORDER — IPRATROPIUM/ALBUTEROL SULFATE 18-103MCG
3 AEROSOL WITH ADAPTER (GRAM) INHALATION EVERY 6 HOURS
Qty: 0 | Refills: 0 | Status: DISCONTINUED | OUTPATIENT
Start: 2018-03-23 | End: 2018-03-26

## 2018-03-23 RX ADMIN — PIPERACILLIN AND TAZOBACTAM 25 GRAM(S): 4; .5 INJECTION, POWDER, LYOPHILIZED, FOR SOLUTION INTRAVENOUS at 06:36

## 2018-03-23 RX ADMIN — SODIUM CHLORIDE 75 MILLILITER(S): 9 INJECTION INTRAMUSCULAR; INTRAVENOUS; SUBCUTANEOUS at 18:00

## 2018-03-23 RX ADMIN — SODIUM CHLORIDE 75 MILLILITER(S): 9 INJECTION INTRAMUSCULAR; INTRAVENOUS; SUBCUTANEOUS at 22:13

## 2018-03-23 RX ADMIN — PIPERACILLIN AND TAZOBACTAM 25 GRAM(S): 4; .5 INJECTION, POWDER, LYOPHILIZED, FOR SOLUTION INTRAVENOUS at 22:13

## 2018-03-23 RX ADMIN — Medication 20 MILLIEQUIVALENT(S): at 10:47

## 2018-03-23 RX ADMIN — ENOXAPARIN SODIUM 40 MILLIGRAM(S): 100 INJECTION SUBCUTANEOUS at 05:07

## 2018-03-23 RX ADMIN — SODIUM CHLORIDE 75 MILLILITER(S): 9 INJECTION INTRAMUSCULAR; INTRAVENOUS; SUBCUTANEOUS at 07:00

## 2018-03-23 RX ADMIN — PIPERACILLIN AND TAZOBACTAM 25 GRAM(S): 4; .5 INJECTION, POWDER, LYOPHILIZED, FOR SOLUTION INTRAVENOUS at 13:31

## 2018-03-23 RX ADMIN — Medication 650 MILLIGRAM(S): at 08:00

## 2018-03-23 RX ADMIN — Medication 400 MILLIGRAM(S): at 22:51

## 2018-03-23 RX ADMIN — Medication 250 MILLIGRAM(S): at 17:40

## 2018-03-23 RX ADMIN — SODIUM CHLORIDE 75 MILLILITER(S): 9 INJECTION INTRAMUSCULAR; INTRAVENOUS; SUBCUTANEOUS at 00:22

## 2018-03-23 RX ADMIN — Medication 250 MILLIGRAM(S): at 05:07

## 2018-03-23 NOTE — CONSULT NOTE ADULT - PROBLEM SELECTOR RECOMMENDATION 9
-RSV positive. Continue on supportive care with IV fluids. On IV Zosyn, blood culture pending.  -CXR negative. -RSV positive. Continue on supportive care with IV fluids. On IV Zosyn, blood culture pending.  -CXR negative.  -recc CT chest/abd/pelvis to evaluate the cause of fever, infection vs disease.

## 2018-03-23 NOTE — PROVIDER CONTACT NOTE (OTHER) - SITUATION
Pt on contact for RSV. Current treatment with abx: vancomycin IVPB and zosyn IVPB. Unable to obtain oral temperature at this time. Current rectal temperature 94.6F.

## 2018-03-23 NOTE — CONSULT NOTE ADULT - ATTENDING COMMENTS
Patient known to me with Hodgkin's lymphoma, refused chemotherapy for many months until she was admitted and was to be placed on hospice when she agreed to Brentuximab.  She was treated upfront with much improvement of her disease but with persistent disease, worsened anemia due to treatment.  She appears to be clinically worsening.  I have discussed this at length with her and her daughter regarding this.  I do not feel she will benefit from any further brentuximab.  I have recommended that she receive AVD- she had significant lung involvement in the past and has not had PFT's completed.  She has not pursued the pet scan/muga scan that was ordered.  She continues to have significant hesitancy to agreeing with therapy.  Continue supportive measures for her current viral condition.  Recommend CT chest/abd/pelvis, MUGA.  Explained the risks of AVD again, she will discuss with her daughter and consider.  Recommend dental work after she begins treatment with chemotherapy.

## 2018-03-23 NOTE — PROVIDER CONTACT NOTE (OTHER) - BACKGROUND
Pt admitted for coughing and fevers up to 101F. Pt on contact for RSV. Current treatment with abx: vancomycin IVPB and zosyn IVPB.

## 2018-03-23 NOTE — CONSULT NOTE ADULT - ASSESSMENT
Ms. Bravo, is a 42 year old female with progressive lymphadenopathy who was diagnosed with Classical Hodgkin’s Lymphoma who refused chemotherapy, and was treated with Brentuximab s/p C8 on 2/20/18 with interval scan showing persistent disease, admitted with fever-RVP+

## 2018-03-23 NOTE — CONSULT NOTE ADULT - PROBLEM SELECTOR RECOMMENDATION 2
Classical- patient refused chemotherapy and was treated with Brentuximab s/p C8 on 2/20 with persistent disease.  Will eventually plan for AVD once agreement from patient and daughter.  Please get LDH, uric acid daily.

## 2018-03-23 NOTE — CONSULT NOTE ADULT - SUBJECTIVE AND OBJECTIVE BOX
HPI:  43 yo woman with PMH of Hodgkin's Lymphoma on Immunotherapy Brentuximab Vedotin (Last dose 2/20), presenting to the ED in setting of fever as high as 101 at home with cough of 5 days. With regards to fever patient has had subjective fevers in the month, but has not really checked temps. Denies chills. Endorses nightly sweats. Patient also endorse dry, nonproductive cough ~5 days with rhinorrhea. Denies sore throat States sick contact her daughter with flu a few weeks ago.  Patient states she had dental work done ~2 weeks ago (root canal and cavity filling) and was treated with Amoxicillin 1 week prior to procedure. Patient denies any mouth sores or pain. Denies headache, neck pain, abdominal pain, nausea/emesis, diarrhea, dysuria (22 Mar 2018 23:21)    PAST MEDICAL & SURGICAL HISTORY:  Hodgkin lymphoma  H/O abdominoplasty: 2004  H/O bilateral breast implants: 2004    FAMILY HISTORY:  Family history of cervical cancer (Sibling)  Family history of lung cancer    Social History  MEDICATIONS  (STANDING):  enoxaparin Injectable 40 milliGRAM(s) SubCutaneous every 24 hours  piperacillin/tazobactam IVPB. 3.375 Gram(s) IV Intermittent every 8 hours  sodium chloride 0.9%. 1000 milliLiter(s) (75 mL/Hr) IV Continuous <Continuous>  vancomycin  IVPB 1000 milliGRAM(s) IV Intermittent every 12 hours    MEDICATIONS  (PRN):  acetaminophen   Tablet 650 milliGRAM(s) Oral every 6 hours PRN For Temp greater than 38 C (100.4 F)  ALBUTerol/ipratropium for Nebulization 3 milliLiter(s) Nebulizer every 6 hours PRN Shortness of Breath and/or Wheezing  guaiFENesin   Syrup  (Sugar-Free) 200 milliGRAM(s) Oral every 6 hours PRN Cough    No Known Allergies    REVIEW OF SYSTEMS      General:	    Skin/Breast:  	  Ophthalmologic:  	  ENMT:	    Respiratory and Thorax:  	  Cardiovascular:	    Gastrointestinal:	    Genitourinary:	    Musculoskeletal:	    Neurological:	    Psychiatric:	    Hematology/Lymphatics:	    Endocrine:	    Allergic/Immunologic:	  Vital Signs Last 24 Hrs  T(C): 36.3 (23 Mar 2018 12:27), Max: 39.2 (22 Mar 2018 16:57)  T(F): 97.3 (23 Mar 2018 12:27), Max: 102.6 (22 Mar 2018 16:57)  HR: 87 (23 Mar 2018 12:27) (80 - 153)  BP: 96/60 (23 Mar 2018 12:39) (86/60 - 125/69)  BP(mean): --  RR: 18 (23 Mar 2018 12:27) (16 - 20)  SpO2: 98% (23 Mar 2018 12:27) (95% - 100%)  Physical Examination  Constitutional: Alert, oriented X3  Eyes: Normal  ENMT: normal  Neck: No lymphadneopathy  Respiratory: b/l clear to auscultation  Cardiovascular: S1,S2,M0  Abdomen: Soft, non tender, BS present  Gastrointestinal: soft, non tender, BS present  Extremities: soft, no edema  Neurological: intact  Skin: no petechiae  Musculoskeletal: normal  Psychiatric: normal                            8.4    8.05  )-----------( 262      ( 23 Mar 2018 09:19 )             27.1     03-23    135  |  101  |  6<L>  ----------------------------<  110<H>  3.4<L>   |  18<L>  |  0.45<L>    Ca    8.7      23 Mar 2018 07:05  Phos  3.3     03-23  Mg     1.7     03-23    TPro  7.9  /  Alb  3.0<L>  /  TBili  1.6<H>  /  DBili  x   /  AST  14  /  ALT  15  /  AlkPhos  419<H>  03-22      Radiology  Assessment and Plan HPI:  41 yo woman with PMH of Hodgkin's Lymphoma ( [place]), who refused chemotherapy and was treated with upfront Brentuximab s/p C8 on 2/20 with persistent disease, anemia secondary to brentuximab, presenting to the ED in setting of fever as high as 101 at home with cough of 5 days. With regards to fever patient has had subjective fevers in the month, but has not really checked temps. Denies chills. Endorses nightly sweats. Patient also endorse dry, nonproductive cough ~5 days with rhinorrhea. Denies sore throat States sick contact her daughter with flu a few weeks ago.  Patient states she had dental work done ~2 weeks ago (root canal and cavity filling) and was treated with Amoxicillin 1 week prior to procedure. Patient denies any mouth sores or pain. Denies headache, neck pain, abdominal pain, nausea/emesis, diarrhea, dysuria .  Patient feels better since admission. Continues to have fever, but weakness is better. She feels her appetite is low. Bowel and bladder habits are normal.    PAST MEDICAL & SURGICAL HISTORY:  Hodgkin lymphoma  H/O abdominoplasty: 2004  H/O bilateral breast implants: 2004    FAMILY HISTORY:  Family history of cervical cancer (Sibling)  Family history of lung cancer    Social History  MEDICATIONS  (STANDING):  enoxaparin Injectable 40 milliGRAM(s) SubCutaneous every 24 hours  piperacillin/tazobactam IVPB. 3.375 Gram(s) IV Intermittent every 8 hours  sodium chloride 0.9%. 1000 milliLiter(s) (75 mL/Hr) IV Continuous <Continuous>  vancomycin  IVPB 1000 milliGRAM(s) IV Intermittent every 12 hours    MEDICATIONS  (PRN):  acetaminophen   Tablet 650 milliGRAM(s) Oral every 6 hours PRN For Temp greater than 38 C (100.4 F)  ALBUTerol/ipratropium for Nebulization 3 milliLiter(s) Nebulizer every 6 hours PRN Shortness of Breath and/or Wheezing  guaiFENesin   Syrup  (Sugar-Free) 200 milliGRAM(s) Oral every 6 hours PRN Cough    No Known Allergies    REVIEW OF SYSTEMS    10 points ROS is negative except for the ones in HPI.    Vital Signs Last 24 Hrs  T(C): 36.3 (23 Mar 2018 12:27), Max: 39.2 (22 Mar 2018 16:57)  T(F): 97.3 (23 Mar 2018 12:27), Max: 102.6 (22 Mar 2018 16:57)  HR: 87 (23 Mar 2018 12:27) (80 - 153)  BP: 96/60 (23 Mar 2018 12:39) (86/60 - 125/69)  BP(mean): --  RR: 18 (23 Mar 2018 12:27) (16 - 20)  SpO2: 98% (23 Mar 2018 12:27) (95% - 100%)    Physical Examination  Constitutional: Alert, oriented X3  Eyes: Normal  ENMT: normal  Neck: ++ lymphadenopathy  Respiratory: b/l clear to auscultation  Cardiovascular: S1,S2,M0  Abdomen: Soft, non tender, BS present  Gastrointestinal: soft, non tender, BS present  Extremities: soft, no edema  Neurological: intact  Skin: no petechiae  Musculoskeletal: normal  Psychiatric: normal                            8.4    8.05  )-----------( 262      ( 23 Mar 2018 09:19 )             27.1     03-23    135  |  101  |  6<L>  ----------------------------<  110<H>  3.4<L>   |  18<L>  |  0.45<L>    Ca    8.7      23 Mar 2018 07:05  Phos  3.3     03-23  Mg     1.7     03-23    TPro  7.9  /  Alb  3.0<L>  /  TBili  1.6<H>  /  DBili  x   /  AST  14  /  ALT  15  /  AlkPhos  419<H>  03-22      Radiology  Assessment and Plan

## 2018-03-23 NOTE — CONSULT NOTE ADULT - PROBLEM SELECTOR RECOMMENDATION 3
-Likely secondary to brentuximab.   -Monitor.    Please page at [phone] with questions or concerns.

## 2018-03-24 LAB
ANION GAP SERPL CALC-SCNC: 12 MMOL/L — SIGNIFICANT CHANGE UP (ref 5–17)
APPEARANCE UR: CLEAR — SIGNIFICANT CHANGE UP
BILIRUB UR-MCNC: NEGATIVE — SIGNIFICANT CHANGE UP
BUN SERPL-MCNC: 8 MG/DL — SIGNIFICANT CHANGE UP (ref 7–23)
CALCIUM SERPL-MCNC: 9.3 MG/DL — SIGNIFICANT CHANGE UP (ref 8.4–10.5)
CHLORIDE SERPL-SCNC: 100 MMOL/L — SIGNIFICANT CHANGE UP (ref 96–108)
CO2 SERPL-SCNC: 25 MMOL/L — SIGNIFICANT CHANGE UP (ref 22–31)
COLOR SPEC: YELLOW — SIGNIFICANT CHANGE UP
CREAT SERPL-MCNC: 0.53 MG/DL — SIGNIFICANT CHANGE UP (ref 0.5–1.3)
DIFF PNL FLD: NEGATIVE — SIGNIFICANT CHANGE UP
EPI CELLS # UR: SIGNIFICANT CHANGE UP /HPF
GLUCOSE SERPL-MCNC: 93 MG/DL — SIGNIFICANT CHANGE UP (ref 70–99)
GLUCOSE UR QL: NEGATIVE — SIGNIFICANT CHANGE UP
KETONES UR-MCNC: NEGATIVE — SIGNIFICANT CHANGE UP
LACTATE SERPL-SCNC: 1.7 MMOL/L — SIGNIFICANT CHANGE UP (ref 0.7–2)
LDH SERPL L TO P-CCNC: 211 U/L — SIGNIFICANT CHANGE UP (ref 50–242)
LEUKOCYTE ESTERASE UR-ACNC: NEGATIVE — SIGNIFICANT CHANGE UP
NITRITE UR-MCNC: NEGATIVE — SIGNIFICANT CHANGE UP
PH UR: 6.5 — SIGNIFICANT CHANGE UP (ref 5–8)
POTASSIUM SERPL-MCNC: 3.2 MMOL/L — LOW (ref 3.5–5.3)
POTASSIUM SERPL-SCNC: 3.2 MMOL/L — LOW (ref 3.5–5.3)
PROT UR-MCNC: 30 MG/DL
RBC CASTS # UR COMP ASSIST: SIGNIFICANT CHANGE UP /HPF (ref 0–2)
SODIUM SERPL-SCNC: 137 MMOL/L — SIGNIFICANT CHANGE UP (ref 135–145)
SP GR SPEC: >1.03 — HIGH (ref 1.01–1.02)
URATE SERPL-MCNC: 2.2 MG/DL — LOW (ref 2.5–7)
UROBILINOGEN FLD QL: NEGATIVE — SIGNIFICANT CHANGE UP
VANCOMYCIN TROUGH SERPL-MCNC: 6.3 UG/ML — LOW (ref 10–20)

## 2018-03-24 PROCEDURE — 99233 SBSQ HOSP IP/OBS HIGH 50: CPT | Mod: GC

## 2018-03-24 PROCEDURE — 71260 CT THORAX DX C+: CPT | Mod: 26

## 2018-03-24 PROCEDURE — 74177 CT ABD & PELVIS W/CONTRAST: CPT | Mod: 26

## 2018-03-24 PROCEDURE — 99233 SBSQ HOSP IP/OBS HIGH 50: CPT

## 2018-03-24 RX ORDER — POTASSIUM CHLORIDE 20 MEQ
40 PACKET (EA) ORAL ONCE
Qty: 0 | Refills: 0 | Status: COMPLETED | OUTPATIENT
Start: 2018-03-24 | End: 2018-03-24

## 2018-03-24 RX ORDER — ACETAMINOPHEN 500 MG
650 TABLET ORAL ONCE
Qty: 0 | Refills: 0 | Status: COMPLETED | OUTPATIENT
Start: 2018-03-24 | End: 2018-03-24

## 2018-03-24 RX ADMIN — PIPERACILLIN AND TAZOBACTAM 25 GRAM(S): 4; .5 INJECTION, POWDER, LYOPHILIZED, FOR SOLUTION INTRAVENOUS at 23:55

## 2018-03-24 RX ADMIN — Medication 250 MILLIGRAM(S): at 06:16

## 2018-03-24 RX ADMIN — SODIUM CHLORIDE 75 MILLILITER(S): 9 INJECTION INTRAMUSCULAR; INTRAVENOUS; SUBCUTANEOUS at 07:49

## 2018-03-24 RX ADMIN — Medication 650 MILLIGRAM(S): at 14:37

## 2018-03-24 RX ADMIN — Medication 650 MILLIGRAM(S): at 23:55

## 2018-03-24 RX ADMIN — PIPERACILLIN AND TAZOBACTAM 25 GRAM(S): 4; .5 INJECTION, POWDER, LYOPHILIZED, FOR SOLUTION INTRAVENOUS at 16:18

## 2018-03-24 RX ADMIN — PIPERACILLIN AND TAZOBACTAM 25 GRAM(S): 4; .5 INJECTION, POWDER, LYOPHILIZED, FOR SOLUTION INTRAVENOUS at 07:49

## 2018-03-24 RX ADMIN — Medication 40 MILLIEQUIVALENT(S): at 13:46

## 2018-03-24 RX ADMIN — ENOXAPARIN SODIUM 40 MILLIGRAM(S): 100 INJECTION SUBCUTANEOUS at 06:16

## 2018-03-24 NOTE — PROGRESS NOTE ADULT - PROBLEM SELECTOR PLAN 2
RSV positive. Continue on supportive care with IV fluids. On IV Zosyn, blood culture pending.  -CXR negative.  -recc CT chest/abd/pelvis to evaluate the cause of fever, infection vs disease

## 2018-03-24 NOTE — PROGRESS NOTE ADULT - PROBLEM SELECTOR PLAN 3
patient refused chemotherapy and was treated with Brentuximab s/p C8 on 2/20 with persistent disease.  Will eventually plan for AVD once agreement from patient and daughter.  Please get LDH, uric acid daily.

## 2018-03-24 NOTE — CHART NOTE - NSCHARTNOTEFT_GEN_A_CORE
Called by RN to see patient with Rigor around 1:30 pm.  Seen and examined the patient.   Noted having severe rigor. Denies chest pain or sob. T 38.9 rectally.  Patient had similar episode last night also  Patient is a 42y old  Female who presents with a chief complaint of sent by md for low blood count ,   fever. Found to have RSV (+)      Vital Signs Last 24 Hrs  T(C): 37.9 (24 Mar 2018 13:40), Max: 38.9 (23 Mar 2018 22:25)  T(F): 100.2 (24 Mar 2018 13:40), Max: 102.1 (23 Mar 2018 22:25)  HR: 145 (24 Mar 2018 13:26) (80 - 145)  BP: 120/78 (24 Mar 2018 13:26) (93/65 - 120/78)  BP(mean): --  RR: 18 (24 Mar 2018 13:26) (18 - 20)  SpO2: 97% (24 Mar 2018 13:26) (94% - 100%)      Labs:                          8.4    8.05  )-----------( 262      ( 23 Mar 2018 09:19 )             27.1     03-24    137  |  100  |  8   ----------------------------<  93  3.2<L>   |  25  |  0.53    Ca    9.3      24 Mar 2018 09:21  Phos  3.3     03-23  Mg     1.7     03-23    TPro  7.9  /  Alb  3.0<L>  /  TBili  1.6<H>  /  DBili  x   /  AST  14  /  ALT  15  /  AlkPhos  419<H>  03-22    CARDIAC MARKERS ( 22 Mar 2018 18:03 )  x     / <0.01 ng/mL / 63 U/L / x     / 1.2 ng/mL          Radiology:    Physical Exam:  General: WN/WD , shaking  Neurology: A&Ox3, nonfocal,  Head:  Normocephalic, atraumatic  Respiratory: CTA B/L  CV: RRR, S1S2,   Abdominal: Soft, NT, ND no palpable mass  MSK: No edema,     Assesment/plan    41 yo woman with PMH of Hodgkin's Lymphoma on Immunotherapy Brentuximab Vedotin (Last dose 2/20), presenting to the ED in setting of fever as high as 101 at home with cough of 5 days. With regards to fever patient has had subjective fevers in the month, but has not really checked temps. Denies chills. Endorses nightly sweats. Patient also endorse dry, nonproductive cough ~5 days with rhinorrhea. Denies sore throat States sick contact her daughter with flu a few weeks ago.  Patient states she had dental work done ~2 weeks ago (root canal and cavity filling) and was treated with Amoxicillin 1 week prior to procedure. Patient denies any mouth sores or pain. Admitted with RSV (+), new hodgkins' lymphoma. Now with recurrenr rogor and fever. Hear rate in 140's  1 Rigor/fever        cultures are (-) So far        Repeated pan Cx        Tylenol for fever  2 Tachycardia        As per patient she has a h/o tachycardia        Not on any medication        also likely due to fever         will continue IVF  DW Dr Fung, agreed with the plan  Will DC vancomycin as per Dr Fung  Fever likely non infectious/tumor related    Sita Hernandez NP  [phone]

## 2018-03-24 NOTE — CHART NOTE - NSCHARTNOTEFT_GEN_A_CORE
Notified by RN, patient with recurrent fever 102.1 and tachycardia 130. Patient seen and examined at bedside.  Patient alert and oriented x3, resting in bed. Reported chills throughout of day. Denied headache, lightheadedness, dizziness, sob, CP, palpitations, dysuria, n/v/diarrhea or abdominal pain.    Vital Signs Last 24 Hrs  T(C): 36.8 (24 Mar 2018 00:24), Max: 38.9 (23 Mar 2018 22:25)  T(F): 98.2 (24 Mar 2018 00:24), Max: 102.1 (23 Mar 2018 22:25)  HR: 114 (24 Mar 2018 00:24) (80 - 137)  BP: 99/62 (24 Mar 2018 00:24) (86/60 - 125/69)  BP(mean): --  RR: 18 (24 Mar 2018 00:24) (16 - 20)  SpO2: 97% (24 Mar 2018 00:24) (95% - 100%)      Labs:                          8.4    8.05  )-----------( 262      ( 23 Mar 2018 09:19 )             27.1     03-23    135  |  101  |  6<L>  ----------------------------<  110<H>  3.4<L>   |  18<L>  |  0.45<L>    Ca    8.7      23 Mar 2018 07:05  Phos  3.3     03-23  Mg     1.7     03-23    TPro  7.9  /  Alb  3.0<L>  /  TBili  1.6<H>  /  DBili  x   /  AST  14  /  ALT  15  /  AlkPhos  419<H>  03-22      Physical Exam:  General: WN/WD NAD, non-toxic appearance  Neurology: A&Ox3, nonfocal, GARCIA x 4  Head:  Normocephalic, atraumatic  Respiratory: CTA B/L  CV: RRR, S1S2, tachycardic  Abdominal: Soft, NT,   MSK: No edema, + peripheral pulses    Assessment & Plan:  43 yo woman with PMH of Hodgkin's Lymphoma on Immunotherapy Brentuximab Vedotin (Last dose 2/20), admitted with sepsis +RSV now with recurrent fever.     1. recurrent fever with tachycardia  - tylenol IV x1  - EKG Sinus Tach 125 bpm likely in the setting of fever  - f/u cultures from 3/22  - c/w IVF  - c/w current antibiotic regimen  - CT C/A/P ordered, pending  - f/u with Heme/Onc in am  - Disussed with Rn to notify provider with any changes in pt's status  - F/u with Attending in am      Deanne Johns, NP  Medicine  #[id number]

## 2018-03-25 LAB
ANION GAP SERPL CALC-SCNC: 14 MMOL/L — SIGNIFICANT CHANGE UP (ref 5–17)
BASOPHILS # BLD AUTO: 0 K/UL — SIGNIFICANT CHANGE UP (ref 0–0.2)
BASOPHILS NFR BLD AUTO: 0 % — SIGNIFICANT CHANGE UP (ref 0–2)
BLD GP AB SCN SERPL QL: NEGATIVE — SIGNIFICANT CHANGE UP
BUN SERPL-MCNC: 8 MG/DL — SIGNIFICANT CHANGE UP (ref 7–23)
CALCIUM SERPL-MCNC: 9 MG/DL — SIGNIFICANT CHANGE UP (ref 8.4–10.5)
CHLORIDE SERPL-SCNC: 100 MMOL/L — SIGNIFICANT CHANGE UP (ref 96–108)
CO2 SERPL-SCNC: 22 MMOL/L — SIGNIFICANT CHANGE UP (ref 22–31)
CREAT SERPL-MCNC: 0.57 MG/DL — SIGNIFICANT CHANGE UP (ref 0.5–1.3)
CULTURE RESULTS: NO GROWTH — SIGNIFICANT CHANGE UP
EOSINOPHIL # BLD AUTO: 0.5 K/UL — SIGNIFICANT CHANGE UP (ref 0–0.5)
EOSINOPHIL NFR BLD AUTO: 8 % — HIGH (ref 0–6)
GLUCOSE SERPL-MCNC: 93 MG/DL — SIGNIFICANT CHANGE UP (ref 70–99)
HCT VFR BLD CALC: 23.5 % — LOW (ref 34.5–45)
HCT VFR BLD CALC: 24.1 % — LOW (ref 34.5–45)
HGB BLD-MCNC: 7.3 G/DL — LOW (ref 11.5–15.5)
HGB BLD-MCNC: 7.6 G/DL — LOW (ref 11.5–15.5)
LYMPHOCYTES # BLD AUTO: 0.63 K/UL — LOW (ref 1–3.3)
LYMPHOCYTES # BLD AUTO: 10 % — LOW (ref 13–44)
MANUAL SMEAR VERIFICATION: SIGNIFICANT CHANGE UP
MCHC RBC-ENTMCNC: 24.1 PG — LOW (ref 27–34)
MCHC RBC-ENTMCNC: 25.7 PG — LOW (ref 27–34)
MCHC RBC-ENTMCNC: 30.3 GM/DL — LOW (ref 32–36)
MCHC RBC-ENTMCNC: 32.5 GM/DL — SIGNIFICANT CHANGE UP (ref 32–36)
MCV RBC AUTO: 79.1 FL — LOW (ref 80–100)
MCV RBC AUTO: 79.5 FL — LOW (ref 80–100)
MONOCYTES # BLD AUTO: 0.31 K/UL — SIGNIFICANT CHANGE UP (ref 0–0.9)
MONOCYTES NFR BLD AUTO: 5 % — SIGNIFICANT CHANGE UP (ref 2–14)
NEUTROPHILS # BLD AUTO: 4.76 K/UL — SIGNIFICANT CHANGE UP (ref 1.8–7.4)
NEUTROPHILS NFR BLD AUTO: 74 % — SIGNIFICANT CHANGE UP (ref 43–77)
NEUTS BAND # BLD: 2 % — SIGNIFICANT CHANGE UP (ref 0–8)
NRBC # BLD: 2 /100 — HIGH (ref 0–0)
PLAT MORPH BLD: NORMAL — SIGNIFICANT CHANGE UP
PLATELET # BLD AUTO: 244 K/UL — SIGNIFICANT CHANGE UP (ref 150–400)
PLATELET # BLD AUTO: 304 K/UL — SIGNIFICANT CHANGE UP (ref 150–400)
POTASSIUM SERPL-MCNC: 3.7 MMOL/L — SIGNIFICANT CHANGE UP (ref 3.5–5.3)
POTASSIUM SERPL-SCNC: 3.7 MMOL/L — SIGNIFICANT CHANGE UP (ref 3.5–5.3)
RBC # BLD: 2.97 M/UL — LOW (ref 3.8–5.2)
RBC # BLD: 3.03 M/UL — LOW (ref 3.8–5.2)
RBC # FLD: 22 % — HIGH (ref 10.3–14.5)
RBC # FLD: 22.4 % — HIGH (ref 10.3–14.5)
RBC BLD AUTO: SIGNIFICANT CHANGE UP
RH IG SCN BLD-IMP: POSITIVE — SIGNIFICANT CHANGE UP
SODIUM SERPL-SCNC: 136 MMOL/L — SIGNIFICANT CHANGE UP (ref 135–145)
SPECIMEN SOURCE: SIGNIFICANT CHANGE UP
VARIANT LYMPHS # BLD: 1 % — SIGNIFICANT CHANGE UP (ref 0–6)
WBC # BLD: 6.26 K/UL — SIGNIFICANT CHANGE UP (ref 3.8–10.5)
WBC # BLD: 8 K/UL — SIGNIFICANT CHANGE UP (ref 3.8–10.5)
WBC # FLD AUTO: 6.26 K/UL — SIGNIFICANT CHANGE UP (ref 3.8–10.5)
WBC # FLD AUTO: 8 K/UL — SIGNIFICANT CHANGE UP (ref 3.8–10.5)

## 2018-03-25 PROCEDURE — 99233 SBSQ HOSP IP/OBS HIGH 50: CPT

## 2018-03-25 RX ORDER — SODIUM CHLORIDE 9 MG/ML
1000 INJECTION INTRAMUSCULAR; INTRAVENOUS; SUBCUTANEOUS ONCE
Qty: 0 | Refills: 0 | Status: COMPLETED | OUTPATIENT
Start: 2018-03-25 | End: 2018-03-25

## 2018-03-25 RX ORDER — PIPERACILLIN AND TAZOBACTAM 4; .5 G/20ML; G/20ML
3.38 INJECTION, POWDER, LYOPHILIZED, FOR SOLUTION INTRAVENOUS EVERY 8 HOURS
Qty: 0 | Refills: 0 | Status: DISCONTINUED | OUTPATIENT
Start: 2018-03-25 | End: 2018-03-26

## 2018-03-25 RX ORDER — SODIUM CHLORIDE 9 MG/ML
1000 INJECTION, SOLUTION INTRAVENOUS
Qty: 0 | Refills: 0 | Status: DISCONTINUED | OUTPATIENT
Start: 2018-03-25 | End: 2018-03-26

## 2018-03-25 RX ADMIN — SODIUM CHLORIDE 1000 MILLILITER(S): 9 INJECTION INTRAMUSCULAR; INTRAVENOUS; SUBCUTANEOUS at 17:05

## 2018-03-25 RX ADMIN — Medication 3 MILLILITER(S): at 23:32

## 2018-03-25 RX ADMIN — Medication 650 MILLIGRAM(S): at 16:32

## 2018-03-25 RX ADMIN — PIPERACILLIN AND TAZOBACTAM 25 GRAM(S): 4; .5 INJECTION, POWDER, LYOPHILIZED, FOR SOLUTION INTRAVENOUS at 23:32

## 2018-03-25 RX ADMIN — PIPERACILLIN AND TAZOBACTAM 25 GRAM(S): 4; .5 INJECTION, POWDER, LYOPHILIZED, FOR SOLUTION INTRAVENOUS at 16:37

## 2018-03-25 RX ADMIN — PIPERACILLIN AND TAZOBACTAM 25 GRAM(S): 4; .5 INJECTION, POWDER, LYOPHILIZED, FOR SOLUTION INTRAVENOUS at 09:07

## 2018-03-25 RX ADMIN — SODIUM CHLORIDE 75 MILLILITER(S): 9 INJECTION, SOLUTION INTRAVENOUS at 17:05

## 2018-03-25 RX ADMIN — ENOXAPARIN SODIUM 40 MILLIGRAM(S): 100 INJECTION SUBCUTANEOUS at 05:13

## 2018-03-25 NOTE — PROVIDER CONTACT NOTE (OTHER) - ACTION/TREATMENT ORDERED:
IV bolus NS 1-liter over 1hr-continued on IV antibiotics CBC repeated and T&C sent-transfuse as needed

## 2018-03-25 NOTE — CHART NOTE - NSCHARTNOTEFT_GEN_A_CORE
Patient is a 42yr old female who presents with a chief complaint of low blood count and sent by MD. Called by RN for patient with Temp of 100.5, HR 130s, SBP 90s and HH 7.3/24.1. Patient seen and examined at bedside. Denies Headache, blurred vision, chest pain, abdominal pain, nausea or vomiting.     HPI:  41 yo woman with PMH of Hodgkin's Lymphoma on Immunotherapy Brentuximab Vedotin (Last dose 2/20), presenting to the ED in setting of fever as high as 101 at home with cough of 5 days. With regards to fever patient has had subjective fevers in the month, but has not really checked temps. Denies chills. Endorses nightly sweats. Patient also endorse dry, nonproductive cough ~5 days with rhinorrhea. Denies sore throat States sick contact her daughter with flu a few weeks ago.  Patient states she had dental work done ~2 weeks ago (root canal and cavity filling) and was treated with Amoxicillin 1 week prior to procedure. Patient denies any mouth sores or pain. Denies headache, neck pain, abdominal pain, nausea/emesis, diarrhea, dysuria (22 Mar 2018 23:21)    Vital Signs Last 24 Hrs  T(C): 38.1 (25 Mar 2018 15:30), Max: 38.1 (25 Mar 2018 15:30)  T(F): 100.5 (25 Mar 2018 15:30), Max: 100.5 (25 Mar 2018 15:30)  HR: 133 (25 Mar 2018 15:30) (90 - 133)  BP: 94/60 (25 Mar 2018 15:30) (94/60 - 146/80)  BP(mean): --  RR: 18 (25 Mar 2018 15:30) (16 - 18)  SpO2: 96% (25 Mar 2018 15:30) (96% - 98%)                        7.6    8.0   )-----------( 304      ( 25 Mar 2018 17:24 )             23.5     03-25    136  |  100  |  8   ----------------------------<  93  3.7   |  22  |  0.57    Ca    9.0      25 Mar 2018 06:48          General: WN/WD NAD  Neurology: A&Ox3, nonfocal, GARCIA x 4  Head:  Normocephalic, atraumatic  Respiratory: CTA B/L  CV: RRR, S1S2, no murmur  Abdominal: Soft, NT, ND no palpable mass  MSK: No edema, + peripheral pulses, FROM all 4 extremity    A/P Patient is a 42yr old female who presents with a chief complaint of low blood count and sent by MD. Called by RN for patient with Temp of 100.5, HR 130s, SBP 90s and HH 7.3/24.1.   - Continue Tylenol 650 PRN  - Normal saline bolus of 1L   - Patient requesting LR maintenance fluid    - BC drawn 3/22 neg   - Stat CBC to monitor HH  - Transfuse if < 7.0  - Continue Zosyn Q8H  - Discussed with Dr. Coronado

## 2018-03-26 ENCOUNTER — TRANSCRIPTION ENCOUNTER (OUTPATIENT)
Age: 43
End: 2018-03-26

## 2018-03-26 ENCOUNTER — FORM ENCOUNTER (OUTPATIENT)
Age: 43
End: 2018-03-26

## 2018-03-26 VITALS
SYSTOLIC BLOOD PRESSURE: 93 MMHG | TEMPERATURE: 98 F | OXYGEN SATURATION: 98 % | HEART RATE: 99 BPM | DIASTOLIC BLOOD PRESSURE: 59 MMHG | RESPIRATION RATE: 18 BRPM

## 2018-03-26 LAB
ANION GAP SERPL CALC-SCNC: 11 MMOL/L — SIGNIFICANT CHANGE UP (ref 5–17)
BLD GP AB SCN SERPL QL: NEGATIVE — SIGNIFICANT CHANGE UP
BUN SERPL-MCNC: 6 MG/DL — LOW (ref 7–23)
CALCIUM SERPL-MCNC: 9.2 MG/DL — SIGNIFICANT CHANGE UP (ref 8.4–10.5)
CHLORIDE SERPL-SCNC: 100 MMOL/L — SIGNIFICANT CHANGE UP (ref 96–108)
CO2 SERPL-SCNC: 23 MMOL/L — SIGNIFICANT CHANGE UP (ref 22–31)
CREAT SERPL-MCNC: 0.58 MG/DL — SIGNIFICANT CHANGE UP (ref 0.5–1.3)
GLUCOSE SERPL-MCNC: 85 MG/DL — SIGNIFICANT CHANGE UP (ref 70–99)
HCT VFR BLD CALC: 24 % — LOW (ref 34.5–45)
HGB BLD-MCNC: 7.5 G/DL — LOW (ref 11.5–15.5)
MCHC RBC-ENTMCNC: 24.5 PG — LOW (ref 27–34)
MCHC RBC-ENTMCNC: 31.3 GM/DL — LOW (ref 32–36)
MCV RBC AUTO: 78.4 FL — LOW (ref 80–100)
NRBC # BLD: 2 /100 WBCS — HIGH (ref 0–0)
PLATELET # BLD AUTO: 280 K/UL — SIGNIFICANT CHANGE UP (ref 150–400)
POTASSIUM SERPL-MCNC: 3.6 MMOL/L — SIGNIFICANT CHANGE UP (ref 3.5–5.3)
POTASSIUM SERPL-SCNC: 3.6 MMOL/L — SIGNIFICANT CHANGE UP (ref 3.5–5.3)
RBC # BLD: 3.06 M/UL — LOW (ref 3.8–5.2)
RBC # FLD: 22.6 % — HIGH (ref 10.3–14.5)
RH IG SCN BLD-IMP: POSITIVE — SIGNIFICANT CHANGE UP
SODIUM SERPL-SCNC: 134 MMOL/L — LOW (ref 135–145)
WBC # BLD: 7.48 K/UL — SIGNIFICANT CHANGE UP (ref 3.8–10.5)
WBC # FLD AUTO: 7.48 K/UL — SIGNIFICANT CHANGE UP (ref 3.8–10.5)

## 2018-03-26 PROCEDURE — 99239 HOSP IP/OBS DSCHRG MGMT >30: CPT

## 2018-03-26 RX ORDER — ACETAMINOPHEN 500 MG
2 TABLET ORAL
Qty: 0 | Refills: 0 | COMMUNITY
Start: 2018-03-26

## 2018-03-26 RX ORDER — ACETAMINOPHEN 500 MG
650 TABLET ORAL ONCE
Qty: 0 | Refills: 0 | Status: COMPLETED | OUTPATIENT
Start: 2018-03-26 | End: 2018-03-26

## 2018-03-26 RX ORDER — IBUPROFEN 200 MG
200 TABLET ORAL ONCE
Qty: 0 | Refills: 0 | Status: DISCONTINUED | OUTPATIENT
Start: 2018-03-26 | End: 2018-03-26

## 2018-03-26 RX ORDER — CIPROFLOXACIN LACTATE 400MG/40ML
1 VIAL (ML) INTRAVENOUS
Qty: 3 | Refills: 0 | OUTPATIENT
Start: 2018-03-26 | End: 2018-03-28

## 2018-03-26 RX ADMIN — PIPERACILLIN AND TAZOBACTAM 25 GRAM(S): 4; .5 INJECTION, POWDER, LYOPHILIZED, FOR SOLUTION INTRAVENOUS at 09:21

## 2018-03-26 RX ADMIN — Medication 200 MILLIGRAM(S): at 09:29

## 2018-03-26 RX ADMIN — Medication 650 MILLIGRAM(S): at 14:51

## 2018-03-26 RX ADMIN — ENOXAPARIN SODIUM 40 MILLIGRAM(S): 100 INJECTION SUBCUTANEOUS at 05:00

## 2018-03-26 RX ADMIN — Medication 3 MILLILITER(S): at 06:15

## 2018-03-26 RX ADMIN — SODIUM CHLORIDE 75 MILLILITER(S): 9 INJECTION, SOLUTION INTRAVENOUS at 05:00

## 2018-03-26 NOTE — DISCHARGE NOTE ADULT - MEDICATION SUMMARY - MEDICATIONS TO TAKE
I will START or STAY ON the medications listed below when I get home from the hospital:    acetaminophen 325 mg oral tablet  -- 2 tab(s) by mouth every 6 hours, As needed, For Temp greater than 38 C (100.4 F)  -- Indication: For Fever    guaiFENesin 100 mg/5 mL oral liquid  -- 10 milliliter(s) by mouth every 6 hours, As needed, Cough  -- Indication: For cough    ferrous sulfate 324 mg (65 mg elemental iron) oral tablet  -- 1 tab(s) by mouth 2 times a day  -- Indication: For Anemia    Levaquin 500 mg oral tablet  -- 1 tab(s) by mouth every 24 hours   -- Avoid prolonged or excessive exposure to direct and/or artificial sunlight while taking this medication.  Do not take dairy products, antacids, or iron preparations within one hour of this medication.  Finish all this medication unless otherwise directed by prescriber.  May cause drowsiness or dizziness.  Medication should be taken with plenty of water.    -- Indication: For Antibiotic    Vitamin D3 50,000 intl units oral capsule  -- 1 cap(s) by mouth once a week  -- Indication: For Supplement

## 2018-03-26 NOTE — DISCHARGE NOTE ADULT - CARE PROVIDER_API CALL
Gabrielle Baxter (DO), Hematology; Internal Medicine; Medical Oncology  [address] [place] [address]  Phone: [phone]  Fax: [phone]

## 2018-03-26 NOTE — PROGRESS NOTE ADULT - SUBJECTIVE AND OBJECTIVE BOX
Hematology Follow-up    INTERVAL HPI/OVERNIGHT EVENTS:  Patient S&E at bedside. No o/n events, patient resting comfortably. No complaints at this time. Patient denies fever, chills, dizziness, weakness, CP, palpitations, SOB, cough, N/V/D/C, dysuria, changes in bowel movements, LE edema.    VITAL SIGNS:  T(F): 98.8 (18 @ 07:06)  HR: 80 (18 @ 07:06)  BP: 93/65 (18 @ 07:06)  RR: 18 (18 @ 07:06)  SpO2: 94% (18 @ 07:06)  Wt(kg): --    PHYSICAL EXAM:    Constitutional: AAOx3, NAD,   Eyes: PERRL, EOMI, sclera non-icteric  Neck: supple, no masses, no JVD  Respiratory: CTA b/l, good air entry b/l, no wheezing, rhonchi, rales, with normal respiratory effort and no intercostal retractions  Cardiovascular: RRR, normal S1S2, no M/R/G  Gastrointestinal: soft, NTND, no masses palpable, BS normal in all four quadrants, no HSM  Extremities:  no c/c/e  Neurological: Grossly intact  Skin: Normal temperature    MEDICATIONS  (STANDING):  enoxaparin Injectable 40 milliGRAM(s) SubCutaneous every 24 hours  piperacillin/tazobactam IVPB. 3.375 Gram(s) IV Intermittent every 8 hours  sodium chloride 0.9%. 1000 milliLiter(s) (75 mL/Hr) IV Continuous <Continuous>  vancomycin  IVPB 1000 milliGRAM(s) IV Intermittent every 12 hours    MEDICATIONS  (PRN):  acetaminophen   Tablet 650 milliGRAM(s) Oral every 6 hours PRN For Temp greater than 38 C (100.4 F)  ALBUTerol/ipratropium for Nebulization 3 milliLiter(s) Nebulizer every 6 hours PRN Shortness of Breath and/or Wheezing  guaiFENesin   Syrup  (Sugar-Free) 200 milliGRAM(s) Oral every 6 hours PRN Cough      No Known Allergies      LABS:                        8.4    8.05  )-----------( 262      ( 23 Mar 2018 09:19 )             27.1     03-    135  |  101  |  6<L>  ----------------------------<  110<H>  3.4<L>   |  18<L>  |  0.45<L>    Ca    8.7      23 Mar 2018 07:05  Phos  3.3       Mg     1.7         TPro  7.9  /  Alb  3.0<L>  /  TBili  1.6<H>  /  DBili  x   /  AST  14  /  ALT  15  /  AlkPhos  419<H>         Urinalysis Basic - ( 22 Mar 2018 20:19 )    Color: PL Yellow / Appearance: Clear / S.006 / pH: x  Gluc: x / Ketone: Negative  / Bili: Negative / Urobili: Negative   Blood: x / Protein: Negative / Nitrite: Negative   Leuk Esterase: Negative / RBC: 0-2 /HPF / WBC 0-2 /HPF   Sq Epi: x / Non Sq Epi: x / Bacteria: x        RADIOLOGY & ADDITIONAL TESTS:  Studies reviewed.
Hematology Follow-up    INTERVAL HPI/OVERNIGHT EVENTS:  Patient S&E at bedside. No o/n events, patient resting comfortably. No complaints at this time. Patient denies fever, chills, dizziness, weakness, CP, palpitations, SOB, cough, N/V/D/C, dysuria, changes in bowel movements, LE edema.    VITAL SIGNS:  T(F): 98.8 (18 @ 07:06)  HR: 80 (18 @ 07:06)  BP: 93/65 (18 @ 07:06)  RR: 18 (18 @ 07:06)  SpO2: 94% (18 @ 07:06)  Wt(kg): --    PHYSICAL EXAM:    Constitutional: AAOx3, NAD,   Eyes: PERRL, EOMI, sclera non-icteric  Neck: supple, no masses, no JVD  Respiratory: CTA b/l, good air entry b/l, no wheezing, rhonchi, rales, with normal respiratory effort and no intercostal retractions  Cardiovascular: RRR, normal S1S2, no M/R/G  Gastrointestinal: soft, NTND, no masses palpable, BS normal in all four quadrants, no HSM  Extremities:  no c/c/e  Neurological: Grossly intact  Skin: Normal temperature    MEDICATIONS  (STANDING):  enoxaparin Injectable 40 milliGRAM(s) SubCutaneous every 24 hours  piperacillin/tazobactam IVPB. 3.375 Gram(s) IV Intermittent every 8 hours  sodium chloride 0.9%. 1000 milliLiter(s) (75 mL/Hr) IV Continuous <Continuous>  vancomycin  IVPB 1000 milliGRAM(s) IV Intermittent every 12 hours    MEDICATIONS  (PRN):  acetaminophen   Tablet 650 milliGRAM(s) Oral every 6 hours PRN For Temp greater than 38 C (100.4 F)  ALBUTerol/ipratropium for Nebulization 3 milliLiter(s) Nebulizer every 6 hours PRN Shortness of Breath and/or Wheezing  guaiFENesin   Syrup  (Sugar-Free) 200 milliGRAM(s) Oral every 6 hours PRN Cough      No Known Allergies      LABS:                        8.4    8.05  )-----------( 262      ( 23 Mar 2018 09:19 )             27.1     03-    135  |  101  |  6<L>  ----------------------------<  110<H>  3.4<L>   |  18<L>  |  0.45<L>    Ca    8.7      23 Mar 2018 07:05  Phos  3.3       Mg     1.7         TPro  7.9  /  Alb  3.0<L>  /  TBili  1.6<H>  /  DBili  x   /  AST  14  /  ALT  15  /  AlkPhos  419<H>         Urinalysis Basic - ( 22 Mar 2018 20:19 )    Color: PL Yellow / Appearance: Clear / S.006 / pH: x  Gluc: x / Ketone: Negative  / Bili: Negative / Urobili: Negative   Blood: x / Protein: Negative / Nitrite: Negative   Leuk Esterase: Negative / RBC: 0-2 /HPF / WBC 0-2 /HPF   Sq Epi: x / Non Sq Epi: x / Bacteria: x        RADIOLOGY & ADDITIONAL TESTS:  Studies reviewed.
Patient is a 42y old  Female who presents with a chief complaint of sent by md for low blood count (22 Mar 2018 23:27)      SUBJECTIVE / OVERNIGHT EVENTS: No acute overnight events. Pt reports intermittent chills. Denies headache, cough, chest pain, SOB, abd pain, n/v/d, dysuria.    MEDICATIONS  (STANDING):  enoxaparin Injectable 40 milliGRAM(s) SubCutaneous every 24 hours  sodium chloride 0.9%. 1000 milliLiter(s) (75 mL/Hr) IV Continuous <Continuous>    MEDICATIONS  (PRN):  acetaminophen   Tablet 650 milliGRAM(s) Oral every 6 hours PRN For Temp greater than 38 C (100.4 F)  ALBUTerol/ipratropium for Nebulization 3 milliLiter(s) Nebulizer every 6 hours PRN Shortness of Breath and/or Wheezing  guaiFENesin   Syrup  (Sugar-Free) 200 milliGRAM(s) Oral every 6 hours PRN Cough      Vital Signs Last 24 Hrs  T(C): 36.8 (25 Mar 2018 10:05), Max: 37.9 (24 Mar 2018 13:40)  T(F): 98.2 (25 Mar 2018 10:05), Max: 100.2 (24 Mar 2018 13:40)  HR: 90 (25 Mar 2018 07:56) (90 - 145)  BP: 95/62 (25 Mar 2018 07:56) (95/62 - 146/80)  BP(mean): --  RR: 18 (25 Mar 2018 07:56) (16 - 18)  SpO2: 98% (25 Mar 2018 07:56) (96% - 98%)  CAPILLARY BLOOD GLUCOSE        I&O's Summary    24 Mar 2018 07:01  -  25 Mar 2018 07:00  --------------------------------------------------------  IN: 1550 mL / OUT: 1150 mL / NET: 400 mL    25 Mar 2018 07:01  -  25 Mar 2018 12:41  --------------------------------------------------------  IN: 420 mL / OUT: 300 mL / NET: 120 mL        PHYSICAL EXAM:  GENERAL: NAD, well-developed  HEAD:  Atraumatic, Normocephalic  EYES: EOMI, PERRLA, conjunctiva and sclera clear  NECK: Supple, No JVD  CHEST/LUNG: Clear to auscultation bilaterally; No wheeze  HEART: Regular rate and rhythm; No murmurs, rubs, or gallops  ABDOMEN: Soft, Nontender, Nondistended; Bowel sounds present  EXTREMITIES:  2+ Peripheral Pulses, No clubbing, cyanosis, or edema  PSYCH: AAOx3  NEUROLOGY: non-focal  SKIN: No rashes or lesions    LABS:                        7.3    6.26  )-----------( 244      ( 25 Mar 2018 08:52 )             24.1     03-25    136  |  100  |  8   ----------------------------<  93  3.7   |  22  |  0.57    Ca    9.0      25 Mar 2018 06:48            Urinalysis Basic - ( 24 Mar 2018 19:13 )    Color: Yellow / Appearance: Clear / SG: >1.030 / pH: x  Gluc: x / Ketone: Negative  / Bili: Negative / Urobili: Negative   Blood: x / Protein: 30 mg/dL / Nitrite: Negative   Leuk Esterase: Negative / RBC: 0-2 /HPF / WBC x   Sq Epi: x / Non Sq Epi: Occasional /HPF / Bacteria: x        RADIOLOGY & ADDITIONAL TESTS:    Imaging Personally Reviewed:    Consultant(s) Notes Reviewed:      Care Discussed with Consultants/Other Providers:
Patient is a 42y old  Female who presents with a chief complaint of sent by md for low blood count (22 Mar 2018 23:27)    HPI: Febrile this am. Still borderline hypotensive. Reports improvement in cough.    Vital Signs Last 24 Hrs  T(C): 36.3 (23 Mar 2018 12:27), Max: 39.2 (22 Mar 2018 16:57)  T(F): 97.3 (23 Mar 2018 12:27), Max: 102.6 (22 Mar 2018 16:57)  HR: 87 (23 Mar 2018 12:27) (80 - 153)  BP: 96/60 (23 Mar 2018 12:39) (86/60 - 125/69)  BP(mean): --  RR: 18 (23 Mar 2018 12:27) (16 - 20)  SpO2: 98% (23 Mar 2018 12:27) (95% - 100%)                            8.4    8.05  )-----------( 262      ( 23 Mar 2018 09:19 )             27.1     03-23    135  |  101  |  6<L>  ----------------------------<  110<H>  3.4<L>   |  18<L>  |  0.45<L>    Ca    8.7      23 Mar 2018 07:05  Phos  3.3     03-23  Mg     1.7     03-23    TPro  7.9  /  Alb  3.0<L>  /  TBili  1.6<H>  /  DBili  x   /  AST  14  /  ALT  15  /  AlkPhos  419<H>  03-22    MEDICATIONS  (STANDING):  enoxaparin Injectable 40 milliGRAM(s) SubCutaneous every 24 hours  piperacillin/tazobactam IVPB. 3.375 Gram(s) IV Intermittent every 8 hours  sodium chloride 0.9%. 1000 milliLiter(s) (75 mL/Hr) IV Continuous <Continuous>  vancomycin  IVPB 1000 milliGRAM(s) IV Intermittent every 12 hours    MEDICATIONS  (PRN):  acetaminophen   Tablet 650 milliGRAM(s) Oral every 6 hours PRN For Temp greater than 38 C (100.4 F)  ALBUTerol/ipratropium for Nebulization 3 milliLiter(s) Nebulizer every 6 hours PRN Shortness of Breath and/or Wheezing
Patient is a 42y old  Female who presents with a chief complaint of sent by md for low blood count (26 Mar 2018 15:10)    HPI: Low grade fever yesterday afternoon. Afebrile subsequently.     Vital Signs Last 24 Hrs  T(C): 37.4 (26 Mar 2018 08:00), Max: 37.4 (26 Mar 2018 08:00)  T(F): 99.3 (26 Mar 2018 08:00), Max: 99.3 (26 Mar 2018 08:00)  HR: 126 (26 Mar 2018 08:00) (96 - 126)  BP: 94/60 (26 Mar 2018 08:00) (94/60 - 104/61)  BP(mean): --  RR: 18 (26 Mar 2018 08:00) (16 - 18)  SpO2: 100% (26 Mar 2018 08:00) (98% - 100%)                            7.5    7.48  )-----------( 280      ( 26 Mar 2018 08:13 )             24.0     03-26    134<L>  |  100  |  6<L>  ----------------------------<  85  3.6   |  23  |  0.58    Ca    9.2      26 Mar 2018 07:18    MEDICATIONS  (STANDING):  enoxaparin Injectable 40 milliGRAM(s) SubCutaneous every 24 hours  lactated ringers. 1000 milliLiter(s) (75 mL/Hr) IV Continuous <Continuous>  piperacillin/tazobactam IVPB. 3.375 Gram(s) IV Intermittent every 8 hours    MEDICATIONS  (PRN):  acetaminophen   Tablet 650 milliGRAM(s) Oral every 6 hours PRN For Temp greater than 38 C (100.4 F)  ALBUTerol/ipratropium for Nebulization 3 milliLiter(s) Nebulizer every 6 hours PRN Shortness of Breath and/or Wheezing  guaiFENesin   Syrup  (Sugar-Free) 200 milliGRAM(s) Oral every 6 hours PRN Cough
Patient is a 42y old  Female who presents with a chief complaint of sent by md for low blood count (22 Mar 2018 23:27)    HPI: Febrile overnight and this afternoon with chills. CT c/a/p done this morning.     Vital Signs Last 24 Hrs  T(C): 37.1 (24 Mar 2018 16:16), Max: 38.9 (23 Mar 2018 22:25)  T(F): 98.8 (24 Mar 2018 16:16), Max: 102.1 (23 Mar 2018 22:25)  HR: 135 (24 Mar 2018 16:16) (80 - 145)  BP: 96/57 (24 Mar 2018 16:16) (93/65 - 120/78)  BP(mean): --  RR: 18 (24 Mar 2018 16:16) (18 - 20)  SpO2: 96% (24 Mar 2018 16:16) (94% - 100%)                          8.4    8.05  )-----------( 262      ( 23 Mar 2018 09:19 )             27.1     03-24    137  |  100  |  8   ----------------------------<  93  3.2<L>   |  25  |  0.53    Ca    9.3      24 Mar 2018 09:21  Phos  3.3     03-23  Mg     1.7     03-23    TPro  7.9  /  Alb  3.0<L>  /  TBili  1.6<H>  /  DBili  x   /  AST  14  /  ALT  15  /  AlkPhos  419<H>  03-22    MEDICATIONS  (STANDING):  enoxaparin Injectable 40 milliGRAM(s) SubCutaneous every 24 hours  piperacillin/tazobactam IVPB. 3.375 Gram(s) IV Intermittent every 8 hours  sodium chloride 0.9%. 1000 milliLiter(s) (75 mL/Hr) IV Continuous <Continuous>    MEDICATIONS  (PRN):  acetaminophen   Tablet 650 milliGRAM(s) Oral every 6 hours PRN For Temp greater than 38 C (100.4 F)  ALBUTerol/ipratropium for Nebulization 3 milliLiter(s) Nebulizer every 6 hours PRN Shortness of Breath and/or Wheezing  guaiFENesin   Syrup  (Sugar-Free) 200 milliGRAM(s) Oral every 6 hours PRN Cough      Chest:    1.  The bilateral peribronchovascular nodules and ill-defined nodules, secondary to lymphoma, have decreased in size since 8/26/2017 and are unchanged since 11/17/2017.  2.  The chest lymph nodes, secondary to lymphoma, are unchanged since 11/17/2017.  3.  The pleural metastases, secondary to lymphoma, are likely new since 11/17/2017.  4.  Patchy groundglass opacities within the left lower lobe are likely secondary to atelectasis rather than infection.  5.  Small bilateral pleural effusions.      Abdomen and pelvis:    1.  The splenic lesions that are concerning for lymphoma have decreased in size and number since 5/20/2017.  2.  The abdominal and pelvic lymph nodes have increased in size since 11/17/2017 and are concerning for lymphoma.

## 2018-03-26 NOTE — DISCHARGE NOTE ADULT - HOSPITAL COURSE
Ms. Bravo, is a 42 year old female with progressive lymphadenopathy who was diagnosed with Classical Hodgkin’s Lymphoma who refused chemotherapy, and was treated with Brentuximab s/p C8 on 2/20/18 with interval scan showing persistent disease, admitted with fever-RVP+. Treated with IV  antibiotic for recurrent fever empirically . Fever likely related to Lymphoma. Cultures were negative  Received blood transfusion for anemia. Needs to F/U with your oncologist.  Patient is schedule to have outpatient chemo and MUGA scan tomorrow   DC home on po Levaquin x 3 more days 41 yo woman with Hodgkin's Lymphoma on Immunotherapy w Brentuximab Vedotin (Last dose 2/20) pw fever as high as 101 at home with cough of 5 days. Sepsis present on admission.    Found to be RSV positive. Started on iv fluids for hypotension and tachycardia. IV Vanco and zosyn given  for presumed superimposed MRSA, gram negative and anaerobic bacterial infection.     Continued to spike high grade fevers. Rpt CT c/a/p showed improvement of lymphoma in some areas, worsening in others- no source of infection. Fevers felt to be secondary to underlying lymphoma.    Transfused 1 unit for anemia of 7.3.     Discharged on PO Levaquin with heme f/u.     Diagnoses: Fever; Hodgkin's lymphoma; RSV infection; Sepsis; Anemia in neoplastic disease; Hypotension; Hypokalemia; Hyponatremia; Vitamin D deficiency

## 2018-03-26 NOTE — DISCHARGE NOTE ADULT - PLAN OF CARE
Resolution Isolation will be completed today. Continue supportive care. Increase po fluids. Likely tumor related. Cultures are negative. Started IV antibiotic empirically. Switched to po lovenox for 3 more days Likely tumor related. Cultures are negative. Started IV antibiotic empirically. Switched to po Lovenox for 3 more days F/U with Dr Baxter

## 2018-03-26 NOTE — DISCHARGE NOTE ADULT - CARE PLAN
Principal Discharge DX:	RSV (respiratory syncytial virus infection)  Goal:	Resolution  Assessment and plan of treatment:	Isolation will be completed today. Continue supportive care. Increase po fluids.  Secondary Diagnosis:	Fever  Assessment and plan of treatment:	Likely tumor related. Cultures are negative. Started IV antibiotic empirically. Switched to po lovenox for 3 more days  Secondary Diagnosis:	Hodgkin lymphoma  Secondary Diagnosis:	Anemia Principal Discharge DX:	RSV (respiratory syncytial virus infection)  Goal:	Resolution  Assessment and plan of treatment:	Isolation will be completed today. Continue supportive care. Increase po fluids.  Secondary Diagnosis:	Fever  Assessment and plan of treatment:	Likely tumor related. Cultures are negative. Started IV antibiotic empirically. Switched to po Lovenox for 3 more days  Secondary Diagnosis:	Hodgkin lymphoma  Assessment and plan of treatment:	F/U with Dr Baxter  Secondary Diagnosis:	Anemia

## 2018-03-26 NOTE — PROGRESS NOTE ADULT - ASSESSMENT
Ms. Bravo, is a 42 year old female with progressive lymphadenopathy who was diagnosed with Classical Hodgkin’s Lymphoma who refused chemotherapy, and was treated with Brentuximab s/p C8 on 2/20/18 with interval scan showing persistent disease, admitted with fever-RVP+
41 yo woman with Hodgkin's Lymphoma on Immunotherapy Brentuximab Vedotin (Last dose 2/20) aw fever, cough, RSV positive.    1. Fever- Recurrent fever before admission- attributed to underlying Lymphoma.     Rpt CT c/a/p done shows improvement of lymphoma in some areas, worsening in others.     Imaging shows no acute infectious process. If repeat blood cultures negative will monitor off antibiotics.     2. RSV infection- Symptomatic treatment.    3. Hypotension- Continue iv fluids.    4. Anemia- Hgb lower than admission. Pt denies melena, vaginal bleeding, may be from underlying sepsis/lymphoma. Trend CBC. Keep Hgb > 7.
Ms. Bravo, is a 42 year old female with progressive lymphadenopathy who was diagnosed with Classical Hodgkin’s Lymphoma who refused chemotherapy, and was treated with Brentuximab s/p C8 on 2/20/18 with interval scan showing persistent disease, admitted with fever-RVP+
43 yo woman with Hodgkin's Lymphoma on Immunotherapy Brentuximab Vedotin (Last dose 2/20) aw fever, cough, RSV positive.    1. Fever- Recurrent fever before admission- attributed to underlying Lymphoma.     Rpt CT c/a/p done this am- shows improvement of lymphoma in some areas, worsening in others- report above.     Bld cx from admission positive. Rpt cx sent today. Continue iv Zosyn for now. Vanco d/maciel as no suspicion for MRSA infection.     2. RSV infection- Symptomatic treatment.    3. Hypotension- Continue iv fluids.
43 yo woman with Hodgkin's Lymphoma on Immunotherapy Brentuximab Vedotin (Last dose 2/20) aw fever, cough, RSV positive.    1. Fever- Recurrent fever before admission- attributed to underlying Lymphoma.     Rpt CT c/a/p done shows improvement of lymphoma in some areas, worsening in others.     Imaging shows no acute infectious process.     2. RSV infection- Symptomatic treatment. Zosyn given for presumed superimposed bacterial infection. Will transition to po Levaquin for 3 more days.    3. Hypotension- Improved.    4. Anemia- Hgb 7.5 today. Transfuse 1 unit. Resume FeSO4 at discharge.    Pt requesting to go home today after transfusion as she has a PET and MUGA appointment tomorrow. She does not want inpatient chemo.     D/c time 45 minutes.
43 yo woman with PMH of Hodgkin's Lymphoma on Immunotherapy Brentuximab Vedotin (Last dose 2/20) aw fever, cough, RSV infection      1. RSV (respiratory syncytial virus infection)- Continue symptomatic treatment. Currently on broad spectrum abx given recent dental work. No clear s/s of bacterial infection. Will de-escalate abx if cx remain negative.       2. Sepsis- with hypotension. Continue iv fluids. Monitor symptoms.        3. Hodgkin lymphoma- Heme to follow.      4. Hyponatremia- Improved with hydration

## 2018-03-27 ENCOUNTER — OUTPATIENT (OUTPATIENT)
Dept: OUTPATIENT SERVICES | Facility: HOSPITAL | Age: 43
LOS: 1 days | End: 2018-03-27
Payer: MEDICAID

## 2018-03-27 ENCOUNTER — APPOINTMENT (OUTPATIENT)
Dept: NUCLEAR MEDICINE | Facility: IMAGING CENTER | Age: 43
End: 2018-03-27
Payer: MEDICAID

## 2018-03-27 DIAGNOSIS — C81.90 HODGKIN LYMPHOMA, UNSPECIFIED, UNSPECIFIED SITE: ICD-10-CM

## 2018-03-27 DIAGNOSIS — Z98.82 BREAST IMPLANT STATUS: Chronic | ICD-10-CM

## 2018-03-27 DIAGNOSIS — Z98.890 OTHER SPECIFIED POSTPROCEDURAL STATES: Chronic | ICD-10-CM

## 2018-03-27 PROCEDURE — A9552: CPT

## 2018-03-27 PROCEDURE — 78472 GATED HEART PLANAR SINGLE: CPT

## 2018-03-27 PROCEDURE — 78472 GATED HEART PLANAR SINGLE: CPT | Mod: 26

## 2018-03-27 PROCEDURE — 78815 PET IMAGE W/CT SKULL-THIGH: CPT | Mod: 26,PS

## 2018-03-27 PROCEDURE — 78815 PET IMAGE W/CT SKULL-THIGH: CPT

## 2018-03-27 PROCEDURE — A9538: CPT

## 2018-04-06 ENCOUNTER — INPATIENT (INPATIENT)
Facility: HOSPITAL | Age: 43
LOS: 3 days | Discharge: HOME | DRG: 841 | End: 2018-04-10
Attending: HOSPITALIST | Admitting: INTERNAL MEDICINE
Payer: MEDICAID

## 2018-04-06 VITALS
OXYGEN SATURATION: 99 % | HEART RATE: 135 BPM | SYSTOLIC BLOOD PRESSURE: 98 MMHG | DIASTOLIC BLOOD PRESSURE: 67 MMHG | TEMPERATURE: 101 F | RESPIRATION RATE: 17 BRPM

## 2018-04-06 DIAGNOSIS — Z98.82 BREAST IMPLANT STATUS: Chronic | ICD-10-CM

## 2018-04-06 DIAGNOSIS — Z98.890 OTHER SPECIFIED POSTPROCEDURAL STATES: Chronic | ICD-10-CM

## 2018-04-06 DIAGNOSIS — A41.9 SEPSIS, UNSPECIFIED ORGANISM: ICD-10-CM

## 2018-04-06 LAB
ALBUMIN SERPL ELPH-MCNC: 2.8 G/DL — LOW (ref 3.3–5)
ALP SERPL-CCNC: 386 U/L — HIGH (ref 40–120)
ALT FLD-CCNC: 21 U/L RC — SIGNIFICANT CHANGE UP (ref 10–45)
ANION GAP SERPL CALC-SCNC: 13 MMOL/L — SIGNIFICANT CHANGE UP (ref 5–17)
AST SERPL-CCNC: 21 U/L — SIGNIFICANT CHANGE UP (ref 10–40)
BASOPHILS # BLD AUTO: 0 K/UL — SIGNIFICANT CHANGE UP (ref 0–0.2)
BASOPHILS NFR BLD AUTO: 0.1 % — SIGNIFICANT CHANGE UP (ref 0–2)
BILIRUB SERPL-MCNC: 2 MG/DL — HIGH (ref 0.2–1.2)
BLD GP AB SCN SERPL QL: NEGATIVE — SIGNIFICANT CHANGE UP
BUN SERPL-MCNC: 7 MG/DL — SIGNIFICANT CHANGE UP (ref 7–23)
CALCIUM SERPL-MCNC: 8.8 MG/DL — SIGNIFICANT CHANGE UP (ref 8.4–10.5)
CHLORIDE SERPL-SCNC: 91 MMOL/L — LOW (ref 96–108)
CO2 SERPL-SCNC: 24 MMOL/L — SIGNIFICANT CHANGE UP (ref 22–31)
CREAT SERPL-MCNC: 0.48 MG/DL — LOW (ref 0.5–1.3)
EOSINOPHIL # BLD AUTO: 0 K/UL — SIGNIFICANT CHANGE UP (ref 0–0.5)
EOSINOPHIL NFR BLD AUTO: 0.3 % — SIGNIFICANT CHANGE UP (ref 0–6)
GAS PNL BLDV: SIGNIFICANT CHANGE UP
GLUCOSE SERPL-MCNC: 101 MG/DL — HIGH (ref 70–99)
HCT VFR BLD CALC: 21.2 % — LOW (ref 34.5–45)
HGB BLD-MCNC: 6.8 G/DL — CRITICAL LOW (ref 11.5–15.5)
LYMPHOCYTES # BLD AUTO: 0.7 K/UL — LOW (ref 1–3.3)
LYMPHOCYTES # BLD AUTO: 8.3 % — LOW (ref 13–44)
MCHC RBC-ENTMCNC: 26.8 PG — LOW (ref 27–34)
MCHC RBC-ENTMCNC: 32.2 GM/DL — SIGNIFICANT CHANGE UP (ref 32–36)
MCV RBC AUTO: 83.1 FL — SIGNIFICANT CHANGE UP (ref 80–100)
MONOCYTES # BLD AUTO: 0.5 K/UL — SIGNIFICANT CHANGE UP (ref 0–0.9)
MONOCYTES NFR BLD AUTO: 6.4 % — SIGNIFICANT CHANGE UP (ref 2–14)
NEUTROPHILS # BLD AUTO: 6.8 K/UL — SIGNIFICANT CHANGE UP (ref 1.8–7.4)
NEUTROPHILS NFR BLD AUTO: 84.9 % — HIGH (ref 43–77)
PLATELET # BLD AUTO: 357 K/UL — SIGNIFICANT CHANGE UP (ref 150–400)
POTASSIUM SERPL-MCNC: 4 MMOL/L — SIGNIFICANT CHANGE UP (ref 3.5–5.3)
POTASSIUM SERPL-SCNC: 4 MMOL/L — SIGNIFICANT CHANGE UP (ref 3.5–5.3)
PROT SERPL-MCNC: 7.2 G/DL — SIGNIFICANT CHANGE UP (ref 6–8.3)
RAPID RVP RESULT: SIGNIFICANT CHANGE UP
RBC # BLD: 2.54 M/UL — LOW (ref 3.8–5.2)
RBC # FLD: 22 % — HIGH (ref 10.3–14.5)
RH IG SCN BLD-IMP: POSITIVE — SIGNIFICANT CHANGE UP
SODIUM SERPL-SCNC: 128 MMOL/L — LOW (ref 135–145)
WBC # BLD: 8 K/UL — SIGNIFICANT CHANGE UP (ref 3.8–10.5)
WBC # FLD AUTO: 8 K/UL — SIGNIFICANT CHANGE UP (ref 3.8–10.5)

## 2018-04-06 PROCEDURE — 99285 EMERGENCY DEPT VISIT HI MDM: CPT | Mod: 25

## 2018-04-06 PROCEDURE — 99223 1ST HOSP IP/OBS HIGH 75: CPT | Mod: GC

## 2018-04-06 PROCEDURE — 93010 ELECTROCARDIOGRAM REPORT: CPT

## 2018-04-06 PROCEDURE — 71046 X-RAY EXAM CHEST 2 VIEWS: CPT | Mod: 26

## 2018-04-06 RX ORDER — INFLUENZA VIRUS VACCINE 15; 15; 15; 15 UG/.5ML; UG/.5ML; UG/.5ML; UG/.5ML
0.5 SUSPENSION INTRAMUSCULAR ONCE
Qty: 0 | Refills: 0 | Status: DISCONTINUED | OUTPATIENT
Start: 2018-04-06 | End: 2018-04-10

## 2018-04-06 RX ORDER — ACETAMINOPHEN 500 MG
975 TABLET ORAL ONCE
Qty: 0 | Refills: 0 | Status: COMPLETED | OUTPATIENT
Start: 2018-04-06 | End: 2018-04-06

## 2018-04-06 RX ORDER — VANCOMYCIN HCL 1 G
1000 VIAL (EA) INTRAVENOUS ONCE
Qty: 0 | Refills: 0 | Status: COMPLETED | OUTPATIENT
Start: 2018-04-06 | End: 2018-04-06

## 2018-04-06 RX ORDER — PIPERACILLIN AND TAZOBACTAM 4; .5 G/20ML; G/20ML
3.38 INJECTION, POWDER, LYOPHILIZED, FOR SOLUTION INTRAVENOUS ONCE
Qty: 0 | Refills: 0 | Status: COMPLETED | OUTPATIENT
Start: 2018-04-06 | End: 2018-04-06

## 2018-04-06 RX ORDER — SODIUM CHLORIDE 9 MG/ML
2000 INJECTION INTRAMUSCULAR; INTRAVENOUS; SUBCUTANEOUS ONCE
Qty: 0 | Refills: 0 | Status: COMPLETED | OUTPATIENT
Start: 2018-04-06 | End: 2018-04-06

## 2018-04-06 RX ORDER — SODIUM CHLORIDE 9 MG/ML
3 INJECTION INTRAMUSCULAR; INTRAVENOUS; SUBCUTANEOUS ONCE
Qty: 0 | Refills: 0 | Status: COMPLETED | OUTPATIENT
Start: 2018-04-06 | End: 2018-04-06

## 2018-04-06 RX ADMIN — Medication 975 MILLIGRAM(S): at 21:27

## 2018-04-06 RX ADMIN — SODIUM CHLORIDE 2000 MILLILITER(S): 9 INJECTION INTRAMUSCULAR; INTRAVENOUS; SUBCUTANEOUS at 21:20

## 2018-04-06 RX ADMIN — PIPERACILLIN AND TAZOBACTAM 200 GRAM(S): 4; .5 INJECTION, POWDER, LYOPHILIZED, FOR SOLUTION INTRAVENOUS at 21:26

## 2018-04-06 RX ADMIN — Medication 250 MILLIGRAM(S): at 22:07

## 2018-04-06 RX ADMIN — SODIUM CHLORIDE 3 MILLILITER(S): 9 INJECTION INTRAMUSCULAR; INTRAVENOUS; SUBCUTANEOUS at 21:20

## 2018-04-06 NOTE — ED PROVIDER NOTE - ATTENDING CONTRIBUTION TO CARE
Attending MD Chin.  Agree with above.  PT is a 42 yr old female with pmhx of hodgkin's lymphoma on immunotherapy (last in February) with poss need for chemo.  PT presents to ED currently with worsening bilateral LE numbness, difficulty ambulating for several days. Pt called her oncologist (Dr. Baxter) who recommended presentation for commencement of chemotherapy.  Non-productive cough improving.  No nausea/abominal pain/CP.  No tylenol/motrin today.  Pt is tachycardic with soft BP on arrival, low grade fever.  Rest of exam unremarklable.  4/5 strength bilateral LE's.

## 2018-04-06 NOTE — ED ADULT NURSE NOTE - CHIEF COMPLAINT QUOTE
Patient c/o bilateral feet numbness for 1 day, L>R, Patient has a history of Lymphoma. Patient on Chemo and Immuno therapy. Oral temp Triage 100.7,

## 2018-04-06 NOTE — ED ADULT TRIAGE NOTE - CHIEF COMPLAINT QUOTE
Patient c/o bilateral feet numbness for 1 day, L>R, Patient has a history of Lymphoma. Patient on Chemo and Immuno therapy

## 2018-04-06 NOTE — ED ADULT NURSE NOTE - OBJECTIVE STATEMENT
43 yo female presents to ED c/o numbness in bilateral lower extremities. She states this has been occurring over the past approx 2 days. Pt was dx with lymphoma 7/2017, began immunosuppressive therapy 9/2017, was planning to begin chemo treatments this week. She states she spoke with her oncologist about numbness in feet and they advised her to come to ED for evaluation. Pt reports fevers at home intermittently, does not regularly check temperature d/t frequency of fevers. She last took tylenol for fever 2 weeks ago when fever reached 102, but has not checked temp since that time. She states she was in the hospital last on 3/22/18 when she was dx with RSV. Pt denies CP, N/V, SOB. Denies swelling in extremities. RSV swab obtained, pt placed on droplet precautions. IV access and labs obtained. 41 yo female presents to ED c/o numbness in bilateral lower extremities, states left is worse than right. She states this has been occurring over the past approx 2 days. Pt was dx with lymphoma 7/2017, began immunosuppressive therapy 9/2017, was planning to begin chemo treatments this week. She states she spoke with her oncologist about numbness in feet and they advised her to come to ED for evaluation. Pt reports fevers at home intermittently, does not regularly check temperature d/t frequency of fevers. She last took tylenol for fever 2 weeks ago when fever reached 102, but has not checked temp since that time. She states she was in the hospital last on 3/22/18 when she was dx with RSV. Pt denies CP, N/V, SOB. Denies swelling in extremities. Pt denies recent falls, states she has trouble walking but has steady gait ambulating with assistance. RVP swab obtained, pt placed on droplet precautions. IV access and labs obtained.

## 2018-04-06 NOTE — ED PROVIDER NOTE - PROGRESS NOTE DETAILS
Beata NGUYỄN: Patient spoke with Heme; requests irradiated blood products.  Spoke with Medicine - admitted.

## 2018-04-06 NOTE — ED PROVIDER NOTE - OBJECTIVE STATEMENT
42 year-old female with history of Hodgkin's Lymphoma on Immunotherapy Brentuximab Vedotin (last dose and Feb) presents to the Emergency Department for fever and tachycardia.    Patient mentions she called her Oncologist due to increasing and worsening LE numbness with difficulty in ambulation.  She called her Oncologist (Dr. Baxter) this AM who mentions that the patient should be started on chemotherapy and coem to ER for further evaluation.  Patient mentions ongoing fever since diagnosis of Hodgkin's; TMax 102 approx. 1-2 weeks ago.  + cough (non-productive), nausea, vomiting, chest pain, shortness of breath, abdominal pain, dysuria, melena/BRBPR.  Has not taken any Tylenol/Motrin today.  PCP - Dr. Contreras 42 year-old female with history of Hodgkin's Lymphoma on Immunotherapy Brentuximab Vedotin (last dose and Feb) presents to the Emergency Department for fever and tachycardia.  Patient mentions she called her Oncologist due to increasing and worsening LE numbness with difficulty in ambulation.  She called her Oncologist (Dr. Baxter) this AM who mentions that the patient should be started on chemotherapy and coem to ER for further evaluation.  Patient mentions ongoing fever since diagnosis of Hodgkin's; TMax 102 approx. 1-2 weeks ago.  + cough (non-productive).  No nausea, vomiting, chest pain, shortness of breath, abdominal pain, dysuria, melena/BRBPR.  Has not taken any Tylenol/Motrin today.  PCP - Dr. Contreras

## 2018-04-06 NOTE — PATIENT PROFILE ADULT. - PROVIDER NOTIFICATION
Pt arrives with c/o running out of his depakote prescription. Hx of schizoaffective disorder. Denies SI.  
Declines

## 2018-04-07 DIAGNOSIS — C81.38: ICD-10-CM

## 2018-04-07 DIAGNOSIS — Z29.9 ENCOUNTER FOR PROPHYLACTIC MEASURES, UNSPECIFIED: ICD-10-CM

## 2018-04-07 DIAGNOSIS — C81.90 HODGKIN LYMPHOMA, UNSPECIFIED, UNSPECIFIED SITE: ICD-10-CM

## 2018-04-07 DIAGNOSIS — E80.6 OTHER DISORDERS OF BILIRUBIN METABOLISM: ICD-10-CM

## 2018-04-07 DIAGNOSIS — R50.9 FEVER, UNSPECIFIED: ICD-10-CM

## 2018-04-07 DIAGNOSIS — D64.9 ANEMIA, UNSPECIFIED: ICD-10-CM

## 2018-04-07 DIAGNOSIS — R20.0 ANESTHESIA OF SKIN: ICD-10-CM

## 2018-04-07 LAB
ALBUMIN SERPL ELPH-MCNC: 2.2 G/DL — LOW (ref 3.3–5)
ALP SERPL-CCNC: 326 U/L — HIGH (ref 40–120)
ALT FLD-CCNC: 17 U/L RC — SIGNIFICANT CHANGE UP (ref 10–45)
ANION GAP SERPL CALC-SCNC: 13 MMOL/L — SIGNIFICANT CHANGE UP (ref 5–17)
APPEARANCE UR: CLEAR — SIGNIFICANT CHANGE UP
AST SERPL-CCNC: 21 U/L — SIGNIFICANT CHANGE UP (ref 10–40)
BILIRUB DIRECT SERPL-MCNC: 0.8 MG/DL — HIGH (ref 0–0.2)
BILIRUB INDIRECT FLD-MCNC: 1.1 MG/DL — HIGH (ref 0.2–1)
BILIRUB SERPL-MCNC: 1.9 MG/DL — HIGH (ref 0.2–1.2)
BILIRUB SERPL-MCNC: 1.9 MG/DL — HIGH (ref 0.2–1.2)
BILIRUB UR-MCNC: NEGATIVE — SIGNIFICANT CHANGE UP
BUN SERPL-MCNC: 6 MG/DL — LOW (ref 7–23)
CALCIUM SERPL-MCNC: 8.8 MG/DL — SIGNIFICANT CHANGE UP (ref 8.4–10.5)
CHLORIDE SERPL-SCNC: 102 MMOL/L — SIGNIFICANT CHANGE UP (ref 96–108)
CO2 SERPL-SCNC: 23 MMOL/L — SIGNIFICANT CHANGE UP (ref 22–31)
COLOR SPEC: COLORLESS — SIGNIFICANT CHANGE UP
CREAT SERPL-MCNC: 0.43 MG/DL — LOW (ref 0.5–1.3)
CULTURE RESULTS: NO GROWTH — SIGNIFICANT CHANGE UP
DIFF PNL FLD: NEGATIVE — SIGNIFICANT CHANGE UP
GLUCOSE SERPL-MCNC: 130 MG/DL — HIGH (ref 70–99)
GLUCOSE UR QL: NEGATIVE — SIGNIFICANT CHANGE UP
HAPTOGLOB SERPL-MCNC: 214 MG/DL — HIGH (ref 34–200)
HCG UR QL: NEGATIVE — SIGNIFICANT CHANGE UP
HCT VFR BLD CALC: 24.9 % — LOW (ref 34.5–45)
HGB BLD-MCNC: 8.4 G/DL — LOW (ref 11.5–15.5)
KETONES UR-MCNC: NEGATIVE — SIGNIFICANT CHANGE UP
LDH SERPL L TO P-CCNC: 271 U/L — HIGH (ref 50–242)
LEUKOCYTE ESTERASE UR-ACNC: NEGATIVE — SIGNIFICANT CHANGE UP
MAGNESIUM SERPL-MCNC: 1.8 MG/DL — SIGNIFICANT CHANGE UP (ref 1.6–2.6)
MCHC RBC-ENTMCNC: 28.5 PG — SIGNIFICANT CHANGE UP (ref 27–34)
MCHC RBC-ENTMCNC: 33.5 GM/DL — SIGNIFICANT CHANGE UP (ref 32–36)
MCV RBC AUTO: 85 FL — SIGNIFICANT CHANGE UP (ref 80–100)
NITRITE UR-MCNC: NEGATIVE — SIGNIFICANT CHANGE UP
PH UR: 6.5 — SIGNIFICANT CHANGE UP (ref 5–8)
PHOSPHATE SERPL-MCNC: 3.9 MG/DL — SIGNIFICANT CHANGE UP (ref 2.5–4.5)
PLATELET # BLD AUTO: 273 K/UL — SIGNIFICANT CHANGE UP (ref 150–400)
POTASSIUM SERPL-MCNC: 3.3 MMOL/L — LOW (ref 3.5–5.3)
POTASSIUM SERPL-SCNC: 3.3 MMOL/L — LOW (ref 3.5–5.3)
PROT SERPL-MCNC: 6.3 G/DL — SIGNIFICANT CHANGE UP (ref 6–8.3)
PROT UR-MCNC: NEGATIVE — SIGNIFICANT CHANGE UP
RBC # BLD: 2.93 M/UL — LOW (ref 3.8–5.2)
RBC # BLD: 2.93 M/UL — LOW (ref 3.8–5.2)
RBC # FLD: 21 % — HIGH (ref 10.3–14.5)
RETICS #: 125 K/UL — SIGNIFICANT CHANGE UP (ref 25–125)
RETICS/RBC NFR: 4.3 % — HIGH (ref 0.5–2.5)
SODIUM SERPL-SCNC: 138 MMOL/L — SIGNIFICANT CHANGE UP (ref 135–145)
SP GR SPEC: <1.005 — LOW (ref 1.01–1.02)
SPECIMEN SOURCE: SIGNIFICANT CHANGE UP
UROBILINOGEN FLD QL: NEGATIVE — SIGNIFICANT CHANGE UP
VANCOMYCIN TROUGH SERPL-MCNC: 14.3 UG/ML — SIGNIFICANT CHANGE UP (ref 10–20)
WBC # BLD: 5.2 K/UL — SIGNIFICANT CHANGE UP (ref 3.8–10.5)
WBC # FLD AUTO: 5.2 K/UL — SIGNIFICANT CHANGE UP (ref 3.8–10.5)

## 2018-04-07 PROCEDURE — 71260 CT THORAX DX C+: CPT | Mod: 26

## 2018-04-07 PROCEDURE — 99223 1ST HOSP IP/OBS HIGH 75: CPT

## 2018-04-07 PROCEDURE — 74177 CT ABD & PELVIS W/CONTRAST: CPT | Mod: 26

## 2018-04-07 PROCEDURE — 99233 SBSQ HOSP IP/OBS HIGH 50: CPT | Mod: GC

## 2018-04-07 PROCEDURE — 99223 1ST HOSP IP/OBS HIGH 75: CPT | Mod: GC

## 2018-04-07 RX ORDER — ENOXAPARIN SODIUM 100 MG/ML
40 INJECTION SUBCUTANEOUS EVERY 24 HOURS
Qty: 0 | Refills: 0 | Status: DISCONTINUED | OUTPATIENT
Start: 2018-04-07 | End: 2018-04-10

## 2018-04-07 RX ORDER — GABAPENTIN 400 MG/1
100 CAPSULE ORAL EVERY 8 HOURS
Qty: 0 | Refills: 0 | Status: DISCONTINUED | OUTPATIENT
Start: 2018-04-07 | End: 2018-04-10

## 2018-04-07 RX ORDER — SODIUM CHLORIDE 9 MG/ML
1000 INJECTION INTRAMUSCULAR; INTRAVENOUS; SUBCUTANEOUS ONCE
Qty: 0 | Refills: 0 | Status: COMPLETED | OUTPATIENT
Start: 2018-04-07 | End: 2018-04-07

## 2018-04-07 RX ORDER — VANCOMYCIN HCL 1 G
1000 VIAL (EA) INTRAVENOUS EVERY 12 HOURS
Qty: 0 | Refills: 0 | Status: DISCONTINUED | OUTPATIENT
Start: 2018-04-07 | End: 2018-04-07

## 2018-04-07 RX ORDER — POTASSIUM CHLORIDE 20 MEQ
20 PACKET (EA) ORAL ONCE
Qty: 0 | Refills: 0 | Status: COMPLETED | OUTPATIENT
Start: 2018-04-07 | End: 2018-04-07

## 2018-04-07 RX ORDER — PIPERACILLIN AND TAZOBACTAM 4; .5 G/20ML; G/20ML
3.38 INJECTION, POWDER, LYOPHILIZED, FOR SOLUTION INTRAVENOUS EVERY 8 HOURS
Qty: 0 | Refills: 0 | Status: DISCONTINUED | OUTPATIENT
Start: 2018-04-07 | End: 2018-04-09

## 2018-04-07 RX ORDER — FERROUS SULFATE 325(65) MG
325 TABLET ORAL DAILY
Qty: 0 | Refills: 0 | Status: DISCONTINUED | OUTPATIENT
Start: 2018-04-07 | End: 2018-04-10

## 2018-04-07 RX ORDER — ACETAMINOPHEN 500 MG
650 TABLET ORAL EVERY 6 HOURS
Qty: 0 | Refills: 0 | Status: DISCONTINUED | OUTPATIENT
Start: 2018-04-07 | End: 2018-04-10

## 2018-04-07 RX ORDER — DIPHENHYDRAMINE HCL 50 MG
25 CAPSULE ORAL ONCE
Qty: 0 | Refills: 0 | Status: COMPLETED | OUTPATIENT
Start: 2018-04-07 | End: 2018-04-07

## 2018-04-07 RX ORDER — SODIUM CHLORIDE 9 MG/ML
1000 INJECTION, SOLUTION INTRAVENOUS
Qty: 0 | Refills: 0 | Status: DISCONTINUED | OUTPATIENT
Start: 2018-04-07 | End: 2018-04-09

## 2018-04-07 RX ORDER — SODIUM CHLORIDE 9 MG/ML
500 INJECTION INTRAMUSCULAR; INTRAVENOUS; SUBCUTANEOUS ONCE
Qty: 0 | Refills: 0 | Status: COMPLETED | OUTPATIENT
Start: 2018-04-07 | End: 2018-04-07

## 2018-04-07 RX ADMIN — Medication 20 MILLIEQUIVALENT(S): at 09:18

## 2018-04-07 RX ADMIN — Medication 25 MILLIGRAM(S): at 00:46

## 2018-04-07 RX ADMIN — SODIUM CHLORIDE 2000 MILLILITER(S): 9 INJECTION INTRAMUSCULAR; INTRAVENOUS; SUBCUTANEOUS at 07:06

## 2018-04-07 RX ADMIN — GABAPENTIN 100 MILLIGRAM(S): 400 CAPSULE ORAL at 02:31

## 2018-04-07 RX ADMIN — SODIUM CHLORIDE 75 MILLILITER(S): 9 INJECTION, SOLUTION INTRAVENOUS at 19:27

## 2018-04-07 RX ADMIN — PIPERACILLIN AND TAZOBACTAM 25 GRAM(S): 4; .5 INJECTION, POWDER, LYOPHILIZED, FOR SOLUTION INTRAVENOUS at 06:40

## 2018-04-07 RX ADMIN — Medication 250 MILLIGRAM(S): at 11:21

## 2018-04-07 RX ADMIN — SODIUM CHLORIDE 75 MILLILITER(S): 9 INJECTION, SOLUTION INTRAVENOUS at 09:22

## 2018-04-07 RX ADMIN — PIPERACILLIN AND TAZOBACTAM 25 GRAM(S): 4; .5 INJECTION, POWDER, LYOPHILIZED, FOR SOLUTION INTRAVENOUS at 22:36

## 2018-04-07 RX ADMIN — Medication 325 MILLIGRAM(S): at 11:23

## 2018-04-07 RX ADMIN — SODIUM CHLORIDE 500 MILLILITER(S): 9 INJECTION INTRAMUSCULAR; INTRAVENOUS; SUBCUTANEOUS at 21:51

## 2018-04-07 RX ADMIN — PIPERACILLIN AND TAZOBACTAM 25 GRAM(S): 4; .5 INJECTION, POWDER, LYOPHILIZED, FOR SOLUTION INTRAVENOUS at 11:21

## 2018-04-07 RX ADMIN — GABAPENTIN 100 MILLIGRAM(S): 400 CAPSULE ORAL at 09:18

## 2018-04-07 RX ADMIN — SODIUM CHLORIDE 75 MILLILITER(S): 9 INJECTION, SOLUTION INTRAVENOUS at 03:24

## 2018-04-07 NOTE — CONSULT NOTE ADULT - PROBLEM SELECTOR RECOMMENDATION 2
Unclear etiology on presentation. No other red flag signs/symptoms such as incontinence). Brentuximab is associated with peripheral neuropathy in ~ 50% of cases though would complete full neuropathy work up before attributing to this medication. Can check B12, A1c, TSH for common causes of neuropathy.

## 2018-04-07 NOTE — H&P ADULT - PROBLEM SELECTOR PLAN 3
Patient anemic to Hb of 6.8 on presentation (baseline 7-9)  - this is likely due to her known hematologic malignancy  - no signs or symptoms of active bleeding at this time  - will transfuse to Hb goal >7, monitor CBC Patient anemic to Hb of 6.8 on presentation (baseline 7-9)  - this is likely due to her known hematologic malignancy  - no signs or symptoms of active bleeding at this time  - will transfuse to Hb goal >7, monitor CBC  - will rule out hemolysis as below

## 2018-04-07 NOTE — H&P ADULT - ASSESSMENT
43 yo F w/PMH of Hodgkin's lymphoma on immunotherapy with Brentuximab vedotin (last dose 2/20/18) presenting sent in by her oncologist for worsening LE numbness admitted with fever, meets SIRS criteria, differential includes infectious versus lymphoma-mediated

## 2018-04-07 NOTE — PROGRESS NOTE ADULT - SUBJECTIVE AND OBJECTIVE BOX
Stewart Carbajal MD PGY1   Contact/Pager - [phone]      Patient is a 42y old  Female who presents with a chief complaint of lower extremity numbness (07 Apr 2018 00:42)        SUBJECTIVE / OVERNIGHT EVENTS: No acute events ON. Pt has no acute complaints, but states she still has neuropathy in the LE. Denies; CP, SOB, abd pain, N/V/D.       MEDICATIONS  (STANDING):  enoxaparin Injectable 40 milliGRAM(s) SubCutaneous every 24 hours  ferrous    sulfate 325 milliGRAM(s) Oral daily  gabapentin 100 milliGRAM(s) Oral every 8 hours  influenza   Vaccine 0.5 milliLiter(s) IntraMuscular once  lactated ringers. 1000 milliLiter(s) (75 mL/Hr) IV Continuous <Continuous>  piperacillin/tazobactam IVPB. 3.375 Gram(s) IV Intermittent every 8 hours  vancomycin  IVPB 1000 milliGRAM(s) IV Intermittent every 12 hours    MEDICATIONS  (PRN):  acetaminophen   Tablet 650 milliGRAM(s) Oral every 6 hours PRN For Temp greater than 38 C (100.4 F)      Allergies    No Known Allergies    Intolerances          Vital Signs Last 24 Hrs  T(C): 33.9 (07 Apr 2018 06:46), Max: 38.2 (06 Apr 2018 20:22)  T(F): 93.1 (07 Apr 2018 06:46), Max: 100.7 (06 Apr 2018 20:22)  HR: 85 (07 Apr 2018 06:46) (84 - 135)  BP: 88/56 (07 Apr 2018 06:46) (84/53 - 99/72)  BP(mean): --  RR: 18 (07 Apr 2018 06:46) (17 - 20)  SpO2: 100% (07 Apr 2018 06:46) (90% - 100%)  CAPILLARY BLOOD GLUCOSE        I&O's Summary    06 Apr 2018 07:01  -  07 Apr 2018 07:00  --------------------------------------------------------  IN: 915 mL / OUT: 600 mL / NET: 315 mL          PHYSICAL EXAM:  GENERAL: NAD, well-developed  HEAD:  AT, NC  EYES: EOMI, PERRLA, conjunctiva and sclera clear  ENMT: Airway patent. MMM. Good dentition, no lesions.  NECK: Supple, No JVD  CHEST/LUNG: CTABL; No wheezing, rhonci, or rales  HEART: RRR; Normal S1, S2. No murmurs, rubs, or gallops  ABDOMEN: Soft, NT, ND; Bowel sounds present. No organomegaly  EXTREMITIES:  2+ Peripheral Pulses, No clubbing, cyanosis, or edema  PSYCH: AAOx3  NEUROLOGY: non-focal  SKIN: Warm, dry, intact; No rashes or lesions      LABS:                        8.4    5.2   )-----------( 273      ( 07 Apr 2018 06:15 )             24.9     04-07    138  |  102  |  6<L>  ----------------------------<  130<H>  3.3<L>   |  23  |  0.43<L>    Ca    8.8      07 Apr 2018 06:12  Phos  3.9     04-07  Mg     1.8     04-07    TPro  6.3  /  Alb  2.2<L>  /  TBili  1.9<H>  /  DBili  0.8<H>  /  AST  21  /  ALT  17  /  AlkPhos  326<H>  04-07    LIVER FUNCTIONS - ( 07 Apr 2018 06:12 )  Alb: 2.2 g/dL / Pro: 6.3 g/dL / ALK PHOS: 326 U/L / ALT: 17 U/L RC / AST: 21 U/L / GGT: x                 Urinalysis Basic - ( 07 Apr 2018 00:20 )    Color: Colorless / Appearance: Clear / SG: <1.005 / pH: x  Gluc: x / Ketone: Negative  / Bili: Negative / Urobili: Negative   Blood: x / Protein: Negative / Nitrite: Negative   Leuk Esterase: Negative / RBC: x / WBC x   Sq Epi: x / Non Sq Epi: x / Bacteria: x              RADIOLOGY & ADDITIONAL TESTS:    Imaging Personally Reviewed:     Consultant(s) Notes Reviewed:     Care Discussed with Consultants/Other Providers: Stewart Carbajal MD PGY1   Contact/Pager - [phone]      Patient is a 42y old  Female who presents with a chief complaint of lower extremity numbness (07 Apr 2018 00:42)        SUBJECTIVE / OVERNIGHT EVENTS: No acute events ON. Pt has no acute complaints, but states she still has neuropathy in the LE. Denies; CP, SOB, abd pain, N/V/D.       MEDICATIONS  (STANDING):  enoxaparin Injectable 40 milliGRAM(s) SubCutaneous every 24 hours  ferrous    sulfate 325 milliGRAM(s) Oral daily  gabapentin 100 milliGRAM(s) Oral every 8 hours  influenza   Vaccine 0.5 milliLiter(s) IntraMuscular once  lactated ringers. 1000 milliLiter(s) (75 mL/Hr) IV Continuous <Continuous>  piperacillin/tazobactam IVPB. 3.375 Gram(s) IV Intermittent every 8 hours  vancomycin  IVPB 1000 milliGRAM(s) IV Intermittent every 12 hours    MEDICATIONS  (PRN):  acetaminophen   Tablet 650 milliGRAM(s) Oral every 6 hours PRN For Temp greater than 38 C (100.4 F)      Allergies    No Known Allergies    Intolerances          Vital Signs Last 24 Hrs  T(C): 33.9 (07 Apr 2018 06:46), Max: 38.2 (06 Apr 2018 20:22)  T(F): 93.1 (07 Apr 2018 06:46), Max: 100.7 (06 Apr 2018 20:22)  HR: 85 (07 Apr 2018 06:46) (84 - 135)  BP: 88/56 (07 Apr 2018 06:46) (84/53 - 99/72)  BP(mean): --  RR: 18 (07 Apr 2018 06:46) (17 - 20)  SpO2: 100% (07 Apr 2018 06:46) (90% - 100%)  CAPILLARY BLOOD GLUCOSE        I&O's Summary    06 Apr 2018 07:01  -  07 Apr 2018 07:00  --------------------------------------------------------  IN: 915 mL / OUT: 600 mL / NET: 315 mL        Physical exam:  GENERAL: NAD, well-developed  HEAD: Atraumatic, Normocephalic  EYES: EOMI, PERRLA, conjunctiva and sclera clear  NECK: Supple  CHEST/LUNG: Clear to auscultation bilaterally; No wheezes/rales/rhonchi  HEART: Regular rate and rhythm; No murmurs, rubs, or gallops  ABDOMEN: Soft, Nontender, Nondistended; Bowel sounds present  EXTREMITIES: 2+ dP pulses b/l, No clubbing, cyanosis, or edema  PSYCH: reactive affect  NEUROLOGY: AAOx3, bilateral LEs with normal strength, ROM, sensation to light touch and proprioception intact bilateral feet  SKIN: No rashes or lesions    LABS:                        8.4    5.2   )-----------( 273      ( 07 Apr 2018 06:15 )             24.9     04-07    138  |  102  |  6<L>  ----------------------------<  130<H>  3.3<L>   |  23  |  0.43<L>    Ca    8.8      07 Apr 2018 06:12  Phos  3.9     04-07  Mg     1.8     04-07    TPro  6.3  /  Alb  2.2<L>  /  TBili  1.9<H>  /  DBili  0.8<H>  /  AST  21  /  ALT  17  /  AlkPhos  326<H>  04-07    LIVER FUNCTIONS - ( 07 Apr 2018 06:12 )  Alb: 2.2 g/dL / Pro: 6.3 g/dL / ALK PHOS: 326 U/L / ALT: 17 U/L RC / AST: 21 U/L / GGT: x                 Urinalysis Basic - ( 07 Apr 2018 00:20 )    Color: Colorless / Appearance: Clear / SG: <1.005 / pH: x  Gluc: x / Ketone: Negative  / Bili: Negative / Urobili: Negative   Blood: x / Protein: Negative / Nitrite: Negative   Leuk Esterase: Negative / RBC: x / WBC x   Sq Epi: x / Non Sq Epi: x / Bacteria: x              RADIOLOGY & ADDITIONAL TESTS:    Imaging Personally Reviewed:     Consultant(s) Notes Reviewed:     Care Discussed with Consultants/Other Providers:

## 2018-04-07 NOTE — CONSULT NOTE ADULT - SUBJECTIVE AND OBJECTIVE BOX
Hematology Consult Note    HPI:  43 yo F w/PMH of Hodgkin's lymphoma (last treated w/Brentuximab on 2/20/18) who presented with LE numbness. She noted tingling in her LE only 2 days prior to admission followed by numbness on the day of admission. No symptoms in her upper extremities. With the numbness she notes difficulty ambulating. She denies weakness, back pain, loss of bowel or bladder function. She does note daily fevers at home since discharge from a pervious hospitalization where she had RSV (3/22-3/26). No nausea, vomiting, chest pain, shortness of breath, abdominal pain, dysuria, sore throat, nasal congestion. Her review of systems is also positive for increasing palpable LAD in her left groin. Since admission she reports ongoing numbness in the feet and loose stool since initiation of antibiotics.     In terms of her Hodgkin's Lymphoma is was diagnosed in 2017 after presenting with lymphadenopathy. Pathology consistent with lymphocyte deplete type of HL. She has bulky disease, bone marrow involvement and lung involvement with pleural effusions. Around presentation she had a pericardial effusion but declined intervention at the time. Initially she declined therapy but was agreeable to brentuximab starting 9/1. She has had 7 cycles, last one 2/20 with plan to switch to AVD given minimal response. She had a PET scan 3/27/18 with increased size and hyperavidity of diffuse lymphadenopathy, new splenic involvement, increased activity in the bone marrow and moderate pleural effusions. MUGA was done pre-initiation of chemo with normal LVEF.       PAST MEDICAL & SURGICAL HISTORY:  Hodgkin lymphoma  H/O abdominoplasty: 2004  H/O bilateral breast implants: 2004      FAMILY HISTORY:  Family history of cervical cancer (Sibling)  Family history of lung cancer      MEDICATIONS  (STANDING):  enoxaparin Injectable 40 milliGRAM(s) SubCutaneous every 24 hours  ferrous    sulfate 325 milliGRAM(s) Oral daily  gabapentin 100 milliGRAM(s) Oral every 8 hours  influenza   Vaccine 0.5 milliLiter(s) IntraMuscular once  lactated ringers. 1000 milliLiter(s) (75 mL/Hr) IV Continuous <Continuous>  piperacillin/tazobactam IVPB. 3.375 Gram(s) IV Intermittent every 8 hours    MEDICATIONS  (PRN):  acetaminophen   Tablet 650 milliGRAM(s) Oral every 6 hours PRN For Temp greater than 38 C (100.4 F)      Allergies    No Known Allergies        SOCIAL HISTORY: denies EtOH, tobacco use    REVIEW OF SYSTEMS:    CONSTITUTIONAL: fevers  EYES/ENT: No visual changes  NECK: No pain or stiffness  RESPIRATORY: No cough, wheezing; No shortness of breath  CARDIOVASCULAR: No chest pain or palpitations  GASTROINTESTINAL: No abdominal or epigastric pain. No nausea, vomiting, or hematemesis; loose stool  GENITOURINARY: No dysuria, frequency or hematuria  NEUROLOGICAL: LE numbness  SKIN: No itching, burning, rashes, or lesions   All other review of systems is negative unless indicated above.    Height (cm): 167.64 (04-06 @ 23:31)  Weight (kg): 125.5 (04-06 @ 23:31)  BMI (kg/m2): 44.7 (04-06 @ 23:31)  BSA (m2): 2.3 (04-06 @ 23:31)    T(F): 93.1 (04-07-18 @ 06:46), Max: 100.7 (04-06-18 @ 20:22)  HR: 85 (04-07-18 @ 06:46)  BP: 88/56 (04-07-18 @ 06:46)  RR: 18 (04-07-18 @ 06:46)  SpO2: 100% (04-07-18 @ 06:46)  Wt(kg): --    GENERAL: NAD, well-developed, non toxic appearing  HEAD:  Atraumatic, Normocephalic  EYES: EOMI, PERRLA, conjunctiva and sclera clear  ENT: moist MM, no thrush or ulcers.  NECK: Supple, palpable non mobile, nontender LAD along cervical chain and supraclavicular region bilaterally.   CHEST/LUNG: Clear to auscultation bilaterally; No wheeze  HEART: Regular rate and rhythm  ABDOMEN: Soft, Nontender, Nondistended; Bowel sounds present. no hepatosplenomegaly. no palpable spinal tenderness.  LYMPH: palpable nontender, nonmobile LAD in the axillary region (bilateral) and inguinal regions (L>R).   EXTREMITIES:  2+ Peripheral Pulses, No clubbing, cyanosis, or edema  NEUROLOGY: AOx3, 5/5 power in bilateral LE, no limitation in range of motion, sensation intact in b/l feet  SKIN: No rashes or lesions                          8.4    5.2   )-----------( 273      ( 07 Apr 2018 06:15 )             24.9       04-07    138  |  102  |  6<L>  ----------------------------<  130<H>  3.3<L>   |  23  |  0.43<L>    Ca    8.8      07 Apr 2018 06:12  Phos  3.9     04-07  Mg     1.8     04-07    TPro  6.3  /  Alb  2.2<L>  /  TBili  1.9<H>  /  DBili  0.8<H>  /  AST  21  /  ALT  17  /  AlkPhos  326<H>  04-07      Haptoglobin, Serum: 214 mg/dL (04-07 @ 08:05)  Lactate Dehydrogenase, Serum: 271 U/L (04-07 @ 06:12)  Magnesium, Serum: 1.8 mg/dL (04-07 @ 06:12)  Phosphorus Level, Serum: 3.9 mg/dL (04-07 @ 06:12)

## 2018-04-07 NOTE — CONSULT NOTE ADULT - PROBLEM SELECTOR RECOMMENDATION 9
Main differential is infectious v malignancy-related. T max 100.7 on admission. No localizing signs/symptoms of infection though recently admitted with RSV.  - s/p Vanc and Zosyn in ER with Zosyn continued. blood cultures are pending. if infectious work up negative likely related to her underlying lymphoma, would continue antibiotics while work up is still ongoing. Main differential is infectious v malignancy-related. T max 100.7 on admission. No localizing signs/symptoms of infection though recently admitted with RSV.  - s/p Vanc and Zosyn in ER with Zosyn continued. blood cultures are pending. if infectious work up negative likely related to her underlying lymphoma, would continue antibiotics while work up is still ongoing.  - CT TAP ordered, will follow up result

## 2018-04-07 NOTE — CONSULT NOTE ADULT - ASSESSMENT
43 yo F w/PMH of Hodgkin's lymphoma (last treated w/Brentuximab on 2/20/18 and plan to start AVD) who presented with LE numbness. Also noted to be febrile on admission. 41 yo F w/PMH of Hodgkin's lymphoma (last treated w/Brentuximab on 2/20/18 and plan to start AVD) who presented with b/l LE neuropathy and fever

## 2018-04-07 NOTE — CONSULT NOTE ADULT - PROBLEM SELECTOR RECOMMENDATION 4
Multifactorial etiology - bone marrow involvement of her Hodgkin's and recent progression on PET scan in addition to brentuximab use. Was recently worked up for her anemia as an outpatient with no B12/folate deficiency and ferritin level of 1550 ruling out iron deficiency.  - Hgb 6.8 on admission with bump to 8.4 s/p 1U pRBC on admission  - peripheral smear to be reviewed  - transfuse for Hgb <7 or active bleeding. Multifactorial etiology - bone marrow involvement of her Hodgkin's and recent progression on PET scan in addition to brentuximab use. Was recently worked up for her anemia as an outpatient with no B12/folate deficiency and ferritin level of 1550 ruling out iron deficiency.  - Hgb 6.8 on admission with bump to 8.4 s/p 1U pRBC on admission  - likely marrow involvement with lymphoma   - peripheral smear reviewed - no evidence of hemolysis  - transfuse for Hgb <7 or active bleeding.

## 2018-04-07 NOTE — PROGRESS NOTE ADULT - PROBLEM SELECTOR PLAN 2
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

## 2018-04-07 NOTE — H&P ADULT - HISTORY OF PRESENT ILLNESS
Patient is a 43 yo F w/PMH of Hodgkin's lymphoma on immunotherapy with Brentuximab vedotin (last dose 2/20/18) presenting sent in by her oncologist for worsening LE numbness.    In the ED, VS on presentation: T 100.7, , BP 98/67, RR 17, SpO2 99% on RA  CBC revealed normal WBC of 8, anemia to Hb of 6.8, platelet count of 357. CMP revealed hyponatremia to 128, baseline hypoalbuminemia to 2.8, baseline hyperbilirubinemia to 2.0 and baseline elevated alk phos of 386. VBG revealed pH of 7.47, lactate of 2.5. UA unremarkable. RVP negative. CXR demonstrated unchanged bilateral pleural effusions and mediastinal masses. Blood and urine cultures were sent. She was given Zosyn x 1, Vancomycin x 1, 2L NS bolus, Tylenol 975mg x 1. 1U pRBC was ordered and she was admitted for further work up and management. Patient is a 41 yo F w/PMH of Hodgkin's lymphoma on immunotherapy with Brentuximab vedotin (last dose 2/20/18) presenting sent in by her oncologist for worsening LE numbness. The patient reports that for the last 2 days she has noticed tingling and now numbness of her feet bilaterally which has interfered with her ability to ambulate. She denies weakness, back pain, changes in bowel or bladder habits. She does report daily fevers at home since discharge from the hospital. She was recently hospitalized from 3/22-3/26 for fever found to be RSV positive with negative cultures, discharged on Levaquin with outpatient hematology follow up. She denies nausea, vomiting, chest pain, shortness of breath, abdominal pain, diarrhea, dysuria, sore throat, nasal congestion. She reports that she is supposed to start chemotherapy on Monday and was sent in by her hematologist and told she will likely get chemotherapy while here.    Recent PET/CT on 3/27 demonstrated progression of lymphoma with bone marrow involvement, splenic involvement, and extensive hypermetabolic LAD in the neck, thorax, abdomen, and pelvis, increased in size and metabolism since prior study in 11/2017.    In the ED, VS on presentation: T 100.7, , BP 98/67, RR 17, SpO2 99% on RA  CBC revealed normal WBC of 8, anemia to Hb of 6.8, platelet count of 357. CMP revealed hyponatremia to 128, baseline hypoalbuminemia to 2.8, baseline hyperbilirubinemia to 2.0 and baseline elevated alk phos of 386. VBG revealed pH of 7.47, lactate of 2.5. UA unremarkable. RVP negative. CXR demonstrated unchanged bilateral pleural effusions and mediastinal masses. Blood and urine cultures were sent. She was given Zosyn x 1, Vancomycin x 1, 2L NS bolus, Tylenol 975mg x 1. 1U pRBC was ordered and she was admitted for further work up and management.

## 2018-04-07 NOTE — H&P ADULT - NSHPPHYSICALEXAM_GEN_ALL_CORE
PHYSICAL EXAM:  Vital Signs Last 24 Hrs  T(F): 97.7 (06 Apr 2018 23:31), Max: 100.7 (06 Apr 2018 20:22)  HR: 111 (06 Apr 2018 23:31) (111 - 135)  BP: 91/59 (06 Apr 2018 23:31) (91/59 - 99/72)  RR: 18 (06 Apr 2018 23:31) (17 - 20)  SpO2: 98% (06 Apr 2018 23:31) (97% - 99%)  GENERAL: NAD, well-developed  HEAD: Atraumatic, Normocephalic  EYES: EOMI, PERRLA, conjunctiva and sclera clear  NECK: Supple, No JVD  CHEST/LUNG: Clear to auscultation bilaterally; No wheezes/rales/rhonchi  HEART: Regular rate and rhythm; No murmurs, rubs, or gallops  ABDOMEN: Soft, Nontender, Nondistended; Bowel sounds present  EXTREMITIES: 2+ dP pulses b/l, No clubbing, cyanosis, or edema  PSYCH: reactive affect  NEUROLOGY: AAOx3, non-focal  SKIN: No rashes or lesions PHYSICAL EXAM:  Vital Signs Last 24 Hrs  T(F): 97.7 (06 Apr 2018 23:31), Max: 100.7 (06 Apr 2018 20:22)  HR: 111 (06 Apr 2018 23:31) (111 - 135)  BP: 91/59 (06 Apr 2018 23:31) (91/59 - 99/72)  RR: 18 (06 Apr 2018 23:31) (17 - 20)  SpO2: 98% (06 Apr 2018 23:31) (97% - 99%)  GENERAL: NAD, well-developed  HEAD: Atraumatic, Normocephalic  EYES: EOMI, PERRLA, conjunctiva and sclera clear  NECK: Supple  CHEST/LUNG: Clear to auscultation bilaterally; No wheezes/rales/rhonchi  HEART: Regular rate and rhythm; No murmurs, rubs, or gallops  ABDOMEN: Soft, Nontender, Nondistended; Bowel sounds present  EXTREMITIES: 2+ dP pulses b/l, No clubbing, cyanosis, or edema  PSYCH: reactive affect  NEUROLOGY: AAOx3, bilateral LEs with normal strength, ROM, sensation to light touch and proprioception intact bilateral feet  SKIN: No rashes or lesions

## 2018-04-07 NOTE — H&P ADULT - NSHPSOCIALHISTORY_GEN_ALL_CORE
She is a never smoker, denies EtOH, no recreational drug use. She formerly worked as a . She lives with her daughter.

## 2018-04-07 NOTE — H&P ADULT - NSHPREVIEWOFSYSTEMS_GEN_ALL_CORE
Constitutional: denies fevers, chills, night sweats, weight loss  HEENT: denies visual changes, hearing changes, rhinitis, odynophagia, or dysphagia  Cardiovascular: denies palpitations, chest pain, edema  Respiratory: denies SOB, wheezing  Gastrointestinal: denies N/V/D, abdominal pain, hematochezia, melena  : denies dysuria, hematuria  MSK: denies weakness, joint pain  Neuro: no numbness or tingling  Psych: no depression or anxiety  Skin: denies new rashes or masses Constitutional: denies fevers, chills, night sweats, weight loss  HEENT: denies visual changes, hearing changes, rhinitis, odynophagia, or dysphagia  Cardiovascular: denies palpitations, chest pain, edema  Respiratory: denies SOB, wheezing  Gastrointestinal: denies N/V/D, abdominal pain, hematochezia, melena  : denies dysuria, hematuria  MSK: denies weakness, joint pain  Neuro: +numbness/tingling of bilateral feet  Psych: no depression or anxiety  Skin: denies new rashes or masses

## 2018-04-07 NOTE — PROGRESS NOTE ADULT - PROBLEM SELECTOR PLAN 3
Improved to 8.4 s/p 1 unit pRBC  Patient anemic to Hb of 6.8 on presentation (baseline 7-9)  - this is likely due to her known hematologic malignancy  - unlikely to be hemolysis, as haptoglobin high, retic appropriately elevated, LDH mildly elevated (in setting of cancer)   - no signs or symptoms of active bleeding at this time  - will transfuse to Hb goal >7, monitor CBC

## 2018-04-07 NOTE — PROGRESS NOTE ADULT - PROBLEM SELECTOR PLAN 1
Patient presented with fever to Tmax 100.7, tachycardia - meets SIRS criteria, differential includes infectious versus lymphoma-mediated  - CXR no focal consolidations, demonstrated known mediastinal masses and pleural effusions  - UA negative, RVP negative, no localizing symptoms  - blood and urine cultures sent  - she is s/p Vancomycin and Zosyn in the ER  - will continue with Vanco, Zosyn for broad coverage for now pending culture results  - prior work up on previous admission only significant for RSV which has since resolved  - s/p 2L NS bolus in the ED, will continue with maintenance IVF  -ID c/s, will f/u recs  -Ct chest, abd/pelvis IV Patient presented with fever to Tmax 100.7, tachycardia - meets SIRS criteria, differential includes infectious versus lymphoma-mediated. Now hypothermic   - CXR no focal consolidations, demonstrated known mediastinal masses and pleural effusions  - UA negative, RVP negative, no localizing symptoms  - blood and urine cultures sent  - she is s/p Vancomycin and Zosyn in the ER  - will continue with Vanco, Zosyn for broad coverage for now pending culture results  - prior work up on previous admission only significant for RSV which has since resolved  - s/p 2L NS bolus in the ED, will continue with maintenance IVF  -ID c/s, will f/u recs  -Ct chest, abd/pelvis IV  -will transfer to tele in the setting of hypothermia, risk of bradycardia

## 2018-04-07 NOTE — H&P ADULT - PROBLEM SELECTOR PLAN 5
Lovenox for DVT ppx  Regular diet Patient has a history of Hodgkin's lymphoma with extensive disease on recent PET/CT despite immunotherapy  - patient is planned for chemotherapy per her report  - hematology to see in AM

## 2018-04-07 NOTE — H&P ADULT - PROBLEM SELECTOR PLAN 2
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%) 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%) as likely etiology 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

## 2018-04-07 NOTE — H&P ADULT - PROBLEM SELECTOR PLAN 4
Patient has a history of Hodgkin's lymphoma with extensive disease on recent PET/CT despite immunotherapy  - patient is planned for chemotherapy per her report  - hematology to see in AM Patient noted to have elevated Tbili on presentation to 2.0 - has had elevated Tbili in the past  - no abdominal symptoms at this time and recent imaging without abdominal pathology  - given anemia will rule out hemolysis - check LDH (although will likely be elevated in lymphoma), haptoglobin, reticulocyte count  - will fractionate Tbili in AM

## 2018-04-07 NOTE — CONSULT NOTE ADULT - ASSESSMENT
41 yo F w/PMH of Hodgkin's lymphoma 2017 on immunotherapy with Brentuximab vedotin (last dose 2/20/18) presenting sent in by her oncologist for worsening LE numbness. The patient reports that for the last 2 days she has noticed tingling and now numbness of her feet bilaterally which has interfered with her ability to ambulate. She denies weakness, back pain, changes in bowel or bladder habits. She does report daily fevers at home since discharge from the hospital. She was recently hospitalized from 3/22-3/26 for fever found to be RSV positive with negative cultures, discharged on Levaquin with outpatient hematology follow up. 41 yo F w/PMH of Hodgkin's lymphoma 2017 on immunotherapy with Brentuximab vedotin (last dose 2/20/18) presenting sent in by her oncologist for worsening LE numbness. The patient reports that for the last 2 days she has noticed tingling and now numbness of her feet bilaterally which has interfered with her ability to ambulate. She denies weakness, back pain, changes in bowel or bladder habits. She does report daily fevers at home since discharge from the hospital. She was recently hospitalized from 3/22-3/26 for fever found to be RSV positive with negative cultures, discharged on Levaquin with outpatient hematology follow up.   In Ed found to be febrile to 100.7 tachycardic now hypotensive and hypothermic. She has been having fever since discharge and this could be related to HL but her hypothermia and hypotension is concerning. No clear source of infection    Recommend:  Dc vanco  c/w Zosyn for now  f/u blood cx  Neuro eval for neuropathy 43 yo F w/PMH of Hodgkin's lymphoma 2017 on immunotherapy with Brentuximab vedotin (last dose 2/20/18) presenting sent in by her oncologist for worsening LE numbness. The patient reports that for the last 2 days she has noticed tingling and now numbness of her feet bilaterally which has interfered with her ability to ambulate. She denies weakness, back pain, changes in bowel or bladder habits. She does report daily fevers at home since discharge from the hospital. She was recently hospitalized from 3/22-3/26 for fever found to be RSV positive with negative cultures, discharged on Levaquin with outpatient hematology follow up.   In Ed found to be febrile to 100.7 tachycardic now hypotensive and hypothermic. She has been having fever since discharge and this could be related to HL but her hypothermia and hypotension is concerning. No clear source of infection    Recommend:  Dc vanco  c/w Zosyn for now- concerned by hypothermia  f/u blood cx  Neuro eval for neuropathy  check AM cortisol level

## 2018-04-07 NOTE — CONSULT NOTE ADULT - SUBJECTIVE AND OBJECTIVE BOX
HPI:  Patient is a 41 yo F w/PMH of Hodgkin's lymphoma on immunotherapy with Brentuximab vedotin (last dose 2/20/18) presenting sent in by her oncologist for worsening LE numbness. The patient reports that for the last 2 days she has noticed tingling and now numbness of her feet bilaterally which has interfered with her ability to ambulate. She denies weakness, back pain, changes in bowel or bladder habits. She does report daily fevers at home since discharge from the hospital. She was recently hospitalized from 3/22-3/26 for fever found to be RSV positive with negative cultures, discharged on Levaquin with outpatient hematology follow up. She denies nausea, vomiting, chest pain, shortness of breath, abdominal pain, diarrhea, dysuria, sore throat, nasal congestion. She reports that she is supposed to start chemotherapy on Monday and was sent in by her hematologist and told she will likely get chemotherapy while here.    Recent PET/CT on 3/27 demonstrated progression of lymphoma with bone marrow involvement, splenic involvement, and extensive hypermetabolic LAD in the neck, thorax, abdomen, and pelvis, increased in size and metabolism since prior study in 11/2017.    In the ED, VS on presentation: T 100.7, , BP 98/67, RR 17, SpO2 99% on RA  CBC revealed normal WBC of 8, anemia to Hb of 6.8, platelet count of 357. CMP revealed hyponatremia to 128, baseline hypoalbuminemia to 2.8, baseline hyperbilirubinemia to 2.0 and baseline elevated alk phos of 386. VBG revealed pH of 7.47, lactate of 2.5. UA unremarkable. RVP negative. CXR demonstrated unchanged bilateral pleural effusions and mediastinal masses. Blood and urine cultures were sent. She was given Zosyn x 1, Vancomycin x 1, 2L NS bolus, Tylenol 975mg x 1. 1U pRBC was ordered and she was admitted for further work up and management. (07 Apr 2018 00:42)      PAST MEDICAL & SURGICAL HISTORY:  Hodgkin lymphoma  H/O abdominoplasty: 2004  H/O bilateral breast implants: 2004      Allergies  No Known Allergies        ANTIMICROBIALS:  piperacillin/tazobactam IVPB. 3.375 every 8 hours  vancomycin  IVPB 1000 every 12 hours      OTHER MEDS: MEDICATIONS  (STANDING):  acetaminophen   Tablet 650 every 6 hours PRN  enoxaparin Injectable 40 every 24 hours  gabapentin 100 every 8 hours  influenza   Vaccine 0.5 once      SOCIAL HISTORY:  [ ] etoh [ ] tobacco [ ] former smoker [ ] IVDU    FAMILY HISTORY:  Family history of cervical cancer (Sibling)  Family history of lung cancer      REVIEW OF SYSTEMS  [  ] ROS unobtainable because:    [  ] All other systems negative except as noted below:	    Constitutional:  [ ] fever [ ] chills  [ ] weight loss  [ ] weakness  Skin:  [ ] rash [ ] phlebitis	  Eyes: [ ] icterus [ ] pain  [ ] discharge	  ENMT: [ ] sore throat  [ ] thrush [ ] ulcers [ ] exudates  Respiratory: [ ] dyspnea [ ] hemoptysis [ ] cough [ ] sputum	  Cardiovascular:  [ ] chest pain [ ] palpitations [ ] edema	  Gastrointestinal:  [ ] nausea [ ] vomiting [ ] diarrhea [ ] constipation [ ] pain	  Genitourinary:  [ ] dysuria [ ] frequency [ ] hematuria [ ] discharge [ ] flank pain  [ ] incontinence  Musculoskeletal:  [ ] myalgias [ ] arthralgias [ ] arthritis  [ ] back pain  Neurological:  [ ] headache [ ] seizures  [ ] confusion/altered mental status  Psychiatric:  [ ] anxiety [ ] depression	  Hematology/Lymphatics:  [ ] lymphadenopathy  Endocrine:  [ ] adrenal [ ] thyroid  Allergic/Immunologic:	 [ ] transplant [ ] seasonal    Vital Signs Last 24 Hrs  T(F): 98.1 (04-07-18 @ 11:45), Max: 100.7 (04-06-18 @ 20:22)    Vital Signs Last 24 Hrs  HR: 114 (04-07-18 @ 11:45) (84 - 135)  BP: 87/60 (04-07-18 @ 11:45) (84/53 - 99/72)  RR: 18 (04-07-18 @ 11:45)  SpO2: 98% (04-07-18 @ 11:45) (90% - 100%)  Wt(kg): --    PHYSICAL EXAM:  General: non-toxic  HEAD/EYES: anicteric, PERRL  ENT:  supple  Cardiovascular:   S1, S2  Respiratory:  clear bilaterally  GI:  soft, non-tender, normal bowel sounds  :  no CVA tenderness   Musculoskeletal:  no synovitis  Neurologic:  grossly non-focal  Skin:  no rash  Lymph: no lymphadenopathy  Psychiatric:  appropriate affect  Vascular:  no phlebitis                                8.4    5.2   )-----------( 273      ( 07 Apr 2018 06:15 )             24.9       04-07    138  |  102  |  6<L>  ----------------------------<  130<H>  3.3<L>   |  23  |  0.43<L>    Ca    8.8      07 Apr 2018 06:12  Phos  3.9     04-07  Mg     1.8     04-07    TPro  6.3  /  Alb  2.2<L>  /  TBili  1.9<H>  /  DBili  0.8<H>  /  AST  21  /  ALT  17  /  AlkPhos  326<H>  04-07      Urinalysis Basic - ( 07 Apr 2018 00:20 )    Color: Colorless / Appearance: Clear / SG: <1.005 / pH: x  Gluc: x / Ketone: Negative  / Bili: Negative / Urobili: Negative   Blood: x / Protein: Negative / Nitrite: Negative   Leuk Esterase: Negative / RBC: x / WBC x   Sq Epi: x / Non Sq Epi: x / Bacteria: x        MICROBIOLOGY:  .Urine Clean Catch (Midstream)  03-24-18   No growth  --  --      .Blood Blood-Peripheral  03-24-18   No growth at 5 days.  --  --      .Urine Clean Catch (Midstream)  03-22-18   No growth  --  --      .Blood Blood-Peripheral  03-22-18   No growth at 5 days.  --  --              v    Rapid RVP Result: NotDetec (04-06 @ 21:45)          RADIOLOGY:< from: Xray Chest 2 Views PA/Lat (04.06.18 @ 21:44) >    FINDINGS:     Bilateral breast implants are noted.  Small bilateral pleural effusions are unchanged.  No new parenchymal opacities or pneumothorax.  Large mediastinal masses are unchanged.  The heart is normal in size.  The visualized osseous structures demonstrate no acute pathology.    IMPRESSION:  Unchanged small bilateral pleural effusions.  Mediastinal masses are unchanged and better evaluated on recent PET/CT.    < end of copied text > HPI:  Patient is a 41 yo F w/PMH of Hodgkin's lymphoma on immunotherapy with Brentuximab vedotin (last dose 2/20/18) presenting sent in by her oncologist for worsening LE numbness. The patient reports that for the last 2 days she has noticed tingling and now numbness of her feet bilaterally which has interfered with her ability to ambulate. She denies weakness, back pain, changes in bowel or bladder habits. She does report daily fevers at home since discharge from the hospital. She was recently hospitalized from 3/22-3/26 for fever found to be RSV positive with negative cultures, discharged on Levaquin with outpatient hematology follow up. She denies nausea, vomiting, chest pain, shortness of breath, abdominal pain, diarrhea, dysuria, sore throat, nasal congestion. She reports that she is supposed to start chemotherapy on Monday and was sent in by her hematologist and told she will likely get chemotherapy while here.    Recent PET/CT on 3/27 demonstrated progression of lymphoma with bone marrow involvement, splenic involvement, and extensive hypermetabolic LAD in the neck, thorax, abdomen, and pelvis, increased in size and metabolism since prior study in 11/2017.    In the ED, VS on presentation: T 100.7, , BP 98/67, RR 17, SpO2 99% on RA  CBC revealed normal WBC of 8, anemia to Hb of 6.8, platelet count of 357. CMP revealed hyponatremia to 128, baseline hypoalbuminemia to 2.8, baseline hyperbilirubinemia to 2.0 and baseline elevated alk phos of 386. VBG revealed pH of 7.47, lactate of 2.5. UA unremarkable. RVP negative. CXR demonstrated unchanged bilateral pleural effusions and mediastinal masses. Blood and urine cultures were sent. She was given Zosyn x 1, Vancomycin x 1, 2L NS bolus, Tylenol 975mg x 1. 1U pRBC was ordered and she was admitted for further work up and management. (07 Apr 2018 00:42)      PAST MEDICAL & SURGICAL HISTORY:  Hodgkin lymphoma  H/O abdominoplasty: 2004  H/O bilateral breast implants: 2004      Allergies  No Known Allergies        ANTIMICROBIALS:  piperacillin/tazobactam IVPB. 3.375 every 8 hours  vancomycin  IVPB 1000 every 12 hours      OTHER MEDS: MEDICATIONS  (STANDING):  acetaminophen   Tablet 650 every 6 hours PRN  enoxaparin Injectable 40 every 24 hours  gabapentin 100 every 8 hours  influenza   Vaccine 0.5 once      SOCIAL HISTORY: [ ] etoh [ ] tobacco [ ] former smoker [ ] IVDU    FAMILY HISTORY:  Family history of cervical cancer (Sibling)  Family history of lung cancer      REVIEW OF SYSTEMS  [  ] ROS unobtainable because:    [x  ] All other systems negative except as noted below:	    Constitutional:  [x ] fever [ ] chills  [ ] weight loss  [ ] weakness  Skin:  [ ] rash [ ] phlebitis	  Eyes: [ ] icterus [ ] pain  [ ] discharge	  ENMT: [ ] sore throat  [ ] thrush [ ] ulcers [ ] exudates  Respiratory: [ ] dyspnea [ ] hemoptysis [ ] cough [ ] sputum	  Cardiovascular:  [ ] chest pain [ ] palpitations [ ] edema	  Gastrointestinal:  [ ] nausea [ ] vomiting [ ] diarrhea [ ] constipation [ ] pain	  Genitourinary:  [ ] dysuria [ ] frequency [ ] hematuria [ ] discharge [ ] flank pain  [ ] incontinence  Musculoskeletal:  [ ] myalgias [ ] arthralgias [ ] arthritis  [ ] back pain  Neurological:  [ ] headache [ ] seizures  [ ] confusion/altered mental status  Psychiatric:  [ ] anxiety [ ] depression	  Hematology/Lymphatics:  [ ] lymphadenopathy  Endocrine:  [ ] adrenal [ ] thyroid  Allergic/Immunologic:	 [ ] transplant [ ] seasonal    Vital Signs Last 24 Hrs  T(F): 98.1 (04-07-18 @ 11:45), Max: 100.7 (04-06-18 @ 20:22)    Vital Signs Last 24 Hrs  HR: 114 (04-07-18 @ 11:45) (84 - 135)  BP: 87/60 (04-07-18 @ 11:45) (84/53 - 99/72)  RR: 18 (04-07-18 @ 11:45)  SpO2: 98% (04-07-18 @ 11:45) (90% - 100%)  Wt(kg): --    PHYSICAL EXAM:  General: non-toxic  HEAD/EYES: anicteric, PERRL  ENT:  supple + lymphadenopathy  Cardiovascular:   S1, S2 breast implants  Respiratory:  clear bilaterally  GI:  soft, non-tender, normal bowel sounds  :  no CVA tenderness   Musculoskeletal:  no synovitis  Neurologic:  grossly non-focal  Skin:  no rash  Lymph: no lymphadenopathy  Psychiatric:  appropriate affect  Vascular:  no phlebitis                                8.4    5.2   )-----------( 273      ( 07 Apr 2018 06:15 )             24.9       04-07    138  |  102  |  6<L>  ----------------------------<  130<H>  3.3<L>   |  23  |  0.43<L>    Ca    8.8      07 Apr 2018 06:12  Phos  3.9     04-07  Mg     1.8     04-07    TPro  6.3  /  Alb  2.2<L>  /  TBili  1.9<H>  /  DBili  0.8<H>  /  AST  21  /  ALT  17  /  AlkPhos  326<H>  04-07      Urinalysis Basic - ( 07 Apr 2018 00:20 )    Color: Colorless / Appearance: Clear / SG: <1.005 / pH: x  Gluc: x / Ketone: Negative  / Bili: Negative / Urobili: Negative   Blood: x / Protein: Negative / Nitrite: Negative   Leuk Esterase: Negative / RBC: x / WBC x   Sq Epi: x / Non Sq Epi: x / Bacteria: x        MICROBIOLOGY:  .Urine Clean Catch (Midstream)  03-24-18   No growth  --  --      .Blood Blood-Peripheral  03-24-18   No growth at 5 days.  --  --      .Urine Clean Catch (Midstream)  03-22-18   No growth  --  --      .Blood Blood-Peripheral  03-22-18   No growth at 5 days.  --  --              v    Rapid RVP Result: NotDetec (04-06 @ 21:45)          RADIOLOGY:< from: Xray Chest 2 Views PA/Lat (04.06.18 @ 21:44) >    FINDINGS:     Bilateral breast implants are noted.  Small bilateral pleural effusions are unchanged.  No new parenchymal opacities or pneumothorax.  Large mediastinal masses are unchanged.  The heart is normal in size.  The visualized osseous structures demonstrate no acute pathology.    IMPRESSION:  Unchanged small bilateral pleural effusions.  Mediastinal masses are unchanged and better evaluated on recent PET/CT.    < end of copied text >

## 2018-04-07 NOTE — H&P ADULT - PROBLEM SELECTOR PLAN 1
Patient presented with fever to Tmax 100.7, tachycardia - meets SIRS criteria, differential includes infectious versus lymphoma-mediated  - CXR no focal consolidations, demonstrated known mediastinal masses and pleural effusions  - UA negative, RVP negative, no localizing symptoms  - blood and urine cultures sent  - she is s/p Vancomycin and Zosyn in the ER  - will continue with Zosyn for broad coverage for now pending culture results  - prior work up on previous admission only significant for RSV which has since resolved  - s/p 2L NS bolus in the ED, will continue with maintenance IVF

## 2018-04-07 NOTE — H&P ADULT - ATTENDING COMMENTS
Patient assigned to me by night hospitalist in charge for management and care for patient for this evening only. Care to be resumed by day hospitalist in the morning and thereafter.     Patient seen and examined at bedside. Agree with the resident note above. Changes made to note above where appropriate.

## 2018-04-07 NOTE — H&P ADULT - NSHPLABSRESULTS_GEN_ALL_CORE
Personally reviewed labs.   Personally reviewed imaging. CXR demonstrated unchanged bilateral pleural effusions and mediastinal masses.  Personally reviewed EKG. Sinus tachycardia at 127 bpm.                          6.8    8.0   )-----------( 357      ( 06 Apr 2018 21:27 )             21.2       04-06    128<L>  |  91<L>  |  7   ----------------------------<  101<H>  4.0   |  24  |  0.48<L>    Ca    8.8      06 Apr 2018 21:27    TPro  7.2  /  Alb  2.8<L>  /  TBili  2.0<H>  /  DBili  x   /  AST  21  /  ALT  21  /  AlkPhos  386<H>  04-06    LIVER FUNCTIONS - ( 06 Apr 2018 21:27 )  Alb: 2.8 g/dL / Pro: 7.2 g/dL / ALK PHOS: 386 U/L / ALT: 21 U/L RC / AST: 21 U/L / GGT: x           Urinalysis Basic - ( 07 Apr 2018 00:20 )  Color: Colorless / Appearance: Clear / SG: <1.005 / pH: x  Gluc: x / Ketone: Negative  / Bili: Negative / Urobili: Negative   Blood: x / Protein: Negative / Nitrite: Negative   Leuk Esterase: Negative / RBC: x / WBC x   Sq Epi: x / Non Sq Epi: x / Bacteria: x 6.8    8.0   )-----------( 357      ( 06 Apr 2018 21:27 )             21.2       04-06    128<L>  |  91<L>  |  7   ----------------------------<  101<H>  4.0   |  24  |  0.48<L>    Ca    8.8      06 Apr 2018 21:27    TPro  7.2  /  Alb  2.8<L>  /  TBili  2.0<H>  /  DBili  x   /  AST  21  /  ALT  21  /  AlkPhos  386<H>  04-06    LIVER FUNCTIONS - ( 06 Apr 2018 21:27 )  Alb: 2.8 g/dL / Pro: 7.2 g/dL / ALK PHOS: 386 U/L / ALT: 21 U/L RC / AST: 21 U/L / GGT: x           Urinalysis Basic - ( 07 Apr 2018 00:20 )  Color: Colorless / Appearance: Clear / SG: <1.005 / pH: x  Gluc: x / Ketone: Negative  / Bili: Negative / Urobili: Negative   Blood: x / Protein: Negative / Nitrite: Negative   Leuk Esterase: Negative / RBC: x / WBC x   Sq Epi: x / Non Sq Epi: x / Bacteria: x    I personally reviewed & interpreted the lab findings above; CBC anemia noted. Mild hyperbilirubinemia noted.   I personally reviewed & interpreted the radiographic findings; CXR shows mediastinal mass  I personally reviewed & interpreted the EKG findings; Sinus tachycardia.

## 2018-04-07 NOTE — CONSULT NOTE ADULT - ATTENDING COMMENTS
Stacey Caicedo M.D. ,   Pager [phone]     after 5PM/ weekends [phone]
Agree with the above, appropriate changes made where necessary.  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   CT CAP pending to assess for occult infection.   Can d/c Vancomycin if there is no PNA on CT chest. Can continue Zosyn until cultures are negative for 48 hours.   Plan for chemotherapy with AVD on Monday. Will need PICC line placed. D/w pt who is agreeable to this.

## 2018-04-07 NOTE — PROGRESS NOTE ADULT - ASSESSMENT
41 yo F w/PMH of Hodgkin's lymphoma on immunotherapy with Brentuximab vedotin (last dose 2/20/18) presenting sent in by her oncologist for worsening LE numbness admitted with fever, meets SIRS criteria, differential includes infectious versus lymphoma-mediated

## 2018-04-07 NOTE — CONSULT NOTE ADULT - PROBLEM SELECTOR RECOMMENDATION 3
Evidence of progression of disease on recent PET scan s/p 7 doses of brentuximab (9/1/17-2/20/18). Plan for AVD initiation. MUGA performed with LVEF 64%. Can consider initiation of therapy while inpatient once infectious work up is complete  -  on admission. Evidence of progression of disease on recent PET scan s/p 7 doses of brentuximab (9/1/17-2/20/18). Plan for AVD initiation. MUGA performed with LVEF 64%. Can consider initiation of therapy while inpatient once infectious work up is complete  -  on admission. would check daily. send uric acid, phos with AM labs to evaluate for tumor lysis. at high risk given bulky disease  - prior to chemo initiation will need PICC placed Evidence of progression of disease on recent PET scan s/p 7 doses of brentuximab (9/1/17-2/20/18). Plan for AVD initiation. MUGA performed with LVEF 64%. Will plan to initiate chemotherapy with AVD = inpatient once infectious work up is complete. Tentatively plan for chemo on monday.  -  on admission. would check daily. send uric acid, phos with AM labs to evaluate for tumor lysis. at high risk given bulky disease  - prior to chemo initiation will need PICC placed prior to monday

## 2018-04-08 LAB
ALBUMIN SERPL ELPH-MCNC: 2.3 G/DL — LOW (ref 3.3–5)
ALP SERPL-CCNC: 269 U/L — HIGH (ref 40–120)
ALT FLD-CCNC: 19 U/L RC — SIGNIFICANT CHANGE UP (ref 10–45)
ANION GAP SERPL CALC-SCNC: 11 MMOL/L — SIGNIFICANT CHANGE UP (ref 5–17)
AST SERPL-CCNC: 19 U/L — SIGNIFICANT CHANGE UP (ref 10–40)
BILIRUB SERPL-MCNC: 1.3 MG/DL — HIGH (ref 0.2–1.2)
BUN SERPL-MCNC: 7 MG/DL — SIGNIFICANT CHANGE UP (ref 7–23)
CALCIUM SERPL-MCNC: 9.1 MG/DL — SIGNIFICANT CHANGE UP (ref 8.4–10.5)
CHLORIDE SERPL-SCNC: 100 MMOL/L — SIGNIFICANT CHANGE UP (ref 96–108)
CO2 SERPL-SCNC: 24 MMOL/L — SIGNIFICANT CHANGE UP (ref 22–31)
CORTIS AM PEAK SERPL-MCNC: 21.2 UG/DL — HIGH (ref 6–18.4)
CREAT SERPL-MCNC: 0.58 MG/DL — SIGNIFICANT CHANGE UP (ref 0.5–1.3)
GLUCOSE SERPL-MCNC: 105 MG/DL — HIGH (ref 70–99)
HBA1C BLD-MCNC: 5.1 % — SIGNIFICANT CHANGE UP (ref 4–5.6)
HCT VFR BLD CALC: 24.5 % — LOW (ref 34.5–45)
HGB BLD-MCNC: 7.8 G/DL — LOW (ref 11.5–15.5)
MAGNESIUM SERPL-MCNC: 1.8 MG/DL — SIGNIFICANT CHANGE UP (ref 1.6–2.6)
MCHC RBC-ENTMCNC: 26.3 PG — LOW (ref 27–34)
MCHC RBC-ENTMCNC: 31.8 GM/DL — LOW (ref 32–36)
MCV RBC AUTO: 82.5 FL — SIGNIFICANT CHANGE UP (ref 80–100)
NRBC # BLD: 2 /100 WBCS — HIGH (ref 0–0)
PHOSPHATE SERPL-MCNC: 4.2 MG/DL — SIGNIFICANT CHANGE UP (ref 2.5–4.5)
PLATELET # BLD AUTO: 270 K/UL — SIGNIFICANT CHANGE UP (ref 150–400)
POTASSIUM SERPL-MCNC: 3.9 MMOL/L — SIGNIFICANT CHANGE UP (ref 3.5–5.3)
POTASSIUM SERPL-SCNC: 3.9 MMOL/L — SIGNIFICANT CHANGE UP (ref 3.5–5.3)
PROT SERPL-MCNC: 6.4 G/DL — SIGNIFICANT CHANGE UP (ref 6–8.3)
RBC # BLD: 2.97 M/UL — LOW (ref 3.8–5.2)
RBC # FLD: 21.1 % — HIGH (ref 10.3–14.5)
SODIUM SERPL-SCNC: 135 MMOL/L — SIGNIFICANT CHANGE UP (ref 135–145)
TSH SERPL-MCNC: 6.19 UIU/ML — HIGH (ref 0.27–4.2)
VIT B12 SERPL-MCNC: >2000 PG/ML — HIGH (ref 232–1245)
WBC # BLD: 5.84 K/UL — SIGNIFICANT CHANGE UP (ref 3.8–10.5)
WBC # FLD AUTO: 5.84 K/UL — SIGNIFICANT CHANGE UP (ref 3.8–10.5)

## 2018-04-08 PROCEDURE — 99232 SBSQ HOSP IP/OBS MODERATE 35: CPT

## 2018-04-08 PROCEDURE — 99233 SBSQ HOSP IP/OBS HIGH 50: CPT | Mod: GC

## 2018-04-08 RX ADMIN — PIPERACILLIN AND TAZOBACTAM 25 GRAM(S): 4; .5 INJECTION, POWDER, LYOPHILIZED, FOR SOLUTION INTRAVENOUS at 15:56

## 2018-04-08 RX ADMIN — Medication 40 MILLIGRAM(S): at 23:44

## 2018-04-08 RX ADMIN — GABAPENTIN 100 MILLIGRAM(S): 400 CAPSULE ORAL at 09:03

## 2018-04-08 RX ADMIN — GABAPENTIN 100 MILLIGRAM(S): 400 CAPSULE ORAL at 15:56

## 2018-04-08 RX ADMIN — ENOXAPARIN SODIUM 40 MILLIGRAM(S): 100 INJECTION SUBCUTANEOUS at 06:40

## 2018-04-08 RX ADMIN — PIPERACILLIN AND TAZOBACTAM 25 GRAM(S): 4; .5 INJECTION, POWDER, LYOPHILIZED, FOR SOLUTION INTRAVENOUS at 06:33

## 2018-04-08 RX ADMIN — Medication 325 MILLIGRAM(S): at 09:03

## 2018-04-08 RX ADMIN — SODIUM CHLORIDE 75 MILLILITER(S): 9 INJECTION, SOLUTION INTRAVENOUS at 22:06

## 2018-04-08 RX ADMIN — PIPERACILLIN AND TAZOBACTAM 25 GRAM(S): 4; .5 INJECTION, POWDER, LYOPHILIZED, FOR SOLUTION INTRAVENOUS at 22:06

## 2018-04-08 RX ADMIN — Medication 650 MILLIGRAM(S): at 08:57

## 2018-04-08 NOTE — PROGRESS NOTE ADULT - PROBLEM SELECTOR PLAN 2
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%) as likely etiology  - c/w gabapentin 100 q 8  - Will consider a neuro consult for neuropathy

## 2018-04-08 NOTE — PROGRESS NOTE ADULT - PROBLEM SELECTOR PLAN 3
Brentuximab can cause neuropathy in 50% of patients. Neuropathy is b/l, not consistent with 1 nerve root, therefore cord compression is unlikely. CT did not show any significant disease in the spine.   Supportive care.   PT eval

## 2018-04-08 NOTE — PROGRESS NOTE ADULT - SUBJECTIVE AND OBJECTIVE BOX
Patient is a 42y old  Female who presents with a chief complaint of lower extremity numbness (07 Apr 2018 00:42)        SUBJECTIVE / OVERNIGHT EVENTS: Overnight, patient's BP continued to be soft in the 80s, . A 500cc bolus was given. Patient has baseline low BPs. Today, pt has no acute complaints, but states she still has neuropathy in the LE. Denies; CP, SOB, abd pain, N/V/D, cough.     MEDICATIONS  (STANDING):  enoxaparin Injectable 40 milliGRAM(s) SubCutaneous every 24 hours  ferrous    sulfate 325 milliGRAM(s) Oral daily  gabapentin 100 milliGRAM(s) Oral every 8 hours  influenza   Vaccine 0.5 milliLiter(s) IntraMuscular once  lactated ringers. 1000 milliLiter(s) (75 mL/Hr) IV Continuous <Continuous>  piperacillin/tazobactam IVPB. 3.375 Gram(s) IV Intermittent every 8 hours    MEDICATIONS  (PRN):  acetaminophen   Tablet 650 milliGRAM(s) Oral every 6 hours PRN For Temp greater than 38 C (100.4 F)        Allergies    No Known Allergies    Intolerances        Vital Signs Last 24 Hrs  T(C): 36.4 (08 Apr 2018 05:12), Max: 37.7 (07 Apr 2018 21:08)  T(F): 97.5 (08 Apr 2018 05:12), Max: 99.8 (07 Apr 2018 21:08)  HR: 86 (08 Apr 2018 05:12) (84 - 150)  BP: 85/57 (08 Apr 2018 05:12) (85/57 - 90/62)  BP(mean): --  RR: 20 (08 Apr 2018 05:12) (17 - 20)  SpO2: 99% (08 Apr 2018 05:12) (96% - 100%)    I&O's Summary    04-07 @ 07:01  -  04-08 @ 07:00  --------------------------------------------------------  IN: 2550 mL / OUT: 0 mL / NET: 2550 mL        Physical exam:  GENERAL: NAD, well-developed  HEAD: Atraumatic, Normocephalic  EYES: EOMI, PERRLA, conjunctiva and sclera clear  NECK: Supple  CHEST/LUNG: Clear to auscultation bilaterally; No wheezes/rales/rhonchi  HEART: Regular rate and rhythm; No murmurs, rubs, or gallops  ABDOMEN: Soft, Nontender, Nondistended; Bowel sounds present  EXTREMITIES: 2+ dP pulses b/l, No clubbing, cyanosis, or edema  PSYCH: reactive affect  NEUROLOGY: AAOx3, bilateral LEs with normal strength, ROM, sensation to light touch and proprioception intact bilateral feet  SKIN: No rashes or lesions    LABS:             AM labs pending                         8.4    5.2   )-----------( 273      ( 07 Apr 2018 06:15 )             24.9     04-08    135  |  100  |  7   ----------------------------<  105<H>  3.9   |  24  |  0.58    Ca    9.1      08 Apr 2018 06:41  Phos  4.2     04-08  Mg     1.8     04-08    TPro  6.4  /  Alb  2.3<L>  /  TBili  1.3<H>  /  DBili  x   /  AST  19  /  ALT  19  /  AlkPhos  269<H>  04-08        Urinalysis Basic - ( 07 Apr 2018 00:20 )    Color: Colorless / Appearance: Clear / SG: <1.005 / pH: x  Gluc: x / Ketone: Negative  / Bili: Negative / Urobili: Negative   Blood: x / Protein: Negative / Nitrite: Negative   Leuk Esterase: Negative / RBC: x / WBC x   Sq Epi: x / Non Sq Epi: x / Bacteria: x              RADIOLOGY & ADDITIONAL TESTS:    Imaging Personally Reviewed:     Consultant(s) Notes Reviewed: ID, Heme/onc    Care Discussed with Consultants/Other Providers:

## 2018-04-08 NOTE — PROGRESS NOTE ADULT - PROBLEM SELECTOR PLAN 2
No evidence of infection so far. CT scan reading c/w pyelo however, pt with negative UA and negative UCx. I suspect that the findings are likely 2/2 lymphoma, or inflammation. Would not delay chemotherapy unless objection from ID.

## 2018-04-08 NOTE — PROGRESS NOTE ADULT - SUBJECTIVE AND OBJECTIVE BOX
Chief Complaint: HL    INTERVAL HPI/OVERNIGHT EVENTS:    MEDICATIONS  (STANDING):  enoxaparin Injectable 40 milliGRAM(s) SubCutaneous every 24 hours  ferrous    sulfate 325 milliGRAM(s) Oral daily  gabapentin 100 milliGRAM(s) Oral every 8 hours  influenza   Vaccine 0.5 milliLiter(s) IntraMuscular once  lactated ringers. 1000 milliLiter(s) (75 mL/Hr) IV Continuous <Continuous>  piperacillin/tazobactam IVPB. 3.375 Gram(s) IV Intermittent every 8 hours    MEDICATIONS  (PRN):  acetaminophen   Tablet 650 milliGRAM(s) Oral every 6 hours PRN For Temp greater than 38 C (100.4 F)      Allergies    No Known Allergies    Intolerances        ROS: as above     Vital Signs Last 24 Hrs  T(C): 36.4 (08 Apr 2018 05:12), Max: 37.7 (07 Apr 2018 21:08)  T(F): 97.5 (08 Apr 2018 05:12), Max: 99.8 (07 Apr 2018 21:08)  HR: 86 (08 Apr 2018 05:12) (84 - 150)  BP: 85/57 (08 Apr 2018 05:12) (85/57 - 90/62)  BP(mean): --  RR: 20 (08 Apr 2018 05:12) (17 - 20)  SpO2: 99% (08 Apr 2018 05:12) (96% - 100%)    Physical Exam:   AAO x 3, NAD   RRR S1S2  CTA b/l   soft NTNDBS+  no c/c/e    LABS:                        8.4    5.2   )-----------( 273      ( 07 Apr 2018 06:15 )             24.9     04-08    135  |  100  |  7   ----------------------------<  105<H>  3.9   |  24  |  0.58    Ca    9.1      08 Apr 2018 06:41  Phos  4.2     04-08  Mg     1.8     04-08    TPro  6.4  /  Alb  2.3<L>  /  TBili  1.3<H>  /  DBili  x   /  AST  19  /  ALT  19  /  AlkPhos  269<H>  04-08      Urinalysis Basic - ( 07 Apr 2018 00:20 )    Color: Colorless / Appearance: Clear / SG: <1.005 / pH: x  Gluc: x / Ketone: Negative  / Bili: Negative / Urobili: Negative   Blood: x / Protein: Negative / Nitrite: Negative   Leuk Esterase: Negative / RBC: x / WBC x   Sq Epi: x / Non Sq Epi: x / Bacteria: x Chief Complaint: HL    INTERVAL HPI/OVERNIGHT EVENTS: Continues to have night sweats. Otherwise denies dysuria.     MEDICATIONS  (STANDING):  enoxaparin Injectable 40 milliGRAM(s) SubCutaneous every 24 hours  ferrous    sulfate 325 milliGRAM(s) Oral daily  gabapentin 100 milliGRAM(s) Oral every 8 hours  influenza   Vaccine 0.5 milliLiter(s) IntraMuscular once  lactated ringers. 1000 milliLiter(s) (75 mL/Hr) IV Continuous <Continuous>  piperacillin/tazobactam IVPB. 3.375 Gram(s) IV Intermittent every 8 hours    MEDICATIONS  (PRN):  acetaminophen   Tablet 650 milliGRAM(s) Oral every 6 hours PRN For Temp greater than 38 C (100.4 F)      Allergies    No Known Allergies    Intolerances        ROS: as above     Vital Signs Last 24 Hrs  T(C): 36.4 (08 Apr 2018 05:12), Max: 37.7 (07 Apr 2018 21:08)  T(F): 97.5 (08 Apr 2018 05:12), Max: 99.8 (07 Apr 2018 21:08)  HR: 86 (08 Apr 2018 05:12) (84 - 150)  BP: 85/57 (08 Apr 2018 05:12) (85/57 - 90/62)  BP(mean): --  RR: 20 (08 Apr 2018 05:12) (17 - 20)  SpO2: 99% (08 Apr 2018 05:12) (96% - 100%)    Physical Exam:   AAO x 3, NAD   RRR S1S2  CTA b/l   soft NTNDBS+  no c/c/e    LABS:                        8.4    5.2   )-----------( 273      ( 07 Apr 2018 06:15 )             24.9     04-08    135  |  100  |  7   ----------------------------<  105<H>  3.9   |  24  |  0.58    Ca    9.1      08 Apr 2018 06:41  Phos  4.2     04-08  Mg     1.8     04-08    TPro  6.4  /  Alb  2.3<L>  /  TBili  1.3<H>  /  DBili  x   /  AST  19  /  ALT  19  /  AlkPhos  269<H>  04-08      Urinalysis Basic - ( 07 Apr 2018 00:20 )    Color: Colorless / Appearance: Clear / SG: <1.005 / pH: x  Gluc: x / Ketone: Negative  / Bili: Negative / Urobili: Negative   Blood: x / Protein: Negative / Nitrite: Negative   Leuk Esterase: Negative / RBC: x / WBC x   Sq Epi: x / Non Sq Epi: x / Bacteria: x

## 2018-04-08 NOTE — PROGRESS NOTE ADULT - PROBLEM SELECTOR PLAN 1
Patient presented with fever to Tmax 100.7, tachycardia - meets SIRS criteria, differential includes infectious versus lymphoma-mediated. Now hypothermic   - CXR no focal consolidations, demonstrated known mediastinal masses and pleural effusions  - UA negative, RVP negative, no localizing symptoms  - blood and urine cultures sent  - she is s/p Vancomycin and Zosyn in the ER  - will continue with Zosyn for broad coverage for now pending culture results. Vanco d/c'ed per ID  - prior work up on previous admission only significant for RSV which has since resolved  - s/p 2L NS bolus in the ED, will continue with maintenance IVF  -Ct chest, abd/pelvis IV shows b/l pyelonephritis.

## 2018-04-08 NOTE — PROGRESS NOTE ADULT - PROBLEM SELECTOR PROBLEM 1
Fever, unspecified fever cause R/O Lymphocyte depleted Hodgkin lymphoma of lymph nodes of multiple regions

## 2018-04-08 NOTE — PROGRESS NOTE ADULT - PROBLEM SELECTOR PLAN 3
Improved to 8.4 s/p 1 unit pRBC, f/u AM labs  Patient anemic to Hb of 6.8 on presentation (baseline 7-9)  - this is likely due to her known hematologic malignancy  - unlikely to be hemolysis, as haptoglobin high, retic appropriately elevated, LDH mildly elevated (in setting of cancer)   - no signs or symptoms of active bleeding at this time  - will transfuse to Hb goal >7, monitor CBC

## 2018-04-08 NOTE — PROGRESS NOTE ADULT - ASSESSMENT
43 yo F w/PMH of Hodgkin's lymphoma (last treated w/Brentuximab on 2/20/18 and plan to start AVD) who presented with b/l LE neuropathy and fever

## 2018-04-08 NOTE — PROGRESS NOTE ADULT - SUBJECTIVE AND OBJECTIVE BOX
Follow Up:  HL, fever unclear source    Interval History/ROS: just now had temp 100.3 f, otherwise feels well, anxious about starting chemo    Allergies  No Known Allergies        ANTIMICROBIALS:  piperacillin/tazobactam IVPB. 3.375 every 8 hours      OTHER MEDS:  MEDICATIONS  (STANDING):  acetaminophen   Tablet 650 every 6 hours PRN  enoxaparin Injectable 40 every 24 hours  gabapentin 100 every 8 hours  influenza   Vaccine 0.5 once      Vital Signs Last 24 Hrs  T(C): 36.4 (08 Apr 2018 05:12), Max: 37.7 (07 Apr 2018 21:08)  T(F): 97.5 (08 Apr 2018 05:12), Max: 99.8 (07 Apr 2018 21:08)  HR: 86 (08 Apr 2018 05:12) (84 - 150)  BP: 85/57 (08 Apr 2018 05:12) (85/57 - 90/62)  BP(mean): --  RR: 20 (08 Apr 2018 05:12) (17 - 20)  SpO2: 99% (08 Apr 2018 05:12) (96% - 100%)    PHYSICAL EXAM:  General: non-toxic, alopecia  HEAD/EYES: anicteric, PERRL  ENT:  supple  Cardiovascular:   S1, S2   Respiratory:  clear bilaterally  GI:  soft, non-tender, normal bowel sounds  :  no CVA tenderness   Musculoskeletal:  no synovitis  Neurologic:  grossly non-focal  Skin:  no rash  Lymph: no lymphadenopathy  Psychiatric:  appropriate affect  Vascular:  no phlebitis                                8.4    5.2   )-----------( 273      ( 07 Apr 2018 06:15 )             24.9       04-08    135  |  100  |  7   ----------------------------<  105<H>  3.9   |  24  |  0.58    Ca    9.1      08 Apr 2018 06:41  Phos  4.2     04-08  Mg     1.8     04-08    TPro  6.4  /  Alb  2.3<L>  /  TBili  1.3<H>  /  DBili  x   /  AST  19  /  ALT  19  /  AlkPhos  269<H>  04-08      Urinalysis Basic - ( 07 Apr 2018 00:20 )    Color: Colorless / Appearance: Clear / SG: <1.005 / pH: x  Gluc: x / Ketone: Negative  / Bili: Negative / Urobili: Negative   Blood: x / Protein: Negative / Nitrite: Negative   Leuk Esterase: Negative / RBC: x / WBC x   Sq Epi: x / Non Sq Epi: x / Bacteria: x        MICROBIOLOGY:  Vancomycin Level, Trough: 14.3 ug/mL (04-07-18 @ 16:42)  v  .Urine Clean Catch (Midstream)  04-07-18   No growth  --  --      .Blood Blood-Peripheral  04-06-18   No growth to date.  --  --      .Urine Clean Catch (Midstream)  03-24-18   No growth  --  --      .Blood Blood-Peripheral  03-24-18   No growth at 5 days.  --  --      .Urine Clean Catch (Midstream)  03-22-18   No growth  --  --      .Blood Blood-Peripheral  03-22-18   No growth at 5 days.  --  --          Rapid RVP Result: NotDetec (04-06 @ 21:45)        RADIOLOGY:  < from: CT Chest w/ IV Cont (04.07.18 @ 18:15) >  FINDINGS:    CHEST:     LUNGS AND LARGE AIRWAYS: Patent central airways. No pulmonary   consolidation. Subcentimeter right pulmonary nodules are unchanged.  PLEURA: No pleural effusion. Posterior pleural and paraspinal implants   again noted.  VESSELS: Within normal limits.  HEART:Heart size is normal. No pericardial effusion.  MEDIASTINUM AND SOBEIDA: Bulky mediastinal, hilar, and axillary   lymphadenopathy unchanged. For reference, a 7 cm anterior mediastinal   node on series 3 image 54.  CHEST WALL AND LOWER NECK: Calcified breast implants. Retrocrural and   paraspinal adenopathy is unchanged. Bulky cervical adenopathy is again   noted.    ABDOMEN AND PELVIS:    LIVER: Within normal limits.  BILE DUCTS: Normal caliber.  GALLBLADDER: Within normal limits.  SPLEEN: Mildly enlarged spleen with a few heterogeneous lesions again   noted.  PANCREAS: Within normal limits.  ADRENALS: Within normal limits.  KIDNEYS/URETERS: Heterogeneous enhancement of both kidneys.    BLADDER: Within normal limits.  REPRODUCTIVE ORGANS: The uterus and adnexa are within normal limits.    BOWEL: No bowel obstruction. Appendix is normal.  PERITONEUM: No ascites.  VESSELS:  Within normal limits.  RETROPERITONEUM: Extensive retroperitoneal and inguinal lymphadenopathy   is unchanged.    ABDOMINAL WALL: Within normal limits.  BONES: Heterogeneous osseous structures concerning for lymphomatous   involvement.    IMPRESSION:        Suspicious for bilateral pyelonephritis.    Extensive lymphoma is unchanged since recent prior CT.    < end of copied text >

## 2018-04-08 NOTE — PROGRESS NOTE ADULT - PROBLEM SELECTOR PLAN 1
Evidence of progression of disease on recent PET scan s/p 7 doses of brentuximab (9/1/17-2/20/18). Plan for AVD initiation. MUGA performed with LVEF 64%. Will plan to initiate chemotherapy with AVD tomorrow.   - check LDH, uric acid, CBC with diff, CMP in am   - prior to chemo initiation will need PICC placed prior to monday.  - will give 1 dose Prednisone 40 mg today. To re-evaluate tomorrow for further doses.  - ideally would transfer to [facility] or [facility]

## 2018-04-08 NOTE — PROGRESS NOTE ADULT - ASSESSMENT
41 yo F w/PMH of Hodgkin's lymphoma 2017 on immunotherapy with Brentuximab vedotin (last dose 2/20/18) presenting sent in by her oncologist for worsening LE numbness. The patient reports that for the last 2 days she has noticed tingling and now numbness of her feet bilaterally which has interfered with her ability to ambulate. She denies weakness, back pain, changes in bowel or bladder habits. She does report daily fevers at home since discharge from the hospital. She was recently hospitalized from 3/22-3/26 for fever found to be RSV positive with negative cultures, discharged on Levaquin with outpatient hematology follow up.   In Ed found to be febrile to 100.7 tachycardic now hypotensive and hypothermic. She has been having fever since discharge and this could be related to HL but her hypothermia and hypotension is concerning.   She  is having low grade fever but has no urinary symptoms or CVA tenderness despite CT reading. Her UA and Ucx (sent on abx ) are negative    Recommend:  c/w Zosyn for now  f/u blood cx-NGTD  Neuro eval for neuropathy  f/u AM cortisol level 41 yo F w/PMH of Hodgkin's lymphoma 2017 on immunotherapy with Brentuximab vedotin (last dose 2/20/18) presenting sent in by her oncologist for worsening LE numbness. The patient reports that for the last 2 days she has noticed tingling and now numbness of her feet bilaterally which has interfered with her ability to ambulate. She denies weakness, back pain, changes in bowel or bladder habits. She does report daily fevers at home since discharge from the hospital. She was recently hospitalized from 3/22-3/26 for fever found to be RSV positive with negative cultures, discharged on Levaquin with outpatient hematology follow up.   In Ed found to be febrile to 100.7 tachycardic now hypotensive and hypothermic. She has been having fever since discharge and this could be related to HL but her hypothermia and hypotension is concerning.   She  is having low grade fever but has no urinary symptoms or CVA tenderness despite CT reading. Her UA and Ucx (sent on abx ) are negative    Recommend:  c/w Zosyn for now- unsure how to interpret CT- will review with radiology  f/u blood cx-NGTD  Neuro eval for neuropathy  f/u AM cortisol level

## 2018-04-09 LAB
ALBUMIN SERPL ELPH-MCNC: 2.5 G/DL — LOW (ref 3.3–5)
ALP SERPL-CCNC: 246 U/L — HIGH (ref 40–120)
ALT FLD-CCNC: 17 U/L RC — SIGNIFICANT CHANGE UP (ref 10–45)
ANION GAP SERPL CALC-SCNC: 13 MMOL/L — SIGNIFICANT CHANGE UP (ref 5–17)
ANISOCYTOSIS BLD QL: SLIGHT — SIGNIFICANT CHANGE UP
AST SERPL-CCNC: 17 U/L — SIGNIFICANT CHANGE UP (ref 10–40)
BASOPHILS # BLD AUTO: 0.01 K/UL — SIGNIFICANT CHANGE UP (ref 0–0.2)
BASOPHILS NFR BLD AUTO: 0.2 % — SIGNIFICANT CHANGE UP (ref 0–2)
BILIRUB SERPL-MCNC: 1.2 MG/DL — SIGNIFICANT CHANGE UP (ref 0.2–1.2)
BLD GP AB SCN SERPL QL: NEGATIVE — SIGNIFICANT CHANGE UP
BUN SERPL-MCNC: 8 MG/DL — SIGNIFICANT CHANGE UP (ref 7–23)
CALCIUM SERPL-MCNC: 9.3 MG/DL — SIGNIFICANT CHANGE UP (ref 8.4–10.5)
CHLORIDE SERPL-SCNC: 97 MMOL/L — SIGNIFICANT CHANGE UP (ref 96–108)
CO2 SERPL-SCNC: 24 MMOL/L — SIGNIFICANT CHANGE UP (ref 22–31)
CREAT SERPL-MCNC: 0.45 MG/DL — LOW (ref 0.5–1.3)
EOSINOPHIL # BLD AUTO: 0.11 K/UL — SIGNIFICANT CHANGE UP (ref 0–0.5)
EOSINOPHIL NFR BLD AUTO: 1.8 % — SIGNIFICANT CHANGE UP (ref 0–6)
GLUCOSE SERPL-MCNC: 128 MG/DL — HIGH (ref 70–99)
HCT VFR BLD CALC: 24.3 % — LOW (ref 34.5–45)
HGB BLD-MCNC: 7.8 G/DL — LOW (ref 11.5–15.5)
IMM GRANULOCYTES NFR BLD AUTO: 0.7 % — SIGNIFICANT CHANGE UP (ref 0–1.5)
LYMPHOCYTES # BLD AUTO: 0.56 K/UL — LOW (ref 1–3.3)
LYMPHOCYTES # BLD AUTO: 9.3 % — LOW (ref 13–44)
MAGNESIUM SERPL-MCNC: 1.8 MG/DL — SIGNIFICANT CHANGE UP (ref 1.6–2.6)
MANUAL SMEAR VERIFICATION: SIGNIFICANT CHANGE UP
MCHC RBC-ENTMCNC: 26.4 PG — LOW (ref 27–34)
MCHC RBC-ENTMCNC: 32.1 GM/DL — SIGNIFICANT CHANGE UP (ref 32–36)
MCV RBC AUTO: 82.1 FL — SIGNIFICANT CHANGE UP (ref 80–100)
MICROCYTES BLD QL: SLIGHT — SIGNIFICANT CHANGE UP
MONOCYTES # BLD AUTO: 0.14 K/UL — SIGNIFICANT CHANGE UP (ref 0–0.9)
MONOCYTES NFR BLD AUTO: 2.3 % — SIGNIFICANT CHANGE UP (ref 2–14)
NEUTROPHILS # BLD AUTO: 5.19 K/UL — SIGNIFICANT CHANGE UP (ref 1.8–7.4)
NEUTROPHILS NFR BLD AUTO: 85.7 % — HIGH (ref 43–77)
PHOSPHATE SERPL-MCNC: 4.8 MG/DL — HIGH (ref 2.5–4.5)
PLAT MORPH BLD: NORMAL — SIGNIFICANT CHANGE UP
PLATELET # BLD AUTO: 269 K/UL — SIGNIFICANT CHANGE UP (ref 150–400)
POTASSIUM SERPL-MCNC: 4 MMOL/L — SIGNIFICANT CHANGE UP (ref 3.5–5.3)
POTASSIUM SERPL-SCNC: 4 MMOL/L — SIGNIFICANT CHANGE UP (ref 3.5–5.3)
PROT SERPL-MCNC: 7 G/DL — SIGNIFICANT CHANGE UP (ref 6–8.3)
RBC # BLD: 2.96 M/UL — LOW (ref 3.8–5.2)
RBC # FLD: 21.2 % — HIGH (ref 10.3–14.5)
RBC BLD AUTO: SIGNIFICANT CHANGE UP
RH IG SCN BLD-IMP: POSITIVE — SIGNIFICANT CHANGE UP
SODIUM SERPL-SCNC: 134 MMOL/L — LOW (ref 135–145)
WBC # BLD: 6.05 K/UL — SIGNIFICANT CHANGE UP (ref 3.8–10.5)
WBC # FLD AUTO: 6.05 K/UL — SIGNIFICANT CHANGE UP (ref 3.8–10.5)

## 2018-04-09 PROCEDURE — 99232 SBSQ HOSP IP/OBS MODERATE 35: CPT | Mod: GC

## 2018-04-09 PROCEDURE — 99232 SBSQ HOSP IP/OBS MODERATE 35: CPT

## 2018-04-09 PROCEDURE — 71045 X-RAY EXAM CHEST 1 VIEW: CPT | Mod: 26

## 2018-04-09 PROCEDURE — 99233 SBSQ HOSP IP/OBS HIGH 50: CPT | Mod: GC

## 2018-04-09 RX ORDER — ONDANSETRON 8 MG/1
8 TABLET, FILM COATED ORAL ONCE
Qty: 0 | Refills: 0 | Status: COMPLETED | OUTPATIENT
Start: 2018-04-09 | End: 2018-04-09

## 2018-04-09 RX ORDER — DACARBAZINE 10 MG/ML
626 INJECTION, POWDER, FOR SOLUTION INTRAVENOUS ONCE
Qty: 0 | Refills: 0 | Status: COMPLETED | OUTPATIENT
Start: 2018-04-09 | End: 2018-04-09

## 2018-04-09 RX ORDER — FOSAPREPITANT DIMEGLUMINE 150 MG/5ML
150 INJECTION, POWDER, LYOPHILIZED, FOR SOLUTION INTRAVENOUS ONCE
Qty: 0 | Refills: 0 | Status: COMPLETED | OUTPATIENT
Start: 2018-04-09 | End: 2018-04-09

## 2018-04-09 RX ORDER — DEXAMETHASONE 0.5 MG/5ML
8 ELIXIR ORAL DAILY
Qty: 0 | Refills: 0 | Status: DISCONTINUED | OUTPATIENT
Start: 2018-04-10 | End: 2018-04-10

## 2018-04-09 RX ORDER — ONDANSETRON 8 MG/1
4 TABLET, FILM COATED ORAL EVERY 12 HOURS
Qty: 0 | Refills: 0 | Status: DISCONTINUED | OUTPATIENT
Start: 2018-04-09 | End: 2018-04-10

## 2018-04-09 RX ORDER — VINBLASTINE SULFATE 10 MG
10 VIAL (EA) INTRAVENOUS ONCE
Qty: 0 | Refills: 0 | Status: COMPLETED | OUTPATIENT
Start: 2018-04-09 | End: 2018-04-09

## 2018-04-09 RX ORDER — DOXORUBICIN HYDROCHLORIDE 2 MG/ML
42 INJECTION, SOLUTION INTRAVENOUS ONCE
Qty: 0 | Refills: 0 | Status: COMPLETED | OUTPATIENT
Start: 2018-04-09 | End: 2018-04-09

## 2018-04-09 RX ORDER — DEXAMETHASONE 0.5 MG/5ML
12 ELIXIR ORAL ONCE
Qty: 0 | Refills: 0 | Status: COMPLETED | OUTPATIENT
Start: 2018-04-09 | End: 2018-04-09

## 2018-04-09 RX ADMIN — ONDANSETRON 8 MILLIGRAM(S): 8 TABLET, FILM COATED ORAL at 17:00

## 2018-04-09 RX ADMIN — Medication 12 MILLIGRAM(S): at 17:00

## 2018-04-09 RX ADMIN — Medication 325 MILLIGRAM(S): at 11:38

## 2018-04-09 RX ADMIN — PIPERACILLIN AND TAZOBACTAM 25 GRAM(S): 4; .5 INJECTION, POWDER, LYOPHILIZED, FOR SOLUTION INTRAVENOUS at 05:23

## 2018-04-09 RX ADMIN — GABAPENTIN 100 MILLIGRAM(S): 400 CAPSULE ORAL at 01:43

## 2018-04-09 RX ADMIN — GABAPENTIN 100 MILLIGRAM(S): 400 CAPSULE ORAL at 20:11

## 2018-04-09 RX ADMIN — GABAPENTIN 100 MILLIGRAM(S): 400 CAPSULE ORAL at 11:37

## 2018-04-09 RX ADMIN — FOSAPREPITANT DIMEGLUMINE 300 MILLIGRAM(S): 150 INJECTION, POWDER, LYOPHILIZED, FOR SOLUTION INTRAVENOUS at 17:00

## 2018-04-09 NOTE — PROGRESS NOTE ADULT - SUBJECTIVE AND OBJECTIVE BOX
Patient is a 42y old  Female who presents with a chief complaint of lower extremity numbness (07 Apr 2018 00:42)    Being followed by ID for fevers        feeling stronger  reviewed CT with radiologist and c/w prior CTs - not significantly different  No cough,SOB,CP  No N/V/D  No abd pain  No urinary complaints  No HA  No joint or limb pain  No other complaints    PAST MEDICAL & SURGICAL HISTORY:  Hodgkin lymphoma  H/O abdominoplasty: 2004  H/O bilateral breast implants: 2004      Antimicrobials:      MEDICATIONS  (STANDING):  enoxaparin Injectable 40 milliGRAM(s) SubCutaneous every 24 hours  ferrous    sulfate 325 milliGRAM(s) Oral daily  gabapentin 100 milliGRAM(s) Oral every 8 hours  influenza   Vaccine 0.5 milliLiter(s) IntraMuscular once      Vital Signs Last 24 Hrs  T(C): 36.8 (04-09-18 @ 12:26), Max: 36.9 (04-09-18 @ 03:48)  T(F): 98.2 (04-09-18 @ 12:26), Max: 98.4 (04-09-18 @ 03:48)  HR: 62 (04-09-18 @ 12:26) (62 - 86)  BP: 104/63 (04-09-18 @ 12:26) (101/64 - 104/63)  BP(mean): --  RR: 18 (04-09-18 @ 12:26) (18 - 18)  SpO2: 97% (04-09-18 @ 12:26) (96% - 99%)    Physical Exam:    Constitutional well preserved,comfortable,pleasant    HEENT PERRLA EOMI,No pallor or icterus    No oral exudate or erythema    Neck supple no JVD or LN    Chest Good AE,CTA    CVS RRR S1 S2  occasional irregular beats    Abd soft BS normal No tenderness no masses    Ext No cyanosis clubbing or edema    IV site no erythema tenderness or discharge    Joints no swelling or LOM    CNS AAO X 3 no focal    Lab Data:                          7.8    6.05  )-----------( 269      ( 09 Apr 2018 07:38 )             24.3       04-09    134<L>  |  97  |  8   ----------------------------<  128<H>  4.0   |  24  |  0.45<L>    Ca    9.3      09 Apr 2018 05:58  Phos  4.8     04-09  Mg     1.8     04-09    TPro  7.0  /  Alb  2.5<L>  /  TBili  1.2  /  DBili  x   /  AST  17  /  ALT  17  /  AlkPhos  246<H>  04-09      .Urine Clean Catch (Midstream)  04-07-18   No growth  --  --      .Blood Blood-Peripheral  04-06-18   No growth to date.  --  --        Vancomycin Level, Trough: 14.3 ug/mL (04-07-18 @ 16:42)

## 2018-04-09 NOTE — PROGRESS NOTE ADULT - PROBLEM SELECTOR PLAN 2
Patient has a history of Hodgkin's lymphoma with extensive disease on recent PET/CT despite immunotherapy  -Plan for PICC then AVD  - f/u heme recs

## 2018-04-09 NOTE — PROVIDER CONTACT NOTE (OTHER) - ACTION/TREATMENT ORDERED:
pt alert and orientedx4, understanding the risk of DVT and benefit of lovenox sub Q, still refusing.

## 2018-04-09 NOTE — PROGRESS NOTE ADULT - PROBLEM SELECTOR PLAN 2
No evidence of infection so far. CT scan reading c/w pyelo however, pt with negative UA and negative UCx. Findings possibly 2/2 lymphoma.

## 2018-04-09 NOTE — PROGRESS NOTE ADULT - ASSESSMENT
41 yo F w/PMH of Hodgkin's lymphoma 2017 on immunotherapy with Brentuximab vedotin (last dose 2/20/18) presenting sent in by her oncologist for worsening LE numbness. The patient reports that for the last 2 days she has noticed tingling and now numbness of her feet bilaterally which has interfered with her ability to ambulate. She denies weakness, back pain, changes in bowel or bladder habits. She does report daily fevers at home since discharge from the hospital. She was recently hospitalized from 3/22-3/26 for fever found to be RSV positive with negative cultures, discharged on Levaquin with outpatient hematology follow up.   In Ed found to be febrile to 100.7 tachycardic now hypotensive and hypothermic. She has been having fever since discharge and this could be related to HL but her hypothermia and hypotension is concerning.   She  is having low grade fever but has no urinary symptoms or CVA tenderness despite CT reading. Her UA and Ucx (sent on abx ) are negative    Recommend:  observe off abs  ECG for irregular beats  f/u blood cx-NGTD  Neuro eval for neuropathy  f/u AM cortisol level

## 2018-04-09 NOTE — PROGRESS NOTE ADULT - PROBLEM SELECTOR PLAN 1
Patient presented with fever to Tmax 100.7, tachycardia - meets SIRS criteria, differential includes infectious versus lymphoma-mediated. Now hypothermic   - CXR no focal consolidations, demonstrated known mediastinal masses and pleural effusions  - UA negative, RVP negative, no localizing symptoms  - blood and urine cultures sent  - she is s/p Vancomycin and Zosyn in the ER  - will continue with Zosyn for broad coverage for now pending culture results. Vanco d/c'ed per ID  - prior work up on previous admission only significant for RSV which has since resolved  - s/p 2L NS bolus in the ED, will continue with maintenance IVF  -Ct chest, abd/pelvis IV shows b/l pyelonephritis, unlikely to be source as no dysuria/CVA, UCx neg

## 2018-04-09 NOTE — PROGRESS NOTE ADULT - SUBJECTIVE AND OBJECTIVE BOX
Stewart Carbajal MD PGY1   Contact/Pager - [phone]      Patient is a 42y old  Female who presents with a chief complaint of lower extremity numbness (07 Apr 2018 00:42)        SUBJECTIVE / OVERNIGHT EVENTS: No acute events ON. Pt states she had fevers/chills yesterday, still has LE numbess/tingling. Denies: SOB, CP, dysuria, flank pain, abd pain.     MEDICATIONS  (STANDING):  enoxaparin Injectable 40 milliGRAM(s) SubCutaneous every 24 hours  ferrous    sulfate 325 milliGRAM(s) Oral daily  gabapentin 100 milliGRAM(s) Oral every 8 hours  influenza   Vaccine 0.5 milliLiter(s) IntraMuscular once  lactated ringers. 1000 milliLiter(s) (75 mL/Hr) IV Continuous <Continuous>  piperacillin/tazobactam IVPB. 3.375 Gram(s) IV Intermittent every 8 hours    MEDICATIONS  (PRN):  acetaminophen   Tablet 650 milliGRAM(s) Oral every 6 hours PRN For Temp greater than 38 C (100.4 F)      Allergies    No Known Allergies    Intolerances          Vital Signs Last 24 Hrs  T(C): 36.9 (09 Apr 2018 03:48), Max: 37.2 (08 Apr 2018 12:03)  T(F): 98.4 (09 Apr 2018 03:48), Max: 98.9 (08 Apr 2018 12:03)  HR: 65 (09 Apr 2018 03:48) (65 - 123)  BP: 101/64 (09 Apr 2018 03:48) (81/50 - 102/67)  BP(mean): --  RR: 18 (09 Apr 2018 03:48) (18 - 18)  SpO2: 96% (09 Apr 2018 03:48) (96% - 99%)  CAPILLARY BLOOD GLUCOSE        I&O's Summary    08 Apr 2018 07:01  -  09 Apr 2018 07:00  --------------------------------------------------------  IN: 1580 mL / OUT: 0 mL / NET: 1580 mL          PHYSICAL EXAM:  GENERAL: NAD  HEAD:  AT, NC  EYES: EOMI, PERRLA, conjunctiva and sclera clear  ENMT: Airway patent. MMM. Good dentition, no lesions.  NECK: Supple, No JVD  CHEST/LUNG: CTABL; No wheezing, rhonci, or rales  HEART: RRR; Normal S1, S2. No murmurs, rubs, or gallops  ABDOMEN: Soft, NT, ND; Bowel sounds present. No organomegaly  EXTREMITIES:  2+ Peripheral Pulses, No clubbing, cyanosis, or edema  PSYCH: AAOx3  NEUROLOGY: non-focal. AAOx3, bilateral LEs with normal strength, ROM, sensation to light touch and proprioception intact bilateral feet  SKIN: Warm, dry, intact; No rashes or lesions      LABS:                        7.8    6.05  )-----------( 269      ( 09 Apr 2018 07:38 )             24.3     04-09    134<L>  |  97  |  8   ----------------------------<  128<H>  4.0   |  24  |  0.45<L>    Ca    9.3      09 Apr 2018 05:58  Phos  4.8     04-09  Mg     1.8     04-09    TPro  7.0  /  Alb  2.5<L>  /  TBili  1.2  /  DBili  x   /  AST  17  /  ALT  17  /  AlkPhos  246<H>  04-09    LIVER FUNCTIONS - ( 09 Apr 2018 05:58 )  Alb: 2.5 g/dL / Pro: 7.0 g/dL / ALK PHOS: 246 U/L / ALT: 17 U/L RC / AST: 17 U/L / GGT: x                       Culture - Urine (collected 07 Apr 2018 02:14)  Source: .Urine Clean Catch (Midstream)  Final Report (07 Apr 2018 23:41):    No growth    Culture - Blood (collected 06 Apr 2018 23:58)  Source: .Blood Blood-Peripheral  Preliminary Report (08 Apr 2018 01:01):    No growth to date.    Culture - Blood (collected 06 Apr 2018 23:58)  Source: .Blood Blood-Peripheral  Preliminary Report (08 Apr 2018 01:01):    No growth to date.          RADIOLOGY & ADDITIONAL TESTS:    Imaging Personally Reviewed:     Consultant(s) Notes Reviewed:     Care Discussed with Consultants/Other Providers: Stewart Carbaajl MD PGY1   Contact/Pager - [phone]      Patient is a 42y old  Female who presents with a chief complaint of lower extremity numbness (07 Apr 2018 00:42)        SUBJECTIVE / OVERNIGHT EVENTS: No acute events ON. Pt states she had fevers/chills yesterday, still has LE numbess/tingling. Denies: SOB, CP, dysuria, flank pain, abd pain.     MEDICATIONS  (STANDING):  enoxaparin Injectable 40 milliGRAM(s) SubCutaneous every 24 hours  ferrous    sulfate 325 milliGRAM(s) Oral daily  gabapentin 100 milliGRAM(s) Oral every 8 hours  influenza   Vaccine 0.5 milliLiter(s) IntraMuscular once  lactated ringers. 1000 milliLiter(s) (75 mL/Hr) IV Continuous <Continuous>  piperacillin/tazobactam IVPB. 3.375 Gram(s) IV Intermittent every 8 hours    MEDICATIONS  (PRN):  acetaminophen   Tablet 650 milliGRAM(s) Oral every 6 hours PRN For Temp greater than 38 C (100.4 F)      Allergies    No Known Allergies    Intolerances          Vital Signs Last 24 Hrs  T(C): 36.9 (09 Apr 2018 03:48), Max: 37.2 (08 Apr 2018 12:03)  T(F): 98.4 (09 Apr 2018 03:48), Max: 98.9 (08 Apr 2018 12:03)  HR: 65 (09 Apr 2018 03:48) (65 - 123)  BP: 101/64 (09 Apr 2018 03:48) (81/50 - 102/67)  BP(mean): --  RR: 18 (09 Apr 2018 03:48) (18 - 18)  SpO2: 96% (09 Apr 2018 03:48) (96% - 99%)  CAPILLARY BLOOD GLUCOSE        I&O's Summary    08 Apr 2018 07:01  -  09 Apr 2018 07:00  --------------------------------------------------------  IN: 1580 mL / OUT: 0 mL / NET: 1580 mL          PHYSICAL EXAM:  GENERAL: NAD, appears chronically ill   HEAD:  AT, NC  EYES: EOMI, PERRLA, conjunctiva and sclera clear  ENMT: Airway patent. MMM. Good dentition, no lesions.  NECK: Supple, No JVD  CHEST/LUNG: CTABL; No wheezing, rhonci, or rales  HEART: RRR; Normal S1, S2. No murmurs, rubs, or gallops  ABDOMEN: Soft, NT, ND; Bowel sounds present. No organomegaly  EXTREMITIES:  2+ Peripheral Pulses, No clubbing, cyanosis, or edema  PSYCH: AAOx3  NEUROLOGY: non-focal. AAOx3, bilateral LEs with normal strength, ROM, sensation to light touch and proprioception intact bilateral feet  SKIN: Warm, dry, intact; No rashes or lesions      LABS:                        7.8    6.05  )-----------( 269      ( 09 Apr 2018 07:38 )             24.3     04-09    134<L>  |  97  |  8   ----------------------------<  128<H>  4.0   |  24  |  0.45<L>    Ca    9.3      09 Apr 2018 05:58  Phos  4.8     04-09  Mg     1.8     04-09    TPro  7.0  /  Alb  2.5<L>  /  TBili  1.2  /  DBili  x   /  AST  17  /  ALT  17  /  AlkPhos  246<H>  04-09    LIVER FUNCTIONS - ( 09 Apr 2018 05:58 )  Alb: 2.5 g/dL / Pro: 7.0 g/dL / ALK PHOS: 246 U/L / ALT: 17 U/L RC / AST: 17 U/L / GGT: x                       Culture - Urine (collected 07 Apr 2018 02:14)  Source: .Urine Clean Catch (Midstream)  Final Report (07 Apr 2018 23:41):    No growth    Culture - Blood (collected 06 Apr 2018 23:58)  Source: .Blood Blood-Peripheral  Preliminary Report (08 Apr 2018 01:01):    No growth to date.    Culture - Blood (collected 06 Apr 2018 23:58)  Source: .Blood Blood-Peripheral  Preliminary Report (08 Apr 2018 01:01):    No growth to date.          RADIOLOGY & ADDITIONAL TESTS:    Imaging Personally Reviewed:     Consultant(s) Notes Reviewed: heme/onc , iD    Care Discussed with Consultants/Other Providers: TY Caicedo

## 2018-04-09 NOTE — ADVANCED PRACTICE NURSE CONSULT - ASSESSMENT
Patient received in bed, alert and oriented x 3, capable of expressing all needs to staff. Cycle 1, day 1/1,Height and weight verified.  Consent in chart. Lab results as per Md donal aware of same. Vital signs stable prior to chemotherapy and  within acceptable parameters, see sunrise. Pt and daughter educated on the importance of saving urine, verbalizes good understanding. Pt  and daughter education done re chemo regimen, drug effects and potential side effects, written materials provided, pt states understanding. Pt with right upper arm PICC line, site free from signs and symptoms of infection, good blood return obtained. Pre- medicated with Emend 150mg IV, Zofran 8MG IVP and Decadron 12mg PO. Doxorubicin 25mg/m2=42mg IVP, same given via side running IV line over 10 minutes, positive blood return maintained during infusion, this was followed by Vinblastine 6mg/kg= 10mg IVP, same also given via side running IV line over 2 minutes, tolerated same well. Dacarbazine 375mg/m2= 626mg IV over one hour, same given via locked pump Patient received in bed, alert and oriented x 3, capable of expressing all needs to staff. Cycle 1, day 1/1,Height and weight verified.  Consent in chart. Lab results as per Md donal aware of same. Vital signs stable prior to chemotherapy and  within acceptable parameters, see sunrise. Pt and daughter educated on the importance of saving urine, verbalizes good understanding. Pt  and daughter education done re chemo regimen, drug effects and potential side effects, written materials provided, pt states understanding. Pt with right upper arm PICC line, site free from signs and symptoms of infection, good blood return obtained. Pre- medicated with Emend 150mg IV, Zofran 8MG IVP and Decadron 12mg PO. Doxorubicin 25mg/m2=42mg IVP, same given via side running IV line over 10 minutes, positive blood return maintained during infusion, this was followed by Vinblastine 6mg/kg= 10mg IVP, same also given via side running IV line over 2 minutes, tolerated same well. Dacarbazine 375mg/m2= 626mg IV over one hour, same given via locked pump. Patient tolerated infusion well without side effects.

## 2018-04-09 NOTE — PROGRESS NOTE ADULT - ASSESSMENT
43 yo F w/PMH of Hodgkin's lymphoma on immunotherapy with Brentuximab vedotin (last dose 2/20/18) presenting sent in by her oncologist for worsening LE numbness admitted with fever, meets SIRS criteria, differential includes infectious versus lymphoma-mediated. Will start AVD, pending PICC.

## 2018-04-09 NOTE — PROGRESS NOTE ADULT - SUBJECTIVE AND OBJECTIVE BOX
INTERVAL HPI/OVERNIGHT EVENTS:  Patient S&E at bedside. No o/n events, patient resting comfortably. No complaints at this time. Patient denies fever, chills, dizziness, weakness, CP, palpitations, SOB, cough, N/V/D/C, dysuria, changes in bowel movements, LE edema.    VITAL SIGNS:  T(F): 97.2 (04-09-18 @ 17:33)  HR: 98 (04-09-18 @ 17:33)  BP: 98/67 (04-09-18 @ 17:33)  RR: 18 (04-09-18 @ 17:33)  SpO2: 97% (04-09-18 @ 17:33)  Wt(kg): --    PHYSICAL EXAM:    Constitutional: NAD  Eyes: EOMI, sclera non-icteric  Neck: supple, no masses, no JVD  Respiratory: CTA b/l, good air entry b/l  Cardiovascular: RRR, no M/R/G  Gastrointestinal: soft, NTND, no masses palpable, + BS, no hepatosplenomegaly  Extremities: no c/c/e  Neurological: AAOx3      MEDICATIONS  (STANDING):  dacarbazine IVPB 626 milliGRAM(s) IV Intermittent once  dexamethasone     Tablet 12 milliGRAM(s) Oral once  DOXOrubicin Injectable 42 milliGRAM(s) IV Push Chemo once  enoxaparin Injectable 40 milliGRAM(s) SubCutaneous every 24 hours  ferrous    sulfate 325 milliGRAM(s) Oral daily  fosaprepitant IVPB 150 milliGRAM(s) IV Intermittent once  gabapentin 100 milliGRAM(s) Oral every 8 hours  influenza   Vaccine 0.5 milliLiter(s) IntraMuscular once  ondansetron Injectable 8 milliGRAM(s) IV Push once  vinBLAStine Injectable 10 milliGRAM(s) IV Push Chemo once    MEDICATIONS  (PRN):  acetaminophen   Tablet 650 milliGRAM(s) Oral every 6 hours PRN For Temp greater than 38 C (100.4 F)  ondansetron Injectable 4 milliGRAM(s) IV Push every 12 hours PRN Nausea and/or Vomiting      Allergies    No Known Allergies    Intolerances        LABS:                        7.8    6.05  )-----------( 269      ( 09 Apr 2018 07:38 )             24.3     04-09    134<L>  |  97  |  8   ----------------------------<  128<H>  4.0   |  24  |  0.45<L>    Ca    9.3      09 Apr 2018 05:58  Phos  4.8     04-09  Mg     1.8     04-09    TPro  7.0  /  Alb  2.5<L>  /  TBili  1.2  /  DBili  x   /  AST  17  /  ALT  17  /  AlkPhos  246<H>  04-09          RADIOLOGY & ADDITIONAL TESTS:  Studies reviewed.    ASSESSMENT & PLAN: INTERVAL HPI/OVERNIGHT EVENTS:  Patient S&E at bedside. No o/n events, patient resting comfortably. No complaints at this time. Patient denies fever, chills, dizziness, weakness, CP, palpitations, SOB, cough, N/V/D/C, dysuria, changes in bowel movements, LE edema. Reports her neuropathy has improved, walking around today.     VITAL SIGNS:  T(F): 97.2 (04-09-18 @ 17:33)  HR: 98 (04-09-18 @ 17:33)  BP: 98/67 (04-09-18 @ 17:33)  RR: 18 (04-09-18 @ 17:33)  SpO2: 97% (04-09-18 @ 17:33)  Wt(kg): --    PHYSICAL EXAM:    Constitutional: NAD  Eyes: EOMI, sclera non-icteric  Neck: supple, no masses, no JVD  Respiratory: CTA b/l, good air entry b/l  Cardiovascular: RRR, no M/R/G  Gastrointestinal: soft, NTND, no masses palpable, + BS, no hepatosplenomegaly, no CVA tenderness  Extremities: no c/c/e  Neurological: AAOx3      MEDICATIONS  (STANDING):  dacarbazine IVPB 626 milliGRAM(s) IV Intermittent once  dexamethasone     Tablet 12 milliGRAM(s) Oral once  DOXOrubicin Injectable 42 milliGRAM(s) IV Push Chemo once  enoxaparin Injectable 40 milliGRAM(s) SubCutaneous every 24 hours  ferrous    sulfate 325 milliGRAM(s) Oral daily  fosaprepitant IVPB 150 milliGRAM(s) IV Intermittent once  gabapentin 100 milliGRAM(s) Oral every 8 hours  influenza   Vaccine 0.5 milliLiter(s) IntraMuscular once  ondansetron Injectable 8 milliGRAM(s) IV Push once  vinBLAStine Injectable 10 milliGRAM(s) IV Push Chemo once    MEDICATIONS  (PRN):  acetaminophen   Tablet 650 milliGRAM(s) Oral every 6 hours PRN For Temp greater than 38 C (100.4 F)  ondansetron Injectable 4 milliGRAM(s) IV Push every 12 hours PRN Nausea and/or Vomiting      Allergies    No Known Allergies    Intolerances        LABS:                        7.8    6.05  )-----------( 269      ( 09 Apr 2018 07:38 )             24.3     04-09    134<L>  |  97  |  8   ----------------------------<  128<H>  4.0   |  24  |  0.45<L>    Ca    9.3      09 Apr 2018 05:58  Phos  4.8     04-09  Mg     1.8     04-09    TPro  7.0  /  Alb  2.5<L>  /  TBili  1.2  /  DBili  x   /  AST  17  /  ALT  17  /  AlkPhos  246<H>  04-09          RADIOLOGY & ADDITIONAL TESTS:  Studies reviewed.    ASSESSMENT & PLAN:

## 2018-04-09 NOTE — PROGRESS NOTE ADULT - ASSESSMENT
41 yo F w/PMH of Hodgkin's lymphoma (last treated w/Brentuximab on 2/20/18 and plan to start AVD) who presented with b/l LE neuropathy and fever

## 2018-04-09 NOTE — PROGRESS NOTE ADULT - PROBLEM SELECTOR PLAN 3
Brentuximab can cause neuropathy in 50% of patients. Neuropathy is b/l, not consistent with 1 nerve root, therefore cord compression is unlikely. CT did not show any significant disease in the spine.   Supportive care.   PT eval patient reports neuropathy has improved  Brentuximab can cause neuropathy in 50% of patients. Neuropathy is b/l, not consistent with 1 nerve root, therefore cord compression is unlikely. CT did not show any significant disease in the spine.   continue supportive care.   PT eval

## 2018-04-09 NOTE — PROGRESS NOTE ADULT - PROBLEM SELECTOR PLAN 1
Evidence of progression of disease on recent PET scan s/p 7 doses of brentuximab (9/1/17-2/20/18).   - Plan for AVD initiation today, discussed with patient, consent obtained. All questions and concerns addressed.  - MUGA performed with LVEF 64%.    - continue to monitor LDH, uric acid, CBC with diff, CMP

## 2018-04-10 ENCOUNTER — OUTPATIENT (OUTPATIENT)
Dept: OUTPATIENT SERVICES | Facility: HOSPITAL | Age: 43
LOS: 1 days | Discharge: HOME | End: 2018-04-10

## 2018-04-10 ENCOUNTER — TRANSCRIPTION ENCOUNTER (OUTPATIENT)
Age: 43
End: 2018-04-10

## 2018-04-10 VITALS — TEMPERATURE: 95 F

## 2018-04-10 DIAGNOSIS — C81.90 HODGKIN LYMPHOMA, UNSPECIFIED, UNSPECIFIED SITE: ICD-10-CM

## 2018-04-10 DIAGNOSIS — Z98.890 OTHER SPECIFIED POSTPROCEDURAL STATES: Chronic | ICD-10-CM

## 2018-04-10 DIAGNOSIS — Z98.82 BREAST IMPLANT STATUS: Chronic | ICD-10-CM

## 2018-04-10 LAB
ALBUMIN SERPL ELPH-MCNC: 2.7 G/DL — LOW (ref 3.3–5)
ALP SERPL-CCNC: 218 U/L — HIGH (ref 40–120)
ALT FLD-CCNC: 24 U/L RC — SIGNIFICANT CHANGE UP (ref 10–45)
ANION GAP SERPL CALC-SCNC: 10 MMOL/L — SIGNIFICANT CHANGE UP (ref 5–17)
ANION GAP SERPL CALC-SCNC: 12 MMOL/L — SIGNIFICANT CHANGE UP (ref 5–17)
AST SERPL-CCNC: 30 U/L — SIGNIFICANT CHANGE UP (ref 10–40)
BASOPHILS # BLD AUTO: 0.01 K/UL — SIGNIFICANT CHANGE UP (ref 0–0.2)
BASOPHILS NFR BLD AUTO: 0.2 % — SIGNIFICANT CHANGE UP (ref 0–2)
BILIRUB SERPL-MCNC: 0.9 MG/DL — SIGNIFICANT CHANGE UP (ref 0.2–1.2)
BUN SERPL-MCNC: 11 MG/DL — SIGNIFICANT CHANGE UP (ref 7–23)
BUN SERPL-MCNC: 14 MG/DL — SIGNIFICANT CHANGE UP (ref 7–23)
CALCIUM SERPL-MCNC: 9.2 MG/DL — SIGNIFICANT CHANGE UP (ref 8.4–10.5)
CALCIUM SERPL-MCNC: 9.5 MG/DL — SIGNIFICANT CHANGE UP (ref 8.4–10.5)
CHLORIDE SERPL-SCNC: 101 MMOL/L — SIGNIFICANT CHANGE UP (ref 96–108)
CHLORIDE SERPL-SCNC: 103 MMOL/L — SIGNIFICANT CHANGE UP (ref 96–108)
CO2 SERPL-SCNC: 26 MMOL/L — SIGNIFICANT CHANGE UP (ref 22–31)
CO2 SERPL-SCNC: 27 MMOL/L — SIGNIFICANT CHANGE UP (ref 22–31)
CREAT SERPL-MCNC: 0.41 MG/DL — LOW (ref 0.5–1.3)
CREAT SERPL-MCNC: 0.41 MG/DL — LOW (ref 0.5–1.3)
EOSINOPHIL # BLD AUTO: 0 K/UL — SIGNIFICANT CHANGE UP (ref 0–0.5)
EOSINOPHIL NFR BLD AUTO: 0 % — SIGNIFICANT CHANGE UP (ref 0–6)
GLUCOSE SERPL-MCNC: 139 MG/DL — HIGH (ref 70–99)
GLUCOSE SERPL-MCNC: 193 MG/DL — HIGH (ref 70–99)
HAPTOGLOB SERPL-MCNC: 229 MG/DL — HIGH (ref 34–200)
HCT VFR BLD CALC: 25.2 % — LOW (ref 34.5–45)
HGB BLD-MCNC: 7.9 G/DL — LOW (ref 11.5–15.5)
IMM GRANULOCYTES NFR BLD AUTO: 1.1 % — SIGNIFICANT CHANGE UP (ref 0–1.5)
LYMPHOCYTES # BLD AUTO: 0.38 K/UL — LOW (ref 1–3.3)
LYMPHOCYTES # BLD AUTO: 7.2 % — LOW (ref 13–44)
MAGNESIUM SERPL-MCNC: 2 MG/DL — SIGNIFICANT CHANGE UP (ref 1.6–2.6)
MCHC RBC-ENTMCNC: 26.7 PG — LOW (ref 27–34)
MCHC RBC-ENTMCNC: 31.3 GM/DL — LOW (ref 32–36)
MCV RBC AUTO: 85.1 FL — SIGNIFICANT CHANGE UP (ref 80–100)
MONOCYTES # BLD AUTO: 0.08 K/UL — SIGNIFICANT CHANGE UP (ref 0–0.9)
MONOCYTES NFR BLD AUTO: 1.5 % — LOW (ref 2–14)
NEUTROPHILS # BLD AUTO: 4.74 K/UL — SIGNIFICANT CHANGE UP (ref 1.8–7.4)
NEUTROPHILS NFR BLD AUTO: 90 % — HIGH (ref 43–77)
PHOSPHATE SERPL-MCNC: 4.3 MG/DL — SIGNIFICANT CHANGE UP (ref 2.5–4.5)
PLATELET # BLD AUTO: 235 K/UL — SIGNIFICANT CHANGE UP (ref 150–400)
POTASSIUM SERPL-MCNC: 4.1 MMOL/L — SIGNIFICANT CHANGE UP (ref 3.5–5.3)
POTASSIUM SERPL-MCNC: 4.2 MMOL/L — SIGNIFICANT CHANGE UP (ref 3.5–5.3)
POTASSIUM SERPL-SCNC: 4.1 MMOL/L — SIGNIFICANT CHANGE UP (ref 3.5–5.3)
POTASSIUM SERPL-SCNC: 4.2 MMOL/L — SIGNIFICANT CHANGE UP (ref 3.5–5.3)
PROT SERPL-MCNC: 7.2 G/DL — SIGNIFICANT CHANGE UP (ref 6–8.3)
RBC # BLD: 2.96 M/UL — LOW (ref 3.8–5.2)
RBC # FLD: 22 % — HIGH (ref 10.3–14.5)
SODIUM SERPL-SCNC: 139 MMOL/L — SIGNIFICANT CHANGE UP (ref 135–145)
SODIUM SERPL-SCNC: 140 MMOL/L — SIGNIFICANT CHANGE UP (ref 135–145)
URATE SERPL-MCNC: 4.1 MG/DL — SIGNIFICANT CHANGE UP (ref 2.5–7)
WBC # BLD: 5.27 K/UL — SIGNIFICANT CHANGE UP (ref 3.8–10.5)
WBC # FLD AUTO: 5.27 K/UL — SIGNIFICANT CHANGE UP (ref 3.8–10.5)

## 2018-04-10 PROCEDURE — 84443 ASSAY THYROID STIM HORMONE: CPT

## 2018-04-10 PROCEDURE — 80202 ASSAY OF VANCOMYCIN: CPT

## 2018-04-10 PROCEDURE — 93005 ELECTROCARDIOGRAM TRACING: CPT

## 2018-04-10 PROCEDURE — 84100 ASSAY OF PHOSPHORUS: CPT

## 2018-04-10 PROCEDURE — 80048 BASIC METABOLIC PNL TOTAL CA: CPT

## 2018-04-10 PROCEDURE — 83735 ASSAY OF MAGNESIUM: CPT

## 2018-04-10 PROCEDURE — 82330 ASSAY OF CALCIUM: CPT

## 2018-04-10 PROCEDURE — 86900 BLOOD TYPING SEROLOGIC ABO: CPT

## 2018-04-10 PROCEDURE — 87086 URINE CULTURE/COLONY COUNT: CPT

## 2018-04-10 PROCEDURE — 86850 RBC ANTIBODY SCREEN: CPT

## 2018-04-10 PROCEDURE — 82248 BILIRUBIN DIRECT: CPT

## 2018-04-10 PROCEDURE — 99239 HOSP IP/OBS DSCHRG MGMT >30: CPT

## 2018-04-10 PROCEDURE — 71260 CT THORAX DX C+: CPT

## 2018-04-10 PROCEDURE — 87633 RESP VIRUS 12-25 TARGETS: CPT

## 2018-04-10 PROCEDURE — 85027 COMPLETE CBC AUTOMATED: CPT

## 2018-04-10 PROCEDURE — 99231 SBSQ HOSP IP/OBS SF/LOW 25: CPT

## 2018-04-10 PROCEDURE — 85014 HEMATOCRIT: CPT

## 2018-04-10 PROCEDURE — 82435 ASSAY OF BLOOD CHLORIDE: CPT

## 2018-04-10 PROCEDURE — 86901 BLOOD TYPING SEROLOGIC RH(D): CPT

## 2018-04-10 PROCEDURE — 86923 COMPATIBILITY TEST ELECTRIC: CPT

## 2018-04-10 PROCEDURE — 84132 ASSAY OF SERUM POTASSIUM: CPT

## 2018-04-10 PROCEDURE — P9040: CPT

## 2018-04-10 PROCEDURE — 85045 AUTOMATED RETICULOCYTE COUNT: CPT

## 2018-04-10 PROCEDURE — 74177 CT ABD & PELVIS W/CONTRAST: CPT

## 2018-04-10 PROCEDURE — 87040 BLOOD CULTURE FOR BACTERIA: CPT

## 2018-04-10 PROCEDURE — 84295 ASSAY OF SERUM SODIUM: CPT

## 2018-04-10 PROCEDURE — 71046 X-RAY EXAM CHEST 2 VIEWS: CPT

## 2018-04-10 PROCEDURE — 82947 ASSAY GLUCOSE BLOOD QUANT: CPT

## 2018-04-10 PROCEDURE — 80053 COMPREHEN METABOLIC PANEL: CPT

## 2018-04-10 PROCEDURE — 87486 CHLMYD PNEUM DNA AMP PROBE: CPT

## 2018-04-10 PROCEDURE — 83010 ASSAY OF HAPTOGLOBIN QUANT: CPT

## 2018-04-10 PROCEDURE — 83036 HEMOGLOBIN GLYCOSYLATED A1C: CPT

## 2018-04-10 PROCEDURE — 82607 VITAMIN B-12: CPT

## 2018-04-10 PROCEDURE — 83605 ASSAY OF LACTIC ACID: CPT

## 2018-04-10 PROCEDURE — 71045 X-RAY EXAM CHEST 1 VIEW: CPT

## 2018-04-10 PROCEDURE — 87798 DETECT AGENT NOS DNA AMP: CPT

## 2018-04-10 PROCEDURE — 82533 TOTAL CORTISOL: CPT

## 2018-04-10 PROCEDURE — 82247 BILIRUBIN TOTAL: CPT

## 2018-04-10 PROCEDURE — 87581 M.PNEUMON DNA AMP PROBE: CPT

## 2018-04-10 PROCEDURE — 96375 TX/PRO/DX INJ NEW DRUG ADDON: CPT

## 2018-04-10 PROCEDURE — 81025 URINE PREGNANCY TEST: CPT

## 2018-04-10 PROCEDURE — 36430 TRANSFUSION BLD/BLD COMPNT: CPT

## 2018-04-10 PROCEDURE — 99285 EMERGENCY DEPT VISIT HI MDM: CPT | Mod: 25

## 2018-04-10 PROCEDURE — 84550 ASSAY OF BLOOD/URIC ACID: CPT

## 2018-04-10 PROCEDURE — 83615 LACTATE (LD) (LDH) ENZYME: CPT

## 2018-04-10 PROCEDURE — 96374 THER/PROPH/DIAG INJ IV PUSH: CPT

## 2018-04-10 PROCEDURE — 82803 BLOOD GASES ANY COMBINATION: CPT

## 2018-04-10 PROCEDURE — 81003 URINALYSIS AUTO W/O SCOPE: CPT

## 2018-04-10 RX ORDER — GABAPENTIN 400 MG/1
1 CAPSULE ORAL
Qty: 90 | Refills: 0 | OUTPATIENT
Start: 2018-04-10 | End: 2018-05-09

## 2018-04-10 RX ORDER — DEXAMETHASONE 0.5 MG/5ML
2 ELIXIR ORAL
Qty: 4 | Refills: 0 | OUTPATIENT
Start: 2018-04-10 | End: 2018-04-11

## 2018-04-10 RX ADMIN — GABAPENTIN 100 MILLIGRAM(S): 400 CAPSULE ORAL at 02:09

## 2018-04-10 RX ADMIN — GABAPENTIN 100 MILLIGRAM(S): 400 CAPSULE ORAL at 09:32

## 2018-04-10 RX ADMIN — GABAPENTIN 100 MILLIGRAM(S): 400 CAPSULE ORAL at 19:28

## 2018-04-10 RX ADMIN — Medication 325 MILLIGRAM(S): at 11:57

## 2018-04-10 RX ADMIN — ENOXAPARIN SODIUM 40 MILLIGRAM(S): 100 INJECTION SUBCUTANEOUS at 09:51

## 2018-04-10 RX ADMIN — Medication 8 MILLIGRAM(S): at 09:33

## 2018-04-10 NOTE — PROGRESS NOTE ADULT - PROBLEM SELECTOR PLAN 5
Patient noted to have elevated Tbili on presentation to 2.0 - has had elevated Tbili in the past  - no abdominal symptoms at this time and recent imaging without abdominal pathology  - CT abd/pelvis non-diagnostic for hyperbili   - hemolysis labs neg
Patient has a history of Hodgkin's lymphoma with extensive disease on recent PET/CT despite immunotherapy  - patient is planned for chemotherapy per her report  - f/u heme recs
Patient noted to have elevated Tbili on presentation to 2.0 - has had elevated Tbili in the past  - no abdominal symptoms at this time and recent imaging without abdominal pathology  - CT abd/pelvis non-diagnostic for hyperbili   - hemolysis labs neg
Patient has a history of Hodgkin's lymphoma with extensive disease on recent PET/CT despite immunotherapy  - patient is planned for chemotherapy per her report  - hematology to see in AM, f/u recs

## 2018-04-10 NOTE — DISCHARGE NOTE ADULT - CARE PLAN
Principal Discharge DX:	Hodgkin lymphoma  Goal:	received chemotherapy  Assessment and plan of treatment:	You were admitted to the hospital for fevers. Our work up reveals these fevers are likely due to your lymphoma. You were initially given antibiotics for possible infection, but because those results are negative, you will not need to be on antibiotics. During this admission, you received treatment with dacarbazine, doxorubicin, vinblastine, and dexamethasone. Please continue dexamethasone 8mg every day fro two more days (tomorrow and day after). For your blood counts, you were also given Neupogen. Please follow up with Dr. Baxter in one to two weeks.  Secondary Diagnosis:	Anemia  Goal:	stable  Assessment and plan of treatment:	Please continue ferrous sulfate daily  Secondary Diagnosis:	Vitamin D deficiency  Goal:	stable  Assessment and plan of treatment:	Please continue your vitamin D supplementation  Secondary Diagnosis:	Numbness of feet  Goal:	stable  Assessment and plan of treatment:	Please continue gabapentin for your leg numbness

## 2018-04-10 NOTE — PROGRESS NOTE ADULT - PROBLEM SELECTOR PLAN 2
No evidence of infection so far. CT scan reading c/w pyelo however, pt with negative UA and negative UCx. Findings possibly 2/2 lymphoma. remains afebrile  -No evidence of infection so far. CT scan reading c/w pyelo however, pt with negative UA and negative UCx. Findings possibly 2/2 lymphoma.

## 2018-04-10 NOTE — PROGRESS NOTE ADULT - PROBLEM SELECTOR PLAN 2
Patient has a history of Hodgkin's lymphoma with extensive disease on recent PET/CT despite immunotherapy  -s/p PICC, AVD, prednisone yesterday  - f/u heme recs Patient has a history of Hodgkin's lymphoma with extensive disease on recent PET/CT despite immunotherapy  -s/p PICC, AVD, prednisone yesterday  - f/u heme recs re: discharge

## 2018-04-10 NOTE — PROGRESS NOTE ADULT - ASSESSMENT
41 yo F w/PMH of Hodgkin's lymphoma on immunotherapy with Brentuximab vedotin (last dose 2/20/18) presenting sent in by her oncologist for worsening LE numbness admitted with fever, meets SIRS criteria, differential includes infectious versus lymphoma-mediated. S/p PICC, AVD.

## 2018-04-10 NOTE — DISCHARGE NOTE ADULT - ADDITIONAL INSTRUCTIONS
Please follow up with Dr. Baxter in one to two weeks.  Please take decadron 8mg on 04/11 and 04/12 Please follow up with Dr. Baxter tomorrow at the [facility] and PICC care set up.   Please take decadron 8mg on 04/11 and 04/12

## 2018-04-10 NOTE — PROGRESS NOTE ADULT - PROBLEM SELECTOR PLAN 3
patient reports neuropathy has improved  Brentuximab can cause neuropathy in 50% of patients. Neuropathy is b/l, not consistent with 1 nerve root, therefore cord compression is unlikely. CT did not show any significant disease in the spine.   continue supportive care.   PT eval patient reports neuropathy has improved  Brentuximab can cause neuropathy in 50% of patients. Neuropathy is b/l, not consistent with 1 nerve root, therefore cord compression is unlikely. CT did not show any significant disease in the spine.   - please obtain PT eval

## 2018-04-10 NOTE — PROGRESS NOTE ADULT - PROVIDER SPECIALTY LIST ADULT
Heme/Onc
Infectious Disease
Internal Medicine

## 2018-04-10 NOTE — PROGRESS NOTE ADULT - PROBLEM SELECTOR PLAN 6
Lovenox for DVT ppx  Regular diet

## 2018-04-10 NOTE — PROGRESS NOTE ADULT - ASSESSMENT
41 yo F w/PMH of Hodgkin's lymphoma 2017 on immunotherapy with Brentuximab vedotin (last dose 2/20/18) presenting sent in by her oncologist for worsening LE numbness. The patient reports that for the last 2 days she has noticed tingling and now numbness of her feet bilaterally which has interfered with her ability to ambulate. She denies weakness, back pain, changes in bowel or bladder habits. She does report daily fevers at home since discharge from the hospital. She was recently hospitalized from 3/22-3/26 for fever found to be RSV positive with negative cultures, discharged on Levaquin with outpatient hematology follow up.   In Ed found to be febrile to 100.7 tachycardic now hypotensive and hypothermic. She has been having fever since discharge and this could be related to HL but her hypothermia and hypotension is concerning.   She  is having low grade fever but has no urinary symptoms or CVA tenderness despite CT reading. Her UA and Ucx (sent on abx ) are negative    Recommend:  observe off abs  Feeling well  f/u blood [id number]  Neuro eval for neuropathy  AM cortisol level- 21.2    ID will follow the patient PRN. Please recontact ID if we can be of further assistance . [phone]

## 2018-04-10 NOTE — DISCHARGE NOTE ADULT - MEDICATION SUMMARY - MEDICATIONS TO TAKE
I will START or STAY ON the medications listed below when I get home from the hospital:    dexamethasone 4 mg oral tablet  -- 2 tab(s) by mouth once a day from 4/11-12  -- Indication: For With chemotherapy, stop after 2 days     gabapentin 100 mg oral capsule  -- 1 cap(s) by mouth every 8 hours  -- Indication: For Numbness of feet    ferrous sulfate 324 mg (65 mg elemental iron) oral tablet  -- 1 tab(s) by mouth 2 times a day  -- Indication: For Anemia    Vitamin D3 50,000 intl units oral capsule  -- 1 cap(s) by mouth once a week  -- Indication: For Vitamin

## 2018-04-10 NOTE — DISCHARGE NOTE ADULT - CARE PROVIDER_API CALL
Gabrielle Baxter (DO), Hematology; Internal Medicine; Medical Oncology  [address] [place] [address]  Phone: [phone]  Fax: [phone]

## 2018-04-10 NOTE — PROGRESS NOTE ADULT - SUBJECTIVE AND OBJECTIVE BOX
Stewart Carbajal MD PGY1   Contact/Pager - [phone]      Patient is a 42y old  Female who presents with a chief complaint of lower extremity numbness (07 Apr 2018 00:42)        SUBJECTIVE / OVERNIGHT EVENTS: Pt tolerated chemo well yesterday, no acute complaints.      MEDICATIONS  (STANDING):  dexamethasone     Tablet 8 milliGRAM(s) Oral daily  enoxaparin Injectable 40 milliGRAM(s) SubCutaneous every 24 hours  ferrous    sulfate 325 milliGRAM(s) Oral daily  gabapentin 100 milliGRAM(s) Oral every 8 hours  influenza   Vaccine 0.5 milliLiter(s) IntraMuscular once    MEDICATIONS  (PRN):  acetaminophen   Tablet 650 milliGRAM(s) Oral every 6 hours PRN For Temp greater than 38 C (100.4 F)  ondansetron Injectable 4 milliGRAM(s) IV Push every 12 hours PRN Nausea and/or Vomiting      Allergies    No Known Allergies    Intolerances          Vital Signs Last 24 Hrs  T(C): 34.6 (10 Apr 2018 08:55), Max: 37.2 (10 Apr 2018 03:15)  T(F): 94.3 (10 Apr 2018 08:55), Max: 98.9 (10 Apr 2018 03:15)  HR: 81 (10 Apr 2018 08:55) (62 - 98)  BP: 116/64 (10 Apr 2018 08:55) (97/61 - 116/64)  BP(mean): --  RR: 18 (10 Apr 2018 08:55) (18 - 18)  SpO2: 96% (10 Apr 2018 08:55) (96% - 97%)  CAPILLARY BLOOD GLUCOSE        I&O's Summary    09 Apr 2018 07:01  -  10 Apr 2018 07:00  --------------------------------------------------------  IN: 1603 mL / OUT: 0 mL / NET: 1603 mL          PHYSICAL EXAM:  GENERAL: NAD  HEAD:  AT, NC  EYES: EOMI, PERRLA, conjunctiva and sclera clear  ENMT: Airway patent. MMM. Good dentition, no lesions.  NECK: Supple, No JVD  CHEST/LUNG: CTABL; No wheezing, rhonci, or rales  HEART: RRR; Normal S1, S2. No murmurs, rubs, or gallops  ABDOMEN: Soft, NT, ND; Bowel sounds present. No organomegaly  EXTREMITIES:  2+ Peripheral Pulses, No clubbing, cyanosis, or edema  PSYCH: AAOx3  NEUROLOGY: non-focal  SKIN: Warm, dry, intact; No rashes or lesions      LABS:                        7.8    6.05  )-----------( 269      ( 09 Apr 2018 07:38 )             24.3     04-10    140  |  103  |  11  ----------------------------<  139<H>  4.2   |  27  |  0.41<L>    Ca    9.2      10 Apr 2018 09:08  Phos  4.3     04-10  Mg     2.0     04-10    TPro  7.0  /  Alb  2.5<L>  /  TBili  1.2  /  DBili  x   /  AST  17  /  ALT  17  /  AlkPhos  246<H>  04-09    LIVER FUNCTIONS - ( 09 Apr 2018 05:58 )  Alb: 2.5 g/dL / Pro: 7.0 g/dL / ALK PHOS: 246 U/L / ALT: 17 U/L RC / AST: 17 U/L / GGT: x                           RADIOLOGY & ADDITIONAL TESTS:    Imaging Personally Reviewed:     Consultant(s) Notes Reviewed:     Care Discussed with Consultants/Other Providers: Stewart Carbajal MD PGY1   Contact/Pager - [phone]      Patient is a 42y old  Female who presents with a chief complaint of lower extremity numbness (07 Apr 2018 00:42)        SUBJECTIVE / OVERNIGHT EVENTS: Pt tolerated chemo well yesterday, no acute complaints.      MEDICATIONS  (STANDING):  dexamethasone     Tablet 8 milliGRAM(s) Oral daily  enoxaparin Injectable 40 milliGRAM(s) SubCutaneous every 24 hours  ferrous    sulfate 325 milliGRAM(s) Oral daily  gabapentin 100 milliGRAM(s) Oral every 8 hours  influenza   Vaccine 0.5 milliLiter(s) IntraMuscular once    MEDICATIONS  (PRN):  acetaminophen   Tablet 650 milliGRAM(s) Oral every 6 hours PRN For Temp greater than 38 C (100.4 F)  ondansetron Injectable 4 milliGRAM(s) IV Push every 12 hours PRN Nausea and/or Vomiting      Allergies    No Known Allergies    Intolerances          Vital Signs Last 24 Hrs  T(C): 34.6 (10 Apr 2018 08:55), Max: 37.2 (10 Apr 2018 03:15)  T(F): 94.3 (10 Apr 2018 08:55), Max: 98.9 (10 Apr 2018 03:15)  HR: 81 (10 Apr 2018 08:55) (62 - 98)  BP: 116/64 (10 Apr 2018 08:55) (97/61 - 116/64)  BP(mean): --  RR: 18 (10 Apr 2018 08:55) (18 - 18)  SpO2: 96% (10 Apr 2018 08:55) (96% - 97%)  CAPILLARY BLOOD GLUCOSE        I&O's Summary    09 Apr 2018 07:01  -  10 Apr 2018 07:00  --------------------------------------------------------  IN: 1603 mL / OUT: 0 mL / NET: 1603 mL          PHYSICAL EXAM:  GENERAL: NAD  HEAD:  AT, NC  EYES: EOMI, PERRLA, conjunctiva and sclera clear  ENMT: Airway patent. MMM. Good dentition, no lesions.  NECK: Supple, No JVD  CHEST/LUNG: CTABL; No wheezing, rhonci, or rales  HEART: RRR; Normal S1, S2. No murmurs, rubs, or gallops  ABDOMEN: Soft, NT, ND; Bowel sounds present. No organomegaly  EXTREMITIES:  2+ Peripheral Pulses, No clubbing, cyanosis, or edema  PSYCH: AAOx3  NEUROLOGY: non-focal  SKIN: Warm, dry, intact; No rashes or lesions      LABS:                        7.8    6.05  )-----------( 269      ( 09 Apr 2018 07:38 )             24.3     04-10    140  |  103  |  11  ----------------------------<  139<H>  4.2   |  27  |  0.41<L>    Ca    9.2      10 Apr 2018 09:08  Phos  4.3     04-10  Mg     2.0     04-10    TPro  7.0  /  Alb  2.5<L>  /  TBili  1.2  /  DBili  x   /  AST  17  /  ALT  17  /  AlkPhos  246<H>  04-09    LIVER FUNCTIONS - ( 09 Apr 2018 05:58 )  Alb: 2.5 g/dL / Pro: 7.0 g/dL / ALK PHOS: 246 U/L / ALT: 17 U/L RC / AST: 17 U/L / GGT: x                           RADIOLOGY & ADDITIONAL TESTS:    Consultant(s) Notes Reviewed: onc, I.D.    Care Discussed with Consultants/Other Providers: heme/onc

## 2018-04-10 NOTE — DISCHARGE NOTE ADULT - PLAN OF CARE
received chemotherapy You were admitted to the hospital for fevers. Our work up reveals these fevers are likely due to your lymphoma. You were initially given antibiotics for possible infection, but because those results are negative, you will not need to be on antibiotics. During this admission, you received treatment with dacarbazine, doxorubicin, vinblastine, and dexamethasone. Please continue dexamethasone 8mg every day fro two more days (tomorrow and day after). For your blood counts, you were also given Neupogen. Please follow up with Dr. Baxter in one to two weeks. stable Please continue ferrous sulfate daily Please continue your vitamin D supplementation Please continue gabapentin for your leg numbness

## 2018-04-10 NOTE — DISCHARGE NOTE ADULT - PATIENT PORTAL LINK FT
You can access the [facility] Patient Portal, offered by [facility], by registering with the following website: [email or web address]

## 2018-04-10 NOTE — PROGRESS NOTE ADULT - PROBLEM SELECTOR PLAN 3
Improved at this time  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%) as likely etiology  - c/w gabapentin 100 q 8

## 2018-04-10 NOTE — PROGRESS NOTE ADULT - ASSESSMENT
43 yo F w/PMH of Hodgkin's lymphoma (last treated w/Brentuximab on 2/20/18 and plan to start AVD) who presented with b/l LE neuropathy and fever 41 yo F w/PMH of Hodgkin's lymphoma (last treated w/Brentuximab on 2/20/18 who presented with b/l LE neuropathy and fever , s/p C1 AVD 4/9

## 2018-04-10 NOTE — PROGRESS NOTE ADULT - PROBLEM SELECTOR PLAN 4
Stable at 7.8 at this time  Improved to 8.4 s/p 1 unit pRBC  Patient anemic to Hb of 6.8 on presentation (baseline 7-9)  - this is likely due to her known hematologic malignancy  - unlikely to be hemolysis, as haptoglobin high, retic appropriately elevated, LDH mildly elevated (in setting of cancer)   - no signs or symptoms of active bleeding at this time  - will transfuse to Hb goal >7, monitor CBC
Patient noted to have elevated Tbili on presentation to 2.0 - has had elevated Tbili in the past  - no abdominal symptoms at this time and recent imaging without abdominal pathology  - CT abd/pelvis non-diagnostic for hyperbili  - hemolysis labs neg
Stable  Improved to 8.4 s/p 1 unit pRBC  Patient anemic to Hb of 6.8 on presentation (baseline 7-9)  - this is likely due to her known hematologic malignancy  - unlikely to be hemolysis, as haptoglobin high, retic appropriately elevated, LDH mildly elevated (in setting of cancer)   - no signs or symptoms of active bleeding at this time  - will transfuse to Hb goal >7, monitor CBC
Patient noted to have elevated Tbili on presentation to 2.0 - has had elevated Tbili in the past  - no abdominal symptoms at this time and recent imaging without abdominal pathology  - CT abd/pelvis   - hemolysis labs neg

## 2018-04-10 NOTE — PROGRESS NOTE ADULT - ATTENDING COMMENTS
Staecy Caicedo M.D. ,   Pager [phone]     after 5PM/ weekends [phone]
43 yo F w/PMH of Hodgkin's lymphoma (s/p first-line Brentuximab on 2/20/18) admitted with fevers likely due to lymphoma progression  - start AVD today  - monitor post-infusion  - plan for neulasta at [facility] 4/11  - indications, benefits and potential risks d/w pt in detail, pt consented to chemo as planned  - OPD f/u with Dr Baxter
tolerated chemo well  ok for dc today  OPD f/u with Dr Baxter
Patient was febrile, ID recommending to continue zosyn.   Onc would like to start chemo tomorrow.   Concern was for pyelo on CT A/P, however UA and UC are negative. BC show no growth so far.   Fevers may be from lymphoma, however would get clearance from ID prior to starting chemo.
low suspicion for pyelonephritis, suspected tumor related SIRs/ fever.   can d/c zosyn today, discussed with heme/onc  picc line today followed by starting chemotherapy
Patient was hypothermic to 93 degrees farenheit this morning. However, hypothermia resolved with warming, and now temperatures sustained ~98. Appreciate ID recommendations. Will continue with zosyn and evaluate for infectious cause of fevers. If workup negative, may be from progression of underlying lymphoma.
per heme/onc would like to observe patient overnight after receiving chemotherapy.   pending no complications will plan for d/c tomorrow.

## 2018-04-10 NOTE — PROGRESS NOTE ADULT - PROBLEM SELECTOR PLAN 1
Evidence of progression of disease on recent PET scan s/p 7 doses of brentuximab (9/1/17-2/20/18).   - Plan for AVD initiation today, discussed with patient, consent obtained. All questions and concerns addressed.  - MUGA performed with LVEF 64%.    - continue to monitor LDH, uric acid, CBC with diff, CMP Evidence of progression of disease on recent PET scan s/p 7 doses of brentuximab (9/1/17-2/20/18).   - s/p AVD 4/9, tolerated well   - continue to monitor LDH, uric acid, CBC with diff, CMP  - will need followup at [facility] on discharge

## 2018-04-10 NOTE — DISCHARGE NOTE ADULT - HOSPITAL COURSE
43 yo F w/PMH of Hodgkin's lymphoma on immunotherapy with Brentuximab vedotin (last dose 2/20/18) presenting sent in by her oncologist for worsening LE numbness. She was recently hospitalized from 3/22-3/26 for fever found to be RSV positive with negative cultures, discharged on Levaquin with outpatient hematology follow up. She reports that she is supposed to start chemotherapy on Monday and was sent in by her hematologist and told she will likely get chemotherapy while here. Recent PET/CT on 3/27 demonstrated progression of lymphoma with bone marrow involvement, splenic involvement, and extensive hypermetabolic LAD in the neck, thorax, abdomen, and pelvis, increased in size and metabolism since prior study in 11/2017.    In the ED, VS on presentation: T 100.7, , BP 98/67, RR 17, SpO2 99% on RA  CBC revealed normal WBC of 8, anemia to Hb of 6.8, platelet count of 357. CMP revealed hyponatremia to 128, baseline hypoalbuminemia to 2.8, baseline hyperbilirubinemia to 2.0 and baseline elevated alk phos of 386. VBG revealed pH of 7.47, lactate of 2.5. UA unremarkable. RVP negative. CXR demonstrated unchanged bilateral pleural effusions and mediastinal masses. Blood and urine cultures were sent. She was given Zosyn x 1, Vancomycin x 1, 2L NS bolus, Tylenol 975mg x 1. 1U pRBC was ordered and she was admitted for further work up and management.    On the medicine floors, CT C/A/P was obtained which revealed bilateral pyelonephritis. Patient was continued on zosyn. Infectious disease team was consulted. All other infectious work up, including the UA was negative. Persistent fevers were suspected to be due to lymphoma. PICC Line was placed on Monday April 9th. Initial round of chemotherapy was given 04/09- dacarbazine, doxorubicin, vinblastine, and dexamethasone. Pateint was given decadron 8mg the following day with Neupogen. Advised to take decadron mg for two more days with plans to follow up with Dr. Baxter in one- two weeks. 41 yo F w/PMH of Hodgkin's lymphoma on immunotherapy with Brentuximab vedotin (last dose 2/20/18) presenting sent in by her oncologist for worsening LE numbness. She was recently hospitalized from 3/22-3/26 for fever found to be RSV positive with negative cultures, discharged on Levaquin with outpatient hematology follow up. She reports that she is supposed to start chemotherapy on Monday and was sent in by her hematologist and told she will likely get chemotherapy while here. Recent PET/CT on 3/27 demonstrated progression of lymphoma with bone marrow involvement, splenic involvement, and extensive hypermetabolic LAD in the neck, thorax, abdomen, and pelvis, increased in size and metabolism since prior study in 11/2017.    In the ED, VS on presentation: T 100.7, , BP 98/67, RR 17, SpO2 99% on RA  CBC revealed normal WBC of 8, anemia to Hb of 6.8, platelet count of 357. CMP revealed hyponatremia to 128, baseline hypoalbuminemia to 2.8, baseline hyperbilirubinemia to 2.0 and baseline elevated alk phos of 386. VBG revealed pH of 7.47, lactate of 2.5. UA unremarkable. RVP negative. CXR demonstrated unchanged bilateral pleural effusions and mediastinal masses. Blood and urine cultures were sent. She was given Zosyn x 1, Vancomycin x 1, 2L NS bolus, Tylenol 975mg x 1. 1U pRBC was ordered and she was admitted for further work up and management.    On the medicine floors, CT C/A/P was obtained which revealed bilateral pyelonephritis. Patient was continued on zosyn. Infectious disease team was consulted. All other infectious work up, including the UA was negative. Persistent fevers were suspected to be due to lymphoma. PICC Line was placed on Monday April 9th. Initial round of chemotherapy was given 04/09- dacarbazine, doxorubicin, vinblastine, and dexamethasone. Pateint was given decadron 8mg the following day with Neupogen. Advised to take decadron mg for two more days with plans to follow up with Dr. Baxter tomorrow, he will arrange PICC care tomorrow as discussed w/ heme fellow Dr. Montoya. 43 yo F w/PMH of Hodgkin's lymphoma on immunotherapy with Brentuximab vedotin (last dose 2/20/18) presenting sent in by her oncologist for worsening LE numbness. She was recently hospitalized from 3/22-3/26 for fever found to be RSV positive with negative cultures, discharged on Levaquin with outpatient hematology follow up. She reports that she is supposed to start chemotherapy on Monday and was sent in by her hematologist and told she will likely get chemotherapy while here. Recent PET/CT on 3/27 demonstrated progression of lymphoma with bone marrow involvement, splenic involvement, and extensive hypermetabolic LAD in the neck, thorax, abdomen, and pelvis, increased in size and metabolism since prior study in 11/2017.    In the ED, VS on presentation: T 100.7, , BP 98/67, RR 17, SpO2 99% on RA  CBC revealed normal WBC of 8, anemia to Hb of 6.8, platelet count of 357. CMP revealed hyponatremia to 128, baseline hypoalbuminemia to 2.8, baseline hyperbilirubinemia to 2.0 and baseline elevated alk phos of 386. VBG revealed pH of 7.47, lactate of 2.5. UA unremarkable. RVP negative. CXR demonstrated unchanged bilateral pleural effusions and mediastinal masses. Blood and urine cultures were sent. She was given Zosyn x 1, Vancomycin x 1, 2L NS bolus, Tylenol 975mg x 1. 1U pRBC was ordered and she was admitted for further work up and management.    SIRs present on admission. Sepsis was ruled out.    On the medicine floors, CT C/A/P was obtained which revealed bilateral perinephric standing concerning for possible pyelonephritis. Patient was continued on zosyn. Infectious disease team was consulted. All other infectious work up, including the UA was negative. Persistent fevers were suspected to be due to lymphoma and NOT pyleonephritis. PICC Line was placed on Monday April 9th. Initial round of chemotherapy was given 04/09- dacarbazine, doxorubicin, vinblastine, and dexamethasone. Pateint was given decadron 8mg the following day with Neupogen. Advised to take decadron mg for two more days with plans to follow up with Dr. Baxter tomorrow, he will arrange PICC care tomorrow as discussed w/ heme fellow Dr. Montoya.

## 2018-04-10 NOTE — PROGRESS NOTE ADULT - SUBJECTIVE AND OBJECTIVE BOX
Patient is a 42y old  Female who presents with a chief complaint of lower extremity numbness (07 Apr 2018 00:42)    Being followed by ID for ? sepsis        Pt stable off abs  notes h/o irreg heart beats at time  No cough,SOB,CP  No N/V/D  No abd pain  No urinary complaints  No HA  No joint or limb pain  No other complaints    PAST MEDICAL & SURGICAL HISTORY:  Hodgkin lymphoma  H/O abdominoplasty: 2004  H/O bilateral breast implants: 2004      Antimicrobials:      MEDICATIONS  (STANDING):  dexamethasone     Tablet 8 milliGRAM(s) Oral daily  enoxaparin Injectable 40 milliGRAM(s) SubCutaneous every 24 hours  ferrous    sulfate 325 milliGRAM(s) Oral daily  gabapentin 100 milliGRAM(s) Oral every 8 hours  influenza   Vaccine 0.5 milliLiter(s) IntraMuscular once      Vital Signs Last 24 Hrs  T(C): 34.9 (04-10-18 @ 14:58), Max: 37.2 (04-10-18 @ 03:15)  T(F): 94.9 (04-10-18 @ 14:58), Max: 98.9 (04-10-18 @ 03:15)  HR: 80 (04-10-18 @ 13:11) (69 - 98)  BP: 99/65 (04-10-18 @ 13:11) (97/61 - 116/64)  BP(mean): --  RR: 18 (04-10-18 @ 13:11) (18 - 18)  SpO2: 96% (04-10-18 @ 13:11) (96% - 97%)    Physical Exam:    Constitutional well preserved,comfortable,pleasant    HEENT PERRLA EOMI,No pallor or icterus    No oral exudate or erythema    Neck supple no JVD or LN    Chest Good AE,CTA    CVS S1S2 some irregular beats    Abd soft BS normal No tenderness no masses    Ext No cyanosis clubbing or edema    IV site no erythema tenderness or discharge    Joints no swelling or LOM    CNS AAO X 3 no focal    Lab Data:                          7.9    5.27  )-----------( 235      ( 10 Apr 2018 10:59 )             25.2       04-10    139  |  101  |  14  ----------------------------<  193<H>  4.1   |  26  |  0.41<L>    Ca    9.5      10 Apr 2018 14:01  Phos  4.3     04-10  Mg     2.0     04-10    TPro  7.2  /  Alb  2.7<L>  /  TBili  0.9  /  DBili  x   /  AST  30  /  ALT  24  /  AlkPhos  218<H>  04-10          .Urine Clean Catch (Midstream)  04-07-18   No growth  --  --      .Blood Blood-Peripheral  04-06-18   No growth to date.  --  --      < from: 12 Lead ECG (04.06.18 @ 20:56) >  Ventricular Rate 127 BPM    Atrial Rate 127 BPM    P-R Interval 114 ms    QRS Duration 72 ms     ms    QTc 418 ms    P Axis 61 degrees    R Axis -12 degrees    T Axis 18 degrees    Diagnosis Line SINUS TACHYCARDIA  POSSIBLE INFERIOR INFARCT ,AGE UNDETERMINED  ABNORMAL ECG    < end of copied text >

## 2018-04-10 NOTE — PROGRESS NOTE ADULT - SUBJECTIVE AND OBJECTIVE BOX
INTERVAL HPI/OVERNIGHT EVENTS:  Patient S&E at bedside. No o/n events, patient resting comfortably. No complaints at this time. Patient denies fever, chills, dizziness, weakness, CP, palpitations, SOB, cough, N/V/D/C, dysuria, changes in bowel movements, LE edema.    VITAL SIGNS:  T(F): 94.3 (04-10-18 @ 08:55)  HR: 81 (04-10-18 @ 08:55)  BP: 116/64 (04-10-18 @ 08:55)  RR: 18 (04-10-18 @ 08:55)  SpO2: 96% (04-10-18 @ 08:55)  Wt(kg): --    PHYSICAL EXAM:    Constitutional: NAD  Eyes: EOMI, sclera non-icteric  Neck: supple, no masses, no JVD  Respiratory: CTA b/l, good air entry b/l  Cardiovascular: RRR, no M/R/G  Gastrointestinal: soft, NTND, no masses palpable, + BS, no hepatosplenomegaly  Extremities: no c/c/e  Neurological: AAOx3      MEDICATIONS  (STANDING):  dexamethasone     Tablet 8 milliGRAM(s) Oral daily  enoxaparin Injectable 40 milliGRAM(s) SubCutaneous every 24 hours  ferrous    sulfate 325 milliGRAM(s) Oral daily  gabapentin 100 milliGRAM(s) Oral every 8 hours  influenza   Vaccine 0.5 milliLiter(s) IntraMuscular once    MEDICATIONS  (PRN):  acetaminophen   Tablet 650 milliGRAM(s) Oral every 6 hours PRN For Temp greater than 38 C (100.4 F)  ondansetron Injectable 4 milliGRAM(s) IV Push every 12 hours PRN Nausea and/or Vomiting      Allergies    No Known Allergies    Intolerances        LABS:                        7.8    6.05  )-----------( 269      ( 09 Apr 2018 07:38 )             24.3     04-10    140  |  103  |  11  ----------------------------<  139<H>  4.2   |  27  |  0.41<L>    Ca    9.2      10 Apr 2018 09:08  Phos  4.3     04-10  Mg     2.0     04-10    TPro  7.0  /  Alb  2.5<L>  /  TBili  1.2  /  DBili  x   /  AST  17  /  ALT  17  /  AlkPhos  246<H>  04-09          RADIOLOGY & ADDITIONAL TESTS:  Studies reviewed.    ASSESSMENT & PLAN: INTERVAL HPI/OVERNIGHT EVENTS:  s/p AVD c1 yesterday, tolerated well, no acute issues. Reports she is doing very well today, no complaints. Eating well, has been walking around the floor, no issues with neuropathy. Denies weakness, weakness, headache, constipation, fevers, chill, n/v/d, abdominal pain, dyspnea.     VITAL SIGNS:  T(F): 94.3 (04-10-18 @ 08:55)  HR: 81 (04-10-18 @ 08:55)  BP: 116/64 (04-10-18 @ 08:55)  RR: 18 (04-10-18 @ 08:55)  SpO2: 96% (04-10-18 @ 08:55)  Wt(kg): --    PHYSICAL EXAM:    Constitutional: NAD  Eyes: EOMI, sclera non-icteric  Neck: supple, no masses, no JVD  Respiratory: CTA b/l, good air entry b/l  Cardiovascular: RRR, no M/R/G  Gastrointestinal: soft, NTND, no masses palpable, + BS, no hepatosplenomegaly  Extremities: no c/c/e  Neurological: AAOx3      MEDICATIONS  (STANDING):  dexamethasone     Tablet 8 milliGRAM(s) Oral daily  enoxaparin Injectable 40 milliGRAM(s) SubCutaneous every 24 hours  ferrous    sulfate 325 milliGRAM(s) Oral daily  gabapentin 100 milliGRAM(s) Oral every 8 hours  influenza   Vaccine 0.5 milliLiter(s) IntraMuscular once    MEDICATIONS  (PRN):  acetaminophen   Tablet 650 milliGRAM(s) Oral every 6 hours PRN For Temp greater than 38 C (100.4 F)  ondansetron Injectable 4 milliGRAM(s) IV Push every 12 hours PRN Nausea and/or Vomiting      Allergies    No Known Allergies    Intolerances        LABS:                        7.8    6.05  )-----------( 269      ( 09 Apr 2018 07:38 )             24.3     04-10    140  |  103  |  11  ----------------------------<  139<H>  4.2   |  27  |  0.41<L>    Ca    9.2      10 Apr 2018 09:08  Phos  4.3     04-10  Mg     2.0     04-10    TPro  7.0  /  Alb  2.5<L>  /  TBili  1.2  /  DBili  x   /  AST  17  /  ALT  17  /  AlkPhos  246<H>  04-09          RADIOLOGY & ADDITIONAL TESTS:  Studies reviewed.    ASSESSMENT & PLAN:

## 2018-04-10 NOTE — PROGRESS NOTE ADULT - PROBLEM SELECTOR PLAN 1
Patient presented with fever to Tmax 100.7, tachycardia - meets SIRS criteria, differential includes infectious versus lymphoma-mediated. Now hypothermic   - CXR no focal consolidations, demonstrated known mediastinal masses and pleural effusions  - UA negative, RVP negative, no localizing symptoms  - blood and urine cultures sent  - she is s/p Vancomycin and Zosyn in the ER  [facility] d/ced per ID  - prior work up on previous admission only significant for RSV which has since resolved  - s/p 2L NS bolus in the ED, will continue with maintenance IVF  -Ct chest, abd/pelvis IV shows b/l pyelonephritis, unlikely to be source as no dysuria/CVA, UCx neg Patient presented with fever to Tmax 100.7, tachycardia - met SIRS criteria, likelys lymphoma-mediated.   - CXR no focal consolidations, demonstrated known mediastinal masses and pleural effusions  - UA negative, RVP negative, no localizing symptoms  - blood and urine cultures sent  - she is s/p Vancomycin and Zosyn   - prior work up on previous admission only significant for RSV which has since resolved  -Ct chest, abd/pelvis IV shows b/l heterogenous enhancement of kidneys, unlikely to be pyelonephritis as no dysuria/CVA, UCx neg

## 2018-04-11 ENCOUNTER — APPOINTMENT (OUTPATIENT)
Dept: HEMATOLOGY ONCOLOGY | Facility: CLINIC | Age: 43
End: 2018-04-11

## 2018-04-12 ENCOUNTER — APPOINTMENT (OUTPATIENT)
Dept: INFUSION THERAPY | Facility: HOSPITAL | Age: 43
End: 2018-04-12

## 2018-04-24 ENCOUNTER — MEDICATION RENEWAL (OUTPATIENT)
Age: 43
End: 2018-04-24

## 2018-04-24 RX ORDER — APREPITANT 125MG-80MG
80 & 125 KIT ORAL
Qty: 6 | Refills: 1 | Status: ACTIVE | COMMUNITY
Start: 2018-04-24 | End: 1900-01-01

## 2018-04-25 ENCOUNTER — APPOINTMENT (OUTPATIENT)
Dept: HEMATOLOGY ONCOLOGY | Facility: CLINIC | Age: 43
End: 2018-04-25
Payer: MEDICAID

## 2018-04-25 ENCOUNTER — RESULT REVIEW (OUTPATIENT)
Age: 43
End: 2018-04-25

## 2018-04-25 ENCOUNTER — INPATIENT (INPATIENT)
Facility: HOSPITAL | Age: 43
LOS: 4 days | Discharge: HOME | DRG: 872 | End: 2018-04-30
Attending: INTERNAL MEDICINE | Admitting: STUDENT IN AN ORGANIZED HEALTH CARE EDUCATION/TRAINING PROGRAM
Payer: MEDICAID

## 2018-04-25 ENCOUNTER — OUTPATIENT (OUTPATIENT)
Dept: OUTPATIENT SERVICES | Facility: HOSPITAL | Age: 43
LOS: 1 days | End: 2018-04-25
Payer: MEDICAID

## 2018-04-25 ENCOUNTER — APPOINTMENT (OUTPATIENT)
Dept: INFUSION THERAPY | Facility: HOSPITAL | Age: 43
End: 2018-04-25

## 2018-04-25 VITALS
TEMPERATURE: 99.2 F | DIASTOLIC BLOOD PRESSURE: 65 MMHG | OXYGEN SATURATION: 100 % | BODY MASS INDEX: 18.16 KG/M2 | HEART RATE: 121 BPM | WEIGHT: 112.43 LBS | RESPIRATION RATE: 17 BRPM | SYSTOLIC BLOOD PRESSURE: 95 MMHG

## 2018-04-25 VITALS — DIASTOLIC BLOOD PRESSURE: 50 MMHG | HEART RATE: 108 BPM | OXYGEN SATURATION: 100 % | SYSTOLIC BLOOD PRESSURE: 100 MMHG

## 2018-04-25 DIAGNOSIS — C81.90 HODGKIN LYMPHOMA, UNSPECIFIED, UNSPECIFIED SITE: ICD-10-CM

## 2018-04-25 DIAGNOSIS — D70.9 NEUTROPENIA, UNSPECIFIED: ICD-10-CM

## 2018-04-25 DIAGNOSIS — Z29.9 ENCOUNTER FOR PROPHYLACTIC MEASURES, UNSPECIFIED: ICD-10-CM

## 2018-04-25 DIAGNOSIS — Z98.82 BREAST IMPLANT STATUS: Chronic | ICD-10-CM

## 2018-04-25 DIAGNOSIS — Z98.890 OTHER SPECIFIED POSTPROCEDURAL STATES: Chronic | ICD-10-CM

## 2018-04-25 DIAGNOSIS — R10.9 UNSPECIFIED ABDOMINAL PAIN: ICD-10-CM

## 2018-04-25 DIAGNOSIS — D64.9 ANEMIA, UNSPECIFIED: ICD-10-CM

## 2018-04-25 DIAGNOSIS — R63.8 OTHER SYMPTOMS AND SIGNS CONCERNING FOOD AND FLUID INTAKE: ICD-10-CM

## 2018-04-25 DIAGNOSIS — R50.9 FEVER, UNSPECIFIED: ICD-10-CM

## 2018-04-25 DIAGNOSIS — I95.9 HYPOTENSION, UNSPECIFIED: ICD-10-CM

## 2018-04-25 LAB
ALBUMIN SERPL ELPH-MCNC: 2.8 G/DL — LOW (ref 3.3–5)
ALP SERPL-CCNC: 784 U/L — HIGH (ref 40–120)
ALT FLD-CCNC: 28 U/L — SIGNIFICANT CHANGE UP (ref 10–45)
ANION GAP SERPL CALC-SCNC: 14 MMOL/L — SIGNIFICANT CHANGE UP (ref 5–17)
ANISOCYTOSIS BLD QL: SLIGHT — SIGNIFICANT CHANGE UP
APPEARANCE UR: ABNORMAL
APTT BLD: 33.2 SEC — SIGNIFICANT CHANGE UP (ref 27.5–37.4)
AST SERPL-CCNC: 23 U/L — SIGNIFICANT CHANGE UP (ref 10–40)
BACTERIA # UR AUTO: ABNORMAL /HPF
BASE EXCESS BLDV CALC-SCNC: -0.3 MMOL/L — SIGNIFICANT CHANGE UP (ref -2–2)
BASOPHILS # BLD AUTO: 0 K/UL — SIGNIFICANT CHANGE UP (ref 0–0.2)
BILIRUB SERPL-MCNC: 1.7 MG/DL — HIGH (ref 0.2–1.2)
BILIRUB UR-MCNC: NEGATIVE — SIGNIFICANT CHANGE UP
BLD GP AB SCN SERPL QL: NEGATIVE — SIGNIFICANT CHANGE UP
BUN SERPL-MCNC: 8 MG/DL — SIGNIFICANT CHANGE UP (ref 7–23)
CA-I SERPL-SCNC: 1.18 MMOL/L — SIGNIFICANT CHANGE UP (ref 1.12–1.3)
CALCIUM SERPL-MCNC: 9 MG/DL — SIGNIFICANT CHANGE UP (ref 8.4–10.5)
CHLORIDE BLDV-SCNC: 98 MMOL/L — SIGNIFICANT CHANGE UP (ref 96–108)
CHLORIDE SERPL-SCNC: 94 MMOL/L — LOW (ref 96–108)
CO2 BLDV-SCNC: 26 MMOL/L — SIGNIFICANT CHANGE UP (ref 22–30)
CO2 SERPL-SCNC: 23 MMOL/L — SIGNIFICANT CHANGE UP (ref 22–31)
COLOR SPEC: ABNORMAL
CREAT SERPL-MCNC: 0.43 MG/DL — LOW (ref 0.5–1.3)
DACRYOCYTES BLD QL SMEAR: SLIGHT — SIGNIFICANT CHANGE UP
DIFF PNL FLD: NEGATIVE — SIGNIFICANT CHANGE UP
ELLIPTOCYTES BLD QL SMEAR: SLIGHT — SIGNIFICANT CHANGE UP
EOSINOPHIL # BLD AUTO: 0.1 K/UL — SIGNIFICANT CHANGE UP (ref 0–0.5)
EOSINOPHIL NFR BLD AUTO: 2 % — SIGNIFICANT CHANGE UP (ref 0–6)
EPI CELLS # UR: SIGNIFICANT CHANGE UP /HPF
GAS PNL BLDV: 131 MMOL/L — LOW (ref 136–145)
GAS PNL BLDV: SIGNIFICANT CHANGE UP
GLUCOSE BLDV-MCNC: 117 MG/DL — HIGH (ref 70–99)
GLUCOSE SERPL-MCNC: 107 MG/DL — HIGH (ref 70–99)
GLUCOSE UR QL: NEGATIVE — SIGNIFICANT CHANGE UP
HCO3 BLDV-SCNC: 24 MMOL/L — SIGNIFICANT CHANGE UP (ref 21–29)
HCT VFR BLD CALC: 17.8 % — CRITICAL LOW (ref 34.5–45)
HCT VFR BLD CALC: 21.6 % — LOW (ref 34.5–45)
HCT VFR BLDA CALC: 17 % — CRITICAL LOW (ref 39–50)
HGB BLD CALC-MCNC: 5.4 G/DL — CRITICAL LOW (ref 11.5–15.5)
HGB BLD-MCNC: 5.9 G/DL — CRITICAL LOW (ref 11.5–15.5)
HGB BLD-MCNC: 7.1 G/DL — LOW (ref 11.5–15.5)
HYALINE CASTS # UR AUTO: ABNORMAL
HYPOCHROMIA BLD QL: SIGNIFICANT CHANGE UP
INR BLD: 1.51 RATIO — HIGH (ref 0.88–1.16)
KETONES UR-MCNC: NEGATIVE — SIGNIFICANT CHANGE UP
LACTATE BLDV-MCNC: 2.3 MMOL/L — HIGH (ref 0.7–2)
LEUKOCYTE ESTERASE UR-ACNC: NEGATIVE — SIGNIFICANT CHANGE UP
LYMPHOCYTES # BLD AUTO: 0.4 K/UL — LOW (ref 1–3.3)
LYMPHOCYTES # BLD AUTO: 19 % — SIGNIFICANT CHANGE UP (ref 13–44)
MACROCYTES BLD QL: SLIGHT — SIGNIFICANT CHANGE UP
MCHC RBC-ENTMCNC: 27.2 PG — SIGNIFICANT CHANGE UP (ref 27–34)
MCHC RBC-ENTMCNC: 28.1 PG — SIGNIFICANT CHANGE UP (ref 27–34)
MCHC RBC-ENTMCNC: 32.7 GM/DL — SIGNIFICANT CHANGE UP (ref 32–36)
MCHC RBC-ENTMCNC: 33.2 G/DL — SIGNIFICANT CHANGE UP (ref 32–36)
MCV RBC AUTO: 81.9 FL — SIGNIFICANT CHANGE UP (ref 80–100)
MCV RBC AUTO: 86.1 FL — SIGNIFICANT CHANGE UP (ref 80–100)
MICROCYTES BLD QL: SLIGHT — SIGNIFICANT CHANGE UP
MONOCYTES # BLD AUTO: 0.4 K/UL — SIGNIFICANT CHANGE UP (ref 0–0.9)
MONOCYTES NFR BLD AUTO: 16 % — HIGH (ref 2–14)
NEUTROPHILS # BLD AUTO: 1.3 K/UL — LOW (ref 1.8–7.4)
NEUTROPHILS NFR BLD AUTO: 63 % — SIGNIFICANT CHANGE UP (ref 43–77)
NITRITE UR-MCNC: NEGATIVE — SIGNIFICANT CHANGE UP
NRBC # BLD: 3 /100 — HIGH (ref 0–0)
PCO2 BLDV: 44 MMHG — SIGNIFICANT CHANGE UP (ref 35–50)
PH BLDV: 7.36 — SIGNIFICANT CHANGE UP (ref 7.35–7.45)
PH UR: 5.5 — SIGNIFICANT CHANGE UP (ref 5–8)
PLAT MORPH BLD: NORMAL — SIGNIFICANT CHANGE UP
PLATELET # BLD AUTO: 352 K/UL — SIGNIFICANT CHANGE UP (ref 150–400)
PLATELET # BLD AUTO: 370 K/UL — SIGNIFICANT CHANGE UP (ref 150–400)
PO2 BLDV: 24 MMHG — LOW (ref 25–45)
POIKILOCYTOSIS BLD QL AUTO: SLIGHT — SIGNIFICANT CHANGE UP
POLYCHROMASIA BLD QL SMEAR: SLIGHT — SIGNIFICANT CHANGE UP
POTASSIUM BLDV-SCNC: 3.7 MMOL/L — SIGNIFICANT CHANGE UP (ref 3.5–5)
POTASSIUM SERPL-MCNC: 3.9 MMOL/L — SIGNIFICANT CHANGE UP (ref 3.5–5.3)
POTASSIUM SERPL-SCNC: 3.9 MMOL/L — SIGNIFICANT CHANGE UP (ref 3.5–5.3)
PROT SERPL-MCNC: 7.5 G/DL — SIGNIFICANT CHANGE UP (ref 6–8.3)
PROT UR-MCNC: 30 MG/DL
PROTHROM AB SERPL-ACNC: 16.5 SEC — HIGH (ref 9.8–12.7)
RBC # BLD: 2.18 M/UL — LOW (ref 3.8–5.2)
RBC # BLD: 2.51 M/UL — LOW (ref 3.8–5.2)
RBC # FLD: 20.2 % — HIGH (ref 10.3–14.5)
RBC # FLD: 22.3 % — HIGH (ref 10.3–14.5)
RBC BLD AUTO: ABNORMAL
RBC CASTS # UR COMP ASSIST: SIGNIFICANT CHANGE UP /HPF (ref 0–2)
RH IG SCN BLD-IMP: POSITIVE — SIGNIFICANT CHANGE UP
SAO2 % BLDV: 30 % — LOW (ref 67–88)
SODIUM SERPL-SCNC: 131 MMOL/L — LOW (ref 135–145)
SP GR SPEC: 1.02 — SIGNIFICANT CHANGE UP (ref 1.01–1.02)
UROBILINOGEN FLD QL: NEGATIVE — SIGNIFICANT CHANGE UP
WBC # BLD: 1.8 K/UL — LOW (ref 3.8–10.5)
WBC # BLD: 2.2 K/UL — LOW (ref 3.8–10.5)
WBC # FLD AUTO: 1.8 K/UL — LOW (ref 3.8–10.5)
WBC # FLD AUTO: 2.2 K/UL — LOW (ref 3.8–10.5)
WBC UR QL: SIGNIFICANT CHANGE UP /HPF (ref 0–5)

## 2018-04-25 PROCEDURE — 76775 US EXAM ABDO BACK WALL LIM: CPT | Mod: 26

## 2018-04-25 PROCEDURE — 71046 X-RAY EXAM CHEST 2 VIEWS: CPT | Mod: 26

## 2018-04-25 PROCEDURE — XXXXX: CPT

## 2018-04-25 PROCEDURE — 99223 1ST HOSP IP/OBS HIGH 75: CPT | Mod: GC

## 2018-04-25 PROCEDURE — 99285 EMERGENCY DEPT VISIT HI MDM: CPT

## 2018-04-25 RX ORDER — FERROUS SULFATE 325(65) MG
1 TABLET ORAL
Qty: 0 | Refills: 0 | COMMUNITY

## 2018-04-25 RX ORDER — CEFEPIME 1 G/1
2000 INJECTION, POWDER, FOR SOLUTION INTRAMUSCULAR; INTRAVENOUS ONCE
Qty: 0 | Refills: 0 | Status: COMPLETED | OUTPATIENT
Start: 2018-04-25 | End: 2018-04-25

## 2018-04-25 RX ORDER — CHOLECALCIFEROL (VITAMIN D3) 125 MCG
1 CAPSULE ORAL
Qty: 0 | Refills: 0 | COMMUNITY

## 2018-04-25 RX ORDER — ACETAMINOPHEN 500 MG
650 TABLET ORAL ONCE
Qty: 0 | Refills: 0 | Status: COMPLETED | OUTPATIENT
Start: 2018-04-25 | End: 2018-04-26

## 2018-04-25 RX ORDER — ENOXAPARIN SODIUM 100 MG/ML
40 INJECTION SUBCUTANEOUS EVERY 24 HOURS
Qty: 0 | Refills: 0 | Status: DISCONTINUED | OUTPATIENT
Start: 2018-04-25 | End: 2018-04-25

## 2018-04-25 RX ORDER — ACETAMINOPHEN 500 MG
650 TABLET ORAL ONCE
Qty: 0 | Refills: 0 | Status: COMPLETED | OUTPATIENT
Start: 2018-04-25 | End: 2018-04-25

## 2018-04-25 RX ORDER — SODIUM CHLORIDE 9 MG/ML
1000 INJECTION INTRAMUSCULAR; INTRAVENOUS; SUBCUTANEOUS
Qty: 0 | Refills: 0 | Status: DISCONTINUED | OUTPATIENT
Start: 2018-04-25 | End: 2018-04-27

## 2018-04-25 RX ORDER — CEFEPIME 1 G/1
2000 INJECTION, POWDER, FOR SOLUTION INTRAMUSCULAR; INTRAVENOUS EVERY 12 HOURS
Qty: 0 | Refills: 0 | Status: DISCONTINUED | OUTPATIENT
Start: 2018-04-26 | End: 2018-04-26

## 2018-04-25 RX ORDER — SODIUM CHLORIDE 9 MG/ML
2000 INJECTION INTRAMUSCULAR; INTRAVENOUS; SUBCUTANEOUS ONCE
Qty: 0 | Refills: 0 | Status: COMPLETED | OUTPATIENT
Start: 2018-04-25 | End: 2018-04-25

## 2018-04-25 RX ADMIN — CEFEPIME 100 MILLIGRAM(S): 1 INJECTION, POWDER, FOR SOLUTION INTRAMUSCULAR; INTRAVENOUS at 17:41

## 2018-04-25 RX ADMIN — SODIUM CHLORIDE 75 MILLILITER(S): 9 INJECTION INTRAMUSCULAR; INTRAVENOUS; SUBCUTANEOUS at 22:48

## 2018-04-25 RX ADMIN — Medication 650 MILLIGRAM(S): at 14:36

## 2018-04-25 RX ADMIN — SODIUM CHLORIDE 2000 MILLILITER(S): 9 INJECTION INTRAMUSCULAR; INTRAVENOUS; SUBCUTANEOUS at 14:25

## 2018-04-25 NOTE — H&P ADULT - PROBLEM SELECTOR PLAN 2
-patient has left flank pain w/ increased urinary frequency for 2-3 weeks, w/ no associated dysuria, U/A negative, no CVA tenderness, renal u/s w/ no evidence of hydronephrosis. Clinical symptoms not consistent w/ pyelonephritis, also mentioned as above.  -will continue to monitor pain w/ pain control as needed. Pain may be secondary to MSQ and recent PET (3/2018) w/ evidence of splenic involvement and extensive lymphadenopathy in abdomen, possibly referred pain?  -patient s/p cefepime, will continue to hold off abx and monitor off, if patient persistently febrile, will start abx. -Patient has low grade temp. 100 daily x 2 months, w/ recent admission for fever in early april w/ w/up of BC negative, UCx negative, no fever on admission, CXR negative at that time, and CTA/P w/ perinephric stranding but no clinical symptoms of pyelo, fevers at that time attributed to tumor burden   -Currently, patient afebrile on admission, w/ left flank pain, w/ renal u/s w/ no hydronephrosis and U/A negative for LE/Nitrites/hematuria, w/ no CVA tenderness on exam, not clinically consistent w/ pyelonephritis. CXR clear. Also, fever has been going on for two months, unlikely to be acutely infectious due to the chronicity.  -Fevers most likely secondary to malignancy, patient has had prior w/up in the past, will continue to monitor  -Patient has low BPs on admission, however her baseline BPs are low (90s-100s/60), does not seem to be in active sepsis. Patient currently s/p 2L fluids, will continue w/ IVF @ 75cc's/hr for 18 hours  -f/u blood cultures, consider ID consult  -s/p cefepime, will hold off on abx, and continue to monitor off abx, if patient has clinical symptoms or patient persistently febrile or persistently hypotensive, will start cefepime -Patient has low BPs on admission, however her baseline BPs are low (90s-100s/60), does not seem to be in active sepsis. Patient currently s/p 2L fluids, will continue w/ IVF @ 75cc's/hr for 18 hours. Patient currently not hypoxic, comfortable on RA, unlikely PE, if patient persistently hypotensive w/ clinical symptoms, will pursue CTA  -will continue w/ cefepime as mentioned above, and continue to monitor BPs.

## 2018-04-25 NOTE — H&P ADULT - ATTENDING COMMENTS
Patient assigned to me by night hospitalist in charge for management and care for patient for this evening only. Care to be resumed by day hospitalist in the morning and thereafter.   Patient seen and examined at bedside. Agree with the resident note above except with the following addendum.  Suspect this could potentially be pyelo based on subjective left flank pain, "burn when urinating," and immunocompromised state w/ frequent use of acetaminophen. She reports her baseline SBP is in 80s. Looking at her records from previous admission, her SBP has been in 80s-90s. Given O2-saturation of % on RA, less likely hypotension is from PE. Suspect is this more from SVC syndrome as her CXR shows more mediastinal prominence (unchanged though). Less likely this is tamponade causing hypotension.   Of note patient refuses to have me examine her and refusing further blood draw for tonight. She wants to "sleep." She also refuse urine pregnancy testing and EKG . Patient assigned to me by night hospitalist in charge for management and care for patient for this evening only. Care to be resumed by day hospitalist in the morning and thereafter.   Patient seen and examined at bedside. Agree with the resident note above except with the following addendum.  Suspect this could potentially be pyelo based on subjective left flank pain, "burn when urinating," and immunocompromised state w/ frequent use of acetaminophen. She reports her baseline SBP is in 80s. Looking at her records from previous admission, her SBP has been in 80s-90s. Given O2-saturation of % on RA, less likely hypotension is from PE. Suspect is this more from dehydration versus SVC syndrome as her CXR shows more mediastinal prominence (unchanged though). Less likely this is tamponade causing hypotension.   Of note patient refuses to have me examine her and refusing further blood draw for tonight. She wants to "sleep." She also refuse urine pregnancy testing and EKG . At this juncture given repeated episode of fever, flank pain, and renal u/s supporting trace perinephric stranding in setting of prior 4-days of IV zosyn (last admission), patient may benefit from ID evaluation. Primary team in AM to weigh in on utility of ID consultation. Patient assigned to me by night hospitalist in charge for management and care for patient for this evening only. Care to be resumed by day hospitalist in the morning and thereafter.   Patient seen and examined at bedside. Agree with the resident note above except with the following addendum.        Suspect this could potentially be pyelo based on subjective left flank pain, "burn when urinating," and immunocompromised state w/ frequent use of acetaminophen. She reports her baseline SBP is in 80s. Looking at her records from previous admission, her SBP has been in 80s-90s. Given O2-saturation of % on RA, less likely hypotension is from PE. Suspect is this more from dehydration versus SVC syndrome as her CXR shows more mediastinal prominence (unchanged though). Less likely this is tamponade causing hypotension.   Of note patient refuses to have me examine her and refusing further blood draw for tonight. She wants to "sleep." She also refuse urine pregnancy testing and EKG . At this juncture given repeated episode of fever, flank pain, and renal u/s supporting trace perinephric stranding in setting of prior 4-days of IV zosyn (last admission), patient may benefit from ID evaluation. Primary team in AM to weigh in on utility of ID consultation.

## 2018-04-25 NOTE — ED PROVIDER NOTE - NS ED ROS FT
Constitutional: + fevers, chills  HEENT: no visual changes, no sore throat, no rhinorrhea  CV: no cp  Resp: no sob  GI: no abd pain, n/v, diarrhea/constipation  : +L flank pain, + urinary freq, no dysuria, hematuria  MSK: no joint pains  skin: no rashes  neuro: no HA, no confusion  psych: no SI/HI

## 2018-04-25 NOTE — H&P ADULT - NSHPLABSRESULTS_GEN_ALL_CORE
Personally reviewed labs, imaging, ekg by me      131<L>  |  94<L>  |  8   ----------------------------<  107<H>  3.9   |  23  |  0.43<L>    Ca    9.0      2018 14:21    TPro  7.5  /  Alb  2.8<L>  /  TBili  1.7<H>  /  DBili  x   /  AST  23  /  ALT  28  /  AlkPhos  784<H>        PT/INR - ( 2018 14:21 )   PT: 16.5 sec;   INR: 1.51 ratio         PTT - ( 2018 14:21 )  PTT:33.2 sec              Urinalysis Basic - ( 2018 14:21 )    Color: Imelda / Appearance: SL Turbid / S.019 / pH: x  Gluc: x / Ketone: Negative  / Bili: Negative / Urobili: Negative   Blood: x / Protein: 30 mg/dL / Nitrite: Negative   Leuk Esterase: Negative / RBC: 3-5 /HPF / WBC 3-5 /HPF   Sq Epi: x / Non Sq Epi: OCC /HPF / Bacteria: Few /HPF                              5.9    2.2   )-----------( 370      ( 2018 11:37 )             17.8     CAPILLARY BLOOD GLUCOSE        < from: Xray Chest 2 Views PA/Lat (18 @ 14:51) >      INTERPRETATION:    CLINICAL INDICATION: Cough for one week. Evaluate for pneumonia.    TECHNIQUE: PA and lateral views of the chest.    COMPARISON: Chest radiograph from 2018.    FINDINGS:     A right upper terminate PICC line terminates in the region of SVC.  Cardiac size is within normal limits.   The lungs are clear.   There are no pleural effusions. No pneumothorax.  There is no acute osseous abnormality. Bilateral breast implants are   noted.    IMPRESSION:   Clear lungs.     < end of copied text >      < from: US Renal (18 @ 15:39) >    FINDINGS:    Right kidney:  12.6 cm. No renal mass, hydronephrosis or calculi. Linear   scar in the interpolar region.    Left kidney:  12.3 cm. No renal mass, hydronephrosis or calculi. Trace   perinephric fluid.    Urinary bladder: Within normal limits.    Miscellaneous: Incidentally noted splenomegaly, 16.9 cm.     IMPRESSION:     No hydronephrosis.      < end of copied text >     131<L>  |  94<L>  |  8   ----------------------------<  107<H>  3.9   |  23  |  0.43<L>    Ca    9.0      2018 14:21    TPro  7.5  /  Alb  2.8<L>  /  TBili  1.7<H>  /  DBili  x   /  AST  23  /  ALT  28  /  AlkPhos  784<H>        PT/INR - ( 2018 14:21 )   PT: 16.5 sec;   INR: 1.51 ratio         PTT - ( 2018 14:21 )  PTT:33.2 sec              Urinalysis Basic - ( 2018 14:21 )    Color: Imelda / Appearance: SL Turbid / S.019 / pH: x  Gluc: x / Ketone: Negative  / Bili: Negative / Urobili: Negative   Blood: x / Protein: 30 mg/dL / Nitrite: Negative   Leuk Esterase: Negative / RBC: 3-5 /HPF / WBC 3-5 /HPF   Sq Epi: x / Non Sq Epi: OCC /HPF / Bacteria: Few /HPF                              5.9    2.2   )-----------( 370      ( 2018 11:37 )             17.8     CAPILLARY BLOOD GLUCOSE        < from: Xray Chest 2 Views PA/Lat (18 @ 14:51) >      INTERPRETATION:    CLINICAL INDICATION: Cough for one week. Evaluate for pneumonia.    TECHNIQUE: PA and lateral views of the chest.    COMPARISON: Chest radiograph from 2018.    FINDINGS:     A right upper terminate PICC line terminates in the region of SVC.  Cardiac size is within normal limits.   The lungs are clear.   There are no pleural effusions. No pneumothorax.  There is no acute osseous abnormality. Bilateral breast implants are   noted.    IMPRESSION:   Clear lungs.     < end of copied text >      < from: US Renal (18 @ 15:39) >    FINDINGS:    Right kidney:  12.6 cm. No renal mass, hydronephrosis or calculi. Linear   scar in the interpolar region.    Left kidney:  12.3 cm. No renal mass, hydronephrosis or calculi. Trace   perinephric fluid.    Urinary bladder: Within normal limits.    Miscellaneous: Incidentally noted splenomegaly, 16.9 cm.     IMPRESSION:     No hydronephrosis.      I personally reviewed & interpreted the lab findings above; CBC c/w leukopenia and anemia. CMP c/w hyponatremia and markedly elevated alkaline phosphate   I personally reviewed & interpreted the radiographic findings; CXR no focal consolidation   I personally reviewed & interpreted the EKG findings; pending

## 2018-04-25 NOTE — ED PROVIDER NOTE - PHYSICAL EXAMINATION
Vitals: afebrile, midly tachy, remainder wnl  Gen: laying comfortably in NAD  Head: NCAT  ENT: sclerae white, anicterus, moist mucous membranes. No exudates. Neck supple, no LAD,  no carotid bruits, no JVD  CV: RRR. Audible S1 and S2. No murmurs, rubs, gallops, S3, nor S4, 2+ radial and DP pulses   Pulm: Clear to auscultation bilaterally. No wheezes, rales, or rhonchi  Abd: soft, normoactive BS x4, NTND, no rebound, no guarding, no rashes  Musculoskeletal:  No peripheral edema  Skin: no lesions or scars noted  Neurologic: AAOx3  : no CVA tenderness  Psych: no SI/HI Vitals: afebrile, midly tachy, remainder wnl  Gen: pale, laying comfortably in NAD  Head: NCAT  ENT: pale conjunctiva, sclerae white, anicterus, moist mucous membranes. No exudates. Neck supple, no LAD,  no carotid bruits, no JVD  CV: RRR. Audible S1 and S2. No murmurs, rubs, gallops, S3, nor S4, 2+ radial and DP pulses   Pulm: Clear to auscultation bilaterally. No wheezes, rales, or rhonchi  Abd: soft, normoactive BS x4, NTND, no rebound, no guarding, no rashes  Musculoskeletal:  No peripheral edema  Skin: no lesions or scars noted  Neurologic: AAOx3  : no CVA tenderness  Psych: no SI/HI

## 2018-04-25 NOTE — ED PROVIDER NOTE - OBJECTIVE STATEMENT
43yo F h/o hodgkin's lymphoma with bone marrow and splenic involevement on Brentuximab sent in by Dr. Morales for suspected flank pain. Pt was recently hospitalized and discharged on 4/19/18 for recurrent fevers. Pt had a CTAP reveleaing b/l perinephric stranding concerning for possible pyelo and started on zosyn. However all infectious w/u including UA was negative. Pt seen by ID and persistent fevers suspected to be due to lymphoma and not pyelo and PICC placed. Pt saw Dr. Baxter (oncologist at [facility]) today for her chemo treatment and sent here for admission for possible pyelo. Pt reporting she has had L flank discomfort for several weeks and urinary frequency (every hour). Has intermittent low grade fevers at home for several months (orally 100). Denies dysuria, hematuria, abd pain, URI symptoms. Has not taken anything for the discomfort. 41yo F h/o hodgkin's lymphoma with bone marrow and splenic involevement on Brentuximab sent in by Dr. Morales for suspected flank pain and anemia. Pt was recently hospitalized and discharged on 4/19/18 for recurrent fevers. Pt had a CTAP reveleaing b/l perinephric stranding concerning for possible pyelo and started on zosyn. However all infectious w/u including UA was negative. Pt seen by ID and persistent fevers suspected to be due to lymphoma and not pyelo and PICC placed. Pt saw Dr. Baxter (oncologist at [facility]) today for her chemo treatment and sent here for admission for possible pyelo. Pt reporting she has had L flank discomfort for several weeks and urinary frequency (every hour). Has intermittent low grade fevers at home for several months (orally 100). Denies dysuria, hematuria, abd pain, URI symptoms. Has not taken anything for the discomfort. Denies cp/sob/lightheadedness.

## 2018-04-25 NOTE — H&P ADULT - PROBLEM SELECTOR PLAN 1
-Patient has low grade temp. 100 daily x 2 months, w/ recent admission for fever in early april w/ w/up of BC negative, UCx negative, no fever on admission, CXR negative at that time, and CTA/P w/ perinephric stranding but no clinical symptoms of pyelo, fevers at that time attributed to tumor burden   -Currently, patient afebrile on admission, w/ left flank pain, w/ renal u/s w/ no hydronephrosis and U/A negative for LE/Nitrites/hematuria, w/ no CVA tenderness on exam, not clinically consistent w/ pyelonephritis. CXR clear. Also, fever has been going on for two months, unlikely to be acutely infectious due to the chronicity.  -Fevers most likely secondary to malignancy, patient has had prior w/up in the past, will continue to monitor  -Patient has low BPs on admission, however her baseline BPs are low (90s-100s/60), does not seem to be in active sepsis. Patient currently s/p 2L fluids, will continue w/ IVF @ 75cc's/hr for 18 hours  -f/u blood cultures, consider ID consult  -s/p cefepime, will hold off on abx, and continue to monitor off abx, if patient has clinical symptoms or patient persistently febrile or persistently hypotensive, will start cefepime -patient has left flank pain w/ increased urinary frequency for 2-3 weeks, w/ no associated dysuria, U/A negative, no CVA tenderness, renal u/s w/ no evidence of hydronephrosis. Clinical symptoms not consistent w/ pyelonephritis, also mentioned as above.  -will continue to monitor pain w/ pain control as needed. Pain may be secondary to MSQ and recent PET (3/2018) w/ evidence of splenic involvement and extensive lymphadenopathy in abdomen, possibly referred pain?  -patient s/p cefepime, will continue to hold off abx and monitor off, if patient persistently febrile, will start abx.  -will order urine hcg -patient has left flank pain w/ increased urinary frequency for 2-3 weeks, w/ perinephric stranding on u/s of left kidney suggestive of pyelonephritis.  -will continue to monitor pain w/ pain control as needed.   -patient s/p cefepime, will continue w/ cefepime 2g q 12 hours, given immunocompromised state and mild neutropenic (ANC 1300)   -will order urine hcg  -cbc w/ diff daily  -f/u Blood cx and urine c/x, u/a currently may be negative for LE/nitrites as patient had recent 4 days of zosyn, patient may require 7-10 days of abx for tx of pyelo

## 2018-04-25 NOTE — H&P ADULT - NSHPREVIEWOFSYSTEMS_GEN_ALL_CORE
REVIEW OF SYSTEMS:  CONSTITUTIONAL: No fever, weight loss, or fatigue  EYES: No eye pain, visual disturbances, or discharge  ENMT:  No difficulty hearing, tinnitus, vertigo; No sinus or throat pain  NECK: No pain or stiffness  BREASTS: No pain, masses, or nipple discharge  RESPIRATORY: No cough, wheezing, chills or hemoptysis; No shortness of breath  CARDIOVASCULAR: No chest pain, palpitations, dizziness, or leg swelling  GASTROINTESTINAL: No abdominal or epigastric pain. No nausea, vomiting, or hematemesis; No diarrhea or constipation. No melena or hematochezia.  GENITOURINARY: No dysuria, frequency, hematuria, or incontinence  NEUROLOGICAL: No headaches, memory loss, loss of strength, numbness, or tremors  SKIN: No itching, burning, rashes, or lesions   LYMPH NODES: No enlarged glands  ENDOCRINE: No heat or cold intolerance; No hair loss  MUSCULOSKELETAL: No joint pain or swelling; No muscle, back, or extremity pain  PSYCHIATRIC: No depression, anxiety, mood swings, or difficulty sleeping  HEME/LYMPH: No easy bruising, or bleeding gums  ALLERY AND IMMUNOLOGIC: No hives or eczema REVIEW OF SYSTEMS:  CONSTITUTIONAL: +fevers, fatigue  EYES: No eye pain, visual disturbances, or discharge  ENMT:  No difficulty hearing, tinnitus, vertigo; No sinus or throat pain  NECK: No pain or stiffness  RESPIRATORY: No cough, wheezing, chills or hemoptysis; No shortness of breath  CARDIOVASCULAR: No chest pain, palpitations, dizziness, or leg swelling  GASTROINTESTINAL: No abdominal or epigastric pain. No nausea, vomiting, or hematemesis; No diarrhea or constipation. No melena or hematochezia.  GENITOURINARY: +urgency, +frequency. -dysuria  NEUROLOGICAL: No headaches, memory loss, loss of strength, numbness, or tremors  SKIN: No itching, burning, rashes, or lesions   LYMPH NODES: No enlarged glands  ENDOCRINE: No heat or cold intolerance; No hair loss  MUSCULOSKELETAL: No joint pain or swelling; No muscle, back, or extremity pain  HEME/LYMPH: No easy bruising, or bleeding gums  ALLERY AND IMMUNOLOGIC: No hives or eczema REVIEW OF SYSTEMS:  CONSTITUTIONAL: +fevers, fatigue  EYES: No eye pain, visual disturbances, or discharge  ENMT:  No difficulty hearing, tinnitus, vertigo; No sinus or throat pain  NECK: No pain or stiffness  RESPIRATORY: No cough, wheezing, chills or hemoptysis; No shortness of breath  CARDIOVASCULAR: No chest pain, palpitations, dizziness, or leg swelling  GASTROINTESTINAL: No abdominal or epigastric pain. No nausea, vomiting, or hematemesis; No diarrhea or constipation. No melena or hematochezia.  GENITOURINARY: +urgency, +frequency. ?dysuria  NEUROLOGICAL: No headaches, memory loss, loss of strength, numbness, or tremors  SKIN: No itching, burning, rashes, or lesions   LYMPH NODES: No enlarged glands  ENDOCRINE: No heat or cold intolerance; No hair loss  MUSCULOSKELETAL: No joint pain or swelling; No muscle, back, or extremity pain  HEME/LYMPH: No easy bruising, or bleeding gums  ALLERY AND IMMUNOLOGIC: No hives or eczema

## 2018-04-25 NOTE — H&P ADULT - NSHPPHYSICALEXAM_GEN_ALL_CORE
Vital Signs Last 24 Hrs  T(C): 36.5 (25 Apr 2018 18:57), Max: 37.2 (25 Apr 2018 14:00)  T(F): 97.7 (25 Apr 2018 18:57), Max: 99 (25 Apr 2018 14:00)  HR: 85 (25 Apr 2018 18:57) (85 - 108)  BP: 89/55 (25 Apr 2018 18:57) (85/32 - 100/64)  BP(mean): --  RR: 18 (25 Apr 2018 18:57) (18 - 20)  SpO2: 100% (25 Apr 2018 18:57) (96% - 100%)  PHYSICAL EXAM:  GENERAL: NAD, well-groomed, well-developed  HEAD:  Atraumatic, Normocephalic  EYES: EOMI, PERRLA, conjunctiva and sclera clear  ENMT: No tonsillar erythema, exudates, or enlargement; Moist mucous membranes, Good dentition, No lesions  NECK: Supple, No JVD, Normal thyroid  CHEST/LUNG: Clear to percussion bilaterally; No rales, rhonchi, wheezing, or rubs  HEART: Regular rate and rhythm; No murmurs, rubs, or gallops  ABDOMEN: Soft, Nontender, Nondistended; Bowel sounds present  EXTREMITIES:  2+ Peripheral Pulses, No clubbing, cyanosis, or edema  LYMPH: No lymphadenopathy noted  SKIN: No rashes or lesions  NERVOUS SYSTEM:  Alert & Oriented X3, Good concentration; Motor Strength 5/5 B/L upper and lower extremities; DTRs 2+ intact and symmetric Vital Signs Last 24 Hrs  T(C): 36.5 (25 Apr 2018 18:57), Max: 37.2 (25 Apr 2018 14:00)  T(F): 97.7 (25 Apr 2018 18:57), Max: 99 (25 Apr 2018 14:00)  HR: 85 (25 Apr 2018 18:57) (85 - 108)  BP: 89/55 (25 Apr 2018 18:57) (85/32 - 100/64)  BP(mean): --  RR: 18 (25 Apr 2018 18:57) (18 - 20)  SpO2: 100% (25 Apr 2018 18:57) (96% - 100%)  PHYSICAL EXAM:  GENERAL: NAD, well-groomed, well-developed  HEAD:  Atraumatic, Normocephalic  EYES: conjunctiva and sclera clear  ENMT: No tonsillar erythema, exudates, or enlargement  NECK: Supple, No JVD  CHEST/LUNG: Clear to percussion bilaterally; No rales, rhonchi, wheezing, or rubs. negative CVA tenderness  HEART: Regular rate and rhythm; No murmurs, rubs, or gallops  ABDOMEN: Soft, Nontender, Nondistended; Bowel sounds present  EXTREMITIES:  2+ Peripheral Pulses, No clubbing, cyanosis, or edema  LYMPH: No lymphadenopathy noted  SKIN: No rashes or lesions  NERVOUS SYSTEM:  Alert & Oriented X3, Good concentration

## 2018-04-25 NOTE — ED PROVIDER NOTE - ATTENDING CONTRIBUTION TO CARE
Attending Note (Adolph): patient with lymphoma complaining of flank pain x 1 month and fever, recent chemo.  concern for infection. no cva tenderness.  labs, antibiotics, admit.

## 2018-04-25 NOTE — ED PROVIDER NOTE - MEDICAL DECISION MAKING DETAILS
43yo F h/o hodgkin's lymphoma with bone marrow and splenic involevement on Brentuximab sent in by Dr. Morales for suspected flank pain and anemia. r/o infectious source likely pyelo. No symptoms from anemia. Will panculture, abx, labs, type and screen, cxr, transfuse, admit to hospitalist.

## 2018-04-25 NOTE — H&P ADULT - ASSESSMENT
43 yo F w/PMH of Hodgkin's lymphoma on immunotherapy with Brentuximab s/p 8 cycles 43 yo F w/PMH of Hodgkin's lymphoma on immunotherapy with Brentuximab s/p 8 cycles, now on AVD presenting from [facility] w/ c/o of left plank pain, fevers. 43 yo F w/PMH of Hodgkin's lymphoma on immunotherapy with Brentuximab s/p 8 cycles, now on AVD presenting from [facility] w/ c/o of left plank pain, fevers, likely pyelonephritis.

## 2018-04-25 NOTE — H&P ADULT - PROBLEM SELECTOR PLAN 4
-lovenox on immunotherapy with Brentuximab s/p 8 cycles, now on AVD   -as per recent PET: 3/27 demonstrated progression of lymphoma with bone marrow involvement, splenic involvement, and extensive hypermetabolic LAD in the neck, thorax, abdomen, and pelvis, increased in size and metabolism since prior study in 11/2017  -current ANC: 1300. plt 370, hgb 5.9  -transfusion protocol: hgb>7.0, plt >15 if febrile/neutropenic, plt >10 for afebrile  -onc consult in AM -hgb 5.9 on admission, anemia most likely in setting of chemotherapy   -s/p 1unit prbc, will get post-transfusion cbc  -transfusion protocol: hgb>7.0, plt >15 if febrile/neutropenic, plt >10 for afebrile  -maintain active type and screen -hgb 5.9 on admission, anemia most likely in setting of chemotherapy   -s/p 1unit prbc, will get post-transfusion cbc  -transfusion protocol: hgb>7.0, plt >15 if febrile/neutropenic, plt >10 for afebrile  -maintain active type and screen  -Pt reports no melena or hematochezia

## 2018-04-25 NOTE — H&P ADULT - PROBLEM SELECTOR PLAN 3
-regular diet -hgb 5.9 on admission, anemia most likely in setting of chemotherapy   -s/p 1unit prbc, will get post-transfusion cbc  -transfusion protocol: hgb>7.0, plt >15 if febrile/neutropenic, plt >10 for afebrile -Patient has low grade temp. 100 daily x 2 months, w/ recent admission for fever in early april w/ w/up of BC negative, UCx negative, no fever on admission, CXR negative at that time, and CTA/P w/ perinephric stranding but no clinical symptoms of pyelo, fevers at that time attributed to tumor burden   -Currently, patient afebrile on admission, w/ left flank pain, w/ renal u/s w/ no hydronephrosis and U/A negative for LE/Nitrites/hematuria, w/ no CVA tenderness on exam, not clinically consistent w/ pyelonephritis. CXR clear. Also, fever has been going on for two months, unlikely to be acutely infectious due to the chronicity.  -Fevers most likely secondary to malignancy, patient has had prior w/up in the past, will continue to monitor  -Patient has low BPs on admission, however her baseline BPs are low (90s-100s/60), does not seem to be in active sepsis. Patient currently s/p 2L fluids, will continue w/ IVF @ 75cc's/hr for 18 hours  -f/u blood cultures, consider ID consult  -s/p cefepime, will hold off on abx, and continue to monitor off abx, if patient has clinical symptoms or patient persistently febrile or persistently hypotensive, will start cefepime -Patient reports prolonged fevers but takes acetaminophen frequently for just "chills" so there is a possibility current temperature trends are masked potentially by acetaminophen use?.

## 2018-04-25 NOTE — ED ADULT NURSE REASSESSMENT NOTE - NS ED NURSE REASSESS COMMENT FT1
Right Picc line flushed with normal saline 10cc via aseptic technique, order in place to access picc line, Picc line is from home on arrival, no signs of redness or infiltration, flushes well, no resistance. IV antibiotics infusing now through PICC, pt is tolerating well.

## 2018-04-25 NOTE — H&P ADULT - NSHPSOCIALHISTORY_GEN_ALL_CORE
Social History:    Marital Status:  (   )    (   ) Single    (   )    (  )   Occupation:   Lives with: (  ) alone  (  ) children   (  ) spouse   (  ) parents  (  ) other    Substance Use (street drugs): (  ) never used  (  ) other:  Tobacco Usage:  (   ) never smoked   (   ) former smoker   (   ) current smoker  (     ) pack year  (        ) last cigarette date  Alcohol Usage:  Sexual History: Social History:    Marital Status:  (   )    (X) Single    (   )    (  )   Occupation: not working anymore,   Lives with: (  ) alone  (X) children   (  ) spouse   (  ) parents  (  ) other    Substance Use (street drugs): (X) never used  (  ) other:  Tobacco Usage:  ( X) never smoked   (   ) former smoker   (   ) current smoker  (     ) pack year  (        ) last cigarette date  Alcohol Usage: none

## 2018-04-25 NOTE — H&P ADULT - PROBLEM SELECTOR PROBLEM 4
Prophylactic measure Hodgkin lymphoma, unspecified Hodgkin lymphoma type, unspecified body region Anemia

## 2018-04-25 NOTE — ED PROVIDER NOTE - PROGRESS NOTE DETAILS
Ho: Case d/w hospitalist Dr. Gonzalez. Would like for us to contact [facility] to see if they would like to have pt under their service and call Dr. Gonzalez back if [facility] does not want to take pt. [facility] paged. Ho: case d/w heme fellow. Reports pt will not be getting chemo while here. Admit to hospitalist.

## 2018-04-25 NOTE — H&P ADULT - PROBLEM SELECTOR PLAN 6
-lovenox -hold ppx in setting of anemia on immunotherapy with Brentuximab s/p 8 cycles, now on AVD   -as per recent PET: 3/27 demonstrated progression of lymphoma with bone marrow involvement, splenic involvement, and extensive hypermetabolic LAD in the neck, thorax, abdomen, and pelvis, increased in size and metabolism since prior study in 11/2017  -current ANC: 1300. plt 370, hgb 5.9  -transfusion protocol: hgb>7.0, plt >15 if febrile/neutropenic, plt >10 for afebrile  -onc consult in AM

## 2018-04-25 NOTE — H&P ADULT - HISTORY OF PRESENT ILLNESS
41 yo F w/PMH of Hodgkin's lymphoma on immunotherapy with Brentuximab s/p 8 cycles      Of note, PET/CT on 3/27 demonstrated progression of lymphoma with bone marrow involvement, splenic involvement, and extensive hypermetabolic LAD in the neck, thorax, abdomen, and pelvis, increased in size and metabolism since prior study in 11/2017.    Of note, recent admission 4/6-4/10: In the ED, VS on presentation: T 100.7, , BP 98/67, RR 17, SpO2 99% on RA  CBC revealed normal WBC of 8, anemia to Hb of 6.8, platelet count of 357. CMP revealed hyponatremia to 128, baseline hypoalbuminemia to 2.8, baseline hyperbilirubinemia to 2.0 and baseline elevated alk phos of 386. VBG revealed pH of 7.47, lactate of 2.5. UA unremarkable. RVP negative. CXR demonstrated unchanged bilateral pleural effusions and mediastinal masses. Blood and urine cultures were sent. She was given Zosyn x 1, Vancomycin x 1, 2L NS bolus, Tylenol 975mg x 1. 1U pRBC was ordered and she was admitted for further work up and management.    SIRs present on admission. Sepsis was ruled out.    On the medicine floors, CT C/A/P was obtained which revealed bilateral perinephric standing concerning for possible pyelonephritis. Patient was continued on zosyn. Infectious disease team was consulted. All other infectious work up, including the UA was negative. Persistent fevers were suspected to be due to lymphoma and NOT pyleonephritis. PICC Line was placed on Monday April 9th. Initial round of chemotherapy was given 04/09- dacarbazine, doxorubicin, vinblastine, and dexamethasone. Pateint was given decadron 8mg the following day with Neupogen. Advised to take decadron mg for two more days with plans to follow up with Dr. Baxter tomorrow, he will arrange PICC care tomorrow as discussed w/ heme fellow Dr. Montoya. 41 yo F w/PMH of Hodgkin's lymphoma on immunotherapy with Brentuximab s/p 8 cycles, now on AVD presenting from [facility] w/ c/o of left plank pain. Patient has had c/o of left flank discomfort x 2-3 weeks, and fever x 2 months. Patient states left flank pain is not associated w/ eating, is localized, constant. Pain is improved w/ patient laying on left side. States she has noticed increased urinary frequency and urgency x 2-3 weeks, w/ no dysuria, hematuria. She has had low grade fevers of temp. 100 daily, does not take anything for it. Denies CP, SOB, palpitations, chills, abdominal pain, no changes in bowel habits, LE swelling. All other ROS is negative. Patient went to [facility] today, and expressed her complaints to Dr. Baxter, who also took blood work, and told patient to come to ED for transfusion and abx. No recent sick contact. Lives at home w/ daughter. Since her malignancy diagnosis, her functional status is declining, and now needs a wheelchair to ambulate long distance.    Of note, PET/CT on 3/27 demonstrated progression of lymphoma with bone marrow involvement, splenic involvement, and extensive hypermetabolic LAD in the neck, thorax, abdomen, and pelvis, increased in size and metabolism since prior study in 11/2017.    Of note, recent admission 4/6-4/10: Patient was febrile, was SIRS criteria, w/ fever work-up. BCx/UCx/RVP were negative. CXR was negative for opacities concerning for pna. CTA/P showed brittny-nephric stranding concerning for pyelonephritis, which she got four days of zosyn. It was thought fevers most likely 2/2 tumor burden than pyelonephritis as she had no clinical symptoms.     ED vitals: temp. 99, -->96, 100/50 (86/52), 96% RA, RR 18  In the ED: given tylenol 650mg x 1, cefepime 2g x 1, NS x 2L, 1unit prbc 43 yo F w/PMH of Hodgkin's lymphoma on immunotherapy with Brentuximab s/p 8 cycles, now on AVD presenting from [facility] w/ c/o of left plank pain. Patient has had c/o of left flank discomfort x 2-3 weeks, and fever x 2 months. Patient states left flank pain is not associated w/ eating, is localized, constant. Pain is improved w/ patient laying on left side. States she has noticed increased urinary frequency and urgency x 2-3 weeks, w/ no dysuria, hematuria. She has had low grade fevers of temp. 100 daily, does not take anything for it. Denies CP, SOB, palpitations, chills, abdominal pain, no changes in bowel habits, N/V, LE swelling. All other ROS is negative. Patient went to [facility] today, and expressed her complaints to Dr. Baxter, who also took blood work, and told patient to come to ED for transfusion and abx. No recent sick contact. Lives at home w/ daughter. Since her malignancy diagnosis, her functional status is declining, and now needs a wheelchair to ambulate long distance.    Of note, PET/CT on 3/27 demonstrated progression of lymphoma with bone marrow involvement, splenic involvement, and extensive hypermetabolic LAD in the neck, thorax, abdomen, and pelvis, increased in size and metabolism since prior study in 11/2017.    Of note, recent admission 4/6-4/10: Patient was febrile, was SIRS criteria, w/ fever work-up. BCx/UCx/RVP were negative. CXR was negative for opacities concerning for pna. CTA/P showed brittny-nephric stranding concerning for pyelonephritis, which she got four days of zosyn. It was thought fevers most likely 2/2 tumor burden than pyelonephritis as she had no clinical symptoms.     ED vitals: temp. 99, -->96, 100/50 (86/52), 96% RA, RR 18  In the ED: given tylenol 650mg x 1, cefepime 2g x 1, NS x 2L, 1unit prbc

## 2018-04-25 NOTE — H&P ADULT - PROBLEM SELECTOR PLAN 5
-regular diet ANC 1300, mild neutropenia, most likely in setting of malignancy w/ active chemotherapy

## 2018-04-26 DIAGNOSIS — A41.9 SEPSIS, UNSPECIFIED ORGANISM: ICD-10-CM

## 2018-04-26 LAB
ALBUMIN SERPL ELPH-MCNC: 2.4 G/DL — LOW (ref 3.3–5)
ALP SERPL-CCNC: 546 U/L — HIGH (ref 40–120)
ALT FLD-CCNC: 20 U/L — SIGNIFICANT CHANGE UP (ref 10–45)
ANION GAP SERPL CALC-SCNC: 10 MMOL/L — SIGNIFICANT CHANGE UP (ref 5–17)
AST SERPL-CCNC: 13 U/L — SIGNIFICANT CHANGE UP (ref 10–40)
BILIRUB SERPL-MCNC: 1.2 MG/DL — SIGNIFICANT CHANGE UP (ref 0.2–1.2)
BUN SERPL-MCNC: 6 MG/DL — LOW (ref 7–23)
CALCIUM SERPL-MCNC: 8.4 MG/DL — SIGNIFICANT CHANGE UP (ref 8.4–10.5)
CHLORIDE SERPL-SCNC: 102 MMOL/L — SIGNIFICANT CHANGE UP (ref 96–108)
CO2 SERPL-SCNC: 24 MMOL/L — SIGNIFICANT CHANGE UP (ref 22–31)
CREAT SERPL-MCNC: 0.39 MG/DL — LOW (ref 0.5–1.3)
GGT SERPL-CCNC: 124 U/L — HIGH (ref 8–40)
GLUCOSE SERPL-MCNC: 96 MG/DL — SIGNIFICANT CHANGE UP (ref 70–99)
HCT VFR BLD CALC: 20.1 % — CRITICAL LOW (ref 34.5–45)
HCT VFR BLD CALC: 26.6 % — LOW (ref 34.5–45)
HGB BLD-MCNC: 6.7 G/DL — CRITICAL LOW (ref 11.5–15.5)
HGB BLD-MCNC: 9 G/DL — LOW (ref 11.5–15.5)
LDH SERPL L TO P-CCNC: 222 U/L — SIGNIFICANT CHANGE UP (ref 50–242)
MCHC RBC-ENTMCNC: 28.6 PG — SIGNIFICANT CHANGE UP (ref 27–34)
MCHC RBC-ENTMCNC: 29.2 PG — SIGNIFICANT CHANGE UP (ref 27–34)
MCHC RBC-ENTMCNC: 33.5 GM/DL — SIGNIFICANT CHANGE UP (ref 32–36)
MCHC RBC-ENTMCNC: 33.9 GM/DL — SIGNIFICANT CHANGE UP (ref 32–36)
MCV RBC AUTO: 85.5 FL — SIGNIFICANT CHANGE UP (ref 80–100)
MCV RBC AUTO: 86.2 FL — SIGNIFICANT CHANGE UP (ref 80–100)
PLATELET # BLD AUTO: 333 K/UL — SIGNIFICANT CHANGE UP (ref 150–400)
PLATELET # BLD AUTO: 358 K/UL — SIGNIFICANT CHANGE UP (ref 150–400)
POTASSIUM SERPL-MCNC: 3.3 MMOL/L — LOW (ref 3.5–5.3)
POTASSIUM SERPL-SCNC: 3.3 MMOL/L — LOW (ref 3.5–5.3)
PROT SERPL-MCNC: 5.9 G/DL — LOW (ref 6–8.3)
RBC # BLD: 2.35 M/UL — LOW (ref 3.8–5.2)
RBC # BLD: 3.09 M/UL — LOW (ref 3.8–5.2)
RBC # FLD: 18.7 % — HIGH (ref 10.3–14.5)
RBC # FLD: 20.1 % — HIGH (ref 10.3–14.5)
SODIUM SERPL-SCNC: 136 MMOL/L — SIGNIFICANT CHANGE UP (ref 135–145)
URATE SERPL-MCNC: 2.2 MG/DL — LOW (ref 2.5–7)
WBC # BLD: 1.8 K/UL — LOW (ref 3.8–10.5)
WBC # BLD: 1.9 K/UL — LOW (ref 3.8–10.5)
WBC # FLD AUTO: 1.8 K/UL — LOW (ref 3.8–10.5)
WBC # FLD AUTO: 1.9 K/UL — LOW (ref 3.8–10.5)

## 2018-04-26 PROCEDURE — 99233 SBSQ HOSP IP/OBS HIGH 50: CPT | Mod: GC

## 2018-04-26 PROCEDURE — 99223 1ST HOSP IP/OBS HIGH 75: CPT | Mod: GC

## 2018-04-26 RX ORDER — POTASSIUM CHLORIDE 20 MEQ
40 PACKET (EA) ORAL ONCE
Qty: 0 | Refills: 0 | Status: COMPLETED | OUTPATIENT
Start: 2018-04-26 | End: 2018-04-26

## 2018-04-26 RX ORDER — FERROUS SULFATE 325(65) MG
325 TABLET ORAL DAILY
Qty: 0 | Refills: 0 | Status: DISCONTINUED | OUTPATIENT
Start: 2018-04-26 | End: 2018-04-30

## 2018-04-26 RX ORDER — ACETAMINOPHEN 500 MG
650 TABLET ORAL EVERY 6 HOURS
Qty: 0 | Refills: 0 | Status: DISCONTINUED | OUTPATIENT
Start: 2018-04-26 | End: 2018-04-30

## 2018-04-26 RX ORDER — VANCOMYCIN HCL 1 G
1000 VIAL (EA) INTRAVENOUS ONCE
Qty: 0 | Refills: 0 | Status: DISCONTINUED | OUTPATIENT
Start: 2018-04-26 | End: 2018-04-26

## 2018-04-26 RX ORDER — VANCOMYCIN HCL 1 G
VIAL (EA) INTRAVENOUS
Qty: 0 | Refills: 0 | Status: DISCONTINUED | OUTPATIENT
Start: 2018-04-26 | End: 2018-04-26

## 2018-04-26 RX ORDER — CEFEPIME 1 G/1
1000 INJECTION, POWDER, FOR SOLUTION INTRAMUSCULAR; INTRAVENOUS EVERY 12 HOURS
Qty: 0 | Refills: 0 | Status: DISCONTINUED | OUTPATIENT
Start: 2018-04-26 | End: 2018-04-30

## 2018-04-26 RX ORDER — CEFEPIME 1 G/1
2000 INJECTION, POWDER, FOR SOLUTION INTRAMUSCULAR; INTRAVENOUS EVERY 12 HOURS
Qty: 0 | Refills: 0 | Status: DISCONTINUED | OUTPATIENT
Start: 2018-04-26 | End: 2018-04-26

## 2018-04-26 RX ADMIN — Medication 325 MILLIGRAM(S): at 12:30

## 2018-04-26 RX ADMIN — Medication 650 MILLIGRAM(S): at 00:02

## 2018-04-26 RX ADMIN — CEFEPIME 100 MILLIGRAM(S): 1 INJECTION, POWDER, FOR SOLUTION INTRAMUSCULAR; INTRAVENOUS at 18:30

## 2018-04-26 RX ADMIN — Medication 40 MILLIEQUIVALENT(S): at 12:30

## 2018-04-26 RX ADMIN — Medication 650 MILLIGRAM(S): at 21:12

## 2018-04-26 RX ADMIN — CEFEPIME 100 MILLIGRAM(S): 1 INJECTION, POWDER, FOR SOLUTION INTRAMUSCULAR; INTRAVENOUS at 06:23

## 2018-04-26 NOTE — PROGRESS NOTE ADULT - SUBJECTIVE AND OBJECTIVE BOX
CHIEF COMPLAINT: L flank pain    Interval Events: Patient is a 42 year old woman with history of Hodgkin's Lymphoma on immunotherapy with Brentuximab s/p 8 cycles, now on AVD presenting from [facility] with complaints of left plank pain for the past 2-3 weeks, and low grade fever for the past 2 months. Patient also endorses increasing urinary frequency and urgency with 2-3 weeks. Patient has some burning, no hematuria. Patient was sent in by her oncologist for blood transfusion. Of note, PET/CT on 3/27 demonstrated progression of lymphoma with bone marrow involvement, splenic involvement, and extensive hypermetabolic LAD in the neck, thorax, abdomen, and pelvis, increased in size and metabolism since prior study in 2017. Of note, recent admission -4/10: Patient was febrile, was SIRS criteria, w/ fever work-up. BCx/UCx/RVP were negative. CXR was negative for opacities concerning for pneumonia. CTA/P showed brittny-nephric stranding concerning for pyelonephritis, which she received 4 days of Zosyn. In the ED vitals: Temp. 99, -->96, 100/50 (86/52), 96% RA, RR 18. She received Tylenol 650 mg x 1, cefepime 2g x 1, NS x 2L, 1unit prbc for Hb of 5.9.     No acute event overnight. Patient remained hemodynamically stable.     OBJECTIVE:  Vital Signs Last 24 Hrs  T(C): 36.7 (2018 10:53), Max: 37.2 (2018 14:00)  T(F): 98.1 (2018 10:53), Max: 99 (2018 14:00)  HR: 87 (2018 10:53) (80 - 108)  BP: 100/64 (2018 10:53) (85/32 - 100/64)  BP(mean): --  RR: 16 (2018 10:53) (16 - 20)  SpO2: 96% (2018 22:16) (96% - 100%)    CAPILLARY BLOOD GLUCOSE          PHYSICAL EXAM:  General: Alert and cooperative. Not in acute stress. Well developed, well nourished.   Head: Normocephalic, no mass and lesions.  Eyes: Intact visual fields. PERRLA, clear conjunctiva. EOMI, no ptosis.   Throat: Oral cavity and pharynx normal. No inflammation, swelling, exudate, or lesions. Teeth and gingiva in good general condition.  Neck: No lymphadenopathy, no masses, no thyromegaly. Carotid pulses 2+. No JVD.   Respiratory: Bilateral lung clear to auscultation, no crackles, no wheezes, no rhonchi.   Cardiovascular: S1/S2 auscultated, no murmur, or gallop. Rhythm is regular. There is no peripheral edema, cyanosis or pallor. Extremities are warm and well perfused. Capillary refill is less than 2 seconds. No carotid bruits.  Abdomen: Soft, non-tender, nondistended, no guarding or rebound tenderness. Active bowel sounds in all 4 quadrants. No hepatosplenomegaly.   Musculoskeletal: Adequately aligned spine. ROM intact spine and extremities. No joint erythema or tenderness. Normal muscular development. Normal gait.   Extremities: No significant deformity or joint abnormality. Peripheral pulses intact. No varicosities. No peripheral edema, atrophy.   Skin: Intact, no rash. Normal color, texture and turgor with no lesions or eruptions.  Neurological: AOAx4. CN2-12 grosslly intact. Strength and sensation symmetric and intact throughout. Reflexes 2+ throughout. Cerebellar testing normal.  Psychiatry: The mental examination revealed the patient was oriented to person, place, and time. The patient was able to demonstrate good judgement and reason, without hallucinations, abnormal affect or abnormal behaviors during the examination. Patient is not suicidal.     LINES:    HOSPITAL MEDICATIONS:    cefepime  IVPB 2000 milliGRAM(s) IV Intermittent every 12 hours                  ferrous    sulfate 325 milliGRAM(s) Oral daily  potassium chloride    Tablet ER 40 milliEquivalent(s) Oral once  sodium chloride 0.9%. 1000 milliLiter(s) IV Continuous <Continuous>            LABS:                        6.7    1.8   )-----------( 333      ( 2018 08:44 )             20.1     Hgb Trend: 6.7<--, 7.1<--, 5.9<--      136  |  102  |  6<L>  ----------------------------<  96  3.3<L>   |  24  |  0.39<L>    Ca    8.4      2018 08:44    TPro  5.9<L>  /  Alb  2.4<L>  /  TBili  1.2  /  DBili  x   /  AST  13  /  ALT  20  /  AlkPhos  546<H>      Creatinine Trend: 0.39<--, 0.43<--, 0.41<--, 0.41<--, 0.45<--, 0.58<--  PT/INR - ( 2018 14:21 )   PT: 16.5 sec;   INR: 1.51 ratio         PTT - ( 2018 14:21 )  PTT:33.2 sec  Urinalysis Basic - ( 2018 14:21 )    Color: Imelda / Appearance: SL Turbid / S.019 / pH: x  Gluc: x / Ketone: Negative  / Bili: Negative / Urobili: Negative   Blood: x / Protein: 30 mg/dL / Nitrite: Negative   Leuk Esterase: Negative / RBC: 3-5 /HPF / WBC 3-5 /HPF   Sq Epi: x / Non Sq Epi: OCC /HPF / Bacteria: Few /HPF        Venous Blood Gas:   @ 14:21  7.36/44/24/24/30  VBG Lactate: 2.3      MICROBIOLOGY:     RADIOLOGY:  [ ] Reviewed and interpreted by me    EKG: CHIEF COMPLAINT: L flank pain    Interval Events: Patient is a 42 year old woman with history of Hodgkin's Lymphoma on immunotherapy with Brentuximab s/p 8 cycles, now on AVD presenting from [facility] with complaints of left plank pain for the past 2-3 weeks, and low grade fever for the past 2 months. Patient also endorses increasing urinary frequency and urgency with 2-3 weeks. Patient has some burning, no hematuria. Patient was sent in by her oncologist for blood transfusion. Of note, PET/CT on 3/27 demonstrated progression of lymphoma with bone marrow involvement, splenic involvement, and extensive hypermetabolic LAD in the neck, thorax, abdomen, and pelvis, increased in size and metabolism since prior study in 2017. Of note, recent admission -4/10: Patient was febrile, was SIRS criteria, w/ fever work-up. BCx/UCx/RVP were negative. CXR was negative for opacities concerning for pneumonia. CTA/P showed brittny-nephric stranding concerning for pyelonephritis, which she received 4 days of Zosyn. In the ED vitals: Temp. 99, -->96, 100/50 (86/52), 96% RA, RR 18. She received Tylenol 650 mg x 1, cefepime 2g x 1, NS x 2L, 1unit prbc for Hb of 5.9.     No acute event overnight. Patient was afebrile and remained hemodynamically stable. She is seen and examined in the morning at the bedside. She denies fever or chills. She denies chest pain or respiratory distress. She denies dizziness, abdominal pain, nausea or vomiting. She denies She continues to endorse some warmth feeling when she urinates.     OBJECTIVE:  Vital Signs Last 24 Hrs  T(C): 36.7 (2018 10:53), Max: 37.2 (2018 14:00)  T(F): 98.1 (2018 10:53), Max: 99 (2018 14:00)  HR: 87 (2018 10:53) (80 - 108)  BP: 100/64 (2018 10:53) (85/32 - 100/64)  BP(mean): --  RR: 16 (2018 10:53) (16 - 20)  SpO2: 96% (2018 22:16) (96% - 100%)    CAPILLARY BLOOD GLUCOSE          PHYSICAL EXAM:  General: Alert and cooperative. Not in acute stress. Well developed, well nourished.   Head: Normocephalic, no mass and lesions.  Eyes: PERRLA, clear conjunctiva. EOMI, no ptosis.   Respiratory: Bilateral lung clear to auscultation, no crackles, no wheezes, no rhonchi.   Cardiovascular: S1/S2 auscultated, no murmur, or gallop. Rhythm is regular. There is no peripheral edema, cyanosis or pallor. Extremities are warm and well perfused.   Abdomen: Soft, non-tender, nondistended, no guarding or rebound tenderness. Active bowel sounds in all 4 quadrants. Significant L CVA tenderness.   Musculoskeletal: Adequately aligned spine. ROM intact spine and extremities. No joint erythema or tenderness. Normal muscular development. Normal gait.   Extremities: No peripheral edema, atrophy.   Skin: Intact, no rash. Normal color, texture and turgor with no lesions or eruptions. R arm with line, skin does not appear erythematous, no warmth.   Neurological: AOAx3.     HOSPITAL MEDICATIONS:    cefepime  IVPB 2000 milliGRAM(s) IV Intermittent every 12 hours                  ferrous    sulfate 325 milliGRAM(s) Oral daily  potassium chloride    Tablet ER 40 milliEquivalent(s) Oral once  sodium chloride 0.9%. 1000 milliLiter(s) IV Continuous <Continuous>            LABS:                        6.7    1.8   )-----------( 333      ( 2018 08:44 )             20.1     Hgb Trend: 6.7<--, 7.1<--, 5.9<--      136  |  102  |  6<L>  ----------------------------<  96  3.3<L>   |  24  |  0.39<L>    Ca    8.4      2018 08:44    TPro  5.9<L>  /  Alb  2.4<L>  /  TBili  1.2  /  DBili  x   /  AST  13  /  ALT  20  /  AlkPhos  546<H>      Creatinine Trend: 0.39<--, 0.43<--, 0.41<--, 0.41<--, 0.45<--, 0.58<--  PT/INR - ( 2018 14:21 )   PT: 16.5 sec;   INR: 1.51 ratio         PTT - ( 2018 14:21 )  PTT:33.2 sec  Urinalysis Basic - ( 2018 14:21 )    Color: Imelda / Appearance: SL Turbid / S.019 / pH: x  Gluc: x / Ketone: Negative  / Bili: Negative / Urobili: Negative   Blood: x / Protein: 30 mg/dL / Nitrite: Negative   Leuk Esterase: Negative / RBC: 3-5 /HPF / WBC 3-5 /HPF   Sq Epi: x / Non Sq Epi: OCC /HPF / Bacteria: Few /HPF        Venous Blood Gas:   @ 14:21  7.36/44/24/24/30  VBG Lactate: 2.3        RADIOLOGY:  < from: US Renal (18 @ 15:39) >  FINDINGS:    Right kidney:  12.6 cm. No renal mass, hydronephrosis or calculi. Linear   scar in the interpolar region.    Left kidney:  12.3 cm. No renal mass, hydronephrosis or calculi. Trace   perinephric fluid.    Urinary bladder: Within normal limits.    Miscellaneous: Incidentally noted splenomegaly, 16.9 cm.     IMPRESSION:     No hydronephrosis.      < end of copied text >

## 2018-04-26 NOTE — PROGRESS NOTE ADULT - PROBLEM SELECTOR PLAN 5
ANC 1300, mild neutropenia, most likely in setting of malignancy w/ active chemotherapy - DVT prophylaxis: hold VTE due to significant anemia  - Diet: Regular    Conchita Grimm PGY-1  Internal Medicine [facility]  Pager [phone]

## 2018-04-26 NOTE — CONSULT NOTE ADULT - ASSESSMENT
43 yo F w/PMH of relapsed  Hodgkin's lymphoma s/p 8 cycles of Brentuximab (  9/1/17-2/20/18) with PET scan done in 3/2018 showing relapsed disease now s/p C1 of AVD ( 4/9/18) presenting from [facility] w/ c/o of left plank pain and " I was anemic"    Anemia- on admission hemoglobin 5.9; now s/p 2 transfusions with improvement in hemoglobin to 9.0 today. she does have bone marrow involvement from the lymphoma. continue to monitor hgb. LDH normal.     Back pain- does have CVA tenderness on left side. follow up urine culture. U/S done on 4/25 showing no hydronephrosis. would continue antibiotics for now and monitor clinically. follow up blood cultures as well    Hodgkin's lymphoma- relapsed. s/p 8 cycles of Brentuximab (  9/1/17-2/20/18) . She refused front line therapy at diagnosis last year and insisted on only getting brentuximab.  had PET scan done in 3/2018 showing relapsed disease   she is now s/p C1 of AVD ( 4/9/18)   we will likely give cycle 2 here of AVD with neulasta after this cycle.  monitor cbc with diff daily

## 2018-04-26 NOTE — CONSULT NOTE ADULT - ATTENDING COMMENTS
42 yoF with a history of HL stage IV likely based on pet s/p brentuximab first line given patient refusal for other chemo with progression of disease based on recent pet from 3/2018 admitted 2 weeks ago for C1 A+AVD given 4/9. On follow up this week at [facility] complaining of fevers, L flank pain and CT scan from previously suspicious for bilateral pyelonephritis. given patient febrile and symptomatic, would favor follow urine cx, cover with antibiotics, repeat CT abdomen. Once clinically improved, will initiate C2 likely with neulasta.  -transfused prbc  -anemia work up- anemai likely from chemo and bm involvement +/- infection?  -pyelo workup  -plan tx for HL C2 A+AVD while inpatient

## 2018-04-26 NOTE — CONSULT NOTE ADULT - SUBJECTIVE AND OBJECTIVE BOX
HPI:  41 yo F w/PMH of relapsed  Hodgkin's lymphoma s/p 8 cycles of Brentuximab (  9/1/17-2/20/18) with PET scan done in 3/2018 showing relapsed disease now s/p C1 of AVD ( 4/9/18) presenting from [facility] w/ c/o of left plank pain and " I was anemic"    Patient has had c/o of left flank discomfort x 2-3 weeks, and fever x 2 months. Patient states left flank pain is not associated w/ eating, is localized, constant. Pain is improved w/ patient laying on left side. States she has noticed increased urinary frequency and urgency x 2-3 weeks, w/ no dysuria, hematuria. She has had low grade fevers of temp. 100 daily, does not take anything for it. Denies CP, SOB, palpitations, chills, abdominal pain, no changes in bowel habits, N/V, LE swelling.    Patient went to [facility]  and expressed her complaints to Dr. Baxter, who also took blood work, and told patient to come to ED for transfusion and abx. No recent sick contact. Lives at home w/ daughter. Since her malignancy diagnosis, her functional status is declining, and now needs a wheelchair to ambulate long distance.    Of note, PET/CT on 3/27 demonstrated progression of lymphoma with bone marrow involvement, splenic involvement, and extensive hypermetabolic LAD in the neck, thorax, abdomen, and pelvis, increased in size and metabolism since prior study in 11/2017.    Of note, recent admission 4/6-4/10: Patient was febrile, was SIRS criteria, w/ fever work-up. BCx/UCx/RVP were negative. CXR was negative for opacities concerning for pna. CTA/P showed brittny-nephric stranding concerning for pyelonephritis, which she got four days of zosyn. It was thought fevers most likely 2/2 tumor burden than pyelonephritis as she had no clinical symptoms.     ED vitals: temp. 99, -->96, 100/50 (86/52), 96% RA, RR 18  In the ED: given tylenol 650mg x 1, cefepime 2g x 1, NS x 2L, 1unit prbc (25 Apr 2018 19:58)      PAST MEDICAL & SURGICAL HISTORY:  Hodgkin lymphoma  H/O abdominoplasty: 2004  H/O bilateral breast implants: 2004      Allergies    No Known Allergies    Intolerances        MEDICATIONS  (STANDING):  cefepime  IVPB 1000 milliGRAM(s) IV Intermittent every 12 hours  ferrous    sulfate 325 milliGRAM(s) Oral daily  sodium chloride 0.9%. 1000 milliLiter(s) (75 mL/Hr) IV Continuous <Continuous>    MEDICATIONS  (PRN):  acetaminophen   Tablet 650 milliGRAM(s) Oral every 6 hours PRN For Temp greater than 38 C (100.4 F)      FAMILY HISTORY:  Family history of cervical cancer (Sibling)  Family history of lung cancer      SOCIAL HISTORY: No EtOH, no tobacco    REVIEW OF SYSTEMS:    CONSTITUTIONAL: No weakness, fevers or chills  EYES/ENT: No visual changes;  No vertigo or throat pain   NECK: No pain or stiffness  RESPIRATORY: No cough, wheezing, hemoptysis; No shortness of breath  CARDIOVASCULAR: No chest pain or palpitations  GASTROINTESTINAL: No abdominal or epigastric pain. No nausea, vomiting, or hematemesis; No diarrhea or constipation. No melena or hematochezia.  GENITOURINARY: No dysuria, frequency or hematuria  NEUROLOGICAL: No numbness or weakness  SKIN: No itching, burning, rashes, or lesions   All other review of systems is negative unless indicated above.    Height (cm): 167.64 (04-25 @ 20:45)  Weight (kg): 50.8 (04-25 @ 20:45)  BMI (kg/m2): 18.1 (04-25 @ 20:45)  BSA (m2): 1.56 (04-25 @ 20:45)    T(F): 98.8 (04-26-18 @ 18:09), Max: 102.2 (04-26-18 @ 15:56)  HR: 110 (04-26-18 @ 15:56)  BP: 102/66 (04-26-18 @ 15:56)  RR: 16 (04-26-18 @ 15:56)  SpO2: 96% (04-26-18 @ 15:56)  Wt(kg): --    GENERAL: NAD, well-developed  HEAD:  Atraumatic, Normocephalic  EYES: EOMI, PERRLA, conjunctiva and sclera clear  NECK: Supple, No JVD  CHEST/LUNG: Clear to auscultation bilaterally; No wheeze  HEART: Regular rate and rhythm; No murmurs, rubs, or gallops  ABDOMEN: Soft, Nontender, Nondistended; Bowel sounds present  EXTREMITIES:  2+ Peripheral Pulses, No clubbing, cyanosis, or edema  NEUROLOGY: non-focal  SKIN: No rashes or lesions                          9.0    1.9   )-----------( 358      ( 26 Apr 2018 16:54 )             26.6       04-26    136  |  102  |  6<L>  ----------------------------<  96  3.3<L>   |  24  |  0.39<L>    Ca    8.4      26 Apr 2018 08:44    TPro  5.9<L>  /  Alb  2.4<L>  /  TBili  1.2  /  DBili  x   /  AST  13  /  ALT  20  /  AlkPhos  546<H>  04-26      Lactate Dehydrogenase, Serum: 222 U/L (04-26 @ 16:54)  Uric Acid, Serum: 2.2 mg/dL (04-26 @ 16:54)      PT/INR - ( 25 Apr 2018 14:21 )   PT: 16.5 sec;   INR: 1.51 ratio         PTT - ( 25 Apr 2018 14:21 )  PTT:33.2 sec    .Urine Clean Catch (Midstream)  04-07 @ 02:14   No growth  --  --      .Blood Blood-Peripheral  04-06 @ 23:58   No growth at 5 days.  --  --      .Urine Clean Catch (Midstream)  03-24 @ 21:04   No growth  --  --      .Blood Blood-Peripheral  03-24 @ 18:00   No growth at 5 days.  --  --      .Urine Clean Catch (Midstream)  03-22 @ 22:54   No growth  --  --      .Blood Blood-Peripheral  03-22 @ 21:54   No growth at 5 days.  --  --

## 2018-04-26 NOTE — PROGRESS NOTE ADULT - ASSESSMENT
43 yo F w/PMH of Hodgkin's lymphoma on immunotherapy with Brentuximab s/p 8 cycles, now on AVD presenting from [facility] w/ c/o of left plank pain, fevers, likely pyelonephritis. Patient is a 42 year old woman with history of Hodgkin's lymphoma on immunotherapy with Brentuximab s/p 8 cycles, now on AVD presenting from [facility] with complaints of left plank pain with sepsis secondary to pyelonephritis.

## 2018-04-26 NOTE — CHART NOTE - NSCHARTNOTEFT_GEN_A_CORE
Called by RN for fever, temp 101.8.    Patient seen at bedside. Vitals significant for mild tachycardia -110's, BP at patient's baseline 103/69. Patient admitted for sepsis in setting of Hodgkins lymphoma, r/o pyelonephritis. As per primary team plan was to start vancomycin should fevers continue. Vancomycin was offered to patient however patient declined, stating she "only wants antibiotics directly related to the kidneys" and would like to hold off at present moment. Risks of not expanding antibiotics were explained to patient, including worsening infection, and she expressed understanding. She is amenable to re-visiting discussion in AM should she continue to be febrile and/or experience worsening sepsis.    RN was advised to please re-check vitals in 4 hours.    SCOTT Miranda MD PGY-1.

## 2018-04-26 NOTE — PROGRESS NOTE ADULT - ATTENDING COMMENTS
mildly neutropenic, diff pending, can continue with cefepime but change to q8hr, awaiting ucx, bx ngtd will quickly de escalate  no signs of blood loss anemia, transfuse 1uprbc, t bili improved, no coagulopathy plts normal  doubt hemolysis  replete lytes  inform heme of admission  sheeba tinoco mildly neutropenic, diff pending, can continue with cefepime but change to 1gm q12 awaiting ucx, bx ngtd will quickly de escalate  no signs of blood loss anemia, transfuse 1uprbc, t bili improved, no coagulopathy plts normal  doubt hemolysis  replete lytes  inform heme of admission  sheeba tinoco

## 2018-04-26 NOTE — PROGRESS NOTE ADULT - PROBLEM SELECTOR PLAN 1
-patient has left flank pain w/ increased urinary frequency for 2-3 weeks, w/ perinephric stranding on u/s of left kidney suggestive of pyelonephritis.  -will continue to monitor pain w/ pain control as needed.   -patient s/p cefepime, will continue w/ cefepime 2g q 12 hours, given immunocompromised state and mild neutropenic (ANC 1300)   -will order urine hcg  -cbc w/ diff daily  -f/u Blood cx and urine c/x, u/a currently may be negative for LE/nitrites as patient had recent 4 days of zosyn, patient may require 7-10 days of abx for tx of pyelo Improving  - Patient presented with tachycardia, fever, hypotension likely sepsis due to L pyelonephritis  - Given patient's relative immunocompromised state, will continue with Cefepime (4/25-), pending urine and blood culture. Patient may require 7-10 days of antibiotics depending on the species  - Continue with IVF @ 75 cc/hr, will continue to monitor BP Improving  - Patient presented with tachycardia, fever, hypotension likely sepsis due to L pyelonephritis  - Given patient's relative immunocompromised state, will continue with Cefepime 1g Q12 (4/25-), pending urine and blood culture. Patient may require 7-10 days of antibiotics depending on the species  - Continue with IVF @ 75 cc/hr, will continue to monitor BP

## 2018-04-26 NOTE — PROGRESS NOTE ADULT - PROBLEM SELECTOR PLAN 3
-Patient reports prolonged fevers but takes acetaminophen frequently for just "chills" so there is a possibility current temperature trends are masked potentially by acetaminophen use?. Stable  - ANC 1300, mild neutropenia, most likely in setting of malignancy with active chemotherapy  - Will continue with Cefepime for pyelonephritis  - Continue to monitor CBC

## 2018-04-26 NOTE — PROGRESS NOTE ADULT - PROBLEM SELECTOR PLAN 4
-hgb 5.9 on admission, anemia most likely in setting of chemotherapy   -s/p 1unit prbc, will get post-transfusion cbc  -transfusion protocol: hgb>7.0, plt >15 if febrile/neutropenic, plt >10 for afebrile  -maintain active type and screen  -Pt reports no melena or hematochezia on immunotherapy with Brentuximab s/p 8 cycles, now on AVD   -as per recent PET: 3/27 demonstrated progression of lymphoma with bone marrow involvement, splenic involvement, and extensive hypermetabolic LAD in the neck, thorax, abdomen, and pelvis, increased in size and metabolism since prior study in 11/2017  - Current ANC: 1300, platelet 370  - Transfusion protocol: hgb>7.0, plt >15 if febrile/neutropenic, plt >10 for afebrile  - Will consult oncology to see the next chemotherapy session as patient missed her last session

## 2018-04-26 NOTE — PROGRESS NOTE ADULT - PROBLEM SELECTOR PLAN 2
-Patient has low BPs on admission, however her baseline BPs are low (90s-100s/60), does not seem to be in active sepsis. Patient currently s/p 2L fluids, will continue w/ IVF @ 75cc's/hr for 18 hours. Patient currently not hypoxic, comfortable on RA, unlikely PE, if patient persistently hypotensive w/ clinical symptoms, will pursue CTA  -will continue w/ cefepime as mentioned above, and continue to monitor BPs. - Likely secondary to iron deficiency anemia vs chemotherapy related, s/p 1 u PRBC on 4/25 and 1 u on 4/26  - No source of active bleed, will continue to monitor CBC closely, and transfuse for goal Hb >7  - Will continue with home dose of iron supplement

## 2018-04-26 NOTE — PROGRESS NOTE ADULT - PROBLEM SELECTOR PLAN 6
on immunotherapy with Brentuximab s/p 8 cycles, now on AVD   -as per recent PET: 3/27 demonstrated progression of lymphoma with bone marrow involvement, splenic involvement, and extensive hypermetabolic LAD in the neck, thorax, abdomen, and pelvis, increased in size and metabolism since prior study in 11/2017  -current ANC: 1300. plt 370, hgb 5.9  -transfusion protocol: hgb>7.0, plt >15 if febrile/neutropenic, plt >10 for afebrile  -onc consult in AM

## 2018-04-27 ENCOUNTER — TRANSCRIPTION ENCOUNTER (OUTPATIENT)
Age: 43
End: 2018-04-27

## 2018-04-27 LAB
ALBUMIN SERPL ELPH-MCNC: 2.5 G/DL — LOW (ref 3.3–5)
ALP SERPL-CCNC: 552 U/L — HIGH (ref 40–120)
ALT FLD-CCNC: 16 U/L — SIGNIFICANT CHANGE UP (ref 10–45)
ANION GAP SERPL CALC-SCNC: 10 MMOL/L — SIGNIFICANT CHANGE UP (ref 5–17)
APPEARANCE UR: CLEAR — SIGNIFICANT CHANGE UP
APTT BLD: 33.3 SEC — SIGNIFICANT CHANGE UP (ref 27.5–37.4)
AST SERPL-CCNC: 11 U/L — SIGNIFICANT CHANGE UP (ref 10–40)
BASOPHILS # BLD AUTO: 0 K/UL — SIGNIFICANT CHANGE UP (ref 0–0.2)
BILIRUB SERPL-MCNC: 1.2 MG/DL — SIGNIFICANT CHANGE UP (ref 0.2–1.2)
BILIRUB UR-MCNC: NEGATIVE — SIGNIFICANT CHANGE UP
BUN SERPL-MCNC: 5 MG/DL — LOW (ref 7–23)
CALCIUM SERPL-MCNC: 8.8 MG/DL — SIGNIFICANT CHANGE UP (ref 8.4–10.5)
CHLORIDE SERPL-SCNC: 103 MMOL/L — SIGNIFICANT CHANGE UP (ref 96–108)
CO2 SERPL-SCNC: 25 MMOL/L — SIGNIFICANT CHANGE UP (ref 22–31)
COLOR SPEC: YELLOW — SIGNIFICANT CHANGE UP
CREAT SERPL-MCNC: 0.39 MG/DL — LOW (ref 0.5–1.3)
DIFF PNL FLD: ABNORMAL
EOSINOPHIL # BLD AUTO: 0.1 K/UL — SIGNIFICANT CHANGE UP (ref 0–0.5)
EOSINOPHIL NFR BLD AUTO: 2 % — SIGNIFICANT CHANGE UP (ref 0–6)
GLUCOSE SERPL-MCNC: 93 MG/DL — SIGNIFICANT CHANGE UP (ref 70–99)
GLUCOSE UR QL: NEGATIVE — SIGNIFICANT CHANGE UP
HAPTOGLOB SERPL-MCNC: 275 MG/DL — HIGH (ref 34–200)
HCT VFR BLD CALC: 25.3 % — LOW (ref 34.5–45)
HGB BLD-MCNC: 8.5 G/DL — LOW (ref 11.5–15.5)
INR BLD: 1.42 RATIO — HIGH (ref 0.88–1.16)
KETONES UR-MCNC: NEGATIVE — SIGNIFICANT CHANGE UP
LEUKOCYTE ESTERASE UR-ACNC: NEGATIVE — SIGNIFICANT CHANGE UP
LYMPHOCYTES # BLD AUTO: 0.4 K/UL — LOW (ref 1–3.3)
LYMPHOCYTES # BLD AUTO: 13 % — SIGNIFICANT CHANGE UP (ref 13–44)
MAGNESIUM SERPL-MCNC: 2.1 MG/DL — SIGNIFICANT CHANGE UP (ref 1.6–2.6)
MCHC RBC-ENTMCNC: 29 PG — SIGNIFICANT CHANGE UP (ref 27–34)
MCHC RBC-ENTMCNC: 33.6 GM/DL — SIGNIFICANT CHANGE UP (ref 32–36)
MCV RBC AUTO: 86.4 FL — SIGNIFICANT CHANGE UP (ref 80–100)
MONOCYTES # BLD AUTO: 0.3 K/UL — SIGNIFICANT CHANGE UP (ref 0–0.9)
MONOCYTES NFR BLD AUTO: 13 % — SIGNIFICANT CHANGE UP (ref 2–14)
NEUTROPHILS # BLD AUTO: 1.2 K/UL — LOW (ref 1.8–7.4)
NEUTROPHILS NFR BLD AUTO: 71 % — SIGNIFICANT CHANGE UP (ref 43–77)
NITRITE UR-MCNC: NEGATIVE — SIGNIFICANT CHANGE UP
PH UR: 6 — SIGNIFICANT CHANGE UP (ref 5–8)
PHOSPHATE SERPL-MCNC: 3.5 MG/DL — SIGNIFICANT CHANGE UP (ref 2.5–4.5)
PLATELET # BLD AUTO: 325 K/UL — SIGNIFICANT CHANGE UP (ref 150–400)
POTASSIUM SERPL-MCNC: 3.7 MMOL/L — SIGNIFICANT CHANGE UP (ref 3.5–5.3)
POTASSIUM SERPL-SCNC: 3.7 MMOL/L — SIGNIFICANT CHANGE UP (ref 3.5–5.3)
PROT SERPL-MCNC: 6.4 G/DL — SIGNIFICANT CHANGE UP (ref 6–8.3)
PROT UR-MCNC: SIGNIFICANT CHANGE UP
PROTHROM AB SERPL-ACNC: 15.6 SEC — HIGH (ref 9.8–12.7)
RBC # BLD: 2.93 M/UL — LOW (ref 3.8–5.2)
RBC # FLD: 18.8 % — HIGH (ref 10.3–14.5)
RBC CASTS # UR COMP ASSIST: SIGNIFICANT CHANGE UP /HPF (ref 0–2)
SODIUM SERPL-SCNC: 138 MMOL/L — SIGNIFICANT CHANGE UP (ref 135–145)
SP GR SPEC: >1.03 — HIGH (ref 1.01–1.02)
UROBILINOGEN FLD QL: NEGATIVE — SIGNIFICANT CHANGE UP
WBC # BLD: 2 K/UL — LOW (ref 3.8–10.5)
WBC # FLD AUTO: 2 K/UL — LOW (ref 3.8–10.5)
WBC UR QL: SIGNIFICANT CHANGE UP /HPF (ref 0–5)

## 2018-04-27 PROCEDURE — 74177 CT ABD & PELVIS W/CONTRAST: CPT | Mod: 26

## 2018-04-27 PROCEDURE — 71260 CT THORAX DX C+: CPT | Mod: 26

## 2018-04-27 PROCEDURE — 99232 SBSQ HOSP IP/OBS MODERATE 35: CPT | Mod: GC

## 2018-04-27 PROCEDURE — 99233 SBSQ HOSP IP/OBS HIGH 50: CPT | Mod: GC

## 2018-04-27 RX ORDER — VANCOMYCIN HCL 1 G
1000 VIAL (EA) INTRAVENOUS EVERY 12 HOURS
Qty: 0 | Refills: 0 | Status: DISCONTINUED | OUTPATIENT
Start: 2018-04-27 | End: 2018-04-29

## 2018-04-27 RX ORDER — SODIUM CHLORIDE 9 MG/ML
1000 INJECTION INTRAMUSCULAR; INTRAVENOUS; SUBCUTANEOUS
Qty: 0 | Refills: 0 | Status: DISCONTINUED | OUTPATIENT
Start: 2018-04-27 | End: 2018-04-30

## 2018-04-27 RX ORDER — VANCOMYCIN HCL 1 G
VIAL (EA) INTRAVENOUS
Qty: 0 | Refills: 0 | Status: DISCONTINUED | OUTPATIENT
Start: 2018-04-27 | End: 2018-04-29

## 2018-04-27 RX ORDER — VANCOMYCIN HCL 1 G
1000 VIAL (EA) INTRAVENOUS ONCE
Qty: 0 | Refills: 0 | Status: COMPLETED | OUTPATIENT
Start: 2018-04-27 | End: 2018-04-27

## 2018-04-27 RX ADMIN — Medication 650 MILLIGRAM(S): at 21:31

## 2018-04-27 RX ADMIN — SODIUM CHLORIDE 50 MILLILITER(S): 9 INJECTION INTRAMUSCULAR; INTRAVENOUS; SUBCUTANEOUS at 16:30

## 2018-04-27 RX ADMIN — CEFEPIME 100 MILLIGRAM(S): 1 INJECTION, POWDER, FOR SOLUTION INTRAMUSCULAR; INTRAVENOUS at 06:09

## 2018-04-27 RX ADMIN — Medication 250 MILLIGRAM(S): at 15:34

## 2018-04-27 RX ADMIN — CEFEPIME 100 MILLIGRAM(S): 1 INJECTION, POWDER, FOR SOLUTION INTRAMUSCULAR; INTRAVENOUS at 17:28

## 2018-04-27 NOTE — PROVIDER CONTACT NOTE (OTHER) - ACTION/TREATMENT ORDERED:
will order tylenol, no further changes in treatment  addendum- ok for pt to refuse tylenol.
MD Mariusz in to see the patient. Tylenol ordered. MD notified of the above. Per MD Ekg is not needed at this time. Will continue to monitor.
Tylenol 650mg Po given . pt seen by MD .vancomycin 1gm IV  x 1 dose ordered .pt refused vancomycin   MD aware.
tylenol 650mg po, blood and urine cultures

## 2018-04-27 NOTE — PROGRESS NOTE ADULT - ATTENDING COMMENTS
repeat blood cultures, agree with addition of vancomycin  ct chest abd pelvis to distinguish if abscess or lymphoma causing fevers

## 2018-04-27 NOTE — PROGRESS NOTE ADULT - SUBJECTIVE AND OBJECTIVE BOX
CHIEF COMPLAINT: L flank pain    Interval Events: Overnight patient was febrile T max was 38.8 at 9 pm. She was slightly tachycardic 100-110, BP at her baseline /60-70.  She is seen and examined in the morning at the bedside. She denies fever or chills. She denies chest pain or respiratory distress. She denies dizziness, abdominal pain, nausea or vomiting.    OBJECTIVE:  Vital Signs Last 24 Hrs  T(C): 36.4 (2018 06:00), Max: 39 (2018 15:56)  T(F): 97.6 (2018 06:00), Max: 102.2 (2018 15:56)  HR: 72 (2018 06:00) (72 - 110)  BP: 110/73 (2018 06:00) (93/56 - 110/73)  BP(mean): --  RR: 18 (2018 06:00) (16 - 18)  SpO2: 99% (2018 06:00) (96% - 99%)     @ 07:01  -   @ 07:00  --------------------------------------------------------  IN: 1000 mL / OUT: 0 mL / NET: 1000 mL      CAPILLARY BLOOD GLUCOSE          PHYSICAL EXAM:  General: Alert and cooperative. Not in acute stress. Well developed, well nourished.   Head: Normocephalic, no mass and lesions.  Eyes: PERRLA, clear conjunctiva. EOMI, no ptosis.   Respiratory: Bilateral lung clear to auscultation, no crackles, no wheezes, no rhonchi.   Cardiovascular: S1/S2 auscultated, no murmur, or gallop. Rhythm is regular. There is no peripheral edema, cyanosis or pallor. Extremities are warm and well perfused.   Abdomen: Soft, non-tender, nondistended, no guarding or rebound tenderness. Active bowel sounds in all 4 quadrants. Significant L CVA tenderness.   Musculoskeletal: Adequately aligned spine. ROM intact spine and extremities. No joint erythema or tenderness. Normal muscular development. Normal gait.   Extremities: No peripheral edema, atrophy.   Skin: Intact, no rash. Normal color, texture and turgor with no lesions or eruptions. R arm with line, skin does not appear erythematous, no warmth.   Neurological: AOAx3.       HOSPITAL MEDICATIONS:    cefepime  IVPB 1000 milliGRAM(s) IV Intermittent every 12 hours  vancomycin  IVPB              acetaminophen   Tablet 650 milliGRAM(s) Oral every 6 hours PRN          ferrous    sulfate 325 milliGRAM(s) Oral daily  sodium chloride 0.9%. 1000 milliLiter(s) IV Continuous <Continuous>            LABS:                        8.5    2.0   )-----------( 325      ( 2018 06:39 )             25.3     Hgb Trend: 8.5<--, 9.0<--, 6.7<--, 7.1<--, 5.9<--      138  |  103  |  5<L>  ----------------------------<  93  3.7   |  25  |  0.39<L>    Ca    8.8      2018 06:40  Phos  3.5       Mg     2.1         TPro  6.4  /  Alb  2.5<L>  /  TBili  1.2  /  DBili  x   /  AST  11  /  ALT  16  /  AlkPhos  552<H>      Creatinine Trend: 0.39<--, 0.39<--, 0.43<--, 0.41<--, 0.41<--, 0.45<--  PT/INR - ( 2018 06:43 )   PT: 15.6 sec;   INR: 1.42 ratio         PTT - ( 2018 06:43 )  PTT:33.3 sec  Urinalysis Basic - ( 2018 14:21 )    Color: Imelda / Appearance: SL Turbid / S.019 / pH: x  Gluc: x / Ketone: Negative  / Bili: Negative / Urobili: Negative   Blood: x / Protein: 30 mg/dL / Nitrite: Negative   Leuk Esterase: Negative / RBC: 3-5 /HPF / WBC 3-5 /HPF   Sq Epi: x / Non Sq Epi: OCC /HPF / Bacteria: Few /HPF        Venous Blood Gas:   @ 14:21  7.36/44/24/  VBG Lactate: 2.3      RADIOLOGY:  < from: US Renal (18 @ 15:39) >  FINDINGS:    Right kidney:  12.6 cm. No renal mass, hydronephrosis or calculi. Linear   scar in the interpolar region.    Left kidney:  12.3 cm. No renal mass, hydronephrosis or calculi. Trace   perinephric fluid.    Urinary bladder: Within normal limits.    Miscellaneous: Incidentally noted splenomegaly, 16.9 cm.     IMPRESSION:     No hydronephrosis.      < end of copied text >

## 2018-04-27 NOTE — PROGRESS NOTE ADULT - SUBJECTIVE AND OBJECTIVE BOX
INTERVAL HPI/OVERNIGHT EVENTS:  Patient S&E at bedside. febrile , is having night sweats  VITAL SIGNS:  T(F): 101.6 (04-27-18 @ 22:15)  HR: 127 (04-27-18 @ 22:15)  BP: 104/70 (04-27-18 @ 22:15)  RR: 18 (04-27-18 @ 22:15)  SpO2: 97% (04-27-18 @ 22:15)  Wt(kg): --    PHYSICAL EXAM:    Constitutional: NAD  Eyes: EOMI, sclera non-icteric  Neck: supple, no masses, no JVD  Respiratory: CTA b/l, good air entry b/l  Cardiovascular: RRR, no M/R/G  Gastrointestinal: soft, NTND, no masses palpable, + BS, no hepatosplenomegaly  Extremities: no c/c/e  Neurological: AAOx3  back: mild left CVA tenderness      MEDICATIONS  (STANDING):  cefepime  IVPB 1000 milliGRAM(s) IV Intermittent every 12 hours  ferrous    sulfate 325 milliGRAM(s) Oral daily  sodium chloride 0.9%. 1000 milliLiter(s) (50 mL/Hr) IV Continuous <Continuous>  vancomycin  IVPB      vancomycin  IVPB 1000 milliGRAM(s) IV Intermittent every 12 hours    MEDICATIONS  (PRN):  acetaminophen   Tablet 650 milliGRAM(s) Oral every 6 hours PRN For Temp greater than 38 C (100.4 F)      Allergies    No Known Allergies    Intolerances        LABS:                        8.5    2.0   )-----------( 325      ( 27 Apr 2018 06:39 )             25.3     04-27    138  |  103  |  5<L>  ----------------------------<  93  3.7   |  25  |  0.39<L>    Ca    8.8      27 Apr 2018 06:40  Phos  3.5     04-27  Mg     2.1     04-27    TPro  6.4  /  Alb  2.5<L>  /  TBili  1.2  /  DBili  x   /  AST  11  /  ALT  16  /  AlkPhos  552<H>  04-27    PT/INR - ( 27 Apr 2018 06:43 )   PT: 15.6 sec;   INR: 1.42 ratio         PTT - ( 27 Apr 2018 06:43 )  PTT:33.3 sec  Urinalysis Basic - ( 27 Apr 2018 22:42 )    Color: x / Appearance: x / SG: x / pH: x  Gluc: x / Ketone: x  / Bili: x / Urobili: x   Blood: x / Protein: x / Nitrite: x   Leuk Esterase: x / RBC: 0-2 /HPF / WBC 0-2 /HPF   Sq Epi: x / Non Sq Epi: x / Bacteria: x        RADIOLOGY & ADDITIONAL TESTS:  Studies reviewed.

## 2018-04-27 NOTE — PROGRESS NOTE ADULT - PROBLEM SELECTOR PLAN 3
Stable  - ANC 1300, mild neutropenia, most likely in setting of malignancy with active chemotherapy  - Will continue with Cefepime and adding Vanc for pyelonephritis  - Continue to monitor CBC, heme/onc recs appreciated

## 2018-04-27 NOTE — PROVIDER CONTACT NOTE (FALL NOTIFICATION) - RECOMMENDATIONS
Instruct pt to call before getting out of bed. Hourly rounding performed, call bell in reach, safety maintained, will continue to monitor.

## 2018-04-27 NOTE — PROGRESS NOTE ADULT - ASSESSMENT
Patient is a 42 year old woman with history of Hodgkin's lymphoma on immunotherapy with Brentuximab s/p 8 cycles, now on AVD presenting from [facility] with complaints of left plank pain with sepsis secondary to L pyelonephritis.

## 2018-04-27 NOTE — DISCHARGE NOTE ADULT - ADDITIONAL INSTRUCTIONS
To the [facility] on Tuesday 5/1/18 at 1:20pm for Neulasta injection  To the [facility] on at ______ to see Dr. Baxter. To the [facility] on Tuesday 5/1/18 at 1:20pm for Neulasta injection  You will be called by someone at the [facility] for your next scheduled appointment. If you do not hear from them by Tuesday 5/1/18 in the afternoon please call [phone].

## 2018-04-27 NOTE — PROVIDER CONTACT NOTE (OTHER) - ASSESSMENT
pt without any complaints, stable
Pt is resting in bed. Received 1 unit of prbc. Vital signs are as charted. No s/s of distress noted.
No SOB, chest pain, chills.

## 2018-04-27 NOTE — PROGRESS NOTE ADULT - PROBLEM SELECTOR PLAN 2
- Likely secondary to iron deficiency anemia vs chemotherapy related, s/p 1 u PRBC on 4/25 and 1 u on 4/26  - No source of active bleed, hapto elevated, low concern for hemolysis. Continue to monitor CBC closely, and transfuse for goal Hb >7  - Will continue with home dose of iron supplement

## 2018-04-27 NOTE — CHART NOTE - NSCHARTNOTEFT_GEN_A_CORE
MEDICINE NP (episodic) Note     Notified by RN patient with temperature . Seen and examined patient at bedside. Patient is alert, NAD. Denies HA, dizziness, CP, SOB, cough, sore throat, nasal congestion, N/V/D,  abd pain, or dysuria.  s/p fall, Pt denied any dizziness, LOC, head injury, generalized pain, pt c/o ongoing L flank pain 7/10 pain described as tenderness.      VITAL SIGNS:  T(C): 38.7 (04-27-18 @ 22:15), Max: 38.7 (04-27-18 @ 21:15)  HR: 127 (04-27-18 @ 22:15) (72 - 127)  BP: 104/70 (04-27-18 @ 22:15) (93/56 - 110/73)  RR: 18 (04-27-18 @ 22:15) (18 - 18)  SpO2: 97% (04-27-18 @ 22:15) (97% - 100%)  Wt(kg): --      LABORATORY:                          8.5    2.0   )-----------( 325      ( 27 Apr 2018 06:39 )             25.3       04-27    138  |  103  |  5<L>  ----------------------------<  93  3.7   |  25  |  0.39<L>    Ca    8.8      27 Apr 2018 06:40  Phos  3.5     04-27  Mg     2.1     04-27    TPro  6.4  /  Alb  2.5<L>  /  TBili  1.2  /  DBili  x   /  AST  11  /  ALT  16  /  AlkPhos  552<H>  04-27        MICROBIOLOGY:  Culture - Blood (04.25.18 @ 21:16)    Specimen Source: .Blood Blood-Venous    Culture Results:   No growth to date.    Culture - Blood (04.25.18 @ 21:16)    Specimen Source: .Blood Blood-Peripheral    Culture Results:   No growth to date.    Culture - Urine (04.25.18 @ 16:29)    Specimen Source: .Urine Clean Catch (Midstream)    Culture Results:   10,000 - 49,000 CFU/mL Enterococcus faecalis Susceptibility to follow.  Normal Urogenital barbra present      RADIOLOGY:  < from: Xray Chest 2 Views PA/Lat (04.25.18 @ 14:51) >    Clear lungs.      No Known Allergies    ABX: cefepime  IVPB 1000 milliGRAM(s) IV Intermittent every 12 hours  vancomycin  IVPB      vancomycin  IVPB 1000 milliGRAM(s) IV Intermittent every 12 hours      PHYSICAL EXAM:  Constitutional: AOx3. NAD. Non toxic appearing.   Skin: intact, warm and dry  HEENT: no scleral injection, no JVD   Respiratory: bibasilar crackles, no wheezing  Cardiovascular: S1 S2. regular   Gastrointestinal: BS X4 active. soft. ND/NT. No guarding or rebound tenderness noted.   Extremities: + 1 b/l LE edema, FROM x4  (b/l wrist and hand with FROM 5/5 strength)  L flank pain + tenderness no erythema, ecchymosis at site    ASSESSMENT/PLAN:     HPI:  41 yo F w/PMH of Hodgkin's lymphoma on immunotherapy with Brentuximab s/p 8 cycles, now on AVD presenting from [facility] w/ c/o of left plank pain. Patient has had c/o of left flank discomfort x 2-3 weeks, and fever x 2 months. Pt s/p unwitnessed fall and neutropenic fever.    1) s/p unwitnessed fall  - Pt informs she fell d/t ongoing numbness from neuropathy on her feet. ambulate with assistance for activity reiterated  for fall prevention  - Pt fell onto b/l palms as per patient, no injuries seen for xray or scan  - daughter notified of event  - on call fellow Dr. Pope notified, no futher interventions ordered    2) Neutropenic Fever  -tylenol and cooling measures prn for pyrexia  -BC x2, UA/UC ordered  -CT of a/p/c done prelim with no opacities, improving b/l pleural effusion, and re-demonstration of extensive lymphoma  -F/U primary team in AM    Ruben Cervantes, MARIN  [id number]

## 2018-04-27 NOTE — PROGRESS NOTE ADULT - ASSESSMENT
41 yo F w/PMH of relapsed  Hodgkin's lymphoma s/p 8 cycles of Brentuximab (  9/1/17-2/20/18) with PET scan done in 3/2018 showing relapsed disease now s/p C1 of AVD ( 4/9/18) presenting from [facility] w/ c/o of left plank pain and " I was anemic"    Left flank pain- u/s negative for hydro. having fevers. does have urine cutlure + 10,000-50,000 CFU of Enterococcus faecalis would keep on cefepime for now.     Fever- likely due to underlying hodgkin's lymphoma. was due for C2 of AVD this past monday. check blood cultures/cxr    Anemia- on admission hemoglobin 5.9; now s/p 2 transfusions with improvement in hemoglobin she does have bone marrow involvement from the lymphoma. continue to monitor hgb. LDH normal.     Hodgkin's lymphoma- relapsed. s/p 8 cycles of Brentuximab (  9/1/17-2/20/18) . She refused front line therapy at diagnosis last year and insisted on only getting brentuximab.  had PET scan done in 3/2018 showing relapsed disease   she is now s/p C1 of AVD ( 4/9/18)   we will likely give cycle 2 here of AVD with neulasta after this cycle if afebrile for 24 hours and clinically improving. continue antibiotics for UTI. to transfer to [facility]  monitor CBC With diff , CMP daily.  monitor cbc with diff daily

## 2018-04-27 NOTE — PROGRESS NOTE ADULT - PROBLEM SELECTOR PLAN 4
On immunotherapy with Brentuximab s/p 8 cycles, now on AVD, last chemo session on 04/08  - As per recent PET: 3/27 demonstrated progression of lymphoma with bone marrow involvement, splenic involvement, and extensive hypermetabolic LAD in the neck, thorax, abdomen, and pelvis, increased in size and metabolism since prior study in 11/2017  - Current ANC: 1300, platelet 370  - Transfusion protocol: hgb>7.0, plt >15 if febrile/neutropenic, plt >10 for afebrile  - Heme/onc recs appreciated, will likely need cycle 2 inpatient

## 2018-04-27 NOTE — DISCHARGE NOTE ADULT - HOME CARE AGENCY
[facility] infusion resume picc care and weekly dressing changes, [phone],physical therapy start of care day after discharge

## 2018-04-27 NOTE — DISCHARGE NOTE ADULT - MEDICATION SUMMARY - MEDICATIONS TO STOP TAKING
I will STOP taking the medications listed below when I get home from the hospital:    Vitamin D3 50,000 intl units oral capsule  -- 1 cap(s) by mouth once a week    dexamethasone 4 mg oral tablet  -- 2 tab(s) by mouth once a day from 4/11-12    gabapentin 100 mg oral capsule  -- 1 cap(s) by mouth every 8 hours    Administration kit daily 10 mL Normal saline flushes daily

## 2018-04-27 NOTE — PROGRESS NOTE ADULT - ATTENDING COMMENTS
Hodgkins lymphoma relapsed after brentuximab (patient had refused therapy initially) scans show still has extensive lymphoma-  -had urine positive for bacteria and some flank tenderness however no acute pathology on prelim ct scan report  -patient had been febrile on admission, could be her lymphoma but also with flank pain and positive UA concern for pyelo.   -abx started thursday, patient feeling somewhat improved - clinically looks nontoxic and has extensive disease on her ct  -would start a+avd sat am, orders signed, patient getting transferred to  Hodgkins lymphoma relapsed after brentuximab (patient had refused therapy initially) scans show still has extensive lymphoma-  -had urine positive for bacteria and some flank tenderness however no acute pathology on prelim ct scan report  -patient had been febrile on admission, could be her lymphoma (she has had fevers from this in the past and she still has extensive disease) but also with flank pain and positive UA concern for pyelo.   -abx started thursday, patient feeling somewhat improved - clinically looks nontoxic and has extensive disease on her ct, ensure no further evidence of pyelo  -would start a+avd sat am, orders signed, patient getting transferred to [facility]  -plan for neulasta as outpatient

## 2018-04-27 NOTE — DISCHARGE NOTE ADULT - CARE PLAN
Principal Discharge DX:	Left flank pain  Secondary Diagnosis:	Hodgkin lymphoma Principal Discharge DX:	Left flank pain  Goal:	Stable  Assessment and plan of treatment:	Continue Cipro x 2 days for treatment of UTI  Secondary Diagnosis:	Hodgkin lymphoma  Goal:	Maintain counts and remain free from infection  Assessment and plan of treatment:	Notify MD and report to the ER for any Temp greater than or equal to 100.4, intractable nausea, vomiting, diarrhea or uncontrollable bleeding.

## 2018-04-27 NOTE — PROGRESS NOTE ADULT - PROBLEM SELECTOR PLAN 1
Improving  - Patient presented initially with tachycardia, fever, hypotension likely sepsis due to L pyelonephritis  - Given patient's relative immunocompromised state, will continue with Cefepime 1g Q12 (4/25-), urine culture shows enterococcus and blood culture NGTD, will add Vanc 1 g Q12 (patient may require 10 days of antibiotics clinical response)  - Continue to monitor hemodynamics Improving  - Patient presented initially with tachycardia, fever, hypotension likely sepsis due to L pyelonephritis  - Given patient's relative immunocompromised state, will continue with Cefepime 1g Q12 (4/25-), urine culture shows enterococcus and blood culture NGTD, will add Vanc 1 g Q12  - Continue to monitor hemodynamics

## 2018-04-27 NOTE — PROVIDER CONTACT NOTE (OTHER) - SITUATION
PRBC completed at 1530, VS at 1600 102/66, , t 102.2, 96% 02RA  addendum-[id number] pt refused tylenol
Febrile 101.7
Pt requesting Tylenol, refused pregnancy test. Ekg ordered by resident MD.
pt with temperature  101.8F ,/69 RR18,SPO2 99%

## 2018-04-27 NOTE — DISCHARGE NOTE ADULT - MEDICATION SUMMARY - MEDICATIONS TO TAKE
I will START or STAY ON the medications listed below when I get home from the hospital:    ferrous sulfate 325 mg (65 mg elemental iron) oral tablet  -- 1 tab(s) by mouth once a day  -- Indication: For Supplement    ciprofloxacin 500 mg oral tablet  -- 1 tab(s) by mouth every 12 hours MDD:2  -- Indication: For UTI I will START or STAY ON the medications listed below when I get home from the hospital:    rolling walker  -- #1  Rolling walker  -- Indication: For Ambulation    Shower chair  -- #1 Shower Chair  -- Indication: For Ambulation    ferrous sulfate 325 mg (65 mg elemental iron) oral tablet  -- 1 tab(s) by mouth once a day  -- Indication: For Supplement    ciprofloxacin 500 mg oral tablet  -- 1 tab(s) by mouth every 12 hours MDD:2  -- Indication: For UTI

## 2018-04-27 NOTE — PROGRESS NOTE ADULT - PROBLEM SELECTOR PLAN 5
- DVT prophylaxis: hold VTE due to significant anemia  - Diet: Regular    Conchita Grimm PGY-1  Internal Medicine [facility]  Pager [phone]

## 2018-04-27 NOTE — DISCHARGE NOTE ADULT - HOSPITAL COURSE
Patient is a 42 year old woman with history of Hodgkin's Lymphoma on immunotherapy with Brentuximab s/p 8 cycles, now on AVD presenting from [facility] with complaints of left plank pain for the past 2-3 weeks, and low grade fever for the past 2 months. Patient also endorses increasing urinary frequency and urgency with 2-3 weeks. Patient has some burning, no hematuria. Patient was sent in by her oncologist for blood transfusion. Of note, PET/CT on 3/27 demonstrated progression of lymphoma with bone marrow involvement, splenic involvement, and extensive hypermetabolic LAD in the neck, thorax, abdomen, and pelvis, increased in size and metabolism since prior study in 11/2017. Of note, recent admission 4/6-4/10: Patient was febrile, was SIRS criteria, w/ fever work-up. BCx/UCx/RVP were negative. CXR was negative for opacities concerning for pneumonia. CTA/P showed brittny-nephric stranding concerning for pyelonephritis, which she received 4 days of Zosyn. In the ED vitals: Temp. 99, -->96, 100/50 (86/52), 96% RA, RR 18. She received Tylenol 650 mg x 1, cefepime 2g x 1, NS x 2L, 1unit prbc for Hb of 5.9. Patient is a 42 year old woman with history of Hodgkin's Lymphoma on immunotherapy with Brentuximab s/p 8 cycles, now on AVD presenting from [facility] with complaints of left plank pain for the past 2-3 weeks, and low grade fever for the past 2 months. Patient also endorses increasing urinary frequency and urgency with 2-3 weeks. Patient has some burning, no hematuria. Patient was sent in by her oncologist for blood transfusion. Of note, PET/CT on 3/27 demonstrated progression of lymphoma with bone marrow involvement, splenic involvement, and extensive hypermetabolic LAD in the neck, thorax, abdomen, and pelvis, increased in size and metabolism since prior study in 11/2017. Of note, recent admission 4/6-4/10: Patient was febrile, was SIRS criteria, w/ fever work-up. BCx/UCx/RVP were negative. CXR was negative for opacities concerning for pneumonia. CTA/P showed brittny-nephric stranding concerning for pyelonephritis, which she received 4 days of Zosyn. Repeat CT revealed resolution of pyelonephritis   On 4/28, pt received cycle 2 AVD. Pt tolerated chemotherapy without any complications Hospital course has been complicated by fall, and UTI. During the hospital stay Pt’s CBCs chemistries were monitored very closely and supplemented as needed. Vitals signs were monitored every 4 hrs and strict I/O was maintained. Patient  to be discharged and will follow up as an outpatient at the [facility]. Patient is a 42 year old woman with history of Hodgkin's Lymphoma on immunotherapy with Brentuximab s/p 8 cycles, now on AVD presenting from [facility] with complaints of left plank pain for the past 2-3 weeks, and low grade fever for the past 2 months. Patient also endorses increasing urinary frequency and urgency with 2-3 weeks. Patient has some burning, no hematuria. Patient was sent in by her oncologist for blood transfusion. Of note, PET/CT on 3/27 demonstrated progression of lymphoma with bone marrow involvement, splenic involvement, and extensive hypermetabolic LAD in the neck, thorax, abdomen, and pelvis, increased in size and metabolism since prior study in 11/2017. Of note, recent admission 4/6-4/10: Patient was febrile, was SIRS criteria, w/ fever work-up. BCx/UCx/RVP were negative. CXR was negative for opacities concerning for pneumonia. CTA/P showed brittny-nephric stranding concerning for pyelonephritis, which she received 4 days of Zosyn. Repeat CT revealed resolution of pyelonephritis.    On 4/28, pt received cycle 2 AVD. Pt tolerated chemotherapy without any complications Hospital course has been complicated by fall, and UTI. During the hospital stay Pt’s CBCs chemistries were monitored very closely and supplemented as needed. Vitals signs were monitored every 4 hrs and strict I/O was maintained. Patient  to be discharged and will follow up as an outpatient at the [facility]. Patient is a 42 year old woman with history of Hodgkin's Lymphoma on immunotherapy with Brentuximab s/p 8 cycles, now on AVD presenting from [facility] with complaints of left plank pain for the past 2-3 weeks, and low grade fever for the past 2 months. Patient also endorses increasing urinary frequency and urgency with 2-3 weeks. Patient has some burning, no hematuria. Patient was sent in by her oncologist for blood transfusion. Of note, PET/CT on 3/27 demonstrated progression of lymphoma with bone marrow involvement, splenic involvement, and extensive hypermetabolic LAD in the neck, thorax, abdomen, and pelvis, increased in size and metabolism since prior study in 11/2017. Of note, recent admission 4/6-4/10: Patient was febrile, was SIRS criteria, w/ fever work-up. BCx/UCx/RVP were negative. CXR was negative for opacities concerning for pneumonia. CTA/P showed brittny-nephric stranding concerning for pyelonephritis, which she received 4 days of Zosyn. Repeat CT revealed resolution of pyelonephritis.    On 4/28, pt received Cycle 1 Day 15 AVD. Pt tolerated chemotherapy without any complications Hospital course has been complicated by fall, and UTI. During the hospital stay Pt’s CBCs chemistries were monitored very closely and supplemented as needed. Vitals signs were monitored every 4 hrs and strict I/O was maintained. Patient  to be discharged and will follow up as an outpatient at the [facility].

## 2018-04-27 NOTE — DISCHARGE NOTE ADULT - PLAN OF CARE
Stable Continue Cipro x 2 days for treatment of UTI Maintain counts and remain free from infection Notify MD and report to the ER for any Temp greater than or equal to 100.4, intractable nausea, vomiting, diarrhea or uncontrollable bleeding.

## 2018-04-27 NOTE — DISCHARGE NOTE ADULT - CARE PROVIDER_API CALL
Gabrielle Baxter (DO), Hematology; Internal Medicine; Medical Oncology  [address] [place] [address]  Phone: [phone]  Fax: [phone]

## 2018-04-28 DIAGNOSIS — B99.9 UNSPECIFIED INFECTIOUS DISEASE: ICD-10-CM

## 2018-04-28 DIAGNOSIS — C81.90 HODGKIN LYMPHOMA, UNSPECIFIED, UNSPECIFIED SITE: ICD-10-CM

## 2018-04-28 LAB
-  AMPICILLIN: SIGNIFICANT CHANGE UP
-  CIPROFLOXACIN: SIGNIFICANT CHANGE UP
-  NITROFURANTOIN: SIGNIFICANT CHANGE UP
-  TETRACYCLINE: SIGNIFICANT CHANGE UP
-  VANCOMYCIN: SIGNIFICANT CHANGE UP
ALBUMIN SERPL ELPH-MCNC: 2.4 G/DL — LOW (ref 3.3–5)
ALP SERPL-CCNC: 498 U/L — HIGH (ref 40–120)
ALT FLD-CCNC: 14 U/L — SIGNIFICANT CHANGE UP (ref 10–45)
ANION GAP SERPL CALC-SCNC: 14 MMOL/L — SIGNIFICANT CHANGE UP (ref 5–17)
AST SERPL-CCNC: 13 U/L — SIGNIFICANT CHANGE UP (ref 10–40)
BASOPHILS # BLD AUTO: 0 K/UL — SIGNIFICANT CHANGE UP (ref 0–0.2)
BILIRUB SERPL-MCNC: 1.1 MG/DL — SIGNIFICANT CHANGE UP (ref 0.2–1.2)
BUN SERPL-MCNC: 7 MG/DL — SIGNIFICANT CHANGE UP (ref 7–23)
CALCIUM SERPL-MCNC: 8.6 MG/DL — SIGNIFICANT CHANGE UP (ref 8.4–10.5)
CHLORIDE SERPL-SCNC: 99 MMOL/L — SIGNIFICANT CHANGE UP (ref 96–108)
CO2 SERPL-SCNC: 24 MMOL/L — SIGNIFICANT CHANGE UP (ref 22–31)
CREAT SERPL-MCNC: 0.45 MG/DL — LOW (ref 0.5–1.3)
CULTURE RESULTS: NO GROWTH — SIGNIFICANT CHANGE UP
CULTURE RESULTS: SIGNIFICANT CHANGE UP
EOSINOPHIL # BLD AUTO: 0 K/UL — SIGNIFICANT CHANGE UP (ref 0–0.5)
EOSINOPHIL NFR BLD AUTO: 4 % — SIGNIFICANT CHANGE UP (ref 0–6)
GLUCOSE SERPL-MCNC: 115 MG/DL — HIGH (ref 70–99)
HCT VFR BLD CALC: 22.8 % — LOW (ref 34.5–45)
HGB BLD-MCNC: 7.6 G/DL — LOW (ref 11.5–15.5)
LDH SERPL L TO P-CCNC: 188 U/L — SIGNIFICANT CHANGE UP (ref 50–242)
LYMPHOCYTES # BLD AUTO: 0.2 K/UL — LOW (ref 1–3.3)
LYMPHOCYTES # BLD AUTO: 16 % — SIGNIFICANT CHANGE UP (ref 13–44)
MAGNESIUM SERPL-MCNC: 2 MG/DL — SIGNIFICANT CHANGE UP (ref 1.6–2.6)
MCHC RBC-ENTMCNC: 28.9 PG — SIGNIFICANT CHANGE UP (ref 27–34)
MCHC RBC-ENTMCNC: 33.1 GM/DL — SIGNIFICANT CHANGE UP (ref 32–36)
MCV RBC AUTO: 87.2 FL — SIGNIFICANT CHANGE UP (ref 80–100)
METHOD TYPE: SIGNIFICANT CHANGE UP
MONOCYTES # BLD AUTO: 0.1 K/UL — SIGNIFICANT CHANGE UP (ref 0–0.9)
NEUTROPHILS # BLD AUTO: 1.3 K/UL — LOW (ref 1.8–7.4)
NEUTROPHILS NFR BLD AUTO: 68 % — SIGNIFICANT CHANGE UP (ref 43–77)
ORGANISM # SPEC MICROSCOPIC CNT: SIGNIFICANT CHANGE UP
ORGANISM # SPEC MICROSCOPIC CNT: SIGNIFICANT CHANGE UP
PHOSPHATE SERPL-MCNC: 3.6 MG/DL — SIGNIFICANT CHANGE UP (ref 2.5–4.5)
PLATELET # BLD AUTO: 271 K/UL — SIGNIFICANT CHANGE UP (ref 150–400)
POTASSIUM SERPL-MCNC: 3.5 MMOL/L — SIGNIFICANT CHANGE UP (ref 3.5–5.3)
POTASSIUM SERPL-SCNC: 3.5 MMOL/L — SIGNIFICANT CHANGE UP (ref 3.5–5.3)
PROT SERPL-MCNC: 6.1 G/DL — SIGNIFICANT CHANGE UP (ref 6–8.3)
RAPID RVP RESULT: SIGNIFICANT CHANGE UP
RBC # BLD: 2.62 M/UL — LOW (ref 3.8–5.2)
RBC # FLD: 19 % — HIGH (ref 10.3–14.5)
SODIUM SERPL-SCNC: 137 MMOL/L — SIGNIFICANT CHANGE UP (ref 135–145)
SPECIMEN SOURCE: SIGNIFICANT CHANGE UP
SPECIMEN SOURCE: SIGNIFICANT CHANGE UP
URATE SERPL-MCNC: 2.1 MG/DL — LOW (ref 2.5–7)
WBC # BLD: 1.7 K/UL — LOW (ref 3.8–10.5)
WBC # FLD AUTO: 1.7 K/UL — LOW (ref 3.8–10.5)

## 2018-04-28 PROCEDURE — 99232 SBSQ HOSP IP/OBS MODERATE 35: CPT

## 2018-04-28 RX ORDER — DOXORUBICIN HYDROCHLORIDE 2 MG/ML
40 INJECTION, SOLUTION INTRAVENOUS ONCE
Qty: 0 | Refills: 0 | Status: COMPLETED | OUTPATIENT
Start: 2018-04-28 | End: 2018-04-30

## 2018-04-28 RX ORDER — FOSAPREPITANT DIMEGLUMINE 150 MG/5ML
150 INJECTION, POWDER, LYOPHILIZED, FOR SOLUTION INTRAVENOUS ONCE
Qty: 0 | Refills: 0 | Status: COMPLETED | OUTPATIENT
Start: 2018-04-28 | End: 2018-04-28

## 2018-04-28 RX ORDER — ACETAMINOPHEN 500 MG
650 TABLET ORAL EVERY 6 HOURS
Qty: 0 | Refills: 0 | Status: DISCONTINUED | OUTPATIENT
Start: 2018-04-28 | End: 2018-04-30

## 2018-04-28 RX ORDER — DEXAMETHASONE 0.5 MG/5ML
12 ELIXIR ORAL ONCE
Qty: 0 | Refills: 0 | Status: COMPLETED | OUTPATIENT
Start: 2018-04-28 | End: 2018-04-28

## 2018-04-28 RX ORDER — ONDANSETRON 8 MG/1
8 TABLET, FILM COATED ORAL ONCE
Qty: 0 | Refills: 0 | Status: COMPLETED | OUTPATIENT
Start: 2018-04-28 | End: 2018-04-28

## 2018-04-28 RX ORDER — SALIVA SUBSTITUTE COMB NO.11 351 MG
30 POWDER IN PACKET (EA) MUCOUS MEMBRANE THREE TIMES A DAY
Qty: 0 | Refills: 0 | Status: DISCONTINUED | OUTPATIENT
Start: 2018-04-28 | End: 2018-04-30

## 2018-04-28 RX ORDER — DACARBAZINE 10 MG/ML
585 INJECTION, POWDER, FOR SOLUTION INTRAVENOUS ONCE
Qty: 0 | Refills: 0 | Status: COMPLETED | OUTPATIENT
Start: 2018-04-28 | End: 2018-04-30

## 2018-04-28 RX ORDER — VINBLASTINE SULFATE 10 MG
9.4 VIAL (EA) INTRAVENOUS ONCE
Qty: 0 | Refills: 0 | Status: COMPLETED | OUTPATIENT
Start: 2018-04-28 | End: 2018-04-30

## 2018-04-28 RX ADMIN — Medication 12 MILLIGRAM(S): at 17:32

## 2018-04-28 RX ADMIN — Medication 30 MILLILITER(S): at 15:21

## 2018-04-28 RX ADMIN — CEFEPIME 100 MILLIGRAM(S): 1 INJECTION, POWDER, FOR SOLUTION INTRAMUSCULAR; INTRAVENOUS at 05:09

## 2018-04-28 RX ADMIN — FOSAPREPITANT DIMEGLUMINE 300 MILLIGRAM(S): 150 INJECTION, POWDER, LYOPHILIZED, FOR SOLUTION INTRAVENOUS at 18:27

## 2018-04-28 RX ADMIN — SODIUM CHLORIDE 50 MILLILITER(S): 9 INJECTION INTRAMUSCULAR; INTRAVENOUS; SUBCUTANEOUS at 05:10

## 2018-04-28 RX ADMIN — Medication 250 MILLIGRAM(S): at 03:11

## 2018-04-28 RX ADMIN — Medication 30 MILLILITER(S): at 22:01

## 2018-04-28 RX ADMIN — Medication 325 MILLIGRAM(S): at 12:05

## 2018-04-28 RX ADMIN — Medication 250 MILLIGRAM(S): at 15:19

## 2018-04-28 RX ADMIN — CEFEPIME 100 MILLIGRAM(S): 1 INJECTION, POWDER, FOR SOLUTION INTRAMUSCULAR; INTRAVENOUS at 17:32

## 2018-04-28 RX ADMIN — ONDANSETRON 8 MILLIGRAM(S): 8 TABLET, FILM COATED ORAL at 17:32

## 2018-04-28 RX ADMIN — SODIUM CHLORIDE 50 MILLILITER(S): 9 INJECTION INTRAMUSCULAR; INTRAVENOUS; SUBCUTANEOUS at 18:51

## 2018-04-28 RX ADMIN — Medication 650 MILLIGRAM(S): at 18:24

## 2018-04-28 NOTE — ADVANCED PRACTICE NURSE CONSULT - ASSESSMENT
Labs today WBC 1.7 HGB 7.6 HCT 22.8 PLTS 271 CR. 0.45 TBILI. 1.1 Pt found in bed, alert and oriented x4, v/s stable afebrile, n/c offered.  Right picc line in place. Site without redness or swelling. Lumen previously accessed with + blood return flushes well with NS. Pt premedicated with  Zofran 8 mg,emend 150mg IVSS and decadron prior to chemotherapy. Chemotherapy verified by 2 RNs prior to administration. At 1900 treated with doxorubicin 25mg/m2= 40mg ivp to lowest y site of a free flowing NS line. At 1910 treated with vinblastine 6mg/m2= 9.4mg ivp to lowest y site of a free flowing NS line. Blood return verified during infusion. At 1920 treated with dacarbazine 375mg/m2= 585mg ivss over 1 hour.Pt remains in bed with n/c offered. Primary RN aware of treatment plan.

## 2018-04-28 NOTE — PROGRESS NOTE ADULT - ATTENDING COMMENTS
Hodgkins lymphoma relapsed after brentuximab (patient had refused therapy initially) scans show still has extensive lymphoma-  -had urine positive for bacteria and some flank tenderness however no acute pathology on prelim ct scan report  -patient had been febrile on admission, could be her lymphoma (she has had fevers from this in the past and she still has extensive disease) but also with flank pain and positive UA concern for pyelo.   -abx started thursday, patient feeling somewhat improved - clinically looks nontoxic and has extensive disease on her ct, ensure no further evidence of pyelo  -would start a+avd sat am, orders signed, patient getting transferred to [facility]  -plan for neulasta as outpatient Hodgkins lymphoma relapsed after brentuximab (patient had refused therapy initially), scans show still has extensive lymphoma-  -had urine positive for bacteria and some flank tenderness however no acute pathology on prelim ct scan report  -patient had been febrile on admission, could be her lymphoma (she has had fevers from this in the past and she still has extensive disease) but also with flank pain and positive UA concern for pyelo.   -On Cefepime/Vanco,  patient feeling improved - clinically looks nontoxic and has extensive disease on her CT scan, ensure no further evidence of pyelo  - Begin C2 AVD today  -plan for Neulasta as outpatient

## 2018-04-28 NOTE — PROGRESS NOTE ADULT - PROBLEM SELECTOR PLAN 2
Febrile Febrile  Fevers most likely from lymphoma  Follow up cultures  4/25 CXR clear lungs Febrile  Fevers most likely from lymphoma  4/25 Urine culture(+)  Enterococcus faecalis , follow up sensitivities   Blood cultures NGTD  Follow up cultures  4/25 CXR clear lungs Febrile  Fevers most likely from lymphoma  4/25 Urine culture(+)  Enterococcus faecalis , follow up sensitivities   Blood cultures NGTD  Follow up cultures  4/25 CXR clear lungs  CT Abdomen and Pelvis : Previously noted pyelonephritis is reviewed has resolved.  Continue Vancomycin and Cefepime for prophylaxis. Febrile, not neutropenic   Fevers most likely from lymphoma  4/25 Urine culture(+)  Enterococcus faecalis , follow up sensitivities   Blood cultures NGTD  Follow up cultures  4/25 CXR clear lungs  CT Abdomen and Pelvis : Previously noted pyelonephritis is reviewed has resolved.  Continue Vancomycin and Cefepime for prophylaxis. Febrile, not neutropenic   Fevers most likely from lymphoma  4/25 Urine culture(+)  Enterococcus faecalis , follow up sensitivities   Blood cultures NGTD  Follow up cultures  4/25 CXR clear lungs  CT Abdomen and Pelvis : Previously noted pyelonephritis is reviewed has resolved.  Continue Vancomycin and Cefepime

## 2018-04-28 NOTE — PROGRESS NOTE ADULT - SUBJECTIVE AND OBJECTIVE BOX
Diagnosis: Relapsed Hodgkin's lymphoma    Protocol/Chemo Regimen: Status post Cycle 1 AVD     Day: 20    Pt endorsed: fall last night night    Review of Systems: Denies nausea, vomiting, diarrhea, abdominal pain    Pain scale: Denies now    Diet: Regular    Allergies    No Known Allergies    Intolerances        ANTIMICROBIALS  cefepime  IVPB 1000 milliGRAM(s) IV Intermittent every 12 hours  vancomycin  IVPB      vancomycin  IVPB 1000 milliGRAM(s) IV Intermittent every 12 hours      HEME/ONC MEDICATIONS      STANDING MEDICATIONS  ferrous    sulfate 325 milliGRAM(s) Oral daily  sodium chloride 0.9%. 1000 milliLiter(s) IV Continuous <Continuous>      PRN MEDICATIONS  acetaminophen   Tablet 650 milliGRAM(s) Oral every 6 hours PRN        Vital Signs Last 24 Hrs  T(C): 36 (28 Apr 2018 02:15), Max: 38.7 (27 Apr 2018 21:15)  T(F): 96.8 (28 Apr 2018 02:15), Max: 101.7 (27 Apr 2018 21:15)  HR: 86 (28 Apr 2018 02:15) (86 - 127)  BP: 96/56 (28 Apr 2018 02:15) (91/55 - 104/70)  BP(mean): --  RR: 18 (28 Apr 2018 02:15) (18 - 18)  SpO2: 97% (28 Apr 2018 02:15) (97% - 100%)    PHYSICAL EXAM  General: NAD  HEENT: PERRLA, EOMOI, clear oropharynx, anicteric sclera, pink conjunctiva  Neck: supple  CV: (+) S1/S2 RRR  Lungs: clear to auscultation, no wheezes or rales  Abdomen: soft, non-tender, non-distended (+) BS  Ext: no clubbing, cyanosis or edema  Skin: no rashes and no petechiae  Neuro: alert and oriented X 3, no focal deficits  Central Line:             LABS:                        8.5    2.0   )-----------( 325      ( 27 Apr 2018 06:39 )             25.3         Mean Cell Volume : 86.4 fl  Mean Cell Hemoglobin : 29.0 pg  Mean Cell Hemoglobin Concentration : 33.6 gm/dL  Auto Neutrophil # : 1.2 K/uL  Auto Lymphocyte # : 0.4 K/uL  Auto Monocyte # : 0.3 K/uL  Auto Eosinophil # : 0.1 K/uL  Auto Basophil # : 0.0 K/uL  Auto Neutrophil % : 71.0 %  Auto Lymphocyte % : 13.0 %  Auto Monocyte % : 13.0 %  Auto Eosinophil % : 2.0 %  Auto Basophil % : x      04-28    137  |  99  |  7   ----------------------------<  115<H>  3.5   |  24  |  0.45<L>    Ca    8.6      28 Apr 2018 06:50  Phos  3.6     04-28  Mg     2.0     04-28    TPro  6.1  /  Alb  2.4<L>  /  TBili  1.1  /  DBili  x   /  AST  13  /  ALT  14  /  AlkPhos  498<H>  04-28      Mg 2.0  Phos 3.6      PT/INR - ( 27 Apr 2018 06:43 )   PT: 15.6 sec;   INR: 1.42 ratio         PTT - ( 27 Apr 2018 06:43 )  PTT:33.3 sec      Uric Acid 2.1    RECENT CULTURES:  04-25 @ 21:16  .Blood Blood-Peripheral      No growth to date.  --  04-25 @ 16:29  .Urine Clean Catch (Midstream)        10,000 - 49,000 CFU/mL Enterococcus faecalis Susceptibility to follow.  Normal Urogenital barbra present  --    RADIOLOGY & ADDITIONAL STUDIES:    < from: CT Abdomen and Pelvis w/ IV Cont (04.27.18 @ 18:46) >  Extensive lymphoma the chest abdomen and pelvis without significant   change from prior study 4/7/2018.    Previously noted pyelonephritis is reviewed has resolved.    Xray Chest 2 Views PA/Lat (04.25.18 @ 14:51) >  IMPRESSION:   Clear lungs. Diagnosis: Relapsed Hodgkin's lymphoma    Protocol/Chemo Regimen:  Cycle 2 AVD     Day: 1    Pt endorsed: fall last night night    Review of Systems: Denies nausea, vomiting, diarrhea, abdominal pain    Pain scale: Denies now    Diet: Regular    Allergies    No Known Allergies    Intolerances        ANTIMICROBIALS  cefepime  IVPB 1000 milliGRAM(s) IV Intermittent every 12 hours  vancomycin  IVPB      vancomycin  IVPB 1000 milliGRAM(s) IV Intermittent every 12 hours      HEME/ONC MEDICATIONS      STANDING MEDICATIONS  ferrous    sulfate 325 milliGRAM(s) Oral daily  sodium chloride 0.9%. 1000 milliLiter(s) IV Continuous <Continuous>      PRN MEDICATIONS  acetaminophen   Tablet 650 milliGRAM(s) Oral every 6 hours PRN        Vital Signs Last 24 Hrs  T(C): 36 (28 Apr 2018 02:15), Max: 38.7 (27 Apr 2018 21:15)  T(F): 96.8 (28 Apr 2018 02:15), Max: 101.7 (27 Apr 2018 21:15)  HR: 86 (28 Apr 2018 02:15) (86 - 127)  BP: 96/56 (28 Apr 2018 02:15) (91/55 - 104/70)  BP(mean): --  RR: 18 (28 Apr 2018 02:15) (18 - 18)  SpO2: 97% (28 Apr 2018 02:15) (97% - 100%)    PHYSICAL EXAM  General: NAD  HEENT: PERRLA,  Neck: supple  CV: (+) S1/S2 RRR  Lungs: clear to auscultation, no wheezes or rales  Abdomen: soft, non-tender, non-distended (+) BS x 4 Q  Ext: no clubbing, cyanosis or edema  Skin: no rashes and no petechiae  Neuro: alert and oriented X 3, no focal deficits  PICC CDI            LABS:                        8.5    2.0   )-----------( 325      ( 27 Apr 2018 06:39 )             25.3         Mean Cell Volume : 86.4 fl  Mean Cell Hemoglobin : 29.0 pg  Mean Cell Hemoglobin Concentration : 33.6 gm/dL  Auto Neutrophil # : 1.2 K/uL  Auto Lymphocyte # : 0.4 K/uL  Auto Monocyte # : 0.3 K/uL  Auto Eosinophil # : 0.1 K/uL  Auto Basophil # : 0.0 K/uL  Auto Neutrophil % : 71.0 %  Auto Lymphocyte % : 13.0 %  Auto Monocyte % : 13.0 %  Auto Eosinophil % : 2.0 %  Auto Basophil % : x      04-28    137  |  99  |  7   ----------------------------<  115<H>  3.5   |  24  |  0.45<L>    Ca    8.6      28 Apr 2018 06:50  Phos  3.6     04-28  Mg     2.0     04-28    TPro  6.1  /  Alb  2.4<L>  /  TBili  1.1  /  DBili  x   /  AST  13  /  ALT  14  /  AlkPhos  498<H>  04-28      Mg 2.0  Phos 3.6      PT/INR - ( 27 Apr 2018 06:43 )   PT: 15.6 sec;   INR: 1.42 ratio         PTT - ( 27 Apr 2018 06:43 )  PTT:33.3 sec      Uric Acid 2.1    RECENT CULTURES:  04-25 @ 21:16  .Blood Blood-Peripheral      No growth to date.  --  04-25 @ 16:29  .Urine Clean Catch (Midstream)        10,000 - 49,000 CFU/mL Enterococcus faecalis Susceptibility to follow.  Normal Urogenital barbra present  --    RADIOLOGY & ADDITIONAL STUDIES:    < from: CT Abdomen and Pelvis w/ IV Cont (04.27.18 @ 18:46) >  Extensive lymphoma the chest abdomen and pelvis without significant   change from prior study 4/7/2018.    Previously noted pyelonephritis is reviewed has resolved.    Xray Chest 2 Views PA/Lat (04.25.18 @ 14:51) >  IMPRESSION:   Clear lungs. Diagnosis: Relapsed Hodgkin's lymphoma    Protocol/Chemo Regimen:  Cycle 2 AVD     Day: 1    Pt endorsed: fall last night , frequent urination    Review of Systems: Denies nausea, vomiting, diarrhea, cough, abdominal pain, mouth pain    Pain scale: Denies now    Diet: Regular    Allergies    No Known Allergies    Intolerances        ANTIMICROBIALS  cefepime  IVPB 1000 milliGRAM(s) IV Intermittent every 12 hours  vancomycin  IVPB      vancomycin  IVPB 1000 milliGRAM(s) IV Intermittent every 12 hours      HEME/ONC MEDICATIONS      STANDING MEDICATIONS  ferrous    sulfate 325 milliGRAM(s) Oral daily  sodium chloride 0.9%. 1000 milliLiter(s) IV Continuous <Continuous>      PRN MEDICATIONS  acetaminophen   Tablet 650 milliGRAM(s) Oral every 6 hours PRN        Vital Signs Last 24 Hrs  T(C): 36 (28 Apr 2018 02:15), Max: 38.7 (27 Apr 2018 21:15)  T(F): 96.8 (28 Apr 2018 02:15), Max: 101.7 (27 Apr 2018 21:15)  HR: 86 (28 Apr 2018 02:15) (86 - 127)  BP: 96/56 (28 Apr 2018 02:15) (91/55 - 104/70)  BP(mean): --  RR: 18 (28 Apr 2018 02:15) (18 - 18)  SpO2: 97% (28 Apr 2018 02:15) (97% - 100%)    PHYSICAL EXAM  General: NAD  HEENT: PERRLA  Neck: supple  CV: (+) S1/S2 RRR  Lungs: clear to auscultation, no wheezes or rales  Abdomen: soft, non-tender, non-distended (+) BS x 4 Q  Ext: mild edema on top of the feet  Skin: no rashes and no petechiae  Neuro: alert and oriented X 3, no focal deficits  Central line: R SL PICC CDI            LABS:                        8.5    2.0   )-----------( 325      ( 27 Apr 2018 06:39 )             25.3         Mean Cell Volume : 86.4 fl  Mean Cell Hemoglobin : 29.0 pg  Mean Cell Hemoglobin Concentration : 33.6 gm/dL  Auto Neutrophil # : 1.2 K/uL  Auto Lymphocyte # : 0.4 K/uL  Auto Monocyte # : 0.3 K/uL  Auto Eosinophil # : 0.1 K/uL  Auto Basophil # : 0.0 K/uL  Auto Neutrophil % : 71.0 %  Auto Lymphocyte % : 13.0 %  Auto Monocyte % : 13.0 %  Auto Eosinophil % : 2.0 %  Auto Basophil % : x      04-28    137  |  99  |  7   ----------------------------<  115<H>  3.5   |  24  |  0.45<L>    Ca    8.6      28 Apr 2018 06:50  Phos  3.6     04-28  Mg     2.0     04-28    TPro  6.1  /  Alb  2.4<L>  /  TBili  1.1  /  DBili  x   /  AST  13  /  ALT  14  /  AlkPhos  498<H>  04-28      Mg 2.0  Phos 3.6      PT/INR - ( 27 Apr 2018 06:43 )   PT: 15.6 sec;   INR: 1.42 ratio         PTT - ( 27 Apr 2018 06:43 )  PTT:33.3 sec      Uric Acid 2.1    RECENT CULTURES:  04-25 @ 21:16  .Blood Blood-Peripheral      No growth to date.  --  04-25 @ 16:29  .Urine Clean Catch (Midstream)        10,000 - 49,000 CFU/mL Enterococcus faecalis Susceptibility to follow.  Normal Urogenital barbra present  --    RADIOLOGY & ADDITIONAL STUDIES:    < from: CT Abdomen and Pelvis w/ IV Cont (04.27.18 @ 18:46) >  Extensive lymphoma the chest abdomen and pelvis without significant   change from prior study 4/7/2018.    Previously noted pyelonephritis is reviewed has resolved.    Xray Chest 2 Views PA/Lat (04.25.18 @ 14:51) >  IMPRESSION:   Clear lungs. Diagnosis: Relapsed Hodgkin's lymphoma    Protocol/Chemo Regimen:  Cycle 2 AVD     Day: 1    Pt endorsed: fall last night , frequent urination    Review of Systems: Denies nausea, vomiting, diarrhea, cough, abdominal pain, mouth pain    Pain scale: Denies now    Diet: Regular    Allergies    No Known Allergies    Intolerances        ANTIMICROBIALS  cefepime  IVPB 1000 milliGRAM(s) IV Intermittent every 12 hours  vancomycin  IVPB      vancomycin  IVPB 1000 milliGRAM(s) IV Intermittent every 12 hours      HEME/ONC MEDICATIONS      STANDING MEDICATIONS  ferrous    sulfate 325 milliGRAM(s) Oral daily  sodium chloride 0.9%. 1000 milliLiter(s) IV Continuous <Continuous>      PRN MEDICATIONS  acetaminophen   Tablet 650 milliGRAM(s) Oral every 6 hours PRN        Vital Signs Last 24 Hrs  T(C): 36 (28 Apr 2018 02:15), Max: 38.7 (27 Apr 2018 21:15)  T(F): 96.8 (28 Apr 2018 02:15), Max: 101.7 (27 Apr 2018 21:15)  HR: 86 (28 Apr 2018 02:15) (86 - 127)  BP: 96/56 (28 Apr 2018 02:15) (91/55 - 104/70)  BP(mean): --  RR: 18 (28 Apr 2018 02:15) (18 - 18)  SpO2: 97% (28 Apr 2018 02:15) (97% - 100%)    PHYSICAL EXAM  General: NAD  HEENT: sclera- anicteric; no erythema/ulcers  Neck: supple  CV: (+) S1/S2 RRR  Lungs: clear to auscultation, no wheezes or rales  Abdomen: soft, non-tender, non-distended (+) BS x 4 Q  Ext: mild edema on top of the feet  Skin: no rash and no petechiae  Neuro: alert and oriented X 3, no focal deficits  Central line: R SL PICC CDI            LABS:                        8.5    2.0   )-----------( 325      ( 27 Apr 2018 06:39 )             25.3         Mean Cell Volume : 86.4 fl  Mean Cell Hemoglobin : 29.0 pg  Mean Cell Hemoglobin Concentration : 33.6 gm/dL  Auto Neutrophil # : 1.2 K/uL  Auto Lymphocyte # : 0.4 K/uL  Auto Monocyte # : 0.3 K/uL  Auto Eosinophil # : 0.1 K/uL  Auto Basophil # : 0.0 K/uL  Auto Neutrophil % : 71.0 %  Auto Lymphocyte % : 13.0 %  Auto Monocyte % : 13.0 %  Auto Eosinophil % : 2.0 %  Auto Basophil % : x      04-28    137  |  99  |  7   ----------------------------<  115<H>  3.5   |  24  |  0.45<L>    Ca    8.6      28 Apr 2018 06:50  Phos  3.6     04-28  Mg     2.0     04-28    TPro  6.1  /  Alb  2.4<L>  /  TBili  1.1  /  DBili  x   /  AST  13  /  ALT  14  /  AlkPhos  498<H>  04-28      Mg 2.0  Phos 3.6      PT/INR - ( 27 Apr 2018 06:43 )   PT: 15.6 sec;   INR: 1.42 ratio         PTT - ( 27 Apr 2018 06:43 )  PTT:33.3 sec      Uric Acid 2.1    RECENT CULTURES:  04-25 @ 21:16  .Blood Blood-Peripheral      No growth to date.  --  04-25 @ 16:29  .Urine Clean Catch (Midstream)        10,000 - 49,000 CFU/mL Enterococcus faecalis Susceptibility to follow.  Normal Urogenital barbra present  --    RADIOLOGY & ADDITIONAL STUDIES:    < from: CT Abdomen and Pelvis w/ IV Cont (04.27.18 @ 18:46) >  Extensive lymphoma the chest abdomen and pelvis without significant   change from prior study 4/7/2018.    Previously noted pyelonephritis is reviewed has resolved.    Xray Chest 2 Views PA/Lat (04.25.18 @ 14:51) >  IMPRESSION:   Clear lungs.

## 2018-04-28 NOTE — PROGRESS NOTE ADULT - PROBLEM SELECTOR PLAN 1
PET scan done in 3/2018 showing relapsed disease now s/p C1 of AVD ( 4/9/18)  CT Abdomen and Pelvis :Extensive lymphoma the chest abdomen and pelvis  Plan to start Cycle 2 AVD if afebrile  Monitor CBC/lytes daily  Replete/transfuse as needed  Monitor for fluid overload- Monitor daily weight, Mouth care  IVF, antiemetics, pain management, strict intake and output, Vitals Q 4hrs PET scan done in 3/2018 showing relapsed disease now s/p C1 of AVD ( 4/9/18)  CT Abdomen and Pelvis :Extensive lymphoma the chest abdomen and pelvis  Start Cycle 2 AVD today  Monitor CBC/lytes daily  Replete/transfuse as needed  Monitor for fluid overload- Monitor daily weight, Mouth care  IVF, antiemetics, pain management, strict intake and output, Vitals Q 4hrs PET scan done in 3/2018 showing relapsed disease now s/p C1 of AVD ( 4/9/18)  CT Abdomen and Pelvis :Extensive lymphoma the chest abdomen and pelvis  Start Cycle 2 AVD today  Monitor CBC/lytes daily  Replete/transfuse as needed  Monitor for fluid overload- Monitor daily weight, Mouth care  IVF, antiemetics, pain management, strict intake and output, Vitals Q 4hrs  Post chemo Neulasta

## 2018-04-28 NOTE — PROGRESS NOTE ADULT - ASSESSMENT
41 yo F w/PMH of relapsed  Hodgkin's lymphoma s/p 8 cycles of Brentuximab (  9/1/17-2/20/18) with PET scan done in 3/2018 showing relapsed disease now s/p C1 of AVD ( 4/9/18) presenting from [facility]  with left flank pain.  Hospital course complicated by fall, and UTI. 43 yo F w/PMH of relapsed  Hodgkin's lymphoma s/p 8 cycles of Brentuximab (  9/1/17-2/20/18) with PET scan done in 3/2018 showing relapsed disease now s/p C1 of AVD ( 4/9/18) presenting from [facility]  with left flank pain.  Hospital course has been complicated by fall, and UTI. 43 yo F w/PMH of relapsed  Hodgkin's lymphoma s/p 8 cycles of Brentuximab (  9/1/17-2/20/18) with PET scan done in 3/2018 showing relapsed disease now s/p C1 of AVD ( 4/9/18) presenting from [facility]  with left flank pain, currently on cycle 2 AVD. Hospital course has been complicated by fall, and UTI.

## 2018-04-29 LAB
ALBUMIN SERPL ELPH-MCNC: 2.5 G/DL — LOW (ref 3.3–5)
ALP SERPL-CCNC: 659 U/L — HIGH (ref 40–120)
ALT FLD-CCNC: 19 U/L — SIGNIFICANT CHANGE UP (ref 10–45)
ANION GAP SERPL CALC-SCNC: 12 MMOL/L — SIGNIFICANT CHANGE UP (ref 5–17)
AST SERPL-CCNC: 24 U/L — SIGNIFICANT CHANGE UP (ref 10–40)
BASOPHILS # BLD AUTO: 0 K/UL — SIGNIFICANT CHANGE UP (ref 0–0.2)
BASOPHILS NFR BLD AUTO: 0 % — SIGNIFICANT CHANGE UP (ref 0–2)
BILIRUB SERPL-MCNC: 1 MG/DL — SIGNIFICANT CHANGE UP (ref 0.2–1.2)
BUN SERPL-MCNC: 11 MG/DL — SIGNIFICANT CHANGE UP (ref 7–23)
CALCIUM SERPL-MCNC: 8.4 MG/DL — SIGNIFICANT CHANGE UP (ref 8.4–10.5)
CHLORIDE SERPL-SCNC: 103 MMOL/L — SIGNIFICANT CHANGE UP (ref 96–108)
CO2 SERPL-SCNC: 24 MMOL/L — SIGNIFICANT CHANGE UP (ref 22–31)
CREAT SERPL-MCNC: 0.36 MG/DL — LOW (ref 0.5–1.3)
EOSINOPHIL # BLD AUTO: 0 K/UL — SIGNIFICANT CHANGE UP (ref 0–0.5)
EOSINOPHIL NFR BLD AUTO: 1.3 % — SIGNIFICANT CHANGE UP (ref 0–6)
GLUCOSE SERPL-MCNC: 149 MG/DL — HIGH (ref 70–99)
HCT VFR BLD CALC: 26.8 % — LOW (ref 34.5–45)
HGB BLD-MCNC: 8.3 G/DL — LOW (ref 11.5–15.5)
LDH SERPL L TO P-CCNC: 195 U/L — SIGNIFICANT CHANGE UP (ref 50–242)
LYMPHOCYTES # BLD AUTO: 0.2 K/UL — LOW (ref 1–3.3)
LYMPHOCYTES # BLD AUTO: 15 % — SIGNIFICANT CHANGE UP (ref 13–44)
MAGNESIUM SERPL-MCNC: 2.1 MG/DL — SIGNIFICANT CHANGE UP (ref 1.6–2.6)
MCHC RBC-ENTMCNC: 27.4 PG — SIGNIFICANT CHANGE UP (ref 27–34)
MCHC RBC-ENTMCNC: 31.2 GM/DL — LOW (ref 32–36)
MCV RBC AUTO: 88 FL — SIGNIFICANT CHANGE UP (ref 80–100)
MONOCYTES # BLD AUTO: 0.1 K/UL — SIGNIFICANT CHANGE UP (ref 0–0.9)
MONOCYTES NFR BLD AUTO: 7 % — SIGNIFICANT CHANGE UP (ref 2–14)
NEUTROPHILS # BLD AUTO: 1.6 K/UL — LOW (ref 1.8–7.4)
NEUTROPHILS NFR BLD AUTO: 74 % — SIGNIFICANT CHANGE UP (ref 43–77)
PHOSPHATE SERPL-MCNC: 3.8 MG/DL — SIGNIFICANT CHANGE UP (ref 2.5–4.5)
PLATELET # BLD AUTO: 246 K/UL — SIGNIFICANT CHANGE UP (ref 150–400)
POTASSIUM SERPL-MCNC: 3.7 MMOL/L — SIGNIFICANT CHANGE UP (ref 3.5–5.3)
POTASSIUM SERPL-SCNC: 3.7 MMOL/L — SIGNIFICANT CHANGE UP (ref 3.5–5.3)
PROT SERPL-MCNC: 6.5 G/DL — SIGNIFICANT CHANGE UP (ref 6–8.3)
RBC # BLD: 3.04 M/UL — LOW (ref 3.8–5.2)
RBC # FLD: 18.8 % — HIGH (ref 10.3–14.5)
SODIUM SERPL-SCNC: 139 MMOL/L — SIGNIFICANT CHANGE UP (ref 135–145)
URATE SERPL-MCNC: 2.5 MG/DL — SIGNIFICANT CHANGE UP (ref 2.5–7)
VANCOMYCIN TROUGH SERPL-MCNC: 6.2 UG/ML — LOW (ref 10–20)
WBC # BLD: 1.9 K/UL — LOW (ref 3.8–10.5)
WBC # FLD AUTO: 1.9 K/UL — LOW (ref 3.8–10.5)

## 2018-04-29 PROCEDURE — 99232 SBSQ HOSP IP/OBS MODERATE 35: CPT

## 2018-04-29 RX ORDER — VANCOMYCIN HCL 1 G
1250 VIAL (EA) INTRAVENOUS EVERY 12 HOURS
Qty: 0 | Refills: 0 | Status: DISCONTINUED | OUTPATIENT
Start: 2018-04-29 | End: 2018-04-30

## 2018-04-29 RX ADMIN — SODIUM CHLORIDE 50 MILLILITER(S): 9 INJECTION INTRAMUSCULAR; INTRAVENOUS; SUBCUTANEOUS at 17:18

## 2018-04-29 RX ADMIN — Medication 325 MILLIGRAM(S): at 12:06

## 2018-04-29 RX ADMIN — SODIUM CHLORIDE 50 MILLILITER(S): 9 INJECTION INTRAMUSCULAR; INTRAVENOUS; SUBCUTANEOUS at 05:03

## 2018-04-29 RX ADMIN — Medication 166.67 MILLIGRAM(S): at 05:48

## 2018-04-29 RX ADMIN — Medication 30 MILLILITER(S): at 14:19

## 2018-04-29 RX ADMIN — CEFEPIME 100 MILLIGRAM(S): 1 INJECTION, POWDER, FOR SOLUTION INTRAMUSCULAR; INTRAVENOUS at 17:17

## 2018-04-29 RX ADMIN — Medication 30 MILLILITER(S): at 05:03

## 2018-04-29 RX ADMIN — Medication 30 MILLILITER(S): at 21:11

## 2018-04-29 RX ADMIN — Medication 166.67 MILLIGRAM(S): at 17:17

## 2018-04-29 RX ADMIN — CEFEPIME 100 MILLIGRAM(S): 1 INJECTION, POWDER, FOR SOLUTION INTRAMUSCULAR; INTRAVENOUS at 05:00

## 2018-04-29 NOTE — PROGRESS NOTE ADULT - PROBLEM SELECTOR PLAN 2
Febrile, not neutropenic   Fevers most likely from lymphoma  4/25 Urine culture(+)  Enterococcus faecalis , follow up sensitivities   Blood cultures NGTD  Follow up cultures  4/25 CXR clear lungs  CT Abdomen and Pelvis : Previously noted pyelonephritis is reviewed has resolved.  Continue Vancomycin and Cefepime Febrile, not neutropenic   Fevers most likely from lymphoma  4/25 Urine culture(+)  Enterococcus faecalis , follow up sensitivities  Repeat urine culture NGTD   Blood cultures NGTD  Follow up cultures  4/25 CXR clear lungs  CT Abdomen and Pelvis : Previously noted pyelonephritis is reviewed has resolved.  Continue Vancomycin and Cefepime

## 2018-04-29 NOTE — PHYSICAL THERAPY INITIAL EVALUATION ADULT - PLANNED THERAPY INTERVENTIONS, PT EVAL
Aaliyah called back.  She stated she received message from Dr. Siddiqui stating PT is okay.  She is inquiring if patient can have a skilled nurse come to her home.  Please callback, [phone].   strengthening/gait training/balance training/bed mobility training/transfer training/GOAL: Pt will negotiate 30 steps with 1 HR and step to pattern independently in 4 weeks.

## 2018-04-29 NOTE — PHYSICAL THERAPY INITIAL EVALUATION ADULT - ADDITIONAL COMMENTS
Pt lives on 4th floor walk up apartment with daughter (22 years old, she works during the day). At baseline, pt does not use an AD. She rents a wheelchair for doctors appointments. Of note, pt had a fall during this hospital stay due to her foot slipping out of her sandal.

## 2018-04-29 NOTE — PHYSICAL THERAPY INITIAL EVALUATION ADULT - PRECAUTIONS/LIMITATIONS, REHAB EVAL
fall precautions/CT Abd 4/27: Extensive lymphoma the chest abdomen and pelvis without significant change from prior study 4/7/2018. Previously noted pyelonephritis is reviewed has resolved. Renal US 4/25: No hydronephrosis. XRay Chest 4/25: Clear lungs.

## 2018-04-29 NOTE — PHYSICAL THERAPY INITIAL EVALUATION ADULT - PERTINENT HX OF CURRENT PROBLEM, REHAB EVAL
43 yo F w/PMH of relapsed  Hodgkin's lymphoma s/p 8 cycles of Brentuximab (  9/1/17-2/20/18) with PET scan done in 3/2018 showing relapsed disease now s/p C1 of AVD ( 4/9/18) presenting from [facility]  with left flank pain, currently status post  cycle 2 AVD. Pt found to be in sepsis 2/2 pyelonephritis. Hospital course has been complicated by fall, and UTI.

## 2018-04-29 NOTE — PROGRESS NOTE ADULT - ASSESSMENT
43 yo F w/PMH of relapsed  Hodgkin's lymphoma s/p 8 cycles of Brentuximab (  9/1/17-2/20/18) with PET scan done in 3/2018 showing relapsed disease now s/p C1 of AVD ( 4/9/18) presenting from [facility]  with left flank pain, currently on cycle 2 AVD. Hospital course has been complicated by fall, and UTI. 43 yo F w/PMH of relapsed  Hodgkin's lymphoma s/p 8 cycles of Brentuximab (  9/1/17-2/20/18) with PET scan done in 3/2018 showing relapsed disease now s/p C1 of AVD ( 4/9/18) presenting from [facility]  with left flank pain, currently status post  cycle 2 AVD. Hospital course has been complicated by fall, and UTI. 43 yo F w/PMH of relapsed  Hodgkin's lymphoma s/p 8 cycles of Brentuximab (  9/1/17-2/20/18) with PET scan done in 3/2018 showing relapsed disease now s/p C1 of AVD ( 4/9/18) presenting from [facility]  with left flank pain, now status post  cycle 2 AVD. Hospital course has been complicated by fall, and UTI.

## 2018-04-29 NOTE — PROGRESS NOTE ADULT - SUBJECTIVE AND OBJECTIVE BOX
Diagnosis: Relapsed Hodgkin's lymphoma    Protocol/Chemo Regimen:  Cycle 2 AVD     Day: 2    Pt endorsed:  frequent urination    Review of Systems: Denies nausea, vomiting, diarrhea, cough, abdominal pain, mouth pain    Pain scale: Denies now    Diet: Regular    Allergies    No Known Allergies    Intolerances        ANTIMICROBIALS  cefepime  IVPB 1000 milliGRAM(s) IV Intermittent every 12 hours  vancomycin  IVPB      vancomycin  IVPB 1000 milliGRAM(s) IV Intermittent every 12 hours      HEME/ONC MEDICATIONS      STANDING MEDICATIONS  ferrous    sulfate 325 milliGRAM(s) Oral daily  sodium chloride 0.9%. 1000 milliLiter(s) IV Continuous <Continuous>      PRN MEDICATIONS  acetaminophen   Tablet 650 milliGRAM(s) Oral every 6 hours PRN        Vital Signs Last 24 Hrs  T(C): 35.6 (29 Apr 2018 05:25), Max: 38.7 (28 Apr 2018 18:15)  T(F): 96 (29 Apr 2018 05:25), Max: 101.6 (28 Apr 2018 18:15)  HR: 68 (29 Apr 2018 09:23) (56 - 102)  BP: 90/57 (29 Apr 2018 09:23) (90/57 - 108/71)  BP(mean): --  RR: 16 (29 Apr 2018 09:23) (16 - 18)  SpO2: 100% (29 Apr 2018 09:23) (96% - 100%)    PHYSICAL EXAM  General: NAD  HEENT: sclera- anicteric; no erythema/ulcers  Neck: supple  CV: (+) S1/S2 RRR  Lungs: clear to auscultation, no wheezes or rales  Abdomen: soft, non-tender, non-distended (+) BS x 4 Q  Ext: mild edema on top of the feet  Skin: no rash and no petechiae  Neuro: alert and oriented X 3, no focal deficits  Central line: R SL PICC CDI  LABS:    Blood Cultures:                           8.3    1.9   )-----------( 246      ( 29 Apr 2018 07:10 )             26.8         Mean Cell Volume : 88.0 fl  Mean Cell Hemoglobin : 27.4 pg  Mean Cell Hemoglobin Concentration : 31.2 gm/dL  Auto Neutrophil # : 1.6 K/uL  Auto Lymphocyte # : 0.2 K/uL  Auto Monocyte # : 0.1 K/uL  Auto Eosinophil # : 0.0 K/uL  Auto Basophil # : 0.0 K/uL  Auto Neutrophil % : 74.0 %  Auto Lymphocyte % : 15.0 %  Auto Monocyte % : 7.0 %  Auto Eosinophil % : 1.3 %  Auto Basophil % : 0.0 %      04-29    139  |  103  |  11  ----------------------------<  149<H>  3.7   |  24  |  0.36<L>    Ca    8.4      29 Apr 2018 07:11  Phos  3.8     04-29  Mg     2.1     04-29    TPro  6.5  /  Alb  2.5<L>  /  TBili  1.0  /  DBili  x   /  AST  24  /  ALT  19  /  AlkPhos  659<H>  04-29      Mg 2.1  Phos 3.8            Uric Acid 2.5          RECENT CULTURES:  04-25 @ 21:16  .Blood Blood-Peripheral      No growth to date.  --  04-25 @ 16:29  .Urine Clean Catch (Midstream)        10,000 - 49,000 CFU/mL Enterococcus faecalis Susceptibility to follow.  Normal Urogenital barbra present  --    RADIOLOGY & ADDITIONAL STUDIES:    < from: CT Abdomen and Pelvis w/ IV Cont (04.27.18 @ 18:46) >  Extensive lymphoma the chest abdomen and pelvis without significant   change from prior study 4/7/2018.    Previously noted pyelonephritis is reviewed has resolved.    Xray Chest 2 Views PA/Lat (04.25.18 @ 14:51) >  IMPRESSION:   Clear lungs. Diagnosis: Relapsed Hodgkin's lymphoma    Protocol/Chemo Regimen:  Cycle 2 AVD     Day: 2    Pt endorsed:  frequent urination resolving. left flank pain improved    Review of Systems: Denies nausea, vomiting, diarrhea, cough, abdominal pain, mouth pain    Pain scale: Denies now    Diet: Regular    Allergies    No Known Allergies    Intolerances        ANTIMICROBIALS  cefepime  IVPB 1250 milliGRAM(s) IV Intermittent every 12 hours  vancomycin  IVPB      vancomycin  IVPB 1250milliGRAM(s) IV Intermittent every 12 hours      HEME/ONC MEDICATIONS      STANDING MEDICATIONS  ferrous    sulfate 325 milliGRAM(s) Oral daily  sodium chloride 0.9%. 1000 milliLiter(s) IV Continuous <Continuous>      PRN MEDICATIONS  acetaminophen   Tablet 650 milliGRAM(s) Oral every 6 hours PRN        Vital Signs Last 24 Hrs  T(C): 35.6 (29 Apr 2018 05:25), Max: 38.7 (28 Apr 2018 18:15)  T(F): 96 (29 Apr 2018 05:25), Max: 101.6 (28 Apr 2018 18:15)  HR: 68 (29 Apr 2018 09:23) (56 - 102)  BP: 90/57 (29 Apr 2018 09:23) (90/57 - 108/71)  BP(mean): --  RR: 16 (29 Apr 2018 09:23) (16 - 18)  SpO2: 100% (29 Apr 2018 09:23) (96% - 100%)    PHYSICAL EXAM  General: NAD  HEENT: sclera- anicteric; no erythema/ulcers  Neck: supple  CV: (+) S1/S2 RRR  Lungs: clear to auscultation, no wheezes or rales  Abdomen: soft, non-tender, non-distended (+) BS x 4 Q  Ext: mild edema on top of the feet  Skin: no rash and no petechiae  Neuro: alert and oriented X 3, no focal deficits  Central line: R SL PICC CDI  LABS:    Blood Cultures:                           8.3    1.9   )-----------( 246      ( 29 Apr 2018 07:10 )             26.8         Mean Cell Volume : 88.0 fl  Mean Cell Hemoglobin : 27.4 pg  Mean Cell Hemoglobin Concentration : 31.2 gm/dL  Auto Neutrophil # : 1.6 K/uL  Auto Lymphocyte # : 0.2 K/uL  Auto Monocyte # : 0.1 K/uL  Auto Eosinophil # : 0.0 K/uL  Auto Basophil # : 0.0 K/uL  Auto Neutrophil % : 74.0 %  Auto Lymphocyte % : 15.0 %  Auto Monocyte % : 7.0 %  Auto Eosinophil % : 1.3 %  Auto Basophil % : 0.0 %      04-29    139  |  103  |  11  ----------------------------<  149<H>  3.7   |  24  |  0.36<L>    Ca    8.4      29 Apr 2018 07:11  Phos  3.8     04-29  Mg     2.1     04-29    TPro  6.5  /  Alb  2.5<L>  /  TBili  1.0  /  DBili  x   /  AST  24  /  ALT  19  /  AlkPhos  659<H>  04-29      Mg 2.1  Phos 3.8            Uric Acid 2.5          RECENT CULTURES:  04-25 @ 21:16  .Blood Blood-Peripheral      No growth to date.  --  04-25 @ 16:29  .Urine Clean Catch (Midstream)        10,000 - 49,000 CFU/mL Enterococcus faecalis Susceptibility to follow.  Normal Urogenital barbra present  Culture - Urine (04.25.18 @ 16:29)    -  Ampicillin: S <=2    -  Ciprofloxacin: I 2    -  Nitrofurantoin: S <=32    -  Tetra/Doxy: R >8    -  Vancomycin: S 4    Specimen Source: .Urine Clean Catch (Midstream)    Culture Results:   10,000 - 49,000 CFU/mL Enterococcus faecalis  Normal Urogenital barbra present    Organism Identification: Enterococcus faecalis    Organism: Enterococcus faecalis    Method Type: SUSAN      --    RADIOLOGY & ADDITIONAL STUDIES:    < from: CT Abdomen and Pelvis w/ IV Cont (04.27.18 @ 18:46) >  Extensive lymphoma the chest abdomen and pelvis without significant   change from prior study 4/7/2018.    Previously noted pyelonephritis is reviewed has resolved.    Xray Chest 2 Views PA/Lat (04.25.18 @ 14:51) >  IMPRESSION:   Clear lungs. Diagnosis: Relapsed Hodgkin's lymphoma    Protocol/Chemo Regimen:  Status post Cycle 2 AVD     Day: 2    Pt endorsed:  frequent urination resolving. left flank pain improved    Review of Systems: Denies nausea, vomiting, diarrhea, cough, abdominal pain, mouth pain    Pain scale: Denies now    Diet: Regular    Allergies    No Known Allergies    Intolerances        ANTIMICROBIALS  cefepime  IVPB 1000 milliGRAM(s) IV Intermittent every 12 hours  vancomycin  IVPB      vancomycin  IVPB 1250milliGRAM(s) IV Intermittent every 12 hours      HEME/ONC MEDICATIONS      STANDING MEDICATIONS  ferrous    sulfate 325 milliGRAM(s) Oral daily  sodium chloride 0.9%. 1000 milliLiter(s) IV Continuous <Continuous>      PRN MEDICATIONS  acetaminophen   Tablet 650 milliGRAM(s) Oral every 6 hours PRN        Vital Signs Last 24 Hrs  T(C): 35.6 (29 Apr 2018 05:25), Max: 38.7 (28 Apr 2018 18:15)  T(F): 96 (29 Apr 2018 05:25), Max: 101.6 (28 Apr 2018 18:15)  HR: 68 (29 Apr 2018 09:23) (56 - 102)  BP: 90/57 (29 Apr 2018 09:23) (90/57 - 108/71)  BP(mean): --  RR: 16 (29 Apr 2018 09:23) (16 - 18)  SpO2: 100% (29 Apr 2018 09:23) (96% - 100%)    PHYSICAL EXAM  General: NAD  HEENT: sclera- anicteric; no erythema/ulcers  Neck: supple  CV: (+) S1/S2 RRR  Lungs: clear to auscultation, no wheezes or rales  Abdomen: soft, non-tender, non-distended (+) BS x 4 Q  Ext: mild edema on top of the feet  Skin: no rash and no petechiae  Neuro: alert and oriented X 3, no focal deficits  Central line: R SL PICC CDI  LABS:    Blood Cultures:                           8.3    1.9   )-----------( 246      ( 29 Apr 2018 07:10 )             26.8         Mean Cell Volume : 88.0 fl  Mean Cell Hemoglobin : 27.4 pg  Mean Cell Hemoglobin Concentration : 31.2 gm/dL  Auto Neutrophil # : 1.6 K/uL  Auto Lymphocyte # : 0.2 K/uL  Auto Monocyte # : 0.1 K/uL  Auto Eosinophil # : 0.0 K/uL  Auto Basophil # : 0.0 K/uL  Auto Neutrophil % : 74.0 %  Auto Lymphocyte % : 15.0 %  Auto Monocyte % : 7.0 %  Auto Eosinophil % : 1.3 %  Auto Basophil % : 0.0 %      04-29    139  |  103  |  11  ----------------------------<  149<H>  3.7   |  24  |  0.36<L>    Ca    8.4      29 Apr 2018 07:11  Phos  3.8     04-29  Mg     2.1     04-29    TPro  6.5  /  Alb  2.5<L>  /  TBili  1.0  /  DBili  x   /  AST  24  /  ALT  19  /  AlkPhos  659<H>  04-29      Mg 2.1  Phos 3.8            Uric Acid 2.5          RECENT CULTURES:  04-25 @ 21:16  .Blood Blood-Peripheral      No growth to date.  --  04-25 @ 16:29  .Urine Clean Catch (Midstream)        10,000 - 49,000 CFU/mL Enterococcus faecalis Susceptibility to follow.  Normal Urogenital barbra present  Culture - Urine (04.25.18 @ 16:29)    -  Ampicillin: S <=2    -  Ciprofloxacin: I 2    -  Nitrofurantoin: S <=32    -  Tetra/Doxy: R >8    -  Vancomycin: S 4    Specimen Source: .Urine Clean Catch (Midstream)    Culture Results:   10,000 - 49,000 CFU/mL Enterococcus faecalis  Normal Urogenital barbra present    Organism Identification: Enterococcus faecalis    Organism: Enterococcus faecalis    Method Type: SUSAN      --    RADIOLOGY & ADDITIONAL STUDIES:    < from: CT Abdomen and Pelvis w/ IV Cont (04.27.18 @ 18:46) >  Extensive lymphoma the chest abdomen and pelvis without significant   change from prior study 4/7/2018.    Previously noted pyelonephritis is reviewed has resolved.    Xray Chest 2 Views PA/Lat (04.25.18 @ 14:51) >  IMPRESSION:   Clear lungs.

## 2018-04-29 NOTE — PROGRESS NOTE ADULT - ATTENDING COMMENTS
Hodgkins lymphoma relapsed after brentuximab (patient had refused therapy initially), scans show still has extensive lymphoma-  -had urine positive for bacteria and some flank tenderness however no acute pathology on prelim ct scan report  -patient had been febrile on admission, could be her lymphoma (she has had fevers from this in the past and she still has extensive disease) but also with flank pain and positive UA concern for pyelo.   -On Cefepime/Vanco,  patient feeling improved - clinically looks nontoxic and has extensive disease on her CT scan, ensure no further evidence of pyelo  - Begin C2 AVD today  -plan for Neulasta as outpatient Hodgkins lymphoma relapsed after brentuximab (patient had refused therapy initially), scans show still has extensive lymphoma-  -had urine positive for bacteria and some flank tenderness however no acute pathology on prelim ct scan report  -patient had been febrile on admission, could be her lymphoma (she has had fevers from this in the past and she still has extensive disease) but also with flank pain and positive UA concern for pyelo.   -On Cefepime/Vanco, patient feeling improved - clinically looks nontoxic and has extensive disease on her CT scan, ensure no further evidence of pyelo  - Cycle 2 AVD   -plan for Neulasta as outpatient, or Neupogen if prolonged hospitalization

## 2018-04-29 NOTE — PROGRESS NOTE ADULT - PROBLEM SELECTOR PLAN 1
PET scan done in 3/2018 showing relapsed disease now s/p C1 of AVD ( 4/9/18)  CT Abdomen and Pelvis :Extensive lymphoma the chest abdomen and pelvis  Continue Cycle 2 AVD   Monitor CBC/lytes daily  Replete/transfuse as needed  Monitor for fluid overload- Monitor daily weight, Mouth care  IVF, antiemetics, pain management, strict intake and output, Vitals Q 4hrs  Post chemo Neulasta

## 2018-04-30 VITALS
TEMPERATURE: 98 F | OXYGEN SATURATION: 99 % | HEART RATE: 91 BPM | RESPIRATION RATE: 16 BRPM | DIASTOLIC BLOOD PRESSURE: 68 MMHG | SYSTOLIC BLOOD PRESSURE: 100 MMHG

## 2018-04-30 LAB
ALBUMIN SERPL ELPH-MCNC: 2.2 G/DL — LOW (ref 3.3–5)
ALP SERPL-CCNC: 436 U/L — HIGH (ref 40–120)
ALT FLD-CCNC: 15 U/L — SIGNIFICANT CHANGE UP (ref 10–45)
ANION GAP SERPL CALC-SCNC: 9 MMOL/L — SIGNIFICANT CHANGE UP (ref 5–17)
APTT BLD: 33.5 SEC — SIGNIFICANT CHANGE UP (ref 27.5–37.4)
AST SERPL-CCNC: 11 U/L — SIGNIFICANT CHANGE UP (ref 10–40)
BASOPHILS # BLD AUTO: 0 K/UL — SIGNIFICANT CHANGE UP (ref 0–0.2)
BASOPHILS NFR BLD AUTO: 0.4 % — SIGNIFICANT CHANGE UP (ref 0–2)
BILIRUB SERPL-MCNC: 0.8 MG/DL — SIGNIFICANT CHANGE UP (ref 0.2–1.2)
BLD GP AB SCN SERPL QL: NEGATIVE — SIGNIFICANT CHANGE UP
BUN SERPL-MCNC: 13 MG/DL — SIGNIFICANT CHANGE UP (ref 7–23)
CALCIUM SERPL-MCNC: 8.6 MG/DL — SIGNIFICANT CHANGE UP (ref 8.4–10.5)
CHLORIDE SERPL-SCNC: 105 MMOL/L — SIGNIFICANT CHANGE UP (ref 96–108)
CO2 SERPL-SCNC: 25 MMOL/L — SIGNIFICANT CHANGE UP (ref 22–31)
CREAT SERPL-MCNC: 0.38 MG/DL — LOW (ref 0.5–1.3)
CULTURE RESULTS: SIGNIFICANT CHANGE UP
CULTURE RESULTS: SIGNIFICANT CHANGE UP
EOSINOPHIL # BLD AUTO: 0 K/UL — SIGNIFICANT CHANGE UP (ref 0–0.5)
EOSINOPHIL NFR BLD AUTO: 0.9 % — SIGNIFICANT CHANGE UP (ref 0–6)
GLUCOSE SERPL-MCNC: 102 MG/DL — HIGH (ref 70–99)
HCT VFR BLD CALC: 22.5 % — LOW (ref 34.5–45)
HGB BLD-MCNC: 7.5 G/DL — LOW (ref 11.5–15.5)
INR BLD: 1.35 RATIO — HIGH (ref 0.88–1.16)
LDH SERPL L TO P-CCNC: 146 U/L — SIGNIFICANT CHANGE UP (ref 50–242)
LYMPHOCYTES # BLD AUTO: 0.2 K/UL — LOW (ref 1–3.3)
LYMPHOCYTES # BLD AUTO: 7.3 % — LOW (ref 13–44)
MAGNESIUM SERPL-MCNC: 2.2 MG/DL — SIGNIFICANT CHANGE UP (ref 1.6–2.6)
MCHC RBC-ENTMCNC: 29.1 PG — SIGNIFICANT CHANGE UP (ref 27–34)
MCHC RBC-ENTMCNC: 33.4 GM/DL — SIGNIFICANT CHANGE UP (ref 32–36)
MCV RBC AUTO: 87.3 FL — SIGNIFICANT CHANGE UP (ref 80–100)
MONOCYTES # BLD AUTO: 0.1 K/UL — SIGNIFICANT CHANGE UP (ref 0–0.9)
MONOCYTES NFR BLD AUTO: 4.3 % — SIGNIFICANT CHANGE UP (ref 2–14)
NEUTROPHILS # BLD AUTO: 2.2 K/UL — SIGNIFICANT CHANGE UP (ref 1.8–7.4)
NEUTROPHILS NFR BLD AUTO: 87.1 % — HIGH (ref 43–77)
PHOSPHATE SERPL-MCNC: 3.4 MG/DL — SIGNIFICANT CHANGE UP (ref 2.5–4.5)
PLATELET # BLD AUTO: 243 K/UL — SIGNIFICANT CHANGE UP (ref 150–400)
POTASSIUM SERPL-MCNC: 3.9 MMOL/L — SIGNIFICANT CHANGE UP (ref 3.5–5.3)
POTASSIUM SERPL-SCNC: 3.9 MMOL/L — SIGNIFICANT CHANGE UP (ref 3.5–5.3)
PROT SERPL-MCNC: 5.7 G/DL — LOW (ref 6–8.3)
PROTHROM AB SERPL-ACNC: 15.3 SEC — HIGH (ref 10–13.1)
RBC # BLD: 2.58 M/UL — LOW (ref 3.8–5.2)
RBC # FLD: 18.7 % — HIGH (ref 10.3–14.5)
RH IG SCN BLD-IMP: POSITIVE — SIGNIFICANT CHANGE UP
SODIUM SERPL-SCNC: 139 MMOL/L — SIGNIFICANT CHANGE UP (ref 135–145)
SPECIMEN SOURCE: SIGNIFICANT CHANGE UP
SPECIMEN SOURCE: SIGNIFICANT CHANGE UP
URATE SERPL-MCNC: 3.1 MG/DL — SIGNIFICANT CHANGE UP (ref 2.5–7)
WBC # BLD: 2.6 K/UL — LOW (ref 3.8–10.5)
WBC # FLD AUTO: 2.6 K/UL — LOW (ref 3.8–10.5)

## 2018-04-30 PROCEDURE — 99231 SBSQ HOSP IP/OBS SF/LOW 25: CPT | Mod: GC

## 2018-04-30 RX ORDER — FERROUS SULFATE 325(65) MG
1 TABLET ORAL
Qty: 0 | Refills: 0 | COMMUNITY

## 2018-04-30 RX ORDER — POLYETHYLENE GLYCOL 3350 17 G/17G
17 POWDER, FOR SOLUTION ORAL DAILY
Qty: 0 | Refills: 0 | Status: DISCONTINUED | OUTPATIENT
Start: 2018-04-30 | End: 2018-04-30

## 2018-04-30 RX ORDER — CIPROFLOXACIN LACTATE 400MG/40ML
1 VIAL (ML) INTRAVENOUS
Qty: 4 | Refills: 0 | OUTPATIENT
Start: 2018-04-30 | End: 2018-05-01

## 2018-04-30 RX ORDER — CIPROFLOXACIN LACTATE 400MG/40ML
500 VIAL (ML) INTRAVENOUS EVERY 12 HOURS
Qty: 0 | Refills: 0 | Status: DISCONTINUED | OUTPATIENT
Start: 2018-04-30 | End: 2018-04-30

## 2018-04-30 RX ADMIN — Medication 30 MILLILITER(S): at 13:47

## 2018-04-30 RX ADMIN — POLYETHYLENE GLYCOL 3350 17 GRAM(S): 17 POWDER, FOR SOLUTION ORAL at 14:13

## 2018-04-30 RX ADMIN — Medication 500 MILLIGRAM(S): at 17:30

## 2018-04-30 RX ADMIN — SODIUM CHLORIDE 50 MILLILITER(S): 9 INJECTION INTRAMUSCULAR; INTRAVENOUS; SUBCUTANEOUS at 05:04

## 2018-04-30 RX ADMIN — Medication 166.67 MILLIGRAM(S): at 05:03

## 2018-04-30 RX ADMIN — CEFEPIME 100 MILLIGRAM(S): 1 INJECTION, POWDER, FOR SOLUTION INTRAMUSCULAR; INTRAVENOUS at 05:04

## 2018-04-30 RX ADMIN — Medication 325 MILLIGRAM(S): at 11:24

## 2018-04-30 RX ADMIN — Medication 30 MILLILITER(S): at 05:04

## 2018-04-30 NOTE — PROGRESS NOTE ADULT - PROBLEM SELECTOR PLAN 1
PET scan done in 3/2018 showing progression of disease admitted after C1 of AVD (4/9/18)  CT Abdomen and Pelvis with extensive lymphoma the chest abdomen and pelvis  Now status post Cycle 2 AVD (4/28)  Monitor CBC/Lytes and transfuse/replete PRN  Strict Is and Os/Daily weights/Mouth Care  IVF  Antiemetics  Will need Neulasta PET scan done in 3/2018 showing progression of disease admitted after C1 of AVD (4/9/18)  CT Abdomen and Pelvis with extensive lymphoma the chest abdomen and pelvis  Now status post Cycle 2 AVD (4/28)  Monitor CBC/Lytes and transfuse/replete PRN  Anemia: 1 unit PRBCs prior to discharge  Strict Is and Os/Daily weights/Mouth Care  IVF  Antiemetics  Neulasta outpatient PET scan done in 3/2018 showing progression of disease admitted after C1 of AVD (4/9/18)  CT Abdomen and Pelvis with extensive lymphoma the chest abdomen and pelvis  Now status post Cycle 1 Day 15 AVD   Monitor CBC/Lytes and transfuse/replete PRN  Anemia: 1 unit PRBCs prior to discharge  Strict Is and Os/Daily weights/Mouth Care  IVF  Antiemetics  Neulasta outpatient

## 2018-04-30 NOTE — PROGRESS NOTE ADULT - ATTENDING COMMENTS
Hodgkins lymphoma relapsed after brentuximab (patient had refused therapy initially), scans show still has extensive lymphoma-  -had urine positive for bacteria and some flank tenderness however no acute pathology on prelim ct scan report  -patient had been febrile on admission, could be her lymphoma (she has had fevers from this in the past and she still has extensive disease) but also with flank pain and positive UA concern for pyelo.   -On Cefepime/Vanco, patient feeling improved - clinically looks nontoxic and has extensive disease on her CT scan, ensure no further evidence of pyelo  - Cycle 2 AVD   -plan for Neulasta as outpatient, or Neupogen if prolonged hospitalization 41yo F with Hodgkins lymphoma relapsed after Brentuximab (patient had refused therapy initially), scans show still has extensive lymphoma-  started C1 AVD on 4/9, got day 15 delayed -given on 4/28; now is day 17  -patient had been febrile on admission, could be her lymphoma (she has had fevers from this in the past and she still has extensive disease) but also with flank pain and positive UA concern for pyelo -repeat CT showed no pyelo. Afebrile, Cx's negative (UCx also negative), not neutropenic. Would give Cipro to finish 7 days of Abx  - d/c home, Neulasta tomorrow  -will give PRBC prior to discharge

## 2018-04-30 NOTE — PROGRESS NOTE ADULT - SUBJECTIVE AND OBJECTIVE BOX
Diagnosis: Stage IV Hodgkin's Lymphoma    Protocol/Chemo Regimen:  Status post Cycle 2 AVD     Day: 3    Pt endorsed:      Review of Systems: Patient denies headache, dizziness, visual changes, chest pain, palpitations, SOB, abdominal pain, nausea, vomiting, diarrhea or dysuria.    Pain scale: Denies    Diet: Regular    Allergies: No Known Allergies        ANTIMICROBIALS  cefepime  IVPB 1000 milliGRAM(s) IV Intermittent every 12 hours  vancomycin  IVPB 1250 milliGRAM(s) IV Intermittent every 12 hours      STANDING MEDICATIONS  ferrous    sulfate 325 milliGRAM(s) Oral daily  Saliva Substitute (CAPHOSOL) 30 milliLiter(s) Swish and Spit three times a day  sodium chloride 0.9%. 1000 milliLiter(s) IV Continuous <Continuous>      PRN MEDICATIONS  acetaminophen   Tablet 650 milliGRAM(s) Oral every 6 hours PRN  acetaminophen   Tablet. 650 milliGRAM(s) Oral every 6 hours PRN      Vital Signs Last 24 Hrs  T(C): 35.5 (30 Apr 2018 06:32), Max: 35.8 (29 Apr 2018 20:56)  T(F): 95.9 (30 Apr 2018 06:32), Max: 96.4 (29 Apr 2018 20:56)  HR: 76 (30 Apr 2018 06:32) (63 - 85)  BP: 91/50 (30 Apr 2018 06:32) (90/57 - 97/65)  BP(mean): --  RR: 18 (30 Apr 2018 06:32) (16 - 18)  SpO2: 98% (30 Apr 2018 06:32) (95% - 100%)      PHYSICAL EXAM  General: NAD  HEENT: PERRLA, EOMI, clear oropharynx, anicteric sclera, pink conjunctiva  Neck: supple  CV: (+) S1/S2 RRR  Lungs: clear to auscultation, no wheezes or rales  Abdomen: soft, non-tender, non-distended (+) BS  Ext: no clubbing, cyanosis or edema  Skin: no rashes and no petechiae  Neuro: alert and oriented X 3, no focal deficits  Central Line: PICC C/D/I      LABS:                        7.5    2.6   )-----------( 243      ( 30 Apr 2018 06:59 )             22.5         Mean Cell Volume : 87.3 fl  Mean Cell Hemoglobin : 29.1 pg  Mean Cell Hemoglobin Concentration : 33.4 gm/dL  Auto Neutrophil # : 2.2 K/uL  Auto Lymphocyte # : 0.2 K/uL  Auto Monocyte # : 0.1 K/uL  Auto Eosinophil # : 0.0 K/uL  Auto Basophil # : 0.0 K/uL  Auto Neutrophil % : 87.1 %  Auto Lymphocyte % : 7.3 %  Auto Monocyte % : 4.3 %  Auto Eosinophil % : 0.9 %  Auto Basophil % : 0.4 %      04-30    139  |  105  |  13  ----------------------------<  102<H>  3.9   |  25  |  0.38<L>    Ca    8.6      30 Apr 2018 06:59  Phos  3.4     04-30  Mg     2.2     04-30    TPro  5.7<L>  /  Alb  2.2<L>  /  TBili  0.8  /  DBili  x   /  AST  11  /  ALT  15  /  AlkPhos  436<H>  04-30      Mg 2.2  Phos 3.4      Uric Acid 3.1        RECENT CULTURES:    Culture - Urine (04.28.18 @ 00:20)    Specimen Source: .Urine Clean Catch (Midstream)    Culture Results:   No growth    Culture - Blood (04.27.18 @ 23:51)    Specimen Source: .Blood Port Single Lumen    Culture Results:   No growth to date.    Culture - Urine (04.25.18 @ 16:29)    -  Ampicillin: S <=2    -  Ciprofloxacin: I 2    -  Nitrofurantoin: S <=32    -  Tetra/Doxy: R >8    -  Vancomycin: S 4    Specimen Source: .Urine Clean Catch (Midstream)    Culture Results:   10,000 - 49,000 CFU/mL Enterococcus faecalis  Normal Urogenital barbra present    Organism Identification: Enterococcus faecalis    Organism: Enterococcus faecalis    Method Type: SUSAN        RADIOLOGY & ADDITIONAL STUDIES:    EXAM:  CT ABDOMEN/PELVIS/CHEST IC                        PROCEDURE DATE:  04/27/2018    IMPRESSION:  Extensive lymphoma the chest abdomen and pelvis without significant   change from prior study 4/7/2018.   Previously noted pyelonephritis is reviewed has resolved.      EXAM:  US KIDNEY(S)                        PROCEDURE DATE:  04/25/2018    IMPRESSION: No hydronephrosis.      EXAM:  XR CHEST PA LAT 2V                        PROCEDURE DATE:  04/25/2018    IMPRESSION: Clear lungs. Diagnosis: Stage IV Hodgkin's Lymphoma    Protocol/Chemo Regimen:  Status post Cycle 2 AVD     Day: 3    Pt endorsed:  mild constipation, had BM this am     Review of Systems: Patient denies headache, dizziness, visual changes, chest pain, palpitations, SOB, abdominal pain, nausea, vomiting, diarrhea or dysuria.    Pain scale: Denies    Diet: Regular    Allergies: No Known Allergies        ANTIMICROBIALS  cefepime  IVPB 1000 milliGRAM(s) IV Intermittent every 12 hours  vancomycin  IVPB 1250 milliGRAM(s) IV Intermittent every 12 hours      STANDING MEDICATIONS  ferrous    sulfate 325 milliGRAM(s) Oral daily  Saliva Substitute (CAPHOSOL) 30 milliLiter(s) Swish and Spit three times a day  sodium chloride 0.9%. 1000 milliLiter(s) IV Continuous <Continuous>      PRN MEDICATIONS  acetaminophen   Tablet 650 milliGRAM(s) Oral every 6 hours PRN  acetaminophen   Tablet. 650 milliGRAM(s) Oral every 6 hours PRN      Vital Signs Last 24 Hrs  T(C): 35.5 (30 Apr 2018 06:32), Max: 35.8 (29 Apr 2018 20:56)  T(F): 95.9 (30 Apr 2018 06:32), Max: 96.4 (29 Apr 2018 20:56)  HR: 76 (30 Apr 2018 06:32) (63 - 85)  BP: 91/50 (30 Apr 2018 06:32) (90/57 - 97/65)  BP(mean): --  RR: 18 (30 Apr 2018 06:32) (16 - 18)  SpO2: 98% (30 Apr 2018 06:32) (95% - 100%)      PHYSICAL EXAM  General: NAD  HEENT: PERRLA, EOMI, clear oropharynx, anicteric sclera, pink conjunctiva  Neck: supple  CV: (+) S1/S2 RRR  Lungs: clear to auscultation, no wheezes or rales  Abdomen: soft, non-tender, non-distended (+) BS  Ext: no clubbing, cyanosis or edema  LN: enlarged LN noted to cervical, axilla, inguinal sites B/L  Skin: no rashes and no petechiae  Neuro: alert and oriented X 3, no focal deficits  Central Line: PICC C/D/I      LABS:                        7.5    2.6   )-----------( 243      ( 30 Apr 2018 06:59 )             22.5         Mean Cell Volume : 87.3 fl  Mean Cell Hemoglobin : 29.1 pg  Mean Cell Hemoglobin Concentration : 33.4 gm/dL  Auto Neutrophil # : 2.2 K/uL  Auto Lymphocyte # : 0.2 K/uL  Auto Monocyte # : 0.1 K/uL  Auto Eosinophil # : 0.0 K/uL  Auto Basophil # : 0.0 K/uL  Auto Neutrophil % : 87.1 %  Auto Lymphocyte % : 7.3 %  Auto Monocyte % : 4.3 %  Auto Eosinophil % : 0.9 %  Auto Basophil % : 0.4 %      04-30    139  |  105  |  13  ----------------------------<  102<H>  3.9   |  25  |  0.38<L>    Ca    8.6      30 Apr 2018 06:59  Phos  3.4     04-30  Mg     2.2     04-30    TPro  5.7<L>  /  Alb  2.2<L>  /  TBili  0.8  /  DBili  x   /  AST  11  /  ALT  15  /  AlkPhos  436<H>  04-30      Mg 2.2  Phos 3.4      Uric Acid 3.1        RECENT CULTURES:    Culture - Urine (04.28.18 @ 00:20)    Specimen Source: .Urine Clean Catch (Midstream)    Culture Results:   No growth    Culture - Blood (04.27.18 @ 23:51)    Specimen Source: .Blood Port Single Lumen    Culture Results:   No growth to date.    Culture - Urine (04.25.18 @ 16:29)    -  Ampicillin: S <=2    -  Ciprofloxacin: I 2    -  Nitrofurantoin: S <=32    -  Tetra/Doxy: R >8    -  Vancomycin: S 4    Specimen Source: .Urine Clean Catch (Midstream)    Culture Results:   10,000 - 49,000 CFU/mL Enterococcus faecalis  Normal Urogenital barbra present    Organism Identification: Enterococcus faecalis    Organism: Enterococcus faecalis    Method Type: SUSAN        RADIOLOGY & ADDITIONAL STUDIES:    EXAM:  CT ABDOMEN/PELVIS/CHEST IC                        PROCEDURE DATE:  04/27/2018    IMPRESSION:  Extensive lymphoma the chest abdomen and pelvis without significant   change from prior study 4/7/2018.   Previously noted pyelonephritis is reviewed has resolved.      EXAM:  US KIDNEY(S)                        PROCEDURE DATE:  04/25/2018    IMPRESSION: No hydronephrosis.      EXAM:  XR CHEST PA LAT 2V                        PROCEDURE DATE:  04/25/2018    IMPRESSION: Clear lungs. Diagnosis: Stage IV Hodgkin's Lymphoma    Protocol/Chemo Regimen:  Cycle 1 AVD     Day: 17    Pt endorsed:  mild constipation, had BM this am     Review of Systems: Patient denies headache, dizziness, visual changes, chest pain, palpitations, SOB, abdominal pain, nausea, vomiting, diarrhea or dysuria.    Pain scale: Denies    Diet: Regular    Allergies: No Known Allergies        ANTIMICROBIALS  cefepime  IVPB 1000 milliGRAM(s) IV Intermittent every 12 hours  vancomycin  IVPB 1250 milliGRAM(s) IV Intermittent every 12 hours      STANDING MEDICATIONS  ferrous    sulfate 325 milliGRAM(s) Oral daily  Saliva Substitute (CAPHOSOL) 30 milliLiter(s) Swish and Spit three times a day  sodium chloride 0.9%. 1000 milliLiter(s) IV Continuous <Continuous>      PRN MEDICATIONS  acetaminophen   Tablet 650 milliGRAM(s) Oral every 6 hours PRN  acetaminophen   Tablet. 650 milliGRAM(s) Oral every 6 hours PRN      Vital Signs Last 24 Hrs  T(C): 35.5 (30 Apr 2018 06:32), Max: 35.8 (29 Apr 2018 20:56)  T(F): 95.9 (30 Apr 2018 06:32), Max: 96.4 (29 Apr 2018 20:56)  HR: 76 (30 Apr 2018 06:32) (63 - 85)  BP: 91/50 (30 Apr 2018 06:32) (90/57 - 97/65)  BP(mean): --  RR: 18 (30 Apr 2018 06:32) (16 - 18)  SpO2: 98% (30 Apr 2018 06:32) (95% - 100%)      PHYSICAL EXAM  General: NAD  HEENT: PERRLA, EOMI, clear oropharynx, anicteric sclera, pink conjunctiva  Neck: supple  CV: (+) S1/S2 RRR  Lungs: clear to auscultation, no wheezes or rales  Abdomen: soft, non-tender, non-distended (+) BS  Ext: no clubbing, cyanosis or edema  LN: enlarged LN noted to cervical, axilla, inguinal sites B/L  Skin: no rashes and no petechiae  Neuro: alert and oriented X 3, no focal deficits  Central Line: PICC C/D/I      LABS:                        7.5    2.6   )-----------( 243      ( 30 Apr 2018 06:59 )             22.5         Mean Cell Volume : 87.3 fl  Mean Cell Hemoglobin : 29.1 pg  Mean Cell Hemoglobin Concentration : 33.4 gm/dL  Auto Neutrophil # : 2.2 K/uL  Auto Lymphocyte # : 0.2 K/uL  Auto Monocyte # : 0.1 K/uL  Auto Eosinophil # : 0.0 K/uL  Auto Basophil # : 0.0 K/uL  Auto Neutrophil % : 87.1 %  Auto Lymphocyte % : 7.3 %  Auto Monocyte % : 4.3 %  Auto Eosinophil % : 0.9 %  Auto Basophil % : 0.4 %      04-30    139  |  105  |  13  ----------------------------<  102<H>  3.9   |  25  |  0.38<L>    Ca    8.6      30 Apr 2018 06:59  Phos  3.4     04-30  Mg     2.2     04-30    TPro  5.7<L>  /  Alb  2.2<L>  /  TBili  0.8  /  DBili  x   /  AST  11  /  ALT  15  /  AlkPhos  436<H>  04-30      Mg 2.2  Phos 3.4      Uric Acid 3.1        RECENT CULTURES:    Culture - Urine (04.28.18 @ 00:20)    Specimen Source: .Urine Clean Catch (Midstream)    Culture Results:   No growth    Culture - Blood (04.27.18 @ 23:51)    Specimen Source: .Blood Port Single Lumen    Culture Results:   No growth to date.    Culture - Urine (04.25.18 @ 16:29)    -  Ampicillin: S <=2    -  Ciprofloxacin: I 2    -  Nitrofurantoin: S <=32    -  Tetra/Doxy: R >8    -  Vancomycin: S 4    Specimen Source: .Urine Clean Catch (Midstream)    Culture Results:   10,000 - 49,000 CFU/mL Enterococcus faecalis  Normal Urogenital barbra present    Organism Identification: Enterococcus faecalis    Organism: Enterococcus faecalis    Method Type: SUSAN        RADIOLOGY & ADDITIONAL STUDIES:    EXAM:  CT ABDOMEN/PELVIS/CHEST IC                        PROCEDURE DATE:  04/27/2018    IMPRESSION:  Extensive lymphoma the chest abdomen and pelvis without significant   change from prior study 4/7/2018.   Previously noted pyelonephritis is reviewed has resolved.      EXAM:  US KIDNEY(S)                        PROCEDURE DATE:  04/25/2018    IMPRESSION: No hydronephrosis.      EXAM:  XR CHEST PA LAT 2V                        PROCEDURE DATE:  04/25/2018    IMPRESSION: Clear lungs.

## 2018-04-30 NOTE — PROGRESS NOTE ADULT - ASSESSMENT
This is a 41 yo F with PMHx of Stage IV Hodgkin's Lymphoma s/p 8 cycles of Brentuximab (9/1/17-2/20/18) with PET/CT on 3/2018 compatible with progression of disease now status post C1of AVD (4/9/18) presenting from [facility] with left flank pain. Urine cx (+) Enterococcus faecalis and CT C/A/P with extensive lymphoma the chest abdomen and pelvis without significant change from prior study. The patient is now status post cycle 2 AVD given on 4/28. The patients hospital course has been complicated by fall. This is a 43 yo F with PMHx of Stage IV Hodgkin's Lymphoma s/p 8 cycles of Brentuximab (9/1/17-2/20/18) with PET/CT on 3/2018 compatible with progression of disease now status post C1of AVD (4/9/18) presenting from [facility] with left flank pain. Urine cx (+) Enterococcus faecalis and CT C/A/P with extensive lymphoma the chest abdomen and pelvis without significant change from prior study. The patient is now status post cycle 1 Day 15 of AVD on 4/28. The patients hospital course has been complicated by fall.

## 2018-04-30 NOTE — PROGRESS NOTE ADULT - PROBLEM SELECTOR PLAN 3
No prophylactic DVT prophylaxis secondary to thrombocytopenia  Encourage ambulation  PT recommending Home PT Encourage ambulation  PT recommending Home PT

## 2018-04-30 NOTE — PROGRESS NOTE ADULT - PROBLEM SELECTOR PLAN 2
The patient is not neutropenic   If febrile Pan Cx and CXR  4/25 Urine culture(+)  Enterococcus faecalis, repeat with NGTD   Continue Vancomycin and Cefepime The patient is not neutropenic   If febrile Pan Cx and CXR  4/25 Urine culture(+)  Enterococcus faecalis, repeat with NGTD   Discontinue Vancomycin and Cefepime, Cipro for 2 days to complete 7 day course

## 2018-05-01 ENCOUNTER — APPOINTMENT (OUTPATIENT)
Dept: INFUSION THERAPY | Facility: HOSPITAL | Age: 43
End: 2018-05-01

## 2018-05-02 DIAGNOSIS — Z51.89 ENCOUNTER FOR OTHER SPECIFIED AFTERCARE: ICD-10-CM

## 2018-05-03 LAB
CULTURE RESULTS: SIGNIFICANT CHANGE UP
SPECIMEN SOURCE: SIGNIFICANT CHANGE UP

## 2018-05-08 ENCOUNTER — OUTPATIENT (OUTPATIENT)
Dept: OUTPATIENT SERVICES | Facility: HOSPITAL | Age: 43
LOS: 1 days | Discharge: HOME | End: 2018-05-08

## 2018-05-08 DIAGNOSIS — Z98.890 OTHER SPECIFIED POSTPROCEDURAL STATES: Chronic | ICD-10-CM

## 2018-05-08 DIAGNOSIS — Z98.82 BREAST IMPLANT STATUS: Chronic | ICD-10-CM

## 2018-05-08 DIAGNOSIS — C81.90 HODGKIN LYMPHOMA, UNSPECIFIED, UNSPECIFIED SITE: ICD-10-CM

## 2018-05-14 ENCOUNTER — APPOINTMENT (OUTPATIENT)
Dept: HEMATOLOGY ONCOLOGY | Facility: CLINIC | Age: 43
End: 2018-05-14
Payer: MEDICAID

## 2018-05-14 ENCOUNTER — RESULT REVIEW (OUTPATIENT)
Age: 43
End: 2018-05-14

## 2018-05-14 ENCOUNTER — LABORATORY RESULT (OUTPATIENT)
Age: 43
End: 2018-05-14

## 2018-05-14 ENCOUNTER — APPOINTMENT (OUTPATIENT)
Dept: INFUSION THERAPY | Facility: HOSPITAL | Age: 43
End: 2018-05-14

## 2018-05-14 DIAGNOSIS — C81.90 HODGKIN LYMPHOMA, UNSPECIFIED, UNSPECIFIED SITE: ICD-10-CM

## 2018-05-14 LAB
BASOPHILS # BLD AUTO: 0 K/UL — SIGNIFICANT CHANGE UP (ref 0–0.2)
BASOPHILS NFR BLD AUTO: 1 % — SIGNIFICANT CHANGE UP (ref 0–2)
BLD GP AB SCN SERPL QL: NEGATIVE — SIGNIFICANT CHANGE UP
EOSINOPHIL # BLD AUTO: 0 K/UL — SIGNIFICANT CHANGE UP (ref 0–0.5)
EOSINOPHIL NFR BLD AUTO: 1 % — SIGNIFICANT CHANGE UP (ref 0–6)
HCT VFR BLD CALC: 24.9 % — LOW (ref 34.5–45)
HGB BLD-MCNC: 8.3 G/DL — LOW (ref 11.5–15.5)
LYMPHOCYTES # BLD AUTO: 0.7 K/UL — LOW (ref 1–3.3)
LYMPHOCYTES # BLD AUTO: 11 % — LOW (ref 13–44)
MCHC RBC-ENTMCNC: 28.1 PG — SIGNIFICANT CHANGE UP (ref 27–34)
MCHC RBC-ENTMCNC: 33.2 G/DL — SIGNIFICANT CHANGE UP (ref 32–36)
MCV RBC AUTO: 84.5 FL — SIGNIFICANT CHANGE UP (ref 80–100)
METAMYELOCYTES # FLD: 4 % — HIGH (ref 0–0)
MONOCYTES # BLD AUTO: 0.6 K/UL — SIGNIFICANT CHANGE UP (ref 0–0.9)
MONOCYTES NFR BLD AUTO: 11 % — SIGNIFICANT CHANGE UP (ref 2–14)
NEUTROPHILS # BLD AUTO: 11 K/UL — HIGH (ref 1.8–7.4)
NEUTROPHILS NFR BLD AUTO: 68 % — SIGNIFICANT CHANGE UP (ref 43–77)
NEUTS BAND # BLD: 4 % — SIGNIFICANT CHANGE UP (ref 0–8)
NRBC # BLD: 5 /100 — HIGH (ref 0–0)
PLAT MORPH BLD: NORMAL — SIGNIFICANT CHANGE UP
PLATELET # BLD AUTO: 280 K/UL — SIGNIFICANT CHANGE UP (ref 150–400)
RBC # BLD: 2.94 M/UL — LOW (ref 3.8–5.2)
RBC # FLD: 19.2 % — HIGH (ref 10.3–14.5)
RBC BLD AUTO: SIGNIFICANT CHANGE UP
RH IG SCN BLD-IMP: POSITIVE — SIGNIFICANT CHANGE UP
WBC # BLD: 12.4 K/UL — HIGH (ref 3.8–10.5)
WBC # FLD AUTO: 12.4 K/UL — HIGH (ref 3.8–10.5)

## 2018-05-14 PROCEDURE — 99215 OFFICE O/P EST HI 40 MIN: CPT

## 2018-05-15 ENCOUNTER — APPOINTMENT (OUTPATIENT)
Dept: INFUSION THERAPY | Facility: HOSPITAL | Age: 43
End: 2018-05-15

## 2018-05-15 DIAGNOSIS — R11.2 NAUSEA WITH VOMITING, UNSPECIFIED: ICD-10-CM

## 2018-05-15 DIAGNOSIS — Z51.11 ENCOUNTER FOR ANTINEOPLASTIC CHEMOTHERAPY: ICD-10-CM

## 2018-05-15 PROBLEM — C81.90 HODGKINS LYMPHOMA: Status: ACTIVE | Noted: 2017-07-25

## 2018-05-16 DIAGNOSIS — Z51.89 ENCOUNTER FOR OTHER SPECIFIED AFTERCARE: ICD-10-CM

## 2018-05-16 PROCEDURE — 86850 RBC ANTIBODY SCREEN: CPT

## 2018-05-16 PROCEDURE — 86901 BLOOD TYPING SEROLOGIC RH(D): CPT

## 2018-05-16 PROCEDURE — 86900 BLOOD TYPING SEROLOGIC ABO: CPT

## 2018-05-16 PROCEDURE — 86923 COMPATIBILITY TEST ELECTRIC: CPT

## 2018-05-17 ENCOUNTER — APPOINTMENT (OUTPATIENT)
Dept: INFUSION THERAPY | Facility: HOSPITAL | Age: 43
End: 2018-05-17

## 2018-05-17 NOTE — PROGRESS NOTE ADULT - SUBJECTIVE AND OBJECTIVE BOX
Time of session: 45 minutes [id number] psychotherapy    Current Symptoms and Diagnosis  F [age]anxiety and depression-worry, concern, low frustration tolerance, health related stress issues, low self-esteem, irritability. Current symptoms are moderate. There is no report of suicidal or homicidal ideation.    Current Medications:  He is currently taking medications as prescribed. Please see EMR for more information.    Progress Note:  Patient attends today's session, he reports that things have been going well, he recently had his daughter's wedding, and it went \"better than expected\". He felt that they had a lot of support from family and friends, the issues that they feared did not materialize. He is very happy for his daughter. He also mentioned that his son recently proposed to his girlfriend. His biggest concern is right now are his wife Jenna who appears to be more depressed lately, she apparently ran out of her medication, and was not able to attend her last medication management appointment. Patient is trying to support her the best that he can. Patient reports that his health has been somewhat better, he occasionally has dizziness, and migraines, he continues to try to balance his energy levels etc.    We discussed ways that he can maintain his current level of progress including keeping himself busy, he has been doing various projects such as breeding rabbits, he plans on making some walking items for various agencies, he continues to spend a lot of time with his grandchildren.    Treatment Plan:  Progress maintenance, continue to work on mood/life adjustment issues. Our next appointment will be in 2 months.    Discharge Needs:  Practice stress reduction, feelings expression, challenge negative thinking, good nutrition and sleep. He will take medications as prescribed.      Edy Rodriguez [facility]     Patient is a 42y old  Female who presents with a chief complaint of sent by md for low blood count (30 Aug 2017 06:11)      SUBJECTIVE / OVERNIGHT EVENTS:  Continues to feel short of breath and is uncomfortable from fluid retention. Denies nausea.    Vital Signs Last 24 Hrs  T(C): 36.6 (02 Sep 2017 10:07), Max: 36.9 (01 Sep 2017 21:26)  T(F): 97.9 (02 Sep 2017 10:07), Max: 98.4 (01 Sep 2017 21:26)  HR: 94 (02 Sep 2017 10:07) (71 - 94)  BP: 100/66 (02 Sep 2017 10:07) (96/64 - 110/73)  BP(mean): --  RR: 18 (02 Sep 2017 10:07) (16 - 18)  SpO2: 95% (02 Sep 2017 10:07) (95% - 97%)    MEDICATIONS  (STANDING):  ferrous    sulfate 325 milliGRAM(s) Oral two times a day with meals  ergocalciferol 48866 Unit(s) Oral every week  dexamethasone     Tablet 4 milliGRAM(s) Oral every 6 hours  cefepime  IVPB 2000 milliGRAM(s) IV Intermittent every 8 hours  docusate sodium 100 milliGRAM(s) Oral daily  senna 2 Tablet(s) Oral at bedtime  enoxaparin Injectable 40 milliGRAM(s) SubCutaneous daily  polyethylene glycol 3350 17 Gram(s) Oral daily  sodium chloride 0.9%. 1000 milliLiter(s) (150 mL/Hr) IV Continuous <Continuous>  Brentuximab (ADCENTRIS) 100 milliGRAM(s),sodium chloride 0.9% 100 milliLiter(s) 100 milliGRAM(s) IV Intermittent once    MEDICATIONS  (PRN):  ondansetron Injectable 8 milliGRAM(s) IV Push every 8 hours PRN Nausea and/or Vomiting      CAPILLARY BLOOD GLUCOSE    Vital Signs Last 24 Hrs  T(C): 36.9 (01 Sep 2017 21:26), Max: 36.9 (01 Sep 2017 21:26)  T(F): 98.4 (01 Sep 2017 21:26), Max: 98.4 (01 Sep 2017 21:26)  HR: 91 (01 Sep 2017 21:26) (68 - 91)  BP: 96/64 (01 Sep 2017 21:26) (96/64 - 110/73)  BP(mean): --  RR: 18 (01 Sep 2017 21:26) (16 - 18)  SpO2: 97% (01 Sep 2017 21:26) (97% - 98%)    I&O's Summary    01 Sep 2017 07:01  -  02 Sep 2017 07:00  --------------------------------------------------------  IN: 540 mL / OUT: 0 mL / NET: 540 mL        PHYSICAL EXAM:  GENERAL: NAD, well-developed  HEAD:  Atraumatic, Normocephalic  CHEST/LUNG: Decreased breath sounds on right posteriorly  HEART: Regular rate and rhythm; No murmurs, rubs, or gallops  ABDOMEN: Mild tenderness, soft, no guarding or rigidity  EXTREMITIES:  Significant pitting edema  NEUROLOGY: non-focal  SKIN: No rashes or lesions    LABS:                        8.7    16.26 )-----------( 167      ( 02 Sep 2017 09:32 )             29.2       09-02    135  |  96  |  18  ----------------------------<  114<H>  4.4   |  24  |  0.42<L>    Ca    8.9      02 Sep 2017 07:07  Phos  3.6     09-02  Mg     1.9     09-02    TPro  6.3  /  Alb  2.6<L>  /  TBili  0.9  /  DBili  x   /  AST  17  /  ALT  21  /  AlkPhos  236<H>  09-02

## 2018-05-23 ENCOUNTER — OTHER (OUTPATIENT)
Age: 43
End: 2018-05-23

## 2018-05-23 PROCEDURE — 87633 RESP VIRUS 12-25 TARGETS: CPT

## 2018-05-23 PROCEDURE — 83605 ASSAY OF LACTIC ACID: CPT

## 2018-05-23 PROCEDURE — 86900 BLOOD TYPING SEROLOGIC ABO: CPT

## 2018-05-23 PROCEDURE — 82435 ASSAY OF BLOOD CHLORIDE: CPT

## 2018-05-23 PROCEDURE — 86850 RBC ANTIBODY SCREEN: CPT

## 2018-05-23 PROCEDURE — 82977 ASSAY OF GGT: CPT

## 2018-05-23 PROCEDURE — 86901 BLOOD TYPING SEROLOGIC RH(D): CPT

## 2018-05-23 PROCEDURE — 74177 CT ABD & PELVIS W/CONTRAST: CPT

## 2018-05-23 PROCEDURE — 82947 ASSAY GLUCOSE BLOOD QUANT: CPT

## 2018-05-23 PROCEDURE — 82553 CREATINE MB FRACTION: CPT

## 2018-05-23 PROCEDURE — 87040 BLOOD CULTURE FOR BACTERIA: CPT

## 2018-05-23 PROCEDURE — 82330 ASSAY OF CALCIUM: CPT

## 2018-05-23 PROCEDURE — 71046 X-RAY EXAM CHEST 2 VIEWS: CPT

## 2018-05-23 PROCEDURE — 87486 CHLMYD PNEUM DNA AMP PROBE: CPT

## 2018-05-23 PROCEDURE — 99285 EMERGENCY DEPT VISIT HI MDM: CPT | Mod: 25

## 2018-05-23 PROCEDURE — 83735 ASSAY OF MAGNESIUM: CPT

## 2018-05-23 PROCEDURE — 93005 ELECTROCARDIOGRAM TRACING: CPT

## 2018-05-23 PROCEDURE — 82565 ASSAY OF CREATININE: CPT

## 2018-05-23 PROCEDURE — 80053 COMPREHEN METABOLIC PANEL: CPT

## 2018-05-23 PROCEDURE — 86923 COMPATIBILITY TEST ELECTRIC: CPT

## 2018-05-23 PROCEDURE — 80202 ASSAY OF VANCOMYCIN: CPT

## 2018-05-23 PROCEDURE — 81001 URINALYSIS AUTO W/SCOPE: CPT

## 2018-05-23 PROCEDURE — 83010 ASSAY OF HAPTOGLOBIN QUANT: CPT

## 2018-05-23 PROCEDURE — 83880 ASSAY OF NATRIURETIC PEPTIDE: CPT

## 2018-05-23 PROCEDURE — 85027 COMPLETE CBC AUTOMATED: CPT

## 2018-05-23 PROCEDURE — 87798 DETECT AGENT NOS DNA AMP: CPT

## 2018-05-23 PROCEDURE — 87086 URINE CULTURE/COLONY COUNT: CPT

## 2018-05-23 PROCEDURE — 87581 M.PNEUMON DNA AMP PROBE: CPT

## 2018-05-23 PROCEDURE — 80048 BASIC METABOLIC PNL TOTAL CA: CPT

## 2018-05-23 PROCEDURE — 84132 ASSAY OF SERUM POTASSIUM: CPT

## 2018-05-23 PROCEDURE — 76775 US EXAM ABDO BACK WALL LIM: CPT

## 2018-05-23 PROCEDURE — 83615 LACTATE (LD) (LDH) ENZYME: CPT

## 2018-05-23 PROCEDURE — 71260 CT THORAX DX C+: CPT

## 2018-05-23 PROCEDURE — 84295 ASSAY OF SERUM SODIUM: CPT

## 2018-05-23 PROCEDURE — 84550 ASSAY OF BLOOD/URIC ACID: CPT

## 2018-05-23 PROCEDURE — 85610 PROTHROMBIN TIME: CPT

## 2018-05-23 PROCEDURE — 85014 HEMATOCRIT: CPT

## 2018-05-23 PROCEDURE — 96375 TX/PRO/DX INJ NEW DRUG ADDON: CPT

## 2018-05-23 PROCEDURE — 84100 ASSAY OF PHOSPHORUS: CPT

## 2018-05-23 PROCEDURE — 36430 TRANSFUSION BLD/BLD COMPNT: CPT

## 2018-05-23 PROCEDURE — 97161 PT EVAL LOW COMPLEX 20 MIN: CPT

## 2018-05-23 PROCEDURE — 84484 ASSAY OF TROPONIN QUANT: CPT

## 2018-05-23 PROCEDURE — 94640 AIRWAY INHALATION TREATMENT: CPT

## 2018-05-23 PROCEDURE — 82550 ASSAY OF CK (CPK): CPT

## 2018-05-23 PROCEDURE — P9040: CPT

## 2018-05-23 PROCEDURE — 82803 BLOOD GASES ANY COMBINATION: CPT

## 2018-05-23 PROCEDURE — 85730 THROMBOPLASTIN TIME PARTIAL: CPT

## 2018-05-23 PROCEDURE — 87186 SC STD MICRODIL/AGAR DIL: CPT

## 2018-05-23 PROCEDURE — 96374 THER/PROPH/DIAG INJ IV PUSH: CPT

## 2018-05-25 ENCOUNTER — OTHER (OUTPATIENT)
Age: 43
End: 2018-05-25

## 2018-05-29 ENCOUNTER — APPOINTMENT (OUTPATIENT)
Dept: INFUSION THERAPY | Facility: HOSPITAL | Age: 43
End: 2018-05-29

## 2018-05-29 ENCOUNTER — INPATIENT (INPATIENT)
Facility: HOSPITAL | Age: 43
LOS: 7 days | Discharge: HOME | DRG: 840 | End: 2018-06-06
Attending: INTERNAL MEDICINE | Admitting: INTERNAL MEDICINE
Payer: MEDICAID

## 2018-05-29 ENCOUNTER — RESULT REVIEW (OUTPATIENT)
Age: 43
End: 2018-05-29

## 2018-05-29 ENCOUNTER — LABORATORY RESULT (OUTPATIENT)
Age: 43
End: 2018-05-29

## 2018-05-29 VITALS
HEART RATE: 120 BPM | SYSTOLIC BLOOD PRESSURE: 96 MMHG | DIASTOLIC BLOOD PRESSURE: 63 MMHG | OXYGEN SATURATION: 96 % | RESPIRATION RATE: 22 BRPM | TEMPERATURE: 99 F

## 2018-05-29 DIAGNOSIS — C81.90 HODGKIN LYMPHOMA, UNSPECIFIED, UNSPECIFIED SITE: ICD-10-CM

## 2018-05-29 DIAGNOSIS — Z98.890 OTHER SPECIFIED POSTPROCEDURAL STATES: Chronic | ICD-10-CM

## 2018-05-29 DIAGNOSIS — Z98.82 BREAST IMPLANT STATUS: Chronic | ICD-10-CM

## 2018-05-29 LAB
ALBUMIN SERPL ELPH-MCNC: 2.8 G/DL — LOW (ref 3.3–5)
ALP SERPL-CCNC: 424 U/L — HIGH (ref 40–120)
ALT FLD-CCNC: 17 U/L — SIGNIFICANT CHANGE UP (ref 10–45)
ANION GAP SERPL CALC-SCNC: 11 MMOL/L — SIGNIFICANT CHANGE UP (ref 5–17)
APPEARANCE UR: CLEAR — SIGNIFICANT CHANGE UP
AST SERPL-CCNC: 14 U/L — SIGNIFICANT CHANGE UP (ref 10–40)
BASE EXCESS BLDV CALC-SCNC: 3.7 MMOL/L — HIGH (ref -2–2)
BASOPHILS # BLD AUTO: 0 K/UL — SIGNIFICANT CHANGE UP (ref 0–0.2)
BASOPHILS NFR BLD AUTO: 0.1 % — SIGNIFICANT CHANGE UP (ref 0–2)
BILIRUB SERPL-MCNC: 1.5 MG/DL — HIGH (ref 0.2–1.2)
BILIRUB UR-MCNC: NEGATIVE — SIGNIFICANT CHANGE UP
BLD GP AB SCN SERPL QL: NEGATIVE — SIGNIFICANT CHANGE UP
BUN SERPL-MCNC: 8 MG/DL — SIGNIFICANT CHANGE UP (ref 7–23)
CA-I SERPL-SCNC: 1.2 MMOL/L — SIGNIFICANT CHANGE UP (ref 1.12–1.3)
CALCIUM SERPL-MCNC: 9.2 MG/DL — SIGNIFICANT CHANGE UP (ref 8.4–10.5)
CHLORIDE BLDV-SCNC: 98 MMOL/L — SIGNIFICANT CHANGE UP (ref 96–108)
CHLORIDE SERPL-SCNC: 92 MMOL/L — LOW (ref 96–108)
CO2 BLDV-SCNC: 28 MMOL/L — SIGNIFICANT CHANGE UP (ref 22–30)
CO2 SERPL-SCNC: 28 MMOL/L — SIGNIFICANT CHANGE UP (ref 22–31)
COLOR SPEC: YELLOW — SIGNIFICANT CHANGE UP
CREAT SERPL-MCNC: 0.48 MG/DL — LOW (ref 0.5–1.3)
DIFF PNL FLD: NEGATIVE — SIGNIFICANT CHANGE UP
EOSINOPHIL # BLD AUTO: 0.1 K/UL — SIGNIFICANT CHANGE UP (ref 0–0.5)
EOSINOPHIL NFR BLD AUTO: 1 % — SIGNIFICANT CHANGE UP (ref 0–6)
EOSINOPHIL NFR BLD AUTO: 1.8 % — SIGNIFICANT CHANGE UP (ref 0–6)
EPI CELLS # UR: SIGNIFICANT CHANGE UP /HPF
GAS PNL BLDV: 131 MMOL/L — LOW (ref 136–145)
GAS PNL BLDV: SIGNIFICANT CHANGE UP
GLUCOSE BLDV-MCNC: 122 MG/DL — HIGH (ref 70–99)
GLUCOSE SERPL-MCNC: 105 MG/DL — HIGH (ref 70–99)
GLUCOSE UR QL: NEGATIVE — SIGNIFICANT CHANGE UP
HCO3 BLDV-SCNC: 27 MMOL/L — SIGNIFICANT CHANGE UP (ref 21–29)
HCT VFR BLD CALC: 20.1 % — CRITICAL LOW (ref 34.5–45)
HCT VFR BLD CALC: 20.5 % — CRITICAL LOW (ref 34.5–45)
HCT VFR BLDA CALC: 18 % — CRITICAL LOW (ref 39–50)
HGB BLD CALC-MCNC: 5.8 G/DL — CRITICAL LOW (ref 11.5–15.5)
HGB BLD-MCNC: 6.9 G/DL — CRITICAL LOW (ref 11.5–15.5)
HGB BLD-MCNC: 6.9 G/DL — CRITICAL LOW (ref 11.5–15.5)
KETONES UR-MCNC: NEGATIVE — SIGNIFICANT CHANGE UP
LACTATE BLDV-MCNC: 2.7 MMOL/L — HIGH (ref 0.7–2)
LEUKOCYTE ESTERASE UR-ACNC: NEGATIVE — SIGNIFICANT CHANGE UP
LYMPHOCYTES # BLD AUTO: 0.5 K/UL — LOW (ref 1–3.3)
LYMPHOCYTES # BLD AUTO: 0.6 K/UL — LOW (ref 1–3.3)
LYMPHOCYTES # BLD AUTO: 6 % — LOW (ref 13–44)
LYMPHOCYTES # BLD AUTO: 7.6 % — LOW (ref 13–44)
MCHC RBC-ENTMCNC: 28.5 PG — SIGNIFICANT CHANGE UP (ref 27–34)
MCHC RBC-ENTMCNC: 28.6 PG — SIGNIFICANT CHANGE UP (ref 27–34)
MCHC RBC-ENTMCNC: 33.7 GM/DL — SIGNIFICANT CHANGE UP (ref 32–36)
MCHC RBC-ENTMCNC: 34.4 G/DL — SIGNIFICANT CHANGE UP (ref 32–36)
MCV RBC AUTO: 82.7 FL — SIGNIFICANT CHANGE UP (ref 80–100)
MCV RBC AUTO: 84.8 FL — SIGNIFICANT CHANGE UP (ref 80–100)
MONOCYTES # BLD AUTO: 0.3 K/UL — SIGNIFICANT CHANGE UP (ref 0–0.9)
MONOCYTES # BLD AUTO: 0.3 K/UL — SIGNIFICANT CHANGE UP (ref 0–0.9)
MONOCYTES NFR BLD AUTO: 3 % — SIGNIFICANT CHANGE UP (ref 2–14)
MONOCYTES NFR BLD AUTO: 3.7 % — SIGNIFICANT CHANGE UP (ref 2–14)
NEUTROPHILS # BLD AUTO: 5.4 K/UL — SIGNIFICANT CHANGE UP (ref 1.8–7.4)
NEUTROPHILS # BLD AUTO: 6.7 K/UL — SIGNIFICANT CHANGE UP (ref 1.8–7.4)
NEUTROPHILS NFR BLD AUTO: 83 % — HIGH (ref 43–77)
NEUTROPHILS NFR BLD AUTO: 86.8 % — HIGH (ref 43–77)
NITRITE UR-MCNC: NEGATIVE — SIGNIFICANT CHANGE UP
PCO2 BLDV: 39 MMHG — SIGNIFICANT CHANGE UP (ref 35–50)
PH BLDV: 7.46 — HIGH (ref 7.35–7.45)
PH UR: 6 — SIGNIFICANT CHANGE UP (ref 5–8)
PLATELET # BLD AUTO: 165 K/UL — SIGNIFICANT CHANGE UP (ref 150–400)
PLATELET # BLD AUTO: 170 K/UL — SIGNIFICANT CHANGE UP (ref 150–400)
PO2 BLDV: 62 MMHG — HIGH (ref 25–45)
POTASSIUM BLDV-SCNC: 3.7 MMOL/L — SIGNIFICANT CHANGE UP (ref 3.5–5)
POTASSIUM SERPL-MCNC: 4.4 MMOL/L — SIGNIFICANT CHANGE UP (ref 3.5–5.3)
POTASSIUM SERPL-SCNC: 4.4 MMOL/L — SIGNIFICANT CHANGE UP (ref 3.5–5.3)
PROT SERPL-MCNC: 5.6 G/DL — LOW (ref 6–8.3)
PROT UR-MCNC: NEGATIVE — SIGNIFICANT CHANGE UP
RBC # BLD: 2.42 M/UL — LOW (ref 3.8–5.2)
RBC # BLD: 2.43 M/UL — LOW (ref 3.8–5.2)
RBC # FLD: 19.4 % — HIGH (ref 10.3–14.5)
RBC # FLD: 19.5 % — HIGH (ref 10.3–14.5)
RBC CASTS # UR COMP ASSIST: SIGNIFICANT CHANGE UP /HPF (ref 0–2)
RH IG SCN BLD-IMP: POSITIVE — SIGNIFICANT CHANGE UP
SAO2 % BLDV: 90 % — HIGH (ref 67–88)
SODIUM SERPL-SCNC: 131 MMOL/L — LOW (ref 135–145)
SP GR SPEC: 1.01 — LOW (ref 1.01–1.02)
UROBILINOGEN FLD QL: NEGATIVE — SIGNIFICANT CHANGE UP
WBC # BLD: 6.1 K/UL — SIGNIFICANT CHANGE UP (ref 3.8–10.5)
WBC # BLD: 7.7 K/UL — SIGNIFICANT CHANGE UP (ref 3.8–10.5)
WBC # FLD AUTO: 6.1 K/UL — SIGNIFICANT CHANGE UP (ref 3.8–10.5)
WBC # FLD AUTO: 7.7 K/UL — SIGNIFICANT CHANGE UP (ref 3.8–10.5)
WBC UR QL: SIGNIFICANT CHANGE UP /HPF (ref 0–5)

## 2018-05-29 PROCEDURE — 99223 1ST HOSP IP/OBS HIGH 75: CPT

## 2018-05-29 PROCEDURE — 71045 X-RAY EXAM CHEST 1 VIEW: CPT | Mod: 26

## 2018-05-29 PROCEDURE — 93010 ELECTROCARDIOGRAM REPORT: CPT

## 2018-05-29 PROCEDURE — 99285 EMERGENCY DEPT VISIT HI MDM: CPT | Mod: 25

## 2018-05-29 RX ORDER — ACETAMINOPHEN 500 MG
975 TABLET ORAL ONCE
Qty: 0 | Refills: 0 | Status: COMPLETED | OUTPATIENT
Start: 2018-05-29 | End: 2018-05-29

## 2018-05-29 RX ORDER — SODIUM CHLORIDE 9 MG/ML
1000 INJECTION INTRAMUSCULAR; INTRAVENOUS; SUBCUTANEOUS ONCE
Qty: 0 | Refills: 0 | Status: COMPLETED | OUTPATIENT
Start: 2018-05-29 | End: 2018-05-29

## 2018-05-29 RX ADMIN — SODIUM CHLORIDE 2000 MILLILITER(S): 9 INJECTION INTRAMUSCULAR; INTRAVENOUS; SUBCUTANEOUS at 19:52

## 2018-05-29 RX ADMIN — Medication 975 MILLIGRAM(S): at 18:12

## 2018-05-29 NOTE — ED PROVIDER NOTE - OBJECTIVE STATEMENT
44yo F h/o Hodgkin's lymphoma diagnosed last year, last chemo 2 weeks ago sent by [email or web address] for anemia on labs prior to today's chemo treatment. Endorses fever at 101 but states it is her baseline since starting chemo. Denies cough, HA, n/v/d, abd pain. Pt PICC line on RUE since April, denies pain, redness, warmth. Sent by Dr. Baxter.

## 2018-05-29 NOTE — ED PROVIDER NOTE - ATTENDING CONTRIBUTION TO CARE
****ATTENDING**** 42yo f hx Hodgkin's lymphoma diagnosed last year, last chemo 2 weeks ago sent by [facility] for anemia. Pt states she was there today for chemo and noted to have low H/H and sent to the hospital. On arrival pt noted to be febrile, states she has a fever on/off usually and not concerned. Denies any symptoms of cough, HA, n/v/d, abd pain. Pt has a PICC line on RUE since april denies pain.   VS reviewed.   On exam, Patient is awake,alert,oriented x 3. Patient is pale appearing and in no acute distress. Patient's chest is clear to ausculation, +s1s2. Abdomen is soft nd/nt +BS. Trace swelling b/l or calf tenderness. RUE PICC line non tender.   Pt noted to be febrile and tachycardic.   Check labs, pan culture. IVF and TS for PRBC. Consult Hematology Dr. Baxter who sent pt to the ED.

## 2018-05-29 NOTE — ED PROVIDER NOTE - MEDICAL DECISION MAKING DETAILS
44yo F h/o Hodgkin's lymphoma diagnosed last year, last chemo 2 weeks ago sent by [facility] for anemia on labs prior to today's chemo treatment. Denies cough, HA, n/v/d, abd pain. Pt is febrile, tachycardic. Will obtain neutropenic workup, give fluids, tylenol, transfuse 2 units, consult with hematology regarding plan.

## 2018-05-29 NOTE — ED PROVIDER NOTE - PHYSICAL EXAMINATION
A&Ox3 mild distress, pallor. Pt tachycardic and febrile. No heart murmur noted. CN 2-12 grossly intact without focal neurologic defict. PERRLA, EOMI. No JVD. No peripheral edema. Lungs CTA b/l no wheezes, rhonchi, rales. Abdomen soft nontender nondistended. Peripheral pulses present and equal b/l. Head NCAT. Skin normal for race.

## 2018-05-29 NOTE — ED ADULT NURSE REASSESSMENT NOTE - NS ED NURSE REASSESS COMMENT FT1
Pt is resting comfortably, repositioned and reconnected to IV fluids.  Pt is diaphoretic and no longer febrile.  Given dry towels.  VSS, tachycardic at baseline since arrival.  Safety and comfort maintained.  Pending blood transfusion.

## 2018-05-29 NOTE — ED ADULT NURSE REASSESSMENT NOTE - NS ED NURSE REASSESS COMMENT FT1
Pt assisted to bathroom with one assist, pt required a lot fo support.  Pt unsteady on feet at times.  Pt changed, and repositioned.  Safety and comfort maintained.  Will continue to monitor.

## 2018-05-29 NOTE — ED PROCEDURE NOTE - PROCEDURE ADDITIONAL DETAILS
20 in left basilic. Emergency Department Focused Ultrasound performed at patient's bedside for educational purposes. The study will have a follow up study performed or was performed in the direct supervision of an ultrasound trained attending.

## 2018-05-29 NOTE — ED PROVIDER NOTE - PROGRESS NOTE DETAILS
Spoke to Dr. Baxter states patient was noted to be hypotensive and tachycardic at [facility] and sent to the hospital. Pt has history of fever, no abx at this time and only culture patient. Admit to hospitalist. MITCH

## 2018-05-29 NOTE — ED ADULT NURSE NOTE - OBJECTIVE STATEMENT
43 y.o. Female presents to the ED via EMS from [facility] for low hemoglobin and hematocrit. A&Ox3. Hx hodgkin's lymphoma, anemia, SOB upon exertion. As per EMS, Pt was supposed to have chemo today in [facility], but did not receive chemo as her hemoglobin and hematocrit was low. Pt was sent here for evaluation and blood transfusion. Pt c/o weakness. Pale in appearance. Pt arrived with PICC on R upper arm - accessed by [facility]. Pt arrived with 1L fluids infusing through PICC line. Pt is febrile. Denies CP, N/V/D, urinary/bowel complications. Family at bedside. Pt is in no current distress. Comfort and safety provided. Will continue to monitor. Awaiting ED MD consult. As per patient, she does not want IV access - pt prefers using PICC line.

## 2018-05-29 NOTE — ED PROVIDER NOTE - PR
Patient became upset with her  and removed C-collar off. Reports wants to leave. Discussed importance of keeping her C-collar on and being checked out for her fall. Patient calming down and agreeable to stay.  stepped out of room to go and smoke a cigarette. Patient reports need to urinate; placed on bedpan.   118

## 2018-05-30 DIAGNOSIS — R00.0 TACHYCARDIA, UNSPECIFIED: ICD-10-CM

## 2018-05-30 DIAGNOSIS — R63.8 OTHER SYMPTOMS AND SIGNS CONCERNING FOOD AND FLUID INTAKE: ICD-10-CM

## 2018-05-30 DIAGNOSIS — Z29.9 ENCOUNTER FOR PROPHYLACTIC MEASURES, UNSPECIFIED: ICD-10-CM

## 2018-05-30 DIAGNOSIS — C81.90 HODGKIN LYMPHOMA, UNSPECIFIED, UNSPECIFIED SITE: ICD-10-CM

## 2018-05-30 DIAGNOSIS — D64.9 ANEMIA, UNSPECIFIED: ICD-10-CM

## 2018-05-30 DIAGNOSIS — R50.9 FEVER, UNSPECIFIED: ICD-10-CM

## 2018-05-30 LAB
ANION GAP SERPL CALC-SCNC: 13 MMOL/L — SIGNIFICANT CHANGE UP (ref 5–17)
BUN SERPL-MCNC: 7 MG/DL — SIGNIFICANT CHANGE UP (ref 7–23)
CALCIUM SERPL-MCNC: 8.8 MG/DL — SIGNIFICANT CHANGE UP (ref 8.4–10.5)
CHLORIDE SERPL-SCNC: 99 MMOL/L — SIGNIFICANT CHANGE UP (ref 96–108)
CO2 SERPL-SCNC: 26 MMOL/L — SIGNIFICANT CHANGE UP (ref 22–31)
CREAT SERPL-MCNC: 0.32 MG/DL — LOW (ref 0.5–1.3)
GLUCOSE SERPL-MCNC: 112 MG/DL — HIGH (ref 70–99)
HCT VFR BLD CALC: 28 % — LOW (ref 34.5–45)
HGB BLD-MCNC: 9.7 G/DL — LOW (ref 11.5–15.5)
MCHC RBC-ENTMCNC: 28.4 PG — SIGNIFICANT CHANGE UP (ref 27–34)
MCHC RBC-ENTMCNC: 34.6 GM/DL — SIGNIFICANT CHANGE UP (ref 32–36)
MCV RBC AUTO: 82.1 FL — SIGNIFICANT CHANGE UP (ref 80–100)
NRBC # BLD: 1 /100 WBCS — HIGH (ref 0–0)
PLATELET # BLD AUTO: 110 K/UL — LOW (ref 150–400)
POTASSIUM SERPL-MCNC: 3.7 MMOL/L — SIGNIFICANT CHANGE UP (ref 3.5–5.3)
POTASSIUM SERPL-SCNC: 3.7 MMOL/L — SIGNIFICANT CHANGE UP (ref 3.5–5.3)
RBC # BLD: 3.41 M/UL — LOW (ref 3.8–5.2)
RBC # FLD: 17 % — HIGH (ref 10.3–14.5)
SODIUM SERPL-SCNC: 138 MMOL/L — SIGNIFICANT CHANGE UP (ref 135–145)
WBC # BLD: 6.19 K/UL — SIGNIFICANT CHANGE UP (ref 3.8–10.5)
WBC # FLD AUTO: 6.19 K/UL — SIGNIFICANT CHANGE UP (ref 3.8–10.5)

## 2018-05-30 PROCEDURE — 99232 SBSQ HOSP IP/OBS MODERATE 35: CPT

## 2018-05-30 PROCEDURE — 99223 1ST HOSP IP/OBS HIGH 75: CPT | Mod: GC

## 2018-05-30 PROCEDURE — 74177 CT ABD & PELVIS W/CONTRAST: CPT | Mod: 26

## 2018-05-30 PROCEDURE — 99222 1ST HOSP IP/OBS MODERATE 55: CPT | Mod: GC

## 2018-05-30 PROCEDURE — 70491 CT SOFT TISSUE NECK W/DYE: CPT | Mod: 26

## 2018-05-30 PROCEDURE — 71260 CT THORAX DX C+: CPT | Mod: 26

## 2018-05-30 PROCEDURE — 99233 SBSQ HOSP IP/OBS HIGH 50: CPT

## 2018-05-30 RX ORDER — ACETAMINOPHEN 500 MG
650 TABLET ORAL EVERY 6 HOURS
Qty: 0 | Refills: 0 | Status: DISCONTINUED | OUTPATIENT
Start: 2018-05-30 | End: 2018-06-06

## 2018-05-30 RX ORDER — SODIUM CHLORIDE 9 MG/ML
1000 INJECTION INTRAMUSCULAR; INTRAVENOUS; SUBCUTANEOUS ONCE
Qty: 0 | Refills: 0 | Status: COMPLETED | OUTPATIENT
Start: 2018-05-30 | End: 2018-05-30

## 2018-05-30 RX ORDER — ALLOPURINOL 300 MG
300 TABLET ORAL DAILY
Qty: 0 | Refills: 0 | Status: DISCONTINUED | OUTPATIENT
Start: 2018-05-30 | End: 2018-06-06

## 2018-05-30 RX ORDER — ENOXAPARIN SODIUM 100 MG/ML
40 INJECTION SUBCUTANEOUS EVERY 24 HOURS
Qty: 0 | Refills: 0 | Status: DISCONTINUED | OUTPATIENT
Start: 2018-05-30 | End: 2018-06-06

## 2018-05-30 RX ORDER — IPRATROPIUM/ALBUTEROL SULFATE 18-103MCG
3 AEROSOL WITH ADAPTER (GRAM) INHALATION ONCE
Qty: 0 | Refills: 0 | Status: DISCONTINUED | OUTPATIENT
Start: 2018-05-30 | End: 2018-06-06

## 2018-05-30 RX ORDER — SODIUM CHLORIDE 9 MG/ML
1000 INJECTION INTRAMUSCULAR; INTRAVENOUS; SUBCUTANEOUS
Qty: 0 | Refills: 0 | Status: DISCONTINUED | OUTPATIENT
Start: 2018-05-30 | End: 2018-06-06

## 2018-05-30 RX ORDER — ALTEPLASE 100 MG
2 KIT INTRAVENOUS ONCE
Qty: 0 | Refills: 0 | Status: COMPLETED | OUTPATIENT
Start: 2018-05-30 | End: 2018-05-30

## 2018-05-30 RX ADMIN — SODIUM CHLORIDE 75 MILLILITER(S): 9 INJECTION INTRAMUSCULAR; INTRAVENOUS; SUBCUTANEOUS at 04:00

## 2018-05-30 RX ADMIN — Medication 300 MILLIGRAM(S): at 23:32

## 2018-05-30 RX ADMIN — ENOXAPARIN SODIUM 40 MILLIGRAM(S): 100 INJECTION SUBCUTANEOUS at 23:32

## 2018-05-30 RX ADMIN — SODIUM CHLORIDE 2000 MILLILITER(S): 9 INJECTION INTRAMUSCULAR; INTRAVENOUS; SUBCUTANEOUS at 18:07

## 2018-05-30 RX ADMIN — ALTEPLASE 2 MILLIGRAM(S): KIT at 22:00

## 2018-05-30 NOTE — H&P ADULT - ASSESSMENT
This is a 43 year old woman with hodgkins lymphoma on chemo presenting with fevers, hypotension, tachycardia.

## 2018-05-30 NOTE — H&P ADULT - NSHPLABSRESULTS_GEN_ALL_CORE
Labs personally reviewed.                        6.9    6.1   )-----------( 170      ( 29 May 2018 18:54 )             20.5         131<L>  |  92<L>  |  8   ----------------------------<  105<H>  4.4   |  28  |  0.48<L>    Ca    9.2      29 May 2018 18:54    TPro  5.6<L>  /  Alb  2.8<L>  /  TBili  1.5<H>  /  DBili  x   /  AST  14  /  ALT  17  /  AlkPhos  424<H>      LIVER FUNCTIONS - ( 29 May 2018 18:54 )  Alb: 2.8 g/dL / Pro: 5.6 g/dL / ALK PHOS: 424 U/L / ALT: 17 U/L / AST: 14 U/L / GGT: x       Urinalysis Basic - ( 29 May 2018 22:39 )    Color: Yellow / Appearance: Clear / S.006 / pH: x  Gluc: x / Ketone: Negative  / Bili: Negative / Urobili: Negative   Blood: x / Protein: Negative / Nitrite: Negative   Leuk Esterase: Negative / RBC: 0-2 /HPF / WBC 0-2 /HPF   Sq Epi: x / Non Sq Epi: OCC /HPF / Bacteria: x    Imaging personally reviewed.      Tracing personally reviewed.

## 2018-05-30 NOTE — CONSULT NOTE ADULT - PROBLEM SELECTOR RECOMMENDATION 2
Patient with likely progression of disease on AVD therapy.   She is s/p 8 cycles of single agent Brentuximab (she refused optimal therapy at the time) and may have selected for a more resistant clone.   Please check CT neck/chest/abdomen/pelvis prior to starting chemotherapy in the AM. Also please administer cathflo for PICC.   Starting allopurinol 300 mg daily. IVF hydration.   Plan is for ICE chemotherapy with mesna starting in the AM. Discussed with the patient, orders written and signed and consent signed.   Ultimately will need to plan for autologous stem cell transplant given severely progressive and resistant disease.   Will follow very closely.   Check CBC with diff, CMP daily, and TLS labs q12 hours.  Discussed with primary team.

## 2018-05-30 NOTE — PROGRESS NOTE ADULT - SUBJECTIVE AND OBJECTIVE BOX
Patient is a 43y old  Female who presents with a chief complaint of hypotension (30 May 2018 00:02)  Subjective: pt feels better s/p 2 units PRBC. No more fevers, no aches/pains. Requesting all lab draws be done off PICC line.    VITAL SIGNS:  Vital Signs Last 24 Hrs  T(C): 37.6 (30 May 2018 16:33), Max: 38.4 (29 May 2018 16:50)  T(F): 99.6 (30 May 2018 16:33), Max: 101.2 (29 May 2018 16:50)  HR: 130 (30 May 2018 16:33) (71 - 130)  BP: 97/67 (30 May 2018 16:33) (89/57 - 103/69)  BP(mean): --  RR: 18 (30 May 2018 16:33) (18 - 20)  SpO2: 96% (30 May 2018 16:33) (96% - 100%)    PHYSICAL EXAM:     GENERAL: no acute distress  HEENT: PERRLA, EOMI, moist oropharynx   RESPIRATORY: CTAB, no w/c   CARDIOVASCULAR: RRR, no murmurs, gallops, rubs  ABDOMINAL: soft, non-tender, non-distended, positive bowel sounds   EXTREMITIES: no clubbing, cyanosis, or edema, +R arm PICC  NEUROLOGICAL: alert and oriented x 3, non-focal  SKIN: no rashes or lesions   MUSCULOSKELETAL: no gross joint deformity  PSYCH: depressed affect, normal mood                        9.7    6.19  )-----------( 110      ( 30 May 2018 08:03 )             28.0     05-30    138  |  99  |  7   ----------------------------<  112<H>  3.7   |  26  |  0.32<L>    Ca    8.8      30 May 2018 07:18    TPro  5.6<L>  /  Alb  2.8<L>  /  TBili  1.5<H>  /  DBili  x   /  AST  14  /  ALT  17  /  AlkPhos  424<H>  05-29      CAPILLARY BLOOD GLUCOSE          MEDICATIONS  (STANDING):  alteplase for catheter clearance 2 milliGRAM(s) Catheter once  enoxaparin Injectable 40 milliGRAM(s) SubCutaneous every 24 hours  sodium chloride 0.9%. 1000 milliLiter(s) (75 mL/Hr) IV Continuous <Continuous>    MEDICATIONS  (PRN):  acetaminophen   Tablet 650 milliGRAM(s) Oral every 6 hours PRN For Temp greater than 38 C (100.4 F)

## 2018-05-30 NOTE — H&P ADULT - PROBLEM SELECTOR PLAN 1
--suspect tumor fevers, patient without any localizing signs of infection  --tachycardia likely 2/2 anemia/poor POs  --blood and urine cultures pending  --continue to trend fever curve, low threshold to start ABx given active chemo

## 2018-05-30 NOTE — H&P ADULT - NSHPPHYSICALEXAM_GEN_ALL_CORE
GENERAL: NAD, well-developed  HEAD:  atraumatic, normocephalic  ENT: EOMI, PERRLA, conjunctiva and sclera clear, neck supple, no JVD, moist mucosa, no LAD, no thyromegaly  CHEST/LUNG: CTAB; no wheezes, rales, rhonchi  BACK: no spinal tenderness  HEART: RRR, no murmurs, rubs, or gallops  ABDOMEN: NABS, soft, nontender, nondistended  EXTREMITIES:  no clubbing, cyanosis, or edema  PSYCH: normal behavior, normal affect, euthymic  NEUROLOGY: AAOx3, non-focal, CN 2-12 intact  SKIN: normal color, no rashes or lesions GENERAL: NAD, cachectic  HEAD:  atraumatic, normocephalic  ENT: EOMI, PERRLA, conjunctiva and sclera clear, neck supple, no JVD, moist mucosa, no LAD, no thyromegaly  CHEST/LUNG: CTAB; no wheezes, rales, rhonchi  BACK: no spinal tenderness  HEART: RRR, no murmurs, rubs, or gallops  ABDOMEN: NABS, soft, nontender, nondistended  EXTREMITIES:  no clubbing, cyanosis, or edema  PSYCH: normal behavior, normal affect, euthymic  NEUROLOGY: AAOx3, non-focal, CN 2-12 intact  SKIN: normal color, no rashes or lesions

## 2018-05-30 NOTE — CHART NOTE - NSCHARTNOTEFT_GEN_A_CORE
MEDICINE NP    SONG BAE  43y Female    Patient is a 43y old  Female who presents with a chief complaint of hypotension (30 May 2018 00:02)       > Event Summary:  Endorsed by day NP, Patient with PICC occlusion.   Patient seen at bedside, AAOX3, NAD. Denies any pain at PICC site      > Vital Signs Last 24 Hrs  T(C): 37.6 (30 May 2018 16:33), Max: 37.6 (30 May 2018 12:36)  T(F): 99.6 (30 May 2018 16:33), Max: 99.6 (30 May 2018 12:36)  HR: 130 (30 May 2018 16:33) (71 - 130)  BP: 97/67 (30 May 2018 16:33) (92/55 - 97/67)  RR: 18 (30 May 2018 16:33) (18 - 18)  SpO2: 96% (30 May 2018 16:33) (96% - 100%)    > Review Of System:   General:	No fevers. No chills.    Respiratory : No cough. No SOB. No hemoptysis  Cardiovascular: No chest pain. No palpitations.  Skin:  No pain at PICC site.       > Radiology:  from: Xray Chest 1 View- PORTABLE-Urgent (05.29.18 @ 18:30) >  FINDINGS:  Right upper extremity PICC line distal tip in the SVC.  < end of copied text >      > Physical Assessment:  General:  A&Ox3, NAD.   CV:  Regular pulse   Respiratory: Even, unlabored.     Skin:  +RUE PICC without redness or erythema         > Assessment & Plan:  RUE PICC Occlusion   -PICC line assessed for no blood return.  -Using aseptic technique, Cathflo administered for blood return aspiration per protocol.    -C/w current regimen       AILYN Magaña-BC  Medicine Department  #[id number]

## 2018-05-30 NOTE — CONSULT NOTE ADULT - PROBLEM SELECTOR RECOMMENDATION 9
No infectious source suspected, although will follow up cultures.   Observe off antibiotics at present time as not neutropenic.   Likely source is worsening lymphoma, clinically she has not responded very well to AVD.

## 2018-05-30 NOTE — CONSULT NOTE ADULT - ASSESSMENT
43F with Hodgkins Lymphoma on chemo admitted 5/29/18 for sepsis. She presented to [facility] for chemotherapy but was hypotensive, tachycardic and febrile. 43F with Hodgkins Lymphoma on therapy with AVD, admitted 5/29/18 for sepsis- 101.2F, tachycardic, hypotensive. No localizing symptoms, nontoxic. Ongoing fevers for months in the setting of progression of disease. Last CT 4/27 with extensive lymphoma. Could be tumor fever. Cultures in lab but antibiotics not started.     Recommend  -monitor off antibiotics for now as she is clinically stable   -f/u blood cultures   -check dopplers   -if fevers persist, repeat CT chest abdomen pelvis     Discussed with Dr Hunt

## 2018-05-30 NOTE — CONSULT NOTE ADULT - SUBJECTIVE AND OBJECTIVE BOX
HPI:  44 y/o F with classical Hodgkin's Lymphoma (lymphocyte depleted) Cd30+ with Ki67 of 20%, stage IV buly disease, diagnosed due to progressive lymphadenopathy for two years in April 2017 with excisional biopsy performed at that time, found on presentation to have significant pulmonary involvement with narrowing of the tracheaobronchial tree, complicated in the past by pericardial effusion for which she refused window and pleural effusion which she refused intervention on, initially had refused therapy but finally agreed to single agent Brentuximab on 9/1/17 and s/p 8 cycles with progression of disease seen on PET in February 2018, and now due to C2D15 dose of AVD. She is presenting with fevers, hypotension, tachycardia. Patient presented today to [facility] for chemotherapy and was found to be tachycardic to 120, with a BP in the 80s/40s, Hgb 6.6, and fever to 101. She notes generalized fatigue and worsening SOB with exertion. Denies cough, runny nose, sore throat, chest pain, palptiations, nausea, vomiting, pain or rash at PICC site, diarrhea, constipation, dysuria. Notably, patient has been having intermittent fevers for many months, notes that they are usually low grade and she often has chills associated. She notes that she feels much better after receiving blood.       PAST MEDICAL & SURGICAL HISTORY:  Hodgkin lymphoma  H/O abdominoplasty: 2004  H/O bilateral breast implants: 2004      Allergies    No Known Allergies    Intolerances        MEDICATIONS  (STANDING):  enoxaparin Injectable 40 milliGRAM(s) SubCutaneous every 24 hours  sodium chloride 0.9%. 1000 milliLiter(s) (75 mL/Hr) IV Continuous <Continuous>    MEDICATIONS  (PRN):  acetaminophen   Tablet 650 milliGRAM(s) Oral every 6 hours PRN For Temp greater than 38 C (100.4 F)      FAMILY HISTORY:  Family history of cervical cancer (Sibling)  Family history of lung cancer      SOCIAL HISTORY: No EtOH, no tobacco    REVIEW OF SYSTEMS:    CONSTITUTIONAL: +Fatigue +fever +chills  EYES/ENT: No visual changes;  No vertigo or throat pain   NECK: No pain or stiffness  RESPIRATORY: No cough, +ALFARO  CARDIOVASCULAR: No chest pain or palpitations  GASTROINTESTINAL: No abdominal or epigastric pain. No nausea, vomiting, or hematemesis; No diarrhea or constipation. No melena or hematochezia.  GENITOURINARY: No dysuria, frequency or hematuria  NEUROLOGICAL: No numbness or weakness  SKIN: No itching, burning, rashes, or lesions   All other review of systems is negative unless indicated above.    Height (cm): 168 (05-29 @ 15:06)  Weight (kg): 52 (05-29 @ 15:06)  BMI (kg/m2): 18.4 (05-29 @ 15:06)  BSA (m2): 1.58 (05-29 @ 15:06)    T(F): 97.5 (05-30-18 @ 07:00), Max: 101.2 (05-29-18 @ 16:50)  HR: 96 (05-30-18 @ 07:00)  BP: 93/55 (05-30-18 @ 07:00)  RR: 18 (05-30-18 @ 07:00)  SpO2: 98% (05-30-18 @ 07:00)  Wt(kg): --    GENERAL: NAD, well-developed  HEAD:  Atraumatic, Normocephalic  EYES: EOMI, PERRLA, conjunctiva and sclera clear  NECK: Supple, No JVD, + lymphadenopathy  CHEST/LUNG: Clear to auscultation bilaterally; No wheeze  HEART: Regular rate and rhythm; No murmurs, rubs, or gallops  ABDOMEN: Soft, Nontender, Nondistended; Bowel sounds present  EXTREMITIES:  2+ Peripheral Pulses, No clubbing, cyanosis, or edema  NEUROLOGY: non-focal  SKIN: No rashes or lesions                          9.7    6.19  )-----------( 110      ( 30 May 2018 08:03 )             28.0       05-30    138  |  99  |  7   ----------------------------<  112<H>  3.7   |  26  |  0.32<L>    Ca    8.8      30 May 2018 07:18    TPro  5.6<L>  /  Alb  2.8<L>  /  TBili  1.5<H>  /  DBili  x   /  AST  14  /  ALT  17  /  AlkPhos  424<H>  05-29    < from: Xray Chest 1 View- PORTABLE-Urgent (05.29.18 @ 18:30) >  IMPRESSION:   Bilateral pleural effusions..  Unchanged left hilar/mediastinal mass.      < end of copied text >

## 2018-05-30 NOTE — CONSULT NOTE ADULT - ASSESSMENT
44 y/o F with classical HL (lymphocyte deplete) with stage IV bulky disease initially refused therapy now s/p 8 cycles of single agent brentuximab with POD in February 2018 now in second cycle of AVD therapy here with fever, hypotension, anemia.

## 2018-05-30 NOTE — H&P ADULT - PROBLEM SELECTOR PLAN 6
--regular diet --regular diet    --BULK OF PATIENT CARE AND DOCUMENTATION OCCURRED ON 5/29, PRIOR TO MN

## 2018-05-30 NOTE — CONSULT NOTE ADULT - SUBJECTIVE AND OBJECTIVE BOX
HPI:  43F with Hodgkins Lymphoma on chemo admitted 18 for sepsis. She presented to [facility] hypotensive, tachycardic and febrile.   Admitted 3/22-3/26/18 with fevers and RSV, received Vancomycin and Zosyn  -4/10 again with fevers, CT with bilateral pyelonephritis but asymptomatic and urine culture negative, received Zosyn for three days then stopped   - admitted with flank pain and frequency, received chemotherapy, urine grew E. faecalis 10-49K CFU, received Vancomycin and Cefepime       PAST MEDICAL & SURGICAL HISTORY:  Hodgkin lymphoma  H/O abdominoplasty:   H/O bilateral breast implants:       Allergies    No Known Allergies    Intolerances        ANTIMICROBIALS:      OTHER MEDS:  acetaminophen   Tablet 650 milliGRAM(s) Oral every 6 hours PRN  enoxaparin Injectable 40 milliGRAM(s) SubCutaneous every 24 hours  sodium chloride 0.9%. 1000 milliLiter(s) IV Continuous <Continuous>      SOCIAL HISTORY:    Marital Status:  (   )    (  ) Single    (   )    (  )   Occupation:   Lives with: (  ) alone  (  ) children   (  ) spouse   (  ) parents  (  ) other    Substance Use (street drugs): (  ) never used  (  ) other:  Tobacco Usage:  (  ) never smoked   (   ) former smoker   (   ) current smoker  (     ) pack year  (        ) last cigarette date  Alcohol Usage: Social EtOH    FAMILY HISTORY:  Family history of cervical cancer (Sibling)  Family history of lung cancer      ROS:  Unobtainable because:   All other systems negative     Constitutional: no fever, no chills, no weight loss, no night sweats  HEENT:  no vision changes, no sore throat, no rhinorrhea  Cardiovascular:  no chest pain, no palpitation  Respiratory:  no SOB, no cough  GI:  no abd pain, no vomiting, no diarrhea  urinary: no dysuria, no hematuria, no flank pain  musculoskeletal:  no joint pain, no joint swelling  skin:  no rash  neurology:  no headache, no seizure, no change in mental status    Physical Exam:    General:    NAD, non toxic, A&O x3  HEENT:   no oropharyngeal lesions, no LAD, neck supple  Cardiovascular:    regular S1,S2, no murmur  Respiratory:   clear b/l, no wheezing  abd:   soft, BS +, not tender, no hepatosplenomegaly  :     no CVAT, no spurapubic tenderness, no bhatt  Musculoskeletal : no joint swelling, no edema  Skin:    no rash  Neurologic:     no focal deficits      Drug Dosing Weight  Height (cm): 168 (29 May 2018 15:06)  Weight (kg): 52 (29 May 2018 15:06)  BMI (kg/m2): 18.4 (29 May 2018 15:06)  BSA (m2): 1.58 (29 May 2018 15:06)    Vital Signs Last 24 Hrs  T(F): 97.5 (18 @ 07:00), Max: 101.2 (18 @ 16:50)    Vital Signs Last 24 Hrs  HR: 96 (18 @ 07:00) (71 - 123)  BP: 93/55 (18 @ 07:00) (89/57 - 103/69)  RR: 18 (18 @ 07:00)  SpO2: 98% (18 @ 07:00) (96% - 100%)  Wt(kg): --                          9.7    6.19  )-----------( 110      ( 30 May 2018 08:03 )             28.0           138  |  99  |  7   ----------------------------<  112<H>  3.7   |  26  |  0.32<L>    Ca    8.8      30 May 2018 07:18    TPro  5.6<L>  /  Alb  2.8<L>  /  TBili  1.5<H>  /  DBili  x   /  AST  14  /  ALT  17  /  AlkPhos  424<H>        Urinalysis Basic - ( 29 May 2018 22:39 )    Color: Yellow / Appearance: Clear / S.006 / pH: x  Gluc: x / Ketone: Negative  / Bili: Negative / Urobili: Negative   Blood: x / Protein: Negative / Nitrite: Negative   Leuk Esterase: Negative / RBC: 0-2 /HPF / WBC 0-2 /HPF   Sq Epi: x / Non Sq Epi: OCC /HPF / Bacteria: x        MICROBIOLOGY:  Blood cultures in lab   Urine culture in lab     RADIOLOGY:  Xray Chest 1 View- PORTABLE-Urgent (18 @ 18:30)   Right upper extremity PICC line distal tip in the SVC.  Redemonstration of a large left hilar/mediastinal mass.  A small left pleural effusion is unchanged.  There is a new small right pleural effusion.  No pneumothorax.  Calcified bilateral breast implants are noted.  The heart is normal in size.  The visualized osseus structures demonstrate no acute pathology.    CT Chest Abdomen Pelvis w/ IV Cont (18 @ 18:46)   Extensive lymphoma the chest abdomen and pelvis without significant change from prior study 2018.  Previously noted pyelonephritis is reviewed has resolved. HPI:  43F with Hodgkins Lymphoma diagnosed 2017, completed 8 cycles of brentuximab with disease progression, now on AVD. She presented to [facility] 18 for chemotherapy but was hypotensive, tachycardic and febrile to 101F.   She has had on and off low-grade fevers for months, temperatures ranging from 100-101F at home. She was admitted 3/22-3/26/18 for fevers, found to have RSV, also received Vancomycin and Zosyn for some reason. Admitted again -4/10 with fevers, CT showed with bilateral pyelonephritis but she was asymptomatic and urine cultures were negative. She received Zosyn which was then stopped. Her most recent admission was -, received flank pain and frequency, received chemotherapy, urine grew E. faecalis 10-49K CFU, received Vancomycin and Cefepime       PAST MEDICAL & SURGICAL HISTORY:  Hodgkin lymphoma  H/O abdominoplasty:   H/O bilateral breast implants:       Allergies    No Known Allergies    Intolerances        ANTIMICROBIALS:      OTHER MEDS:  acetaminophen   Tablet 650 milliGRAM(s) Oral every 6 hours PRN  enoxaparin Injectable 40 milliGRAM(s) SubCutaneous every 24 hours  sodium chloride 0.9%. 1000 milliLiter(s) IV Continuous <Continuous>      SOCIAL HISTORY:    Marital Status:  (   )    (  ) Single    (   )    (  )   Occupation:   Lives with: (  ) alone  (  ) children   (  ) spouse   (  ) parents  (  ) other    Substance Use (street drugs): (  ) never used  (  ) other:  Tobacco Usage:  (  ) never smoked   (   ) former smoker   (   ) current smoker  (     ) pack year  (        ) last cigarette date  Alcohol Usage: Social EtOH    FAMILY HISTORY:  Family history of cervical cancer (Sibling)  Family history of lung cancer      ROS:  Unobtainable because:   All other systems negative     Constitutional: no fever, no chills, no weight loss, no night sweats  HEENT:  no vision changes, no sore throat, no rhinorrhea  Cardiovascular:  no chest pain, no palpitation  Respiratory:  no SOB, no cough  GI:  no abd pain, no vomiting, no diarrhea  urinary: no dysuria, no hematuria, no flank pain  musculoskeletal:  no joint pain, no joint swelling  skin:  no rash  neurology:  no headache, no seizure, no change in mental status    Physical Exam:    General:    NAD, non toxic, A&O x3  HEENT:   no oropharyngeal lesions, no LAD, neck supple  Cardiovascular:    regular S1,S2, no murmur  Respiratory:   clear b/l, no wheezing  abd:   soft, BS +, not tender, no hepatosplenomegaly  :     no CVAT, no spurapubic tenderness, no bhatt  Musculoskeletal : no joint swelling, no edema  Skin:    no rash  Neurologic:     no focal deficits      Drug Dosing Weight  Height (cm): 168 (29 May 2018 15:06)  Weight (kg): 52 (29 May 2018 15:06)  BMI (kg/m2): 18.4 (29 May 2018 15:06)  BSA (m2): 1.58 (29 May 2018 15:06)    Vital Signs Last 24 Hrs  T(F): 97.5 (18 @ 07:00), Max: 101.2 (18 @ 16:50)    Vital Signs Last 24 Hrs  HR: 96 (18 @ 07:00) (71 - 123)  BP: 93/55 (18 @ 07:00) (89/57 - 103/69)  RR: 18 (18 @ 07:00)  SpO2: 98% (18 @ 07:00) (96% - 100%)  Wt(kg): --                          9.7    6.19  )-----------( 110      ( 30 May 2018 08:03 )             28.0           138  |  99  |  7   ----------------------------<  112<H>  3.7   |  26  |  0.32<L>    Ca    8.8      30 May 2018 07:18    TPro  5.6<L>  /  Alb  2.8<L>  /  TBili  1.5<H>  /  DBili  x   /  AST  14  /  ALT  17  /  AlkPhos  424<H>        Urinalysis Basic - ( 29 May 2018 22:39 )    Color: Yellow / Appearance: Clear / S.006 / pH: x  Gluc: x / Ketone: Negative  / Bili: Negative / Urobili: Negative   Blood: x / Protein: Negative / Nitrite: Negative   Leuk Esterase: Negative / RBC: 0-2 /HPF / WBC 0-2 /HPF   Sq Epi: x / Non Sq Epi: OCC /HPF / Bacteria: x        MICROBIOLOGY:  Blood cultures in lab   Urine culture in lab     RADIOLOGY:  Xray Chest 1 View- PORTABLE-Urgent (18 @ 18:30)   Right upper extremity PICC line distal tip in the SVC.  Redemonstration of a large left hilar/mediastinal mass.  A small left pleural effusion is unchanged.  There is a new small right pleural effusion.  No pneumothorax.  Calcified bilateral breast implants are noted.  The heart is normal in size.  The visualized osseus structures demonstrate no acute pathology.    CT Chest Abdomen Pelvis w/ IV Cont (18 @ 18:46)   Extensive lymphoma the chest abdomen and pelvis without significant change from prior study 2018.  Previously noted pyelonephritis is reviewed has resolved. HPI:  43F with Hodgkins Lymphoma diagnosed 2017, completed 8 cycles of brentuximab with disease progression, now on AVD. She presented to [facility] 18 for chemotherapy but was hypotensive, tachycardic and febrile to 101F.   She has had on and off low-grade fevers for months, temperatures ranging from 100-101F at home. She was admitted 3/22-3/26/18 for fevers, found to have RSV, cultures were negative, received Vancomycin and Zosyn. Admitted again -4/10 with fevers, CT showed bilateral pyelonephritis but she had no urinary symptoms and her urine cultures were negative; Zosyn was recommended by ID to be stopped. Her most recent admission was -, she had flank pain and urinary frequency, urine grew E. faecalis 10-49K CFU, received Vancomycin and Cefepime, also received chemotherapy.   She has no localizing complaints. No cough, shortness of breath, runny nose or sore throat. No abdominal pain, diarrhea. No vomiting. No sores to her mouth. No skin rash or joint pain. She is just tired, her legs feels a bit numb and weak. She believes her fevers are all from her cancer and not from an infection because she feels fine and just wants her chemotherapy.     PAST MEDICAL & SURGICAL HISTORY:  Hodgkin lymphoma  H/O abdominoplasty:   H/O bilateral breast implants:       Allergies    No Known Allergies    Intolerances    ANTIMICROBIALS:      OTHER MEDS:  acetaminophen   Tablet 650 milliGRAM(s) Oral every 6 hours PRN  enoxaparin Injectable 40 milliGRAM(s) SubCutaneous every 24 hours  sodium chloride 0.9%. 1000 milliLiter(s) IV Continuous <Continuous>    SOCIAL HISTORY: Nonsmoker. Lives with her children.     FAMILY HISTORY:  Family history of cervical cancer (Sibling)  Family history of lung cancer      ROS:  All other systems negative   Constitutional: fevers and chills. fatigue   HEENT:  no sore throat, no rhinorrhea  Cardiovascular:  no chest pain, no palpitation  Respiratory:  no SOB, no cough  GI:  no abd pain, no vomiting, no diarrhea  urinary: no dysuria, no hematuria, no flank pain  musculoskeletal:  no joint pain, no joint swelling  skin:  no rash  neurology:  no headache  heme: no bleeding     Physical Exam:  General:    NAD, non toxic, A&O x3  HEENT:   no oropharyngeal lesions. large bulky nontender bilateral cervical and supraclavicular LAD.   Cardiovascular:  tachycardic, regular rhythm   Respiratory:   nonlabored, clear b/l, no wheezing  abd:   soft, BS +, not tender, no hepatosplenomegaly  :     no CVAT, no spurapubic tenderness, no bhatt  Musculoskeletal : no joint swelling  Skin:    no rash  Neurologic:     no focal deficits  Vascular: slight swelling to both feet, right arm PICC without erythema or tenderness     Drug Dosing Weight  Height (cm): 168 (29 May 2018 15:06)  Weight (kg): 52 (29 May 2018 15:06)  BMI (kg/m2): 18.4 (29 May 2018 15:06)  BSA (m2): 1.58 (29 May 2018 15:06)    Vital Signs Last 24 Hrs  T(F): 97.5 (18 @ 07:00), Max: 101.2 (18 @ 16:50)    Vital Signs Last 24 Hrs  HR: 96 (18 @ 07:00) (71 - 123)  BP: 93/55 (18 @ 07:00) (89/57 - 103/69)  RR: 18 (18 @ 07:00)  SpO2: 98% (18 @ 07:00) (96% - 100%)  Wt(kg): --                          9.7    6.19  )-----------( 110      ( 30 May 2018 08:03 )             28.0           138  |  99  |  7   ----------------------------<  112<H>  3.7   |  26  |  0.32<L>    Ca    8.8      30 May 2018 07:18    TPro  5.6<L>  /  Alb  2.8<L>  /  TBili  1.5<H>  /  DBili  x   /  AST  14  /  ALT  17  /  AlkPhos  424<H>        Urinalysis Basic - ( 29 May 2018 22:39 )    Color: Yellow / Appearance: Clear / S.006 / pH: x  Gluc: x / Ketone: Negative  / Bili: Negative / Urobili: Negative   Blood: x / Protein: Negative / Nitrite: Negative   Leuk Esterase: Negative / RBC: 0-2 /HPF / WBC 0-2 /HPF   Sq Epi: x / Non Sq Epi: OCC /HPF / Bacteria: x        MICROBIOLOGY:  Blood cultures in lab   Urine culture in lab     RADIOLOGY:  Xray Chest 1 View- PORTABLE-Urgent (18 @ 18:30)   Right upper extremity PICC line distal tip in the SVC.  Redemonstration of a large left hilar/mediastinal mass.  A small left pleural effusion is unchanged.  There is a new small right pleural effusion.  No pneumothorax.  Calcified bilateral breast implants are noted.  The heart is normal in size.  The visualized osseus structures demonstrate no acute pathology.    CT Chest Abdomen Pelvis w/ IV Cont (18 @ 18:46)   Extensive lymphoma the chest abdomen and pelvis without significant change from prior study 2018.  Previously noted pyelonephritis is reviewed has resolved.

## 2018-05-30 NOTE — CONSULT NOTE ADULT - ATTENDING COMMENTS
43 year old male with Hodgkins Lymphoma diagnosed 4/2017, completed 8 cycles of brentuximab with disease progression, now on AVD.     Sent from [facility] 5/29/18 after found to be hypotensive, tachycardic and febrile to 101F.   Having intermittent low-grade fevers for months  Recently admitted 3/22-3/26/18 for fevers, found to have RSV  Readmitted 4/6-4/10 with fevers, with CT demonstrating bilateral pyelonephritis (urine cultures were negative)  Recent admission 4/25-4/30 with left flank pain and US suggesting pyelonephritis  Refusing repeat CT scans.  Febrile to 101.2F in ED. r/o infection.   Fevers possibly from underlying disease. Patient refusing CT scans stating she has had fevers for months and she thinks its from her disease.   CXR bilateral pleural effusions    Recommend:  -Can monitor off abx at this time.  -F/U blood cxs.  -If fevers persist, would obtain CT C/A/P.  -US B/L LE to evaluate for DVT.  -Will follow along with you.
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  Her lymphadenopathy appears unchanged, she is more bloated, continues to have anemia (suspect marrow involvement) and persistent fevers.  She continues to be hospitalized for fevers, treated with multiple courses of antibiotics but likely all secondary to her lymphoma.  I have recommended to her that we change paths before she becomes more ill.  I have explained that I doubt that her disease is likely to resolve with further AVD.  Recommend CT chest/abdomen/pelvis and will begin salvage therapy with ICE.  Risks including but not limited to further myelosuppression, infection, renal/hepatic toxicity, GI side effects.  She understands and wishes to proceed.

## 2018-05-30 NOTE — H&P ADULT - HISTORY OF PRESENT ILLNESS
This is a 43 year old woman with hodgkins lymphoma on chemo presenting with fevers, hypotension, tachycardia.  Patient presented today for chemo and was found to be tachy to 120, BP 80s/40s, Hgb 6.6, fever to 101.  Notably, patient has been having intermittent fevers for many months. She currently denies symptoms.      In ED, Tmax 101.2, HR 90, BP 89//69, satting well on RA. Labs notable for WBC 6.1, Hgb 6.9, Plt 170, Na 131, Cr 0.48, Tbili 1.5, , UA negative. This is a 43 year old woman with hodgkins lymphoma on chemo presenting with fevers, hypotension, tachycardia. Patient presented today for chemo and was found to be tachy to 120, BP 80s/40s, Hgb 6.6, fever to 101. She notes generalized fatigue and worsening SOB with exertion. Denies cough, runny nose, sore throat, chest pain, palptiations, nausea, vomiting, pain or rash at PICC site, diarrhea, constipation, dysuria. Notably, patient has been having intermittent fevers for many months, notes that they are usually low grade and she often has chills associated. She notes that she feels much better after 1U PRBCs.       In ED, Tmax 101.2, HR 90, BP 89//69, satting well on RA. Labs notable for WBC 6.1, Hgb 6.9, Plt 170, Na 131, Cr 0.48, Tbili 1.5, , UA negative.

## 2018-05-31 LAB
ALBUMIN SERPL ELPH-MCNC: 2.3 G/DL — LOW (ref 3.3–5)
ALBUMIN SERPL ELPH-MCNC: 2.5 G/DL — LOW (ref 3.3–5)
ALP SERPL-CCNC: 258 U/L — HIGH (ref 40–120)
ALP SERPL-CCNC: 276 U/L — HIGH (ref 40–120)
ALT FLD-CCNC: 12 U/L — SIGNIFICANT CHANGE UP (ref 10–45)
ALT FLD-CCNC: 13 U/L — SIGNIFICANT CHANGE UP (ref 10–45)
ANION GAP SERPL CALC-SCNC: 12 MMOL/L — SIGNIFICANT CHANGE UP (ref 5–17)
ANION GAP SERPL CALC-SCNC: 12 MMOL/L — SIGNIFICANT CHANGE UP (ref 5–17)
AST SERPL-CCNC: 13 U/L — SIGNIFICANT CHANGE UP (ref 10–40)
AST SERPL-CCNC: 15 U/L — SIGNIFICANT CHANGE UP (ref 10–40)
BASOPHILS # BLD AUTO: 0 K/UL — SIGNIFICANT CHANGE UP (ref 0–0.2)
BASOPHILS # BLD AUTO: 0 K/UL — SIGNIFICANT CHANGE UP (ref 0–0.2)
BASOPHILS NFR BLD AUTO: 0.1 % — SIGNIFICANT CHANGE UP (ref 0–2)
BASOPHILS NFR BLD AUTO: 0.5 % — SIGNIFICANT CHANGE UP (ref 0–2)
BILIRUB SERPL-MCNC: 1.2 MG/DL — SIGNIFICANT CHANGE UP (ref 0.2–1.2)
BILIRUB SERPL-MCNC: 1.3 MG/DL — HIGH (ref 0.2–1.2)
BUN SERPL-MCNC: 5 MG/DL — LOW (ref 7–23)
BUN SERPL-MCNC: 7 MG/DL — SIGNIFICANT CHANGE UP (ref 7–23)
CALCIUM SERPL-MCNC: 8.3 MG/DL — LOW (ref 8.4–10.5)
CALCIUM SERPL-MCNC: 8.5 MG/DL — SIGNIFICANT CHANGE UP (ref 8.4–10.5)
CHLORIDE SERPL-SCNC: 94 MMOL/L — LOW (ref 96–108)
CHLORIDE SERPL-SCNC: 96 MMOL/L — SIGNIFICANT CHANGE UP (ref 96–108)
CO2 SERPL-SCNC: 24 MMOL/L — SIGNIFICANT CHANGE UP (ref 22–31)
CO2 SERPL-SCNC: 26 MMOL/L — SIGNIFICANT CHANGE UP (ref 22–31)
CREAT SERPL-MCNC: 0.42 MG/DL — LOW (ref 0.5–1.3)
CREAT SERPL-MCNC: 0.48 MG/DL — LOW (ref 0.5–1.3)
CULTURE RESULTS: NO GROWTH — SIGNIFICANT CHANGE UP
EOSINOPHIL # BLD AUTO: 0 K/UL — SIGNIFICANT CHANGE UP (ref 0–0.5)
EOSINOPHIL # BLD AUTO: 0.1 K/UL — SIGNIFICANT CHANGE UP (ref 0–0.5)
EOSINOPHIL NFR BLD AUTO: 0.2 % — SIGNIFICANT CHANGE UP (ref 0–6)
EOSINOPHIL NFR BLD AUTO: 1 % — SIGNIFICANT CHANGE UP (ref 0–6)
GLUCOSE SERPL-MCNC: 156 MG/DL — HIGH (ref 70–99)
GLUCOSE SERPL-MCNC: 93 MG/DL — SIGNIFICANT CHANGE UP (ref 70–99)
HCT VFR BLD CALC: 25.5 % — LOW (ref 34.5–45)
HCT VFR BLD CALC: 25.8 % — LOW (ref 34.5–45)
HGB BLD-MCNC: 8.9 G/DL — LOW (ref 11.5–15.5)
HGB BLD-MCNC: 9 G/DL — LOW (ref 11.5–15.5)
LYMPHOCYTES # BLD AUTO: 0.3 K/UL — LOW (ref 1–3.3)
LYMPHOCYTES # BLD AUTO: 0.4 K/UL — LOW (ref 1–3.3)
LYMPHOCYTES # BLD AUTO: 4.8 % — LOW (ref 13–44)
LYMPHOCYTES # BLD AUTO: 4.9 % — LOW (ref 13–44)
MCHC RBC-ENTMCNC: 30.2 PG — SIGNIFICANT CHANGE UP (ref 27–34)
MCHC RBC-ENTMCNC: 30.6 PG — SIGNIFICANT CHANGE UP (ref 27–34)
MCHC RBC-ENTMCNC: 34.9 GM/DL — SIGNIFICANT CHANGE UP (ref 32–36)
MCHC RBC-ENTMCNC: 35 GM/DL — SIGNIFICANT CHANGE UP (ref 32–36)
MCV RBC AUTO: 86.6 FL — SIGNIFICANT CHANGE UP (ref 80–100)
MCV RBC AUTO: 87.4 FL — SIGNIFICANT CHANGE UP (ref 80–100)
MONOCYTES # BLD AUTO: 0.2 K/UL — SIGNIFICANT CHANGE UP (ref 0–0.9)
MONOCYTES # BLD AUTO: 0.2 K/UL — SIGNIFICANT CHANGE UP (ref 0–0.9)
MONOCYTES NFR BLD AUTO: 1.9 % — LOW (ref 2–14)
MONOCYTES NFR BLD AUTO: 4 % — SIGNIFICANT CHANGE UP (ref 2–14)
NEUTROPHILS # BLD AUTO: 5.1 K/UL — SIGNIFICANT CHANGE UP (ref 1.8–7.4)
NEUTROPHILS # BLD AUTO: 7.4 K/UL — SIGNIFICANT CHANGE UP (ref 1.8–7.4)
NEUTROPHILS NFR BLD AUTO: 89.7 % — HIGH (ref 43–77)
NEUTROPHILS NFR BLD AUTO: 92.9 % — HIGH (ref 43–77)
PHOSPHATE SERPL-MCNC: 3.6 MG/DL — SIGNIFICANT CHANGE UP (ref 2.5–4.5)
PLATELET # BLD AUTO: 123 K/UL — LOW (ref 150–400)
PLATELET # BLD AUTO: 136 K/UL — LOW (ref 150–400)
POTASSIUM SERPL-MCNC: 3.6 MMOL/L — SIGNIFICANT CHANGE UP (ref 3.5–5.3)
POTASSIUM SERPL-MCNC: 4.1 MMOL/L — SIGNIFICANT CHANGE UP (ref 3.5–5.3)
POTASSIUM SERPL-SCNC: 3.6 MMOL/L — SIGNIFICANT CHANGE UP (ref 3.5–5.3)
POTASSIUM SERPL-SCNC: 4.1 MMOL/L — SIGNIFICANT CHANGE UP (ref 3.5–5.3)
PROT SERPL-MCNC: 4.8 G/DL — LOW (ref 6–8.3)
PROT SERPL-MCNC: 5.3 G/DL — LOW (ref 6–8.3)
RBC # BLD: 2.94 M/UL — LOW (ref 3.8–5.2)
RBC # BLD: 2.95 M/UL — LOW (ref 3.8–5.2)
RBC # FLD: 17.2 % — HIGH (ref 10.3–14.5)
RBC # FLD: 17.8 % — HIGH (ref 10.3–14.5)
SODIUM SERPL-SCNC: 132 MMOL/L — LOW (ref 135–145)
SODIUM SERPL-SCNC: 132 MMOL/L — LOW (ref 135–145)
SPECIMEN SOURCE: SIGNIFICANT CHANGE UP
WBC # BLD: 5.7 K/UL — SIGNIFICANT CHANGE UP (ref 3.8–10.5)
WBC # BLD: 8.5 K/UL — SIGNIFICANT CHANGE UP (ref 3.8–10.5)
WBC # FLD AUTO: 5.7 K/UL — SIGNIFICANT CHANGE UP (ref 3.8–10.5)
WBC # FLD AUTO: 8.5 K/UL — SIGNIFICANT CHANGE UP (ref 3.8–10.5)

## 2018-05-31 PROCEDURE — 99233 SBSQ HOSP IP/OBS HIGH 50: CPT | Mod: GC

## 2018-05-31 PROCEDURE — 99233 SBSQ HOSP IP/OBS HIGH 50: CPT

## 2018-05-31 PROCEDURE — 99232 SBSQ HOSP IP/OBS MODERATE 35: CPT | Mod: GC

## 2018-05-31 RX ORDER — ETOPOSIDE 20 MG/ML
157 VIAL (ML) INTRAVENOUS DAILY
Qty: 0 | Refills: 0 | Status: DISCONTINUED | OUTPATIENT
Start: 2018-05-31 | End: 2018-06-06

## 2018-05-31 RX ORDER — ONDANSETRON 8 MG/1
16 TABLET, FILM COATED ORAL DAILY
Qty: 0 | Refills: 0 | Status: COMPLETED | OUTPATIENT
Start: 2018-05-31 | End: 2018-06-02

## 2018-05-31 RX ORDER — DEXAMETHASONE 0.5 MG/5ML
12 ELIXIR ORAL ONCE
Qty: 0 | Refills: 0 | Status: COMPLETED | OUTPATIENT
Start: 2018-05-31 | End: 2018-05-31

## 2018-05-31 RX ORDER — METOCLOPRAMIDE HCL 10 MG
10 TABLET ORAL EVERY 8 HOURS
Qty: 0 | Refills: 0 | Status: DISCONTINUED | OUTPATIENT
Start: 2018-05-31 | End: 2018-06-06

## 2018-05-31 RX ADMIN — Medication 12 MILLIGRAM(S): at 14:56

## 2018-05-31 RX ADMIN — SODIUM CHLORIDE 75 MILLILITER(S): 9 INJECTION INTRAMUSCULAR; INTRAVENOUS; SUBCUTANEOUS at 13:52

## 2018-05-31 RX ADMIN — ENOXAPARIN SODIUM 40 MILLIGRAM(S): 100 INJECTION SUBCUTANEOUS at 23:24

## 2018-05-31 RX ADMIN — ONDANSETRON 116 MILLIGRAM(S): 8 TABLET, FILM COATED ORAL at 14:02

## 2018-05-31 RX ADMIN — SODIUM CHLORIDE 75 MILLILITER(S): 9 INJECTION INTRAMUSCULAR; INTRAVENOUS; SUBCUTANEOUS at 20:09

## 2018-05-31 RX ADMIN — Medication 300 MILLIGRAM(S): at 13:49

## 2018-05-31 NOTE — PROGRESS NOTE ADULT - SUBJECTIVE AND OBJECTIVE BOX
Follow Up: Fevers    Interval History: No fevers overnight. Feels fine, still tired and does not like her breakfast. No cough, no abdominal pain, no dysuria, no rash.     ROS:    All other systems negative  Constitutional: no fever, no chills  HEENT:  no sore throat  Respiratory:  no SOB, no cough  GI:  no abd pain, no diarrhea  urinary: no dysuria  skin:  no rash    Allergies  No Known Allergies        ANTIMICROBIALS:      OTHER MEDS:  acetaminophen   Tablet 650 milliGRAM(s) Oral every 6 hours PRN  ALBUTerol/ipratropium for Nebulization. 3 milliLiter(s) Nebulizer once  allopurinol 300 milliGRAM(s) Oral daily  enoxaparin Injectable 40 milliGRAM(s) SubCutaneous every 24 hours  sodium chloride 0.9%. 1000 milliLiter(s) IV Continuous <Continuous>      Vital Signs Last 24 Hrs  T(C): 37.1 (31 May 2018 07:58), Max: 37.6 (30 May 2018 12:36)  T(F): 98.7 (31 May 2018 07:58), Max: 99.6 (30 May 2018 12:36)  HR: 118 (31 May 2018 07:58) (105 - 130)  BP: 112/71 (31 May 2018 07:58) (92/55 - 112/71)  BP(mean): --  RR: 18 (31 May 2018 07:58) (18 - 18)  SpO2: 95% (31 May 2018 07:58) (95% - 100%)    Physical Exam:  General: NAD,  non toxic, A&O   HEENT: cervical supraclavicular LAD, neck supple  Cardiovascular: regular rate and rhythm   Respiratory: nonlabored on room air, clear b/l, no wheezing  abd:  soft, BS +, no tenderness   : no suprapubic tenderness, no bhatt  Musculoskeletal: no joint swelling  Skin:    no rash  Neurologic:     no focal deficit                          8.9    5.7   )-----------( 123      ( 31 May 2018 06:39 )             25.5       05-31    132<L>  |  94<L>  |  5<L>  ----------------------------<  93  3.6   |  26  |  0.42<L>    Ca    8.3<L>      31 May 2018 06:33  Phos  3.6       Mg     1.7         TPro  4.8<L>  /  Alb  2.3<L>  /  TBili  1.2  /  DBili  x   /  AST  13  /  ALT  12  /  AlkPhos  258<H>        Urinalysis Basic - ( 29 May 2018 22:39 )    Color: Yellow / Appearance: Clear / S.006 / pH: x  Gluc: x / Ketone: Negative  / Bili: Negative / Urobili: Negative   Blood: x / Protein: Negative / Nitrite: Negative   Leuk Esterase: Negative / RBC: 0-2 /HPF / WBC 0-2 /HPF   Sq Epi: x / Non Sq Epi: OCC /HPF / Bacteria: x    MICROBIOLOGY:  Culture - Urine (18 @ 03:05)    Specimen Source: .Urine Clean Catch (Midstream)    Culture Results:   No growth    Culture - Blood (18 @ 20:59)    Specimen Source: .Blood Blood    Culture Results:   No growth to date.    Culture - Blood (18 @ 20:59)    Specimen Source: .Blood Blood    Culture Results:   No growth to date.    RADIOLOGY:  CT Chest Abdomen Pelvis w/ IV Cont (18 @ 23:09)   Since 2018, increased adenopathy in the chest, abdomen, and pelvis.  Increased pleural metastases and pulmonary nodule.  Diffuse lymphomatous involvement of the osseous structures.     CT Neck Soft Tissue w/ IV Cont (18 @ 23:09)   ******PRELIMINARY REPORT******        INTERPRETATION:  no emergent findings. lymphoma, pleural effusions, ascites and anasarca.

## 2018-05-31 NOTE — PROGRESS NOTE ADULT - SUBJECTIVE AND OBJECTIVE BOX
Patient is a 43y old  Female who presents with a chief complaint of hypotension (30 May 2018 00:02)    HPI: Pt afebrile overnight. C/o abdominal bloating. CT c/a/p revealed increased adenopathy in the chest, abdomen, and pelvis, increased pleural metastases and pulmonary nodule, diffuse lymphomatous involvement of the osseous structures.       Vital Signs Last 24 Hrs  T(C): 37.1 (31 May 2018 07:58), Max: 37.6 (30 May 2018 16:33)  T(F): 98.7 (31 May 2018 07:58), Max: 99.6 (30 May 2018 16:33)  HR: 118 (31 May 2018 07:58) (116 - 130)  BP: 112/71 (31 May 2018 07:58) (92/59 - 112/71)  BP(mean): --  RR: 18 (31 May 2018 07:58) (18 - 18)  SpO2: 95% (31 May 2018 07:58) (95% - 97%)                          8.9    5.7   )-----------( 123      ( 31 May 2018 06:39 )             25.5     05-31    132<L>  |  94<L>  |  5<L>  ----------------------------<  93  3.6   |  26  |  0.42<L>    Ca    8.3<L>      31 May 2018 06:33  Phos  3.6     05-31  Mg     1.7     05-31    TPro  4.8<L>  /  Alb  2.3<L>  /  TBili  1.2  /  DBili  x   /  AST  13  /  ALT  12  /  AlkPhos  258<H>  05-31    MEDICATIONS  (STANDING):  ALBUTerol/ipratropium for Nebulization. 3 milliLiter(s) Nebulizer once  allopurinol 300 milliGRAM(s) Oral daily  dexamethasone     Tablet 12 milliGRAM(s) Oral once  enoxaparin Injectable 40 milliGRAM(s) SubCutaneous every 24 hours  etoposide IVPB 157 milliGRAM(s) IV Intermittent daily  ondansetron  IVPB 16 milliGRAM(s) IV Intermittent daily  sodium chloride 0.9%. 1000 milliLiter(s) (75 mL/Hr) IV Continuous <Continuous>    MEDICATIONS  (PRN):  acetaminophen   Tablet 650 milliGRAM(s) Oral every 6 hours PRN For Temp greater than 38 C (100.4 F)  metoclopramide Injectable 10 milliGRAM(s) IV Push every 8 hours PRN nausea/vomiting

## 2018-05-31 NOTE — PROGRESS NOTE ADULT - ASSESSMENT
43 year old woman with hodgkins lymphoma on chemo aw fevers, hypotension, tachycardia, progression of disease.

## 2018-05-31 NOTE — PROGRESS NOTE ADULT - SUBJECTIVE AND OBJECTIVE BOX
INTERVAL HPI/OVERNIGHT EVENTS:  Patient without complaints this morning. CT neck/chest/abdomen/pelvis with progressive disease compared to imaging 1 month prior.    VITAL SIGNS:  T(F): 98.9 (18 @ 15:05)  HR: 107 (18 @ 15:05)  BP: 100/65 (18 @ 15:05)  RR: 18 (18 @ 15:05)  SpO2: 96% (18 @ 15:05)  Wt(kg): --    PHYSICAL EXAM:    Constitutional: NAD  Eyes: EOMI, sclera non-icteric  Neck: supple, no masses, no JVD  Respiratory: CTA b/l, good air entry b/l  Cardiovascular: RRR, no M/R/G  Gastrointestinal: soft, NTND, no masses palpable, + BS, no hepatosplenomegaly  Extremities: no c/c/e  Neurological: AAOx3      MEDICATIONS  (STANDING):  ALBUTerol/ipratropium for Nebulization. 3 milliLiter(s) Nebulizer once  allopurinol 300 milliGRAM(s) Oral daily  enoxaparin Injectable 40 milliGRAM(s) SubCutaneous every 24 hours  etoposide IVPB 157 milliGRAM(s) IV Intermittent daily  ondansetron  IVPB 16 milliGRAM(s) IV Intermittent daily  sodium chloride 0.9%. 1000 milliLiter(s) (75 mL/Hr) IV Continuous <Continuous>    MEDICATIONS  (PRN):  acetaminophen   Tablet 650 milliGRAM(s) Oral every 6 hours PRN For Temp greater than 38 C (100.4 F)  metoclopramide Injectable 10 milliGRAM(s) IV Push every 8 hours PRN nausea/vomiting      Allergies    No Known Allergies    Intolerances        LABS:                        8.9    5.7   )-----------( 123      ( 31 May 2018 06:39 )             25.5         132<L>  |  94<L>  |  5<L>  ----------------------------<  93  3.6   |  26  |  0.42<L>    Ca    8.3<L>      31 May 2018 06:33  Phos  3.6       Mg     1.7         TPro  4.8<L>  /  Alb  2.3<L>  /  TBili  1.2  /  DBili  x   /  AST  13  /  ALT  12  /  AlkPhos  258<H>  05-31      Urinalysis Basic - ( 29 May 2018 22:39 )    Color: Yellow / Appearance: Clear / S.006 / pH: x  Gluc: x / Ketone: Negative  / Bili: Negative / Urobili: Negative   Blood: x / Protein: Negative / Nitrite: Negative   Leuk Esterase: Negative / RBC: 0-2 /HPF / WBC 0-2 /HPF   Sq Epi: x / Non Sq Epi: OCC /HPF / Bacteria: x        RADIOLOGY & ADDITIONAL TESTS:  Studies reviewed.    ASSESSMENT & PLAN: INTERVAL HPI/OVERNIGHT EVENTS:  Patient without complaints this morning. CT neck/chest/abdomen/pelvis with progressive disease compared to imaging 1 month prior.    VITAL SIGNS:  T(F): 98.9 (18 @ 15:05)  HR: 107 (18 @ 15:05)  BP: 100/65 (18 @ 15:05)  RR: 18 (18 @ 15:05)  SpO2: 96% (18 @ 15:05)  Wt(kg): --    PHYSICAL EXAM:    Constitutional: NAD  Eyes: EOMI, sclera non-icteric  Neck: patient declining lymph node exam  Respiratory: CTA b/l, good air entry b/l  Cardiovascular: RRR, no M/R/G  Gastrointestinal: soft, NTND, no masses palpable, + BS, no hepatosplenomegaly  Extremities: no c/c/e  Neurological: AAOx3      MEDICATIONS  (STANDING):  ALBUTerol/ipratropium for Nebulization. 3 milliLiter(s) Nebulizer once  allopurinol 300 milliGRAM(s) Oral daily  enoxaparin Injectable 40 milliGRAM(s) SubCutaneous every 24 hours  etoposide IVPB 157 milliGRAM(s) IV Intermittent daily  ondansetron  IVPB 16 milliGRAM(s) IV Intermittent daily  sodium chloride 0.9%. 1000 milliLiter(s) (75 mL/Hr) IV Continuous <Continuous>    MEDICATIONS  (PRN):  acetaminophen   Tablet 650 milliGRAM(s) Oral every 6 hours PRN For Temp greater than 38 C (100.4 F)  metoclopramide Injectable 10 milliGRAM(s) IV Push every 8 hours PRN nausea/vomiting      Allergies    No Known Allergies    Intolerances        LABS:                        8.9    5.7   )-----------( 123      ( 31 May 2018 06:39 )             25.5         132<L>  |  94<L>  |  5<L>  ----------------------------<  93  3.6   |  26  |  0.42<L>    Ca    8.3<L>      31 May 2018 06:33  Phos  3.6       Mg     1.7         TPro  4.8<L>  /  Alb  2.3<L>  /  TBili  1.2  /  DBili  x   /  AST  13  /  ALT  12  /  AlkPhos  258<H>  05-31      Urinalysis Basic - ( 29 May 2018 22:39 )    Color: Yellow / Appearance: Clear / S.006 / pH: x  Gluc: x / Ketone: Negative  / Bili: Negative / Urobili: Negative   Blood: x / Protein: Negative / Nitrite: Negative   Leuk Esterase: Negative / RBC: 0-2 /HPF / WBC 0-2 /HPF   Sq Epi: x / Non Sq Epi: OCC /HPF / Bacteria: x        RADIOLOGY & ADDITIONAL TESTS:  Studies reviewed.    ASSESSMENT & PLAN:

## 2018-05-31 NOTE — ADVANCED PRACTICE NURSE CONSULT - ASSESSMENT
Patient received at 1400 in the bed alert oriented x4, patient aware of the chemo regime. Patient receiving cycle 1 ICE , height and weight verified and MD aware of the same. Carboplatin AUC dosing verified, calculation in the chart. Labs verified MD aware of the same , vital signs as per sunrise. Vital signs stable Patient received at 1400 in the bed alert oriented x4, patient aware of the chemo regime. Patient receiving cycle 1 ICE , height and weight verified and MD aware of the same. Patient education provided and printed materials given . Carboplatin AUC dosing verified, calculation in the chart. Labs verified MD aware of the same , vital signs as per sunrise. Vital signs stable except for the temperature patient had a temp of 100.4 Dr. Baxter made aware and as per the MD it was OK to continue chemo. Patient received pre medication Dexamethasone 12mg po and Zofran 16mg IVSS as per order. Etoposide 100mg/m2= 157mg Iv given over an hour at 507ml/hr started at 1550 and completed at 1650. Patient tolerated chemo well no signs of adverse reaction noted and patient had no complains.

## 2018-05-31 NOTE — PROGRESS NOTE ADULT - ASSESSMENT
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

## 2018-05-31 NOTE — PROGRESS NOTE ADULT - ASSESSMENT
43F with Hodgkins Lymphoma on therapy with AVD, admitted 5/29/18 for sepsis- 101.2F, tachycardic, hypotensive. No localizing symptoms and nontoxic. Blood and urine cultures negative. Ongoing fevers for months in the setting of progression of disease. Suspect more tumor fever rather than infection.     Recommend  -monitor off antibiotics for now as she is clinically stable   -f/u blood cultures   -check dopplers 43F with Hodgkins Lymphoma on therapy with AVD, admitted 5/29/18 for sepsis- 101.2F, tachycardic, hypotensive. No localizing symptoms and nontoxic. Blood and urine cultures negative. Ongoing fevers for months in the setting of progression of disease. Suspect more tumor fever rather than infection.     Recommend  -Monitor off antibiotics for now as she is clinically stable   -F/U blood cultures   -check dopplers lower extremity bilaterally

## 2018-06-01 ENCOUNTER — TRANSCRIPTION ENCOUNTER (OUTPATIENT)
Age: 43
End: 2018-06-01

## 2018-06-01 LAB
BASOPHILS # BLD AUTO: 0.1 K/UL — SIGNIFICANT CHANGE UP (ref 0–0.2)
BASOPHILS NFR BLD AUTO: 1.5 % — SIGNIFICANT CHANGE UP (ref 0–2)
EOSINOPHIL # BLD AUTO: 0 K/UL — SIGNIFICANT CHANGE UP (ref 0–0.5)
EOSINOPHIL NFR BLD AUTO: 0.4 % — SIGNIFICANT CHANGE UP (ref 0–6)
HCT VFR BLD CALC: 25.8 % — LOW (ref 34.5–45)
HGB BLD-MCNC: 8.7 G/DL — LOW (ref 11.5–15.5)
LDH SERPL L TO P-CCNC: 202 U/L — SIGNIFICANT CHANGE UP (ref 50–242)
LYMPHOCYTES # BLD AUTO: 0.2 K/UL — LOW (ref 1–3.3)
LYMPHOCYTES # BLD AUTO: 2.8 % — LOW (ref 13–44)
MAGNESIUM SERPL-MCNC: 1.9 MG/DL — SIGNIFICANT CHANGE UP (ref 1.6–2.6)
MCHC RBC-ENTMCNC: 29.2 PG — SIGNIFICANT CHANGE UP (ref 27–34)
MCHC RBC-ENTMCNC: 33.5 GM/DL — SIGNIFICANT CHANGE UP (ref 32–36)
MCV RBC AUTO: 87.1 FL — SIGNIFICANT CHANGE UP (ref 80–100)
MONOCYTES # BLD AUTO: 0.1 K/UL — SIGNIFICANT CHANGE UP (ref 0–0.9)
MONOCYTES NFR BLD AUTO: 1.4 % — LOW (ref 2–14)
NEUTROPHILS # BLD AUTO: 5.2 K/UL — SIGNIFICANT CHANGE UP (ref 1.8–7.4)
NEUTROPHILS NFR BLD AUTO: 93.9 % — HIGH (ref 43–77)
PLATELET # BLD AUTO: 89 K/UL — LOW (ref 150–400)
RBC # BLD: 2.97 M/UL — LOW (ref 3.8–5.2)
RBC # FLD: 17.7 % — HIGH (ref 10.3–14.5)
URATE SERPL-MCNC: 2 MG/DL — LOW (ref 2.5–7)
WBC # BLD: 5.5 K/UL — SIGNIFICANT CHANGE UP (ref 3.8–10.5)
WBC # FLD AUTO: 5.5 K/UL — SIGNIFICANT CHANGE UP (ref 3.8–10.5)

## 2018-06-01 PROCEDURE — 99233 SBSQ HOSP IP/OBS HIGH 50: CPT

## 2018-06-01 PROCEDURE — 99232 SBSQ HOSP IP/OBS MODERATE 35: CPT | Mod: GC

## 2018-06-01 PROCEDURE — 99233 SBSQ HOSP IP/OBS HIGH 50: CPT | Mod: GC

## 2018-06-01 RX ORDER — IFOSFAMIDE 1 G/1
7850 INJECTION, POWDER, LYOPHILIZED, FOR SOLUTION INTRAVENOUS ONCE
Qty: 0 | Refills: 0 | Status: COMPLETED | OUTPATIENT
Start: 2018-06-01 | End: 2018-06-01

## 2018-06-01 RX ORDER — FOSAPREPITANT DIMEGLUMINE 150 MG/5ML
150 INJECTION, POWDER, LYOPHILIZED, FOR SOLUTION INTRAVENOUS ONCE
Qty: 0 | Refills: 0 | Status: COMPLETED | OUTPATIENT
Start: 2018-06-01 | End: 2018-06-01

## 2018-06-01 RX ORDER — CARBOPLATIN 50 MG
624 VIAL (EA) INTRAVENOUS ONCE
Qty: 0 | Refills: 0 | Status: COMPLETED | OUTPATIENT
Start: 2018-06-01 | End: 2018-06-01

## 2018-06-01 RX ORDER — ALLOPURINOL 300 MG
1 TABLET ORAL
Qty: 0 | Refills: 0 | COMMUNITY
Start: 2018-06-01

## 2018-06-01 RX ADMIN — SODIUM CHLORIDE 75 MILLILITER(S): 9 INJECTION INTRAMUSCULAR; INTRAVENOUS; SUBCUTANEOUS at 11:53

## 2018-06-01 RX ADMIN — ONDANSETRON 116 MILLIGRAM(S): 8 TABLET, FILM COATED ORAL at 11:52

## 2018-06-01 RX ADMIN — Medication 300 MILLIGRAM(S): at 11:49

## 2018-06-01 RX ADMIN — FOSAPREPITANT DIMEGLUMINE 300 MILLIGRAM(S): 150 INJECTION, POWDER, LYOPHILIZED, FOR SOLUTION INTRAVENOUS at 11:52

## 2018-06-01 RX ADMIN — ENOXAPARIN SODIUM 40 MILLIGRAM(S): 100 INJECTION SUBCUTANEOUS at 22:44

## 2018-06-01 NOTE — PHYSICAL THERAPY INITIAL EVALUATION ADULT - ADDITIONAL COMMENTS
pt lives in a 4th floor apartment with her daughter. prior to admission she was amb Independently within the home without AD and a Rw outside the home.

## 2018-06-01 NOTE — DISCHARGE NOTE ADULT - MEDICATION SUMMARY - MEDICATIONS TO TAKE
I will START or STAY ON the medications listed below when I get home from the hospital:    allopurinol 300 mg oral tablet  -- 1 tab(s) by mouth once a day  -- Indication: For Prophylactic measure    ferrous sulfate 325 mg (65 mg elemental iron) oral tablet  -- 1 tab(s) by mouth once a day  -- Indication: For Anemia I will START or STAY ON the medications listed below when I get home from the hospital:    Zofran ODT 4 mg oral tablet, disintegrating  -- 1 tab(s) by mouth 3 times a day, As Needed   -- Indication: For N/v    allopurinol 300 mg oral tablet  -- 1 tab(s) by mouth once a day  -- Indication: For Prophylactic measure    ferrous sulfate 325 mg (65 mg elemental iron) oral tablet  -- 1 tab(s) by mouth once a day  -- Indication: For Anemia

## 2018-06-01 NOTE — DISCHARGE NOTE ADULT - CARE PLAN
Principal Discharge DX:	Fever  Assessment and plan of treatment:	Likely due to Lymphoma.  started on chemo.  Secondary Diagnosis:	Anemia  Assessment and plan of treatment:	6.9 on admission.   Transfused 2 units.   Hb stable currently  Secondary Diagnosis:	Hodgkin lymphoma  Assessment and plan of treatment:	Progression of disease. Continue chemo as scheduled. Principal Discharge DX:	Anemia  Goal:	S/P PRBC  Assessment and plan of treatment:	Follow-up with your Hematologist for further monitoring.  Secondary Diagnosis:	Hodgkin lymphoma  Assessment and plan of treatment:	Follow-up with you Hematologist for further monitoring and treatment  Secondary Diagnosis:	Fever  Goal:	resolved  Secondary Diagnosis:	Acute encephalopathy  Goal:	improved  Secondary Diagnosis:	Hypokalemia  Goal:	supplemented Principal Discharge DX:	Anemia  Goal:	S/P PRBC  Assessment and plan of treatment:	Follow-up with your Hematologist for further monitoring.  Secondary Diagnosis:	Hodgkin lymphoma  Assessment and plan of treatment:	Follow-up with you Hematologist for further monitoring and treatment  Follow-up at the [facility] for Neulasta injection tomorrow 6/7/18 at 4:40p  Secondary Diagnosis:	Fever  Goal:	resolved  Secondary Diagnosis:	Acute encephalopathy  Goal:	improved  Secondary Diagnosis:	Hypokalemia  Goal:	supplemented

## 2018-06-01 NOTE — PROGRESS NOTE ADULT - SUBJECTIVE AND OBJECTIVE BOX
Follow Up:  Fevers    Interval History: Restarted chemotherapy yesterday. Low grade fever 100.4F yesterday afternoon. No new symptoms. Feels fine.     ROS:    Respiratory:  no cough  GI:  no diarrhea  urinary: no dysuria    Allergies  No Known Allergies        ANTIMICROBIALS:      OTHER MEDS:  acetaminophen   Tablet 650 milliGRAM(s) Oral every 6 hours PRN  ALBUTerol/ipratropium for Nebulization. 3 milliLiter(s) Nebulizer once  allopurinol 300 milliGRAM(s) Oral daily  CARBOplatin IVPB 624 milliGRAM(s) IV Intermittent once  enoxaparin Injectable 40 milliGRAM(s) SubCutaneous every 24 hours  etoposide IVPB 157 milliGRAM(s) IV Intermittent daily  fosaprepitant IVPB 150 milliGRAM(s) IV Intermittent once  ifosfamide IVPB w/additives 7850 milliGRAM(s) IV Intermittent once  metoclopramide Injectable 10 milliGRAM(s) IV Push every 8 hours PRN  ondansetron  IVPB 16 milliGRAM(s) IV Intermittent daily  sodium chloride 0.9%. 1000 milliLiter(s) IV Continuous <Continuous>      Vital Signs Last 24 Hrs  T(C): 37.8 (31 May 2018 18:45), Max: 38 (31 May 2018 14:38)  T(F): 100.1 (31 May 2018 18:45), Max: 100.4 (31 May 2018 14:38)  HR: 50 (01 Jun 2018 08:52) (50 - 109)  BP: 92/58 (01 Jun 2018 08:52) (92/58 - 109/67)  BP(mean): --  RR: 18 (01 Jun 2018 08:52) (18 - 18)  SpO2: 96% (01 Jun 2018 08:52) (95% - 96%)    Physical Exam:  General:    NAD, non toxic, alert oriented  HEENT:  cervical LAD  Cardiovascular:  regular rate and rhythm   Respiratory:  nonlabored, clear b/l  abd:  soft, BS +  Musculoskeletal:   no joint swelling                        8.7    5.5   )-----------( 89       ( 01 Jun 2018 08:49 )             25.8       05-31    132<L>  |  96  |  7   ----------------------------<  156<H>  4.1   |  24  |  0.48<L>    Ca    8.5      31 May 2018 18:57  Phos  3.6     05-31  Mg     1.9     06-01    TPro  5.3<L>  /  Alb  2.5<L>  /  TBili  1.3<H>  /  DBili  x   /  AST  15  /  ALT  13  /  AlkPhos  276<H>  05-31    MICROBIOLOGY:  Culture - Blood (05.29.18 @ 20:59)    Specimen Source: .Blood Blood    Culture Results:   No growth to date.    Culture - Blood (05.29.18 @ 20:59)    Specimen Source: .Blood Blood    Culture Results:   No growth to date.    Culture - Urine (05.30.18 @ 03:05)    Specimen Source: .Urine Clean Catch (Midstream)    Culture Results:   No growth    RADIOLOGY: Follow Up:  Fevers    Interval History: Restarted chemotherapy yesterday. Low grade fever 100.4F yesterday afternoon. No new symptoms. Feels fine.     ROS:    Respiratory:  no cough  GI:  no diarrhea  urinary: no dysuria    Allergies  No Known Allergies        ANTIMICROBIALS:      OTHER MEDS:  acetaminophen   Tablet 650 milliGRAM(s) Oral every 6 hours PRN  ALBUTerol/ipratropium for Nebulization. 3 milliLiter(s) Nebulizer once  allopurinol 300 milliGRAM(s) Oral daily  CARBOplatin IVPB 624 milliGRAM(s) IV Intermittent once  enoxaparin Injectable 40 milliGRAM(s) SubCutaneous every 24 hours  etoposide IVPB 157 milliGRAM(s) IV Intermittent daily  fosaprepitant IVPB 150 milliGRAM(s) IV Intermittent once  ifosfamide IVPB w/additives 7850 milliGRAM(s) IV Intermittent once  metoclopramide Injectable 10 milliGRAM(s) IV Push every 8 hours PRN  ondansetron  IVPB 16 milliGRAM(s) IV Intermittent daily  sodium chloride 0.9%. 1000 milliLiter(s) IV Continuous <Continuous>      Vital Signs Last 24 Hrs  T(C): 37.8 (31 May 2018 18:45), Max: 38 (31 May 2018 14:38)  T(F): 100.1 (31 May 2018 18:45), Max: 100.4 (31 May 2018 14:38)  HR: 50 (01 Jun 2018 08:52) (50 - 109)  BP: 92/58 (01 Jun 2018 08:52) (92/58 - 109/67)  BP(mean): --  RR: 18 (01 Jun 2018 08:52) (18 - 18)  SpO2: 96% (01 Jun 2018 08:52) (95% - 96%)    Physical Exam:  General:    NAD, non toxic, alert oriented  HEENT:  cervical LAD  Cardiovascular:  regular rate and rhythm   Respiratory:  nonlabored, clear b/l  abd:  soft, BS +  Musculoskeletal:   no joint swelling                        8.7    5.5   )-----------( 89       ( 01 Jun 2018 08:49 )             25.8       05-31    132<L>  |  96  |  7   ----------------------------<  156<H>  4.1   |  24  |  0.48<L>    Ca    8.5      31 May 2018 18:57  Phos  3.6     05-31  Mg     1.9     06-01    TPro  5.3<L>  /  Alb  2.5<L>  /  TBili  1.3<H>  /  DBili  x   /  AST  15  /  ALT  13  /  AlkPhos  276<H>  05-31    MICROBIOLOGY:  Culture - Blood (05.29.18 @ 20:59)    Specimen Source: .Blood Blood    Culture Results:   No growth to date.    Culture - Blood (05.29.18 @ 20:59)    Specimen Source: .Blood Blood    Culture Results:   No growth to date.    Culture - Urine (05.30.18 @ 03:05)    Specimen Source: .Urine Clean Catch (Midstream)    Culture Results:   No growth    RADIOLOGY:  CT Chest Abdomen Pelvis w/ IV Cont (05.30.18 @ 23:09)   Since April 27, 2018, increased adenopathy in the chest, abdomen, and pelvis.  Increased pleural metastases and pulmonary nodule.  Diffuse lymphomatous involvement of the osseous structures.     CT Neck Soft Tissue w/ IV Cont (05.30.18 @ 23:09)   Extensive cervical lymphadenopathy is present in the bilateral level 4   and level 5 locations, measuring up to 3.8 cm x 3.1 cm in the left level   5 supraclavicular location and 2.9 cm x 2.9 cm in the right level 5   supraclavicular location.

## 2018-06-01 NOTE — DISCHARGE NOTE ADULT - HOSPITAL COURSE
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 Fever was   progression of disease on imaging- infectious workup negative  - most likely lymphoma related, imaging also notably negative for any infection  - Monitoring off antibiotics.   - start ICE salvage chemotherapy regimen  was given for progression of disease.  - Tomorrow will be final day of ICE with day 3 etoposide. Can be discharged with neulasta.  F/u with oncology. 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 Fever was progression of disease on imaging- infectious workup negative, most likely lymphoma related, imaging also notably negative. patient also given total of 3 units prbc for Anemia. S/P chemotherapy pt with acute encephalopathy, presumed neurotoxicity due to Ifosfamide Methylene Blue given x1 dose with improvement of mental status. started on Neupogen post chemo. 43 year old woman with Hodgkins lymphoma pw fevers, hypotension, tachycardia. Noted generalized fatigue and worsening SOB with exertion. Has been having intermittent fevers for many months.    BP 80s/40s, Hgb 6.6, fever to 101. Admitted and given iv fluids with improvement in BP. Transfused for anemia.     CT c/a/p revealed increased adenopathy in the chest, abdomen, and pelvis, increased pleural metastases and pulmonary nodule, diffuse lymphomatous involvement of the osseous structures. CT neck revealed extensive cervical lymphadenopathy.    Fever felt to be secondary to progression of disease- infectious workup negative. Decision made to give inpatient chemo- given ICE regimen.     Developed confusion post chemo- likely secondary to Ifosfamide neurotoxicity. Mental status gradually improved. Given 1 dose of Methylene Blue.     Started on Neupogen post chemo. Given a total of 3 units for recurrent anemia.    Appointment made at the [facility] for Neulasta on the day after discharge. D/maciel on above meds. Home PT and transportation to appointment arranged by Case management.    Diagnoses:  Fever; Hypotension; Hodgkin's lymphoma; Inpatient chemotherapy; Anemia in neoplastic disease; Pancytopenia due to chemotherapy; Hyponatremia; Hypokalemia; Hypomagnesemia;

## 2018-06-01 NOTE — PROGRESS NOTE ADULT - ASSESSMENT
43 year old woman with hodgkins lymphoma on chemo aw fevers, tachycardia, progression of disease. Started on ICE chemo yesterday.

## 2018-06-01 NOTE — DISCHARGE NOTE ADULT - PLAN OF CARE
Likely due to Lymphoma.  started on chemo. 6.9 on admission.   Transfused 2 units.   Hb stable currently Progression of disease. Continue chemo as scheduled. Follow-up with you Hematologist for further monitoring and treatment resolved improved supplemented S/P PRBC Follow-up with your Hematologist for further monitoring. Follow-up with you Hematologist for further monitoring and treatment  Follow-up at the [facility] for Neulasta injection tomorrow 6/7/18 at 4:40p

## 2018-06-01 NOTE — PROGRESS NOTE ADULT - SUBJECTIVE AND OBJECTIVE BOX
Patient is a 43y old  Female who presents with a chief complaint of hypotension (30 May 2018 00:02)    HPI: Pt c/o generalized weakness. Low grade fevers overnight.     Vital Signs Last 24 Hrs  T(C): 37.8 (31 May 2018 18:45), Max: 38 (31 May 2018 14:38)  T(F): 100.1 (31 May 2018 18:45), Max: 100.4 (31 May 2018 14:38)  HR: 50 (01 Jun 2018 08:52) (50 - 109)  BP: 92/58 (01 Jun 2018 08:52) (92/58 - 109/67)  BP(mean): --  RR: 18 (01 Jun 2018 08:52) (18 - 18)  SpO2: 96% (01 Jun 2018 08:52) (95% - 96%)                          8.7    5.5   )-----------( 89       ( 01 Jun 2018 08:49 )             25.8     05-31    132<L>  |  96  |  7   ----------------------------<  156<H>  4.1   |  24  |  0.48<L>    Ca    8.5      31 May 2018 18:57  Phos  3.6     05-31  Mg     1.9     06-01    TPro  5.3<L>  /  Alb  2.5<L>  /  TBili  1.3<H>  /  DBili  x   /  AST  15  /  ALT  13  /  AlkPhos  276<H>  05-31    MEDICATIONS  (STANDING):  ALBUTerol/ipratropium for Nebulization. 3 milliLiter(s) Nebulizer once  allopurinol 300 milliGRAM(s) Oral daily  CARBOplatin IVPB 624 milliGRAM(s) IV Intermittent once  enoxaparin Injectable 40 milliGRAM(s) SubCutaneous every 24 hours  etoposide IVPB 157 milliGRAM(s) IV Intermittent daily  ifosfamide IVPB w/additives 7850 milliGRAM(s) IV Intermittent once  ondansetron  IVPB 16 milliGRAM(s) IV Intermittent daily  sodium chloride 0.9%. 1000 milliLiter(s) (75 mL/Hr) IV Continuous <Continuous>    MEDICATIONS  (PRN):  acetaminophen   Tablet 650 milliGRAM(s) Oral every 6 hours PRN For Temp greater than 38 C (100.4 F)  metoclopramide Injectable 10 milliGRAM(s) IV Push every 8 hours PRN nausea/vomiting

## 2018-06-01 NOTE — DISCHARGE NOTE ADULT - COMMUNITY RESOURCES
[facility] # [phone]. Scheduled ambulette services at 3:30pm to [facility] and will pick you up at 6pm at [facility]. [facility] transport # [phone]. Scheduled [facility] ride ambulette services( [phone])  at 3:30pm to [facility] and will pick you up at 6pm at [facility].

## 2018-06-01 NOTE — PROGRESS NOTE ADULT - SUBJECTIVE AND OBJECTIVE BOX
INTERVAL HPI/OVERNIGHT EVENTS:  Patient S&E at bedside. No o/n events, patient reports that she feels weak, but otherwise no complaints.     VITAL SIGNS:  T(F): 100.1 (05-31-18 @ 18:45)  HR: 50 (06-01-18 @ 08:52)  BP: 92/58 (06-01-18 @ 08:52)  RR: 18 (06-01-18 @ 08:52)  SpO2: 96% (06-01-18 @ 08:52)  Wt(kg): --    PHYSICAL EXAM:    Constitutional: NAD  Eyes: EOMI, sclera non-icteric  Neck: supple, no masses, no JVD  HEME: + cervical, inguinal adenopathy  Respiratory: CTA b/l, good air entry b/l  Cardiovascular: RRR, no M/R/G  Gastrointestinal: soft, NTND, no masses palpable, + BS, no hepatosplenomegaly  Extremities: no c/c/e  Neurological: AAOx3      MEDICATIONS  (STANDING):  ALBUTerol/ipratropium for Nebulization. 3 milliLiter(s) Nebulizer once  allopurinol 300 milliGRAM(s) Oral daily  CARBOplatin IVPB 624 milliGRAM(s) IV Intermittent once  enoxaparin Injectable 40 milliGRAM(s) SubCutaneous every 24 hours  etoposide IVPB 157 milliGRAM(s) IV Intermittent daily  ifosfamide IVPB w/additives 7850 milliGRAM(s) IV Intermittent once  ondansetron  IVPB 16 milliGRAM(s) IV Intermittent daily  sodium chloride 0.9%. 1000 milliLiter(s) (75 mL/Hr) IV Continuous <Continuous>    MEDICATIONS  (PRN):  acetaminophen   Tablet 650 milliGRAM(s) Oral every 6 hours PRN For Temp greater than 38 C (100.4 F)  metoclopramide Injectable 10 milliGRAM(s) IV Push every 8 hours PRN nausea/vomiting      Allergies    No Known Allergies    Intolerances        LABS:                        8.7    5.5   )-----------( 89       ( 01 Jun 2018 08:49 )             25.8     05-31    132<L>  |  96  |  7   ----------------------------<  156<H>  4.1   |  24  |  0.48<L>    Ca    8.5      31 May 2018 18:57  Phos  3.6     05-31  Mg     1.9     06-01    TPro  5.3<L>  /  Alb  2.5<L>  /  TBili  1.3<H>  /  DBili  x   /  AST  15  /  ALT  13  /  AlkPhos  276<H>  05-31          RADIOLOGY & ADDITIONAL TESTS:  Studies reviewed.

## 2018-06-01 NOTE — DISCHARGE NOTE ADULT - MEDICATION SUMMARY - MEDICATIONS TO STOP TAKING
I will STOP taking the medications listed below when I get home from the hospital:    ciprofloxacin 500 mg oral tablet  -- 1 tab(s) by mouth every 12 hours MDD:2

## 2018-06-01 NOTE — DISCHARGE NOTE ADULT - PATIENT PORTAL LINK FT
You can access the [facility] Patient Portal, offered by [facility], by registering with the following website: [email or web address]

## 2018-06-01 NOTE — PROGRESS NOTE ADULT - ASSESSMENT
43F with Hodgkins Lymphoma, admitted 5/29/18 in SIRS. Months of nonlocalizing fevers in the setting of progression of lymphoma. Cultures negative. Suspect tumor fever rather than infection.     Recommend  -monitor off antibiotics     Will sign off. Please call ID as needed. 43F with Hodgkins Lymphoma, admitted 5/29/18 in SIRS. Months of nonlocalizing fevers in the setting of progression of lymphoma. Cultures negative. Suspect tumor fever rather than infection. All cultures NGTD. CT C/A/P with progression of disease. Nontoxic appearing.     Recommend  -monitor off antibiotics     Will sign off. Please call ID as needed.

## 2018-06-01 NOTE — DISCHARGE NOTE ADULT - HOME CARE AGENCY
[facility]. Nurse and physical therapist to arrange visit within 24-48 hours after discharge. [phone]

## 2018-06-01 NOTE — PHYSICAL THERAPY INITIAL EVALUATION ADULT - PERTINENT HX OF CURRENT PROBLEM, REHAB EVAL
43F with Hodgkins Lymphoma, admitted 5/29/18 in SIRS. Months of nonlocalizing fevers in the setting of progression of lymphoma. Cultures negative. Suspect tumor fever rather than infection.

## 2018-06-01 NOTE — DISCHARGE NOTE ADULT - CARE PROVIDER_API CALL
Gabrielle Baxter (DO), Hematology; Medical Oncology  [address] [place] [address]  Phone: [phone]  Fax: [phone]

## 2018-06-02 LAB
HCT VFR BLD CALC: 24.6 % — LOW (ref 34.5–45)
HGB BLD-MCNC: 8.4 G/DL — LOW (ref 11.5–15.5)
LDH SERPL L TO P-CCNC: 165 U/L — SIGNIFICANT CHANGE UP (ref 50–242)
MAGNESIUM SERPL-MCNC: 1.8 MG/DL — SIGNIFICANT CHANGE UP (ref 1.6–2.6)
MCHC RBC-ENTMCNC: 29.9 PG — SIGNIFICANT CHANGE UP (ref 27–34)
MCHC RBC-ENTMCNC: 34.2 GM/DL — SIGNIFICANT CHANGE UP (ref 32–36)
MCV RBC AUTO: 87.2 FL — SIGNIFICANT CHANGE UP (ref 80–100)
PLATELET # BLD AUTO: 97 K/UL — LOW (ref 150–400)
RBC # BLD: 2.82 M/UL — LOW (ref 3.8–5.2)
RBC # FLD: 17.6 % — HIGH (ref 10.3–14.5)
URATE SERPL-MCNC: 2 MG/DL — LOW (ref 2.5–7)
WBC # BLD: 5.4 K/UL — SIGNIFICANT CHANGE UP (ref 3.8–10.5)
WBC # FLD AUTO: 5.4 K/UL — SIGNIFICANT CHANGE UP (ref 3.8–10.5)

## 2018-06-02 PROCEDURE — 99232 SBSQ HOSP IP/OBS MODERATE 35: CPT

## 2018-06-02 PROCEDURE — 99233 SBSQ HOSP IP/OBS HIGH 50: CPT | Mod: GC

## 2018-06-02 RX ADMIN — ONDANSETRON 116 MILLIGRAM(S): 8 TABLET, FILM COATED ORAL at 15:25

## 2018-06-02 RX ADMIN — Medication 300 MILLIGRAM(S): at 13:42

## 2018-06-02 RX ADMIN — SODIUM CHLORIDE 75 MILLILITER(S): 9 INJECTION INTRAMUSCULAR; INTRAVENOUS; SUBCUTANEOUS at 15:25

## 2018-06-02 NOTE — PROGRESS NOTE ADULT - SUBJECTIVE AND OBJECTIVE BOX
Patient is a 43y old  Female who presents with a chief complaint of hypotension, Hodgkins Lymphoma, receiving chemotherapy.       SUBJECTIVE / OVERNIGHT EVENTS:  No acute events overnight.     MEDICATIONS  (STANDING):  ALBUTerol/ipratropium for Nebulization. 3 milliLiter(s) Nebulizer once  allopurinol 300 milliGRAM(s) Oral daily  enoxaparin Injectable 40 milliGRAM(s) SubCutaneous every 24 hours  etoposide IVPB 157 milliGRAM(s) IV Intermittent daily  ondansetron  IVPB 16 milliGRAM(s) IV Intermittent daily  sodium chloride 0.9%. 1000 milliLiter(s) (75 mL/Hr) IV Continuous <Continuous>    MEDICATIONS  (PRN):  acetaminophen   Tablet 650 milliGRAM(s) Oral every 6 hours PRN For Temp greater than 38 C (100.4 F)  metoclopramide Injectable 10 milliGRAM(s) IV Push every 8 hours PRN nausea/vomiting      CAPILLARY BLOOD GLUCOSE        I&O's Summary    01 Jun 2018 07:01  -  02 Jun 2018 07:00  --------------------------------------------------------  IN: 3144 mL / OUT: 500 mL / NET: 2644 mL    02 Jun 2018 07:01  -  02 Jun 2018 11:29  --------------------------------------------------------  IN: 240 mL / OUT: 0 mL / NET: 240 mL        PHYSICAL EXAM:  Vital Signs Last 24 Hrs  T(C): 36.7 (02 Jun 2018 07:36), Max: 36.7 (02 Jun 2018 07:36)  T(F): 98 (02 Jun 2018 07:36), Max: 98 (02 Jun 2018 07:36)  HR: 110 (02 Jun 2018 07:36) (97 - 110)  BP: 85/46 (02 Jun 2018 07:36) (85/46 - 106/70)  BP(mean): --  RR: 18 (02 Jun 2018 07:36) (18 - 20)  SpO2: 93% (02 Jun 2018 07:36) (93% - 100%)  GENERAL: NAD, cachexia.  HEAD:  Atraumatic, Normocephalic  EYES: EOMI, PERRLA, conjunctiva and sclera clear  NECK: Supple, No JVD  CHEST/LUNG: Clear to auscultation bilaterally; No wheeze  HEART: Regular rate and rhythm; No murmurs, rubs, or gallops  ABDOMEN: Soft, Nontender, Nondistended; Bowel sounds present  EXTREMITIES:  2+ Peripheral Pulses, No clubbing, cyanosis, or edema  PSYCH: AAOx3  NEUROLOGY: non-focal  SKIN: No rashes or lesions    LABS:                        8.4    5.4   )-----------( 97       ( 02 Jun 2018 07:07 )             24.6     05-31    132<L>  |  96  |  7   ----------------------------<  156<H>  4.1   |  24  |  0.48<L>    Ca    8.5      31 May 2018 18:57  Mg     1.8     06-02    TPro  5.3<L>  /  Alb  2.5<L>  /  TBili  1.3<H>  /  DBili  x   /  AST  15  /  ALT  13  /  AlkPhos  276<H>  05-31              RADIOLOGY & ADDITIONAL TESTS:    Imaging Personally Reviewed:    Consultant(s) Notes Reviewed:      Care Discussed with Consultants/Other Providers:

## 2018-06-02 NOTE — ADVANCED PRACTICE NURSE CONSULT - ASSESSMENT
Cycle # day 3/3 Lab results as per Md donal aware of same. Vital signs stable prior to the start of chemotherapy, see sunrise.  Pt educated on the importance of saving urine, verbalize understanding of same.but refuses to use the hat to measure urine Pt education done re chemo regimen, drug effects and potential side effects, , pt states understanding. Pt reports the she has tolerated previous chemotherapy without side effects.Pt with PICC line on rt upper arm  site free from signs and symptoms of infection, positive blood return obtained. Pre-medicated with zofran 16mg ivss  pt given etoposide 100mg /l1=998ro iv infuse over 1 hr intiated at 1600 pt resting in bed tired and sleeping Cycle # day 3/3 Lab results as per Md donal aware of same. Vital signs stable prior to the start of chemotherapy, see sunrise.  Pt educated on the importance of saving urine, verbalize understanding of same.but refuses to use the hat to measure urine Pt education done re chemo regimen, drug effects and potential side effects, , pt states understanding. Pt reports the she has tolerated previous chemotherapy without side effects.Pt with PICC line on rt upper arm  site free from signs and symptoms of infection, positive blood return obtained. Pre-medicated with zofran 16mg ivss  pt given etoposide 100mg /m5=994km iv infuse over 1 hr intiated at 1600 pt resting in bed tired and sleeping  completed infusion with no adverse effects report given to the area nurse

## 2018-06-02 NOTE — ADVANCED PRACTICE NURSE CONSULT - RECOMMEDATIONS
Report endorsed to the area nurse to monitor the patient for any adverse reactions.
Pt tolerated well report given to area nurse strict i/o monitor for side effects
will continue to monitor for any adverse effects

## 2018-06-02 NOTE — ADVANCED PRACTICE NURSE CONSULT - REASON FOR CONSULT
Cycle 1, day 2/3,Height and weight verified. Lab results as per Md donal aware of same. Vital signs stable prior to chemotherapy, and  within accepectable parameters, see sunrise. Pt educated on the importance of saving urine, verbalizes good understanding. Pt education done re chemo regimen, drug effects and potential side effects, pt states understanding. Reports that /she has been tolerating chemotherapy well without side effects. Pt withR PICC  line, site free from signs and symptoms of infection, good blood return obtained. Pre- medicated withzofran 16 mg ivss emend 150 mg ivsscarboplatin auc 5 624 mg ivss infused over 1 hr w/o incident ifosfamide 5000 mg/b8=7106 mg w/ mesna 5000 mg civi @ 54 ml/hr etoposide 100 mg/h9=021 mg sarah infused over 1 hr w/o incident
chemotheraphy note
Chemotherapy Note

## 2018-06-02 NOTE — PROGRESS NOTE ADULT - SUBJECTIVE AND OBJECTIVE BOX
INTERVAL HPI/OVERNIGHT EVENTS:  Patient S&E at bedside. No o/n events, Fatigue; mild abdominal upset. Has had bowel movements    VITAL SIGNS:  T(F): 98 (06-02-18 @ 07:36)  HR: 110 (06-02-18 @ 07:36)  BP: 85/46 (06-02-18 @ 07:36)  RR: 18 (06-02-18 @ 07:36)  SpO2: 93% (06-02-18 @ 07:36)  Wt(kg): --    PHYSICAL EXAM:    Constitutional: NAD  Eyes: EOMI, sclera non-icteric  Neck: supple, no masses, no JVD  Respiratory: CTA b/l, good air entry b/l  Cardiovascular: RRR, no M/R/G  Gastrointestinal: soft, NTND, no masses palpable, + BS, no hepatosplenomegaly  Extremities: no c/c/e  Neurological: AAOx3      MEDICATIONS  (STANDING):  ALBUTerol/ipratropium for Nebulization. 3 milliLiter(s) Nebulizer once  allopurinol 300 milliGRAM(s) Oral daily  enoxaparin Injectable 40 milliGRAM(s) SubCutaneous every 24 hours  etoposide IVPB 157 milliGRAM(s) IV Intermittent daily  ondansetron  IVPB 16 milliGRAM(s) IV Intermittent daily  sodium chloride 0.9%. 1000 milliLiter(s) (75 mL/Hr) IV Continuous <Continuous>    MEDICATIONS  (PRN):  acetaminophen   Tablet 650 milliGRAM(s) Oral every 6 hours PRN For Temp greater than 38 C (100.4 F)  metoclopramide Injectable 10 milliGRAM(s) IV Push every 8 hours PRN nausea/vomiting      Allergies    No Known Allergies    Intolerances        LABS:                        8.4    5.4   )-----------( 97       ( 02 Jun 2018 07:07 )             24.6     05-31    132<L>  |  96  |  7   ----------------------------<  156<H>  4.1   |  24  |  0.48<L>    Ca    8.5      31 May 2018 18:57  Mg     1.8     06-02    TPro  5.3<L>  /  Alb  2.5<L>  /  TBili  1.3<H>  /  DBili  x   /  AST  15  /  ALT  13  /  AlkPhos  276<H>  05-31          RADIOLOGY & ADDITIONAL TESTS:  Studies reviewed.

## 2018-06-03 LAB
ANION GAP SERPL CALC-SCNC: 11 MMOL/L — SIGNIFICANT CHANGE UP (ref 5–17)
BASOPHILS # BLD AUTO: 0 K/UL — SIGNIFICANT CHANGE UP (ref 0–0.2)
BASOPHILS NFR BLD AUTO: 0 % — SIGNIFICANT CHANGE UP (ref 0–2)
BUN SERPL-MCNC: 12 MG/DL — SIGNIFICANT CHANGE UP (ref 7–23)
CALCIUM SERPL-MCNC: 8.8 MG/DL — SIGNIFICANT CHANGE UP (ref 8.4–10.5)
CHLORIDE SERPL-SCNC: 104 MMOL/L — SIGNIFICANT CHANGE UP (ref 96–108)
CO2 SERPL-SCNC: 23 MMOL/L — SIGNIFICANT CHANGE UP (ref 22–31)
CREAT SERPL-MCNC: 0.36 MG/DL — LOW (ref 0.5–1.3)
CULTURE RESULTS: SIGNIFICANT CHANGE UP
CULTURE RESULTS: SIGNIFICANT CHANGE UP
EOSINOPHIL # BLD AUTO: 0 K/UL — SIGNIFICANT CHANGE UP (ref 0–0.5)
EOSINOPHIL NFR BLD AUTO: 0.3 % — SIGNIFICANT CHANGE UP (ref 0–6)
GLUCOSE SERPL-MCNC: 97 MG/DL — SIGNIFICANT CHANGE UP (ref 70–99)
HCT VFR BLD CALC: 26.1 % — LOW (ref 34.5–45)
HGB BLD-MCNC: 8.8 G/DL — LOW (ref 11.5–15.5)
LDH SERPL L TO P-CCNC: 162 U/L — SIGNIFICANT CHANGE UP (ref 50–242)
LYMPHOCYTES # BLD AUTO: 0.1 K/UL — LOW (ref 1–3.3)
LYMPHOCYTES # BLD AUTO: 2.8 % — LOW (ref 13–44)
MAGNESIUM SERPL-MCNC: 1.9 MG/DL — SIGNIFICANT CHANGE UP (ref 1.6–2.6)
MCHC RBC-ENTMCNC: 29.3 PG — SIGNIFICANT CHANGE UP (ref 27–34)
MCHC RBC-ENTMCNC: 33.7 GM/DL — SIGNIFICANT CHANGE UP (ref 32–36)
MCV RBC AUTO: 86.9 FL — SIGNIFICANT CHANGE UP (ref 80–100)
MONOCYTES # BLD AUTO: 0 K/UL — SIGNIFICANT CHANGE UP (ref 0–0.9)
MONOCYTES NFR BLD AUTO: 0.5 % — LOW (ref 2–14)
NEUTROPHILS # BLD AUTO: 3.7 K/UL — SIGNIFICANT CHANGE UP (ref 1.8–7.4)
NEUTROPHILS NFR BLD AUTO: 96.4 % — HIGH (ref 43–77)
PLATELET # BLD AUTO: 70 K/UL — LOW (ref 150–400)
POTASSIUM SERPL-MCNC: 3 MMOL/L — LOW (ref 3.5–5.3)
POTASSIUM SERPL-SCNC: 3 MMOL/L — LOW (ref 3.5–5.3)
RBC # BLD: 3 M/UL — LOW (ref 3.8–5.2)
RBC # FLD: 17.8 % — HIGH (ref 10.3–14.5)
SODIUM SERPL-SCNC: 138 MMOL/L — SIGNIFICANT CHANGE UP (ref 135–145)
SPECIMEN SOURCE: SIGNIFICANT CHANGE UP
SPECIMEN SOURCE: SIGNIFICANT CHANGE UP
URATE SERPL-MCNC: 1.9 MG/DL — LOW (ref 2.5–7)
WBC # BLD: 3.8 K/UL — SIGNIFICANT CHANGE UP (ref 3.8–10.5)
WBC # FLD AUTO: 3.8 K/UL — SIGNIFICANT CHANGE UP (ref 3.8–10.5)

## 2018-06-03 PROCEDURE — 99232 SBSQ HOSP IP/OBS MODERATE 35: CPT

## 2018-06-03 PROCEDURE — 70450 CT HEAD/BRAIN W/O DYE: CPT | Mod: 26

## 2018-06-03 RX ORDER — POTASSIUM CHLORIDE 20 MEQ
20 PACKET (EA) ORAL
Qty: 0 | Refills: 0 | Status: COMPLETED | OUTPATIENT
Start: 2018-06-03 | End: 2018-06-04

## 2018-06-03 RX ORDER — MAGNESIUM SULFATE 500 MG/ML
1 VIAL (ML) INJECTION ONCE
Qty: 0 | Refills: 0 | Status: COMPLETED | OUTPATIENT
Start: 2018-06-03 | End: 2018-06-03

## 2018-06-03 RX ADMIN — SODIUM CHLORIDE 75 MILLILITER(S): 9 INJECTION INTRAMUSCULAR; INTRAVENOUS; SUBCUTANEOUS at 18:32

## 2018-06-03 RX ADMIN — Medication 100 GRAM(S): at 23:27

## 2018-06-03 RX ADMIN — ENOXAPARIN SODIUM 40 MILLIGRAM(S): 100 INJECTION SUBCUTANEOUS at 22:35

## 2018-06-03 NOTE — CHART NOTE - NSCHARTNOTEFT_GEN_A_CORE
SONG BAE  43y Female  Patient is a 43y old  Female who presents with a chief complaint of hypotension (01 Jun 2018 14:17)     PAST MEDICAL & SURGICAL HISTORY:  Hodgkin lymphoma  H/O abdominoplasty: 2004  H/O bilateral breast implants: 2004    Vital Signs Last 24 Hrs  T(C): 36.8 (03 Jun 2018 16:25), Max: 36.8 (03 Jun 2018 16:25)  T(F): 98.3 (03 Jun 2018 16:25), Max: 98.3 (03 Jun 2018 16:25)  HR: 96 (03 Jun 2018 16:25) (96 - 96)  BP: 101/68 (03 Jun 2018 16:25) (101/68 - 101/68)  BP(mean): --  RR: 18 (03 Jun 2018 16:25) (18 - 18)  SpO2: 95% (03 Jun 2018 16:25) (95% - 95%)    Event Summary: AMS/L eyelid swelling                          8.4    5.4   )-----------( 97       ( 02 Jun 2018 07:07 )             24.6     Mg     1.8     06-02    Completed first dose of chemotherapy. Seen by Oncology Fellow.    Plan: CT Head non contrast          Discussed with Dr. SCOTT Cheney covering attending and agrees with plan    Justine Reid, MARIN   [id number]

## 2018-06-03 NOTE — PROGRESS NOTE ADULT - ASSESSMENT
This 43 year old female has completed ICE chemotherapy and has considerable fatigue. She had refused blood testing this morning and sh is limiting my physical examination today. She is quiet and is not asking to leave the hospital. Discussion wiht nursing staff. the patient has the right to refuse venipuncture Prior laboratory studies reviewed and the vital signs have been normal,. Physical examination as much as as allowed shows no change from yesterday  Our current plan is to ask her to remain in hospital for filgrastin treatment

## 2018-06-03 NOTE — PROGRESS NOTE ADULT - SUBJECTIVE AND OBJECTIVE BOX
INTERVAL HPI/OVERNIGHT EVENTS:  Patient S&E at bedside. No o/n events, She remains fatigue and has limited discussion with me preferring to keep her eyes closed. She refuses a full examination and she refuses blood testing    VITAL SIGNS:  T(F): 98.8 (06-02-18 @ 15:55)  HR: 91 (06-02-18 @ 15:55)  BP: 103/68 (06-02-18 @ 15:55)  RR: 18 (06-02-18 @ 15:55)  SpO2: 97% (06-02-18 @ 15:55)  Wt(kg): --    PHYSICAL EXAM:    Constitutional: NAD  Eyes:  Neck: supple, no masses, no JVD  Respiratory: refuses  Cardiovascular: refuses  Gastrointestinal: refuses  Extremities: no c/c/e  Neurological: Awake, alert; uncooperative      MEDICATIONS  (STANDING):  ALBUTerol/ipratropium for Nebulization. 3 milliLiter(s) Nebulizer once  allopurinol 300 milliGRAM(s) Oral daily  enoxaparin Injectable 40 milliGRAM(s) SubCutaneous every 24 hours  etoposide IVPB 157 milliGRAM(s) IV Intermittent daily  sodium chloride 0.9%. 1000 milliLiter(s) (75 mL/Hr) IV Continuous <Continuous>    MEDICATIONS  (PRN):  acetaminophen   Tablet 650 milliGRAM(s) Oral every 6 hours PRN For Temp greater than 38 C (100.4 F)  metoclopramide Injectable 10 milliGRAM(s) IV Push every 8 hours PRN nausea/vomiting      Allergies    No Known Allergies    Intolerances        LABS:                        8.4    5.4   )-----------( 97       ( 02 Jun 2018 07:07 )             24.6       Mg     1.8     06-02            RADIOLOGY & ADDITIONAL TESTS:  Studies reviewed.

## 2018-06-03 NOTE — PROGRESS NOTE ADULT - SUBJECTIVE AND OBJECTIVE BOX
Patient is a 43y old  Female who presents for scheduled chemotherapy.       SUBJECTIVE / OVERNIGHT EVENTS:  Patient tolerated treatment with Vepeside.   Some confusion noted overnight, with element of sundowning.  Clinically back to baseline in terms of mental status.     MEDICATIONS  (STANDING):  ALBUTerol/ipratropium for Nebulization. 3 milliLiter(s) Nebulizer once  allopurinol 300 milliGRAM(s) Oral daily  enoxaparin Injectable 40 milliGRAM(s) SubCutaneous every 24 hours  etoposide IVPB 157 milliGRAM(s) IV Intermittent daily  sodium chloride 0.9%. 1000 milliLiter(s) (75 mL/Hr) IV Continuous <Continuous>    MEDICATIONS  (PRN):  acetaminophen   Tablet 650 milliGRAM(s) Oral every 6 hours PRN For Temp greater than 38 C (100.4 F)  metoclopramide Injectable 10 milliGRAM(s) IV Push every 8 hours PRN nausea/vomiting      CAPILLARY BLOOD GLUCOSE        I&O's Summary    02 Jun 2018 07:01  -  03 Jun 2018 07:00  --------------------------------------------------------  IN: 2853 mL / OUT: 0 mL / NET: 2853 mL        PHYSICAL EXAM:  Vital Signs Last 24 Hrs  T(C): 37.1 (02 Jun 2018 15:55), Max: 37.1 (02 Jun 2018 15:55)  T(F): 98.8 (02 Jun 2018 15:55), Max: 98.8 (02 Jun 2018 15:55)  HR: 91 (02 Jun 2018 15:55) (87 - 91)  BP: 103/68 (02 Jun 2018 15:55) (95/61 - 103/68)  BP(mean): --  RR: 18 (02 Jun 2018 15:55) (18 - 18)  SpO2: 97% (02 Jun 2018 15:55) (97% - 98%)  GENERAL: NAD, chronically ill appearing  HEAD:  Atraumatic, Normocephalic  EYES: EOMI, PERRLA, conjunctiva and sclera clear  NECK: Supple, No JVD  CHEST/LUNG: Clear to auscultation bilaterally; No wheeze  HEART: Regular rate and rhythm; No murmurs, rubs, or gallops  ABDOMEN: Soft, Nontender, Nondistended; Bowel sounds present  EXTREMITIES:  2+ Peripheral Pulses, No clubbing, cyanosis, or edema  PSYCH: AAOx3  NEUROLOGY: non-focal  SKIN: No rashes or lesions    LABS:                        8.4    5.4   )-----------( 97       ( 02 Jun 2018 07:07 )             24.6       Mg     1.8     06-02                RADIOLOGY & ADDITIONAL TESTS:    Imaging Personally Reviewed:    Consultant(s) Notes Reviewed:      Care Discussed with Consultants/Other Providers:

## 2018-06-04 DIAGNOSIS — G93.40 ENCEPHALOPATHY, UNSPECIFIED: ICD-10-CM

## 2018-06-04 LAB
ANION GAP SERPL CALC-SCNC: 12 MMOL/L — SIGNIFICANT CHANGE UP (ref 5–17)
BASOPHILS # BLD AUTO: 0 K/UL — SIGNIFICANT CHANGE UP (ref 0–0.2)
BASOPHILS NFR BLD AUTO: 0.7 % — SIGNIFICANT CHANGE UP (ref 0–2)
BUN SERPL-MCNC: 14 MG/DL — SIGNIFICANT CHANGE UP (ref 7–23)
CALCIUM SERPL-MCNC: 8.7 MG/DL — SIGNIFICANT CHANGE UP (ref 8.4–10.5)
CHLORIDE SERPL-SCNC: 104 MMOL/L — SIGNIFICANT CHANGE UP (ref 96–108)
CO2 SERPL-SCNC: 22 MMOL/L — SIGNIFICANT CHANGE UP (ref 22–31)
CREAT SERPL-MCNC: 0.44 MG/DL — LOW (ref 0.5–1.3)
EOSINOPHIL # BLD AUTO: 0 K/UL — SIGNIFICANT CHANGE UP (ref 0–0.5)
EOSINOPHIL NFR BLD AUTO: 0.4 % — SIGNIFICANT CHANGE UP (ref 0–6)
GLUCOSE SERPL-MCNC: 79 MG/DL — SIGNIFICANT CHANGE UP (ref 70–99)
HCT VFR BLD CALC: 25.5 % — LOW (ref 34.5–45)
HGB BLD-MCNC: 8.6 G/DL — LOW (ref 11.5–15.5)
LDH SERPL L TO P-CCNC: 165 U/L — SIGNIFICANT CHANGE UP (ref 50–242)
LYMPHOCYTES # BLD AUTO: 0.1 K/UL — LOW (ref 1–3.3)
LYMPHOCYTES # BLD AUTO: 1.7 % — LOW (ref 13–44)
MAGNESIUM SERPL-MCNC: 2.2 MG/DL — SIGNIFICANT CHANGE UP (ref 1.6–2.6)
MCHC RBC-ENTMCNC: 29.3 PG — SIGNIFICANT CHANGE UP (ref 27–34)
MCHC RBC-ENTMCNC: 33.7 GM/DL — SIGNIFICANT CHANGE UP (ref 32–36)
MCV RBC AUTO: 87 FL — SIGNIFICANT CHANGE UP (ref 80–100)
MONOCYTES # BLD AUTO: 0 K/UL — SIGNIFICANT CHANGE UP (ref 0–0.9)
MONOCYTES NFR BLD AUTO: 0.4 % — LOW (ref 2–14)
NEUTROPHILS # BLD AUTO: 4.6 K/UL — SIGNIFICANT CHANGE UP (ref 1.8–7.4)
NEUTROPHILS NFR BLD AUTO: 96.7 % — HIGH (ref 43–77)
PLATELET # BLD AUTO: 75 K/UL — LOW (ref 150–400)
POTASSIUM SERPL-MCNC: 4.3 MMOL/L — SIGNIFICANT CHANGE UP (ref 3.5–5.3)
POTASSIUM SERPL-SCNC: 4.3 MMOL/L — SIGNIFICANT CHANGE UP (ref 3.5–5.3)
RBC # BLD: 2.93 M/UL — LOW (ref 3.8–5.2)
RBC # FLD: 17.8 % — HIGH (ref 10.3–14.5)
SODIUM SERPL-SCNC: 138 MMOL/L — SIGNIFICANT CHANGE UP (ref 135–145)
URATE SERPL-MCNC: 2 MG/DL — LOW (ref 2.5–7)
WBC # BLD: 4.7 K/UL — SIGNIFICANT CHANGE UP (ref 3.8–10.5)
WBC # FLD AUTO: 4.7 K/UL — SIGNIFICANT CHANGE UP (ref 3.8–10.5)

## 2018-06-04 PROCEDURE — 99232 SBSQ HOSP IP/OBS MODERATE 35: CPT | Mod: GC

## 2018-06-04 PROCEDURE — 71045 X-RAY EXAM CHEST 1 VIEW: CPT | Mod: 26

## 2018-06-04 PROCEDURE — 99233 SBSQ HOSP IP/OBS HIGH 50: CPT

## 2018-06-04 RX ORDER — FILGRASTIM 480MCG/1.6
300 VIAL (ML) INJECTION DAILY
Qty: 0 | Refills: 0 | Status: DISCONTINUED | OUTPATIENT
Start: 2018-06-04 | End: 2018-06-06

## 2018-06-04 RX ADMIN — SODIUM CHLORIDE 75 MILLILITER(S): 9 INJECTION INTRAMUSCULAR; INTRAVENOUS; SUBCUTANEOUS at 11:41

## 2018-06-04 RX ADMIN — ENOXAPARIN SODIUM 40 MILLIGRAM(S): 100 INJECTION SUBCUTANEOUS at 23:04

## 2018-06-04 RX ADMIN — Medication 300 MICROGRAM(S): at 18:57

## 2018-06-04 RX ADMIN — Medication 50 MILLIEQUIVALENT(S): at 03:45

## 2018-06-04 RX ADMIN — Medication 50 MILLIEQUIVALENT(S): at 00:40

## 2018-06-04 RX ADMIN — Medication 50 MILLIEQUIVALENT(S): at 01:35

## 2018-06-04 RX ADMIN — Medication 300 MILLIGRAM(S): at 11:42

## 2018-06-04 NOTE — PROGRESS NOTE ADULT - SUBJECTIVE AND OBJECTIVE BOX
Patient is a 43y old  Female who presents with a chief complaint of hypotension (01 Jun 2018 14:17)    HPI: Pt reported to be confused over the weekend. CT head negative.     Vital Signs Last 24 Hrs  T(C): 36.6 (04 Jun 2018 16:52), Max: 36.7 (03 Jun 2018 22:41)  T(F): 97.8 (04 Jun 2018 16:52), Max: 98.1 (04 Jun 2018 08:17)  HR: 101 (04 Jun 2018 16:52) (93 - 130)  BP: 95/63 (04 Jun 2018 16:52) (91/64 - 104/60)  BP(mean): --  RR: 16 (04 Jun 2018 16:52) (16 - 18)  SpO2: 97% (04 Jun 2018 16:52) (95% - 97%)                          8.6    4.7   )-----------( 75       ( 04 Jun 2018 06:30 )             25.5     06-04    138  |  104  |  14  ----------------------------<  79  4.3   |  22  |  0.44<L>    Ca    8.7      04 Jun 2018 06:30  Mg     2.2     06-04      MEDICATIONS  (STANDING):  ALBUTerol/ipratropium for Nebulization. 3 milliLiter(s) Nebulizer once  allopurinol 300 milliGRAM(s) Oral daily  enoxaparin Injectable 40 milliGRAM(s) SubCutaneous every 24 hours  etoposide IVPB 157 milliGRAM(s) IV Intermittent daily  filgrastim-sndz Injectable 300 MICROGram(s) SubCutaneous daily  Methylene Blue 50 milliGRAM(s),Dextrose 5% 50 milliLiter(s) 50 milliGRAM(s) IV Intermittent once  sodium chloride 0.9%. 1000 milliLiter(s) (75 mL/Hr) IV Continuous <Continuous>    MEDICATIONS  (PRN):  acetaminophen   Tablet 650 milliGRAM(s) Oral every 6 hours PRN For Temp greater than 38 C (100.4 F)  metoclopramide Injectable 10 milliGRAM(s) IV Push every 8 hours PRN nausea/vomiting

## 2018-06-04 NOTE — PROGRESS NOTE ADULT - ASSESSMENT
43 year old woman with hodgkins lymphoma on chemo aw fevers, tachycardia, progression of disease. Completed ICE chemo inpatient. Altered mental status over the weekend- now improved. 43 year old woman with hodgkins lymphoma on chemo aw fevers, tachycardia, progression of disease. Completed ICE chemo as an inpatient. Altered mental status over the weekend- now improved.

## 2018-06-04 NOTE — PROGRESS NOTE ADULT - ASSESSMENT
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 She has some mild persistent confusion according to daughters. We recommend giving antidote, methylene blue 50 mg IV x 1, spoke to pharmacy regarding medication. Will re-assess patient tomorrow. CT head OK, can hold of on brain MRI for now.  - start neupogen 300 mcg today, she will need for 5 days for growth factor support  - continue allopurinol  - outpatient follow-up with Dr. Baxter

## 2018-06-05 DIAGNOSIS — E87.6 HYPOKALEMIA: ICD-10-CM

## 2018-06-05 LAB
ANION GAP SERPL CALC-SCNC: 11 MMOL/L — SIGNIFICANT CHANGE UP (ref 5–17)
ANISOCYTOSIS BLD QL: SIGNIFICANT CHANGE UP
BASOPHILS # BLD AUTO: 0 K/UL — SIGNIFICANT CHANGE UP (ref 0–0.2)
BASOPHILS NFR BLD AUTO: 0 % — SIGNIFICANT CHANGE UP (ref 0–2)
BLD GP AB SCN SERPL QL: NEGATIVE — SIGNIFICANT CHANGE UP
BUN SERPL-MCNC: 15 MG/DL — SIGNIFICANT CHANGE UP (ref 7–23)
CALCIUM SERPL-MCNC: 8.3 MG/DL — LOW (ref 8.4–10.5)
CHLORIDE SERPL-SCNC: 104 MMOL/L — SIGNIFICANT CHANGE UP (ref 96–108)
CO2 SERPL-SCNC: 21 MMOL/L — LOW (ref 22–31)
CREAT SERPL-MCNC: 0.48 MG/DL — LOW (ref 0.5–1.3)
EOSINOPHIL # BLD AUTO: 0.04 K/UL — SIGNIFICANT CHANGE UP (ref 0–0.5)
EOSINOPHIL NFR BLD AUTO: 1 % — SIGNIFICANT CHANGE UP (ref 0–6)
GLUCOSE SERPL-MCNC: 140 MG/DL — HIGH (ref 70–99)
HCT VFR BLD CALC: 20.9 % — CRITICAL LOW (ref 34.5–45)
HGB BLD-MCNC: 6.9 G/DL — CRITICAL LOW (ref 11.5–15.5)
LYMPHOCYTES # BLD AUTO: 0.09 K/UL — LOW (ref 1–3.3)
LYMPHOCYTES # BLD AUTO: 2 % — LOW (ref 13–44)
MANUAL SMEAR VERIFICATION: SIGNIFICANT CHANGE UP
MCHC RBC-ENTMCNC: 27.8 PG — SIGNIFICANT CHANGE UP (ref 27–34)
MCHC RBC-ENTMCNC: 33 GM/DL — SIGNIFICANT CHANGE UP (ref 32–36)
MCV RBC AUTO: 84.3 FL — SIGNIFICANT CHANGE UP (ref 80–100)
MONOCYTES # BLD AUTO: 0 K/UL — SIGNIFICANT CHANGE UP (ref 0–0.9)
MONOCYTES NFR BLD AUTO: 0 % — LOW (ref 2–14)
NEUTROPHILS # BLD AUTO: 4.12 K/UL — SIGNIFICANT CHANGE UP (ref 1.8–7.4)
NEUTROPHILS NFR BLD AUTO: 97 % — HIGH (ref 43–77)
NRBC # BLD: 0 /100 — SIGNIFICANT CHANGE UP (ref 0–0)
OVALOCYTES BLD QL SMEAR: SLIGHT — SIGNIFICANT CHANGE UP
PLAT MORPH BLD: NORMAL — SIGNIFICANT CHANGE UP
PLATELET # BLD AUTO: 51 K/UL — LOW (ref 150–400)
POTASSIUM SERPL-MCNC: 2.8 MMOL/L — CRITICAL LOW (ref 3.5–5.3)
POTASSIUM SERPL-MCNC: 3.3 MMOL/L — LOW (ref 3.5–5.3)
POTASSIUM SERPL-SCNC: 2.8 MMOL/L — CRITICAL LOW (ref 3.5–5.3)
POTASSIUM SERPL-SCNC: 3.3 MMOL/L — LOW (ref 3.5–5.3)
RBC # BLD: 2.48 M/UL — LOW (ref 3.8–5.2)
RBC # FLD: 18.6 % — HIGH (ref 10.3–14.5)
RBC BLD AUTO: ABNORMAL
RH IG SCN BLD-IMP: POSITIVE — SIGNIFICANT CHANGE UP
SODIUM SERPL-SCNC: 136 MMOL/L — SIGNIFICANT CHANGE UP (ref 135–145)
WBC # BLD: 4.25 K/UL — SIGNIFICANT CHANGE UP (ref 3.8–10.5)
WBC # FLD AUTO: 4.25 K/UL — SIGNIFICANT CHANGE UP (ref 3.8–10.5)

## 2018-06-05 PROCEDURE — 99232 SBSQ HOSP IP/OBS MODERATE 35: CPT | Mod: GC

## 2018-06-05 PROCEDURE — 99233 SBSQ HOSP IP/OBS HIGH 50: CPT

## 2018-06-05 RX ORDER — POTASSIUM CHLORIDE 20 MEQ
40 PACKET (EA) ORAL ONCE
Qty: 0 | Refills: 0 | Status: COMPLETED | OUTPATIENT
Start: 2018-06-05 | End: 2018-06-06

## 2018-06-05 RX ORDER — POTASSIUM CHLORIDE 20 MEQ
10 PACKET (EA) ORAL
Qty: 0 | Refills: 0 | Status: COMPLETED | OUTPATIENT
Start: 2018-06-05 | End: 2018-06-05

## 2018-06-05 RX ADMIN — Medication 10 MILLIGRAM(S): at 03:11

## 2018-06-05 RX ADMIN — Medication 100 MILLIEQUIVALENT(S): at 12:10

## 2018-06-05 RX ADMIN — Medication 300 MILLIGRAM(S): at 12:06

## 2018-06-05 RX ADMIN — Medication 300 MICROGRAM(S): at 12:06

## 2018-06-05 RX ADMIN — Medication 100 MILLIEQUIVALENT(S): at 09:25

## 2018-06-05 RX ADMIN — ENOXAPARIN SODIUM 40 MILLIGRAM(S): 100 INJECTION SUBCUTANEOUS at 23:48

## 2018-06-05 RX ADMIN — Medication 100 MILLIEQUIVALENT(S): at 10:50

## 2018-06-05 NOTE — PROGRESS NOTE ADULT - ASSESSMENT
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 Ct head negative. Patient neurologically at her baseline.  - continue neupogen 300 mcg, today is day 2. She will receive neulasta at [facility] on discharge.  - continue allopurinol  - plan for D/C home tomorrow as long as medically stable  - outpatient follow-up with Dr. Baxter

## 2018-06-05 NOTE — PROGRESS NOTE ADULT - ASSESSMENT
43 year old with hodgkins lymphoma on chemo aw fevers, tachycardia, progression of disease. Completed ICE chemo as an inpatient. Altered mental status post chemo- now improved.

## 2018-06-05 NOTE — PROGRESS NOTE ADULT - SUBJECTIVE AND OBJECTIVE BOX
Patient is a 43y old  Female who presents with a chief complaint of hypotension (01 Jun 2018 14:17)    HPI: Mental status improved today. Pt up in bed.     Vital Signs Last 24 Hrs  T(C): 36.8 (05 Jun 2018 16:00), Max: 36.8 (05 Jun 2018 16:00)  T(F): 98.3 (05 Jun 2018 16:00), Max: 98.3 (05 Jun 2018 16:00)  HR: 104 (05 Jun 2018 16:00) (104 - 134)  BP: 104/70 (05 Jun 2018 16:00) (93/68 - 104/70)  BP(mean): --  RR: 17 (05 Jun 2018 16:00) (16 - 17)  SpO2: 98% (05 Jun 2018 16:00) (98% - 99%)                          6.9    4.25  )-----------( 51       ( 05 Jun 2018 08:35 )             20.9     06-05    x   |  x   |  x   ----------------------------<  x   3.3<L>   |  x   |  x     Ca    8.3<L>      05 Jun 2018 07:10  Mg     2.2     06-04      MEDICATIONS  (STANDING):  ALBUTerol/ipratropium for Nebulization. 3 milliLiter(s) Nebulizer once  allopurinol 300 milliGRAM(s) Oral daily  enoxaparin Injectable 40 milliGRAM(s) SubCutaneous every 24 hours  etoposide IVPB 157 milliGRAM(s) IV Intermittent daily  filgrastim-sndz Injectable 300 MICROGram(s) SubCutaneous daily  potassium chloride    Tablet ER 40 milliEquivalent(s) Oral once  sodium chloride 0.9%. 1000 milliLiter(s) (75 mL/Hr) IV Continuous <Continuous>    MEDICATIONS  (PRN):  acetaminophen   Tablet 650 milliGRAM(s) Oral every 6 hours PRN For Temp greater than 38 C (100.4 F)  metoclopramide Injectable 10 milliGRAM(s) IV Push every 8 hours PRN nausea/vomiting

## 2018-06-05 NOTE — PROGRESS NOTE ADULT - SUBJECTIVE AND OBJECTIVE BOX
Hematology Follow-up    INTERVAL HPI/OVERNIGHT EVENTS:  No acute changes. Pt continues to feel better and more like her usual self. She got out of bed today and ambulated which she feels she was not able to do for past few days. She denies confusion, headaches, light sensitivity, changes in vision.     VITAL SIGNS:  T(F): 98.3 (06-05-18 @ 16:00)  HR: 104 (06-05-18 @ 16:00)  BP: 104/70 (06-05-18 @ 16:00)  RR: 17 (06-05-18 @ 16:00)  SpO2: 98% (06-05-18 @ 16:00)  Wt(kg): --    PHYSICAL EXAM:    Constitutional: AAOx3, NAD,   Eyes: PERRL, EOMI, sclera non-icteric  Neck: supple, no masses, no JVD  Respiratory: CTA b/l, good air entry b/l, no wheezing, rhonchi, rales, with normal respiratory effort and no intercostal retractions  Cardiovascular: RRR, normal S1S2, no M/R/G  Gastrointestinal: soft, NTND, no masses palpable, BS normal in all four quadrants, no HSM  Extremities:  no c/c/e  Neurological: Grossly intact  Skin: Normal temperature    MEDICATIONS  (STANDING):  ALBUTerol/ipratropium for Nebulization. 3 milliLiter(s) Nebulizer once  allopurinol 300 milliGRAM(s) Oral daily  enoxaparin Injectable 40 milliGRAM(s) SubCutaneous every 24 hours  etoposide IVPB 157 milliGRAM(s) IV Intermittent daily  filgrastim-sndz Injectable 300 MICROGram(s) SubCutaneous daily  potassium chloride    Tablet ER 40 milliEquivalent(s) Oral once  sodium chloride 0.9%. 1000 milliLiter(s) (75 mL/Hr) IV Continuous <Continuous>    MEDICATIONS  (PRN):  acetaminophen   Tablet 650 milliGRAM(s) Oral every 6 hours PRN For Temp greater than 38 C (100.4 F)  metoclopramide Injectable 10 milliGRAM(s) IV Push every 8 hours PRN nausea/vomiting      No Known Allergies      LABS:                        6.9    4.25  )-----------( 51       ( 05 Jun 2018 08:35 )             20.9     06-05    x   |  x   |  x   ----------------------------<  x   3.3<L>   |  x   |  x     Ca    8.3<L>      05 Jun 2018 07:10  Mg     2.2     06-04             RADIOLOGY & ADDITIONAL TESTS:  Studies reviewed.

## 2018-06-06 VITALS — WEIGHT: 118.17 LBS

## 2018-06-06 LAB
ALBUMIN SERPL ELPH-MCNC: 2.4 G/DL — LOW (ref 3.3–5)
ALP SERPL-CCNC: 157 U/L — HIGH (ref 40–120)
ALT FLD-CCNC: 10 U/L — SIGNIFICANT CHANGE UP (ref 10–45)
ANION GAP SERPL CALC-SCNC: 9 MMOL/L — SIGNIFICANT CHANGE UP (ref 5–17)
AST SERPL-CCNC: 7 U/L — LOW (ref 10–40)
BASOPHILS # BLD AUTO: 0 K/UL — SIGNIFICANT CHANGE UP (ref 0–0.2)
BASOPHILS NFR BLD AUTO: 0.5 % — SIGNIFICANT CHANGE UP (ref 0–2)
BILIRUB SERPL-MCNC: 0.9 MG/DL — SIGNIFICANT CHANGE UP (ref 0.2–1.2)
BUN SERPL-MCNC: 11 MG/DL — SIGNIFICANT CHANGE UP (ref 7–23)
CALCIUM SERPL-MCNC: 8.4 MG/DL — SIGNIFICANT CHANGE UP (ref 8.4–10.5)
CHLORIDE SERPL-SCNC: 104 MMOL/L — SIGNIFICANT CHANGE UP (ref 96–108)
CO2 SERPL-SCNC: 22 MMOL/L — SIGNIFICANT CHANGE UP (ref 22–31)
CREAT SERPL-MCNC: 0.49 MG/DL — LOW (ref 0.5–1.3)
EOSINOPHIL # BLD AUTO: 0 K/UL — SIGNIFICANT CHANGE UP (ref 0–0.5)
EOSINOPHIL NFR BLD AUTO: 1.5 % — SIGNIFICANT CHANGE UP (ref 0–6)
GLUCOSE SERPL-MCNC: 139 MG/DL — HIGH (ref 70–99)
HCT VFR BLD CALC: 22.2 % — LOW (ref 34.5–45)
HGB BLD-MCNC: 8 G/DL — LOW (ref 11.5–15.5)
LDH SERPL L TO P-CCNC: 136 U/L — SIGNIFICANT CHANGE UP (ref 50–242)
LYMPHOCYTES # BLD AUTO: 0.1 K/UL — LOW (ref 1–3.3)
LYMPHOCYTES # BLD AUTO: 3.8 % — LOW (ref 13–44)
MAGNESIUM SERPL-MCNC: 1.8 MG/DL — SIGNIFICANT CHANGE UP (ref 1.6–2.6)
MCHC RBC-ENTMCNC: 30.8 PG — SIGNIFICANT CHANGE UP (ref 27–34)
MCHC RBC-ENTMCNC: 36.2 GM/DL — HIGH (ref 32–36)
MCV RBC AUTO: 85.1 FL — SIGNIFICANT CHANGE UP (ref 80–100)
MONOCYTES # BLD AUTO: 0 K/UL — SIGNIFICANT CHANGE UP (ref 0–0.9)
MONOCYTES NFR BLD AUTO: 0.8 % — LOW (ref 2–14)
NEUTROPHILS # BLD AUTO: 1.5 K/UL — LOW (ref 1.8–7.4)
NEUTROPHILS NFR BLD AUTO: 93.3 % — HIGH (ref 43–77)
PLATELET # BLD AUTO: 32 K/UL — LOW (ref 150–400)
POTASSIUM SERPL-MCNC: 3.1 MMOL/L — LOW (ref 3.5–5.3)
POTASSIUM SERPL-SCNC: 3.1 MMOL/L — LOW (ref 3.5–5.3)
PROT SERPL-MCNC: 4.8 G/DL — LOW (ref 6–8.3)
RBC # BLD: 2.61 M/UL — LOW (ref 3.8–5.2)
RBC # FLD: 16.4 % — HIGH (ref 10.3–14.5)
SODIUM SERPL-SCNC: 135 MMOL/L — SIGNIFICANT CHANGE UP (ref 135–145)
URATE SERPL-MCNC: 1.5 MG/DL — LOW (ref 2.5–7)
WBC # BLD: 1.6 K/UL — LOW (ref 3.8–10.5)
WBC # FLD AUTO: 1.6 K/UL — LOW (ref 3.8–10.5)

## 2018-06-06 PROCEDURE — P9040: CPT

## 2018-06-06 PROCEDURE — 86850 RBC ANTIBODY SCREEN: CPT

## 2018-06-06 PROCEDURE — 85014 HEMATOCRIT: CPT

## 2018-06-06 PROCEDURE — 86901 BLOOD TYPING SEROLOGIC RH(D): CPT

## 2018-06-06 PROCEDURE — 36430 TRANSFUSION BLD/BLD COMPNT: CPT

## 2018-06-06 PROCEDURE — 87086 URINE CULTURE/COLONY COUNT: CPT

## 2018-06-06 PROCEDURE — 80053 COMPREHEN METABOLIC PANEL: CPT

## 2018-06-06 PROCEDURE — 93005 ELECTROCARDIOGRAM TRACING: CPT

## 2018-06-06 PROCEDURE — 99285 EMERGENCY DEPT VISIT HI MDM: CPT | Mod: 25

## 2018-06-06 PROCEDURE — 87040 BLOOD CULTURE FOR BACTERIA: CPT

## 2018-06-06 PROCEDURE — 83615 LACTATE (LD) (LDH) ENZYME: CPT

## 2018-06-06 PROCEDURE — 83735 ASSAY OF MAGNESIUM: CPT

## 2018-06-06 PROCEDURE — 82947 ASSAY GLUCOSE BLOOD QUANT: CPT

## 2018-06-06 PROCEDURE — 86900 BLOOD TYPING SEROLOGIC ABO: CPT

## 2018-06-06 PROCEDURE — 83605 ASSAY OF LACTIC ACID: CPT

## 2018-06-06 PROCEDURE — 82803 BLOOD GASES ANY COMBINATION: CPT

## 2018-06-06 PROCEDURE — 82435 ASSAY OF BLOOD CHLORIDE: CPT

## 2018-06-06 PROCEDURE — 80048 BASIC METABOLIC PNL TOTAL CA: CPT

## 2018-06-06 PROCEDURE — 71260 CT THORAX DX C+: CPT

## 2018-06-06 PROCEDURE — 81001 URINALYSIS AUTO W/SCOPE: CPT

## 2018-06-06 PROCEDURE — 82330 ASSAY OF CALCIUM: CPT

## 2018-06-06 PROCEDURE — 84132 ASSAY OF SERUM POTASSIUM: CPT

## 2018-06-06 PROCEDURE — 85027 COMPLETE CBC AUTOMATED: CPT

## 2018-06-06 PROCEDURE — 71045 X-RAY EXAM CHEST 1 VIEW: CPT

## 2018-06-06 PROCEDURE — 97162 PT EVAL MOD COMPLEX 30 MIN: CPT

## 2018-06-06 PROCEDURE — 70491 CT SOFT TISSUE NECK W/DYE: CPT

## 2018-06-06 PROCEDURE — 84295 ASSAY OF SERUM SODIUM: CPT

## 2018-06-06 PROCEDURE — 99239 HOSP IP/OBS DSCHRG MGMT >30: CPT

## 2018-06-06 PROCEDURE — 74177 CT ABD & PELVIS W/CONTRAST: CPT

## 2018-06-06 PROCEDURE — 84100 ASSAY OF PHOSPHORUS: CPT

## 2018-06-06 PROCEDURE — 84550 ASSAY OF BLOOD/URIC ACID: CPT

## 2018-06-06 PROCEDURE — 86923 COMPATIBILITY TEST ELECTRIC: CPT

## 2018-06-06 PROCEDURE — 70450 CT HEAD/BRAIN W/O DYE: CPT

## 2018-06-06 RX ORDER — POTASSIUM CHLORIDE 20 MEQ
40 PACKET (EA) ORAL ONCE
Qty: 0 | Refills: 0 | Status: COMPLETED | OUTPATIENT
Start: 2018-06-06 | End: 2018-06-06

## 2018-06-06 RX ORDER — ONDANSETRON 8 MG/1
1 TABLET, FILM COATED ORAL
Qty: 45 | Refills: 0 | OUTPATIENT
Start: 2018-06-06 | End: 2018-06-20

## 2018-06-06 RX ORDER — ALLOPURINOL 300 MG
1 TABLET ORAL
Qty: 30 | Refills: 0 | OUTPATIENT
Start: 2018-06-06 | End: 2018-07-05

## 2018-06-06 RX ORDER — ALLOPURINOL 300 MG
1 TABLET ORAL
Qty: 10 | Refills: 0 | OUTPATIENT
Start: 2018-06-06 | End: 2018-06-15

## 2018-06-06 RX ADMIN — Medication 40 MILLIEQUIVALENT(S): at 12:21

## 2018-06-06 RX ADMIN — Medication 300 MILLIGRAM(S): at 12:21

## 2018-06-06 RX ADMIN — Medication 300 MICROGRAM(S): at 12:22

## 2018-06-06 NOTE — PROGRESS NOTE ADULT - PROBLEM SELECTOR PROBLEM 3
Anemia
Hodgkin lymphoma
Anemia
Anemia
Prophylactic measure

## 2018-06-06 NOTE — PROGRESS NOTE ADULT - PROVIDER SPECIALTY LIST ADULT
Heme/Onc
Hospitalist
Infectious Disease
Hospitalist
Infectious Disease
Heme/Onc

## 2018-06-06 NOTE — PROGRESS NOTE ADULT - PROBLEM SELECTOR PLAN 3
--likely 2/2 chemo  --patient s/p 2U PRBCs, f/u post transfusion cbc  --trend CBC
Progression of disease seen on imaging.   Patient received Vepesid yesterday.  Support care.
Progression of disease seen on imaging.   Patient receiving active chemotherapy, last dose today.
Progression of disease. Continue chemo as scheduled.
Progression of disease. To start ICE chemotherapy today.
Due to chemotherapy. Transfuse 1 unit today.
--lovenox
Due to chemotherapy. Transfuse 1 unit yesterday.

## 2018-06-06 NOTE — PROGRESS NOTE ADULT - ASSESSMENT
43 year old with hodgkins lymphoma on chemo aw fevers, tachycardia, progression of disease. Completed ICE chemo as an inpatient. Altered mental status post chemo- now improved. Transfused for anemia.

## 2018-06-06 NOTE — PROGRESS NOTE ADULT - PROBLEM SELECTOR PROBLEM 2
Hodgkin lymphoma
Anemia
Hodgkin lymphoma
Tachycardia
Anemia
Hodgkin lymphoma

## 2018-06-06 NOTE — PROGRESS NOTE ADULT - PROBLEM SELECTOR PROBLEM 4
Hodgkin lymphoma
Prophylactic measure
Hypokalemia
Hypokalemia

## 2018-06-06 NOTE — PROGRESS NOTE ADULT - PROBLEM SELECTOR PLAN 2
- progression of disease on imaging  - start ICE salvage chemotherapy regimen given progression on AVD, today is C1D2  - Receives ifosfamide 24 hour infusion today with mesna as well as carboplatin AUC 5 today.   - Continue to check TLS labs daily, CBC with diff, CMP daily.   - Tomorrow will be final day of ICE with day 3 etoposide. Can be discharged with neulasta at [facility] for Monday as long as clinically stable.
- Stable after PRBC's on admission.   - Refused labs today.  - Will continue to discuss need of blood draws given need of Neulasta in the near future.
- progression of disease on imaging  - start ICE salvage chemotherapy regimen given progression on AVD, today is C1D1
--persistently tachycardic despite blood transfusion. LIkely 2/2 tumor progression vs. possible occult infection  - observe off ABX for now, if spikes fever, would culture again and start abx as above, send for CT C/A/P to identify possible source  - Dr. Baxter leaning toward administering chemo, would start only if afebrile x 24 hours and cultures negative
6.9 on admission. Transfused 2 units. Hb stable.
6.9 on admission. Transfused 2 units. Hb stable.
6.9 on admission. Transfused 2 units. Monitor.
Progression of disease. S/p ICE chemo as above. Started on Neupogen post chemo.
Progression of disease. S/p ICE chemo as above. Started on Neupogen post chemo.
Progression of disease. S/p ICE chemo as above. Started on Neupogen today.

## 2018-06-06 NOTE — PROGRESS NOTE ADULT - ATTENDING COMMENTS
Patient awaiting evaluation by Case Management/Social Work. Patients insurance may have lapsed, which would preclude her from being discharged with Neulasta and PICC line care. Awaiting input.
Patient seen and agree with Dr. Pollard's note. Briefly, patient is 42 y/o F with classical HL (lymphocyte depleted) with stage IV bulky disease initially refused therapy now s/p 8 cycles of single agent brentuximab with POD in February 2018, s/p AVD with POD, s/p RICE cycle salvage therapy this admission, completed on 6/3. She now has had disorientation and altered mental status that is possibly due to ifosfamide neurotoxicity.     Plan:  1. Agree with single dose of methylene blue 50 mg to treat residual confusion.
Patient seen and agree with Dr. Pollard's note. Briefly, patient is 42 y/o F with classical HL (lymphocyte depleted) with stage IV bulky disease initially refused therapy now s/p 8 cycles of single agent brentuximab with progression of disease in February 2018, s/p AVD with progression of disease, s/p RICE cycle 1 salvage therapy this admission, completed on 6/3. Her course was complicated by apparent neurotoxicity from ifosfamide, but she has now returned to baseline neurologic status.    Plan:  1. Disposition as above.
This 43 year old female with lymphocyte depleted bulky stage 4 Hodgkin's Lymphoma is now receiving ICE chemotherapy status post incomplete response to prior chemotherapy (brentuximab).   She has fatigue and considerable discomfort. She told me that she would be discharged today but she is currently receiving chemotherapy and my weekend instructions were to follow her in the hospital through [facility]. I have no instructions for discharge for today
please see my authored note above
Chester Hunt MD  Pager [phone]  After 5pm/weekends call [phone]
Chester Hunt MD  Pager [phone]  After 5pm/weekends call [phone]
42 y/o F with classical HL (lymphocyte depleted) with stage IV bulky disease initially refused therapy now s/p 8 cycles of single agent brentuximab with POD in February 2018 on AVD without ideal response, received 1.5 cycles to day.  CT scan completed revealed progression of her disease.   Day 2 of ICE- tolerating it well.  Continue allopurinol.  Physical therapy  Patient's insurance has lapsed, she will need to stay inpatient for g-CSF until insurance issues resolve.
42 y/o F with classical HL (lymphocyte depleted) with stage IV bulky disease initially refused therapy now s/p 8 cycles of single agent brentuximab with POD in February 2018 on AVD without ideal response, received 1.5 cycles to day.  CT scan completed revealed progression of her disease.   Plan to begin salvage treatment with ICE- day 1 today.  Continue hydration, allopurinol.    Low grade temps likely tumor fever.  Cultures are negative, observe off antibiotics.
D/c planning for tomorrow if stable.
Pt to complete chemo tomorrow. Is requesting to go home after that. PT consult ordered for weakness- may need rolling walker/home PT.
D/c planning for tomorrow.
D/c home today. To get Neulasta at the [facility] tomorrow.     D/c time 45 mins.

## 2018-06-06 NOTE — PROGRESS NOTE ADULT - PROBLEM SELECTOR PROBLEM 1
Fever
Acute encephalopathy

## 2018-06-06 NOTE — PROGRESS NOTE ADULT - SUBJECTIVE AND OBJECTIVE BOX
Patient is a 43y old  Female who presents with a chief complaint of hypotension (01 Jun 2018 14:17)    HPI: Pt up in bed. Mental status improved.     Vital Signs Last 24 Hrs  T(C): 36.9 (05 Jun 2018 19:06), Max: 36.9 (05 Jun 2018 19:06)  T(F): 98.4 (05 Jun 2018 19:06), Max: 98.4 (05 Jun 2018 19:06)  HR: 112 (05 Jun 2018 19:06) (104 - 112)  BP: 116/81 (05 Jun 2018 19:06) (104/70 - 116/81)  BP(mean): --  RR: 17 (05 Jun 2018 19:06) (17 - 17)  SpO2: 98% (05 Jun 2018 19:06) (98% - 98%)                          8.0    1.6   )-----------( 32       ( 06 Jun 2018 10:22 )             22.2     06-06    135  |  104  |  11  ----------------------------<  139<H>  3.1<L>   |  22  |  0.49<L>    Ca    8.4      06 Jun 2018 10:22  Mg     1.8     06-06    TPro  4.8<L>  /  Alb  2.4<L>  /  TBili  0.9  /  DBili  x   /  AST  7<L>  /  ALT  10  /  AlkPhos  157<H>  06-06    MEDICATIONS  (STANDING):  ALBUTerol/ipratropium for Nebulization. 3 milliLiter(s) Nebulizer once  allopurinol 300 milliGRAM(s) Oral daily  enoxaparin Injectable 40 milliGRAM(s) SubCutaneous every 24 hours  etoposide IVPB 157 milliGRAM(s) IV Intermittent daily  filgrastim-sndz Injectable 300 MICROGram(s) SubCutaneous daily  sodium chloride 0.9%. 1000 milliLiter(s) (75 mL/Hr) IV Continuous <Continuous>    MEDICATIONS  (PRN):  acetaminophen   Tablet 650 milliGRAM(s) Oral every 6 hours PRN For Temp greater than 38 C (100.4 F)  metoclopramide Injectable 10 milliGRAM(s) IV Push every 8 hours PRN nausea/vomiting

## 2018-06-06 NOTE — PROGRESS NOTE ADULT - GASTROINTESTINAL
detailed exam
Soft, non-tender, no hepatosplenomegaly, normal bowel sounds

## 2018-06-07 ENCOUNTER — APPOINTMENT (OUTPATIENT)
Dept: INFUSION THERAPY | Facility: HOSPITAL | Age: 43
End: 2018-06-07

## 2018-06-07 ENCOUNTER — OUTPATIENT (OUTPATIENT)
Dept: OUTPATIENT SERVICES | Facility: HOSPITAL | Age: 43
LOS: 1 days | Discharge: HOME | End: 2018-06-07

## 2018-06-07 DIAGNOSIS — Z98.82 BREAST IMPLANT STATUS: Chronic | ICD-10-CM

## 2018-06-07 DIAGNOSIS — C81.90 HODGKIN LYMPHOMA, UNSPECIFIED, UNSPECIFIED SITE: ICD-10-CM

## 2018-06-07 DIAGNOSIS — Z98.890 OTHER SPECIFIED POSTPROCEDURAL STATES: Chronic | ICD-10-CM

## 2018-06-08 DIAGNOSIS — Z51.89 ENCOUNTER FOR OTHER SPECIFIED AFTERCARE: ICD-10-CM

## 2018-06-09 ENCOUNTER — OTHER (OUTPATIENT)
Age: 43
End: 2018-06-09

## 2018-06-09 DIAGNOSIS — K64.9 UNSPECIFIED HEMORRHOIDS: ICD-10-CM

## 2018-06-09 RX ORDER — HYDROCORTISONE 25 MG/G
2.5 CREAM TOPICAL
Qty: 50 | Refills: 0 | Status: ACTIVE | COMMUNITY
Start: 2018-06-09 | End: 1900-01-01

## 2018-06-11 ENCOUNTER — INPATIENT (INPATIENT)
Facility: HOSPITAL | Age: 43
LOS: 0 days | DRG: 871 | End: 2018-06-12
Attending: INTERNAL MEDICINE | Admitting: INTERNAL MEDICINE
Payer: MEDICAID

## 2018-06-11 VITALS
SYSTOLIC BLOOD PRESSURE: 76 MMHG | HEIGHT: 66 IN | WEIGHT: 115.96 LBS | DIASTOLIC BLOOD PRESSURE: 43 MMHG | HEART RATE: 150 BPM | RESPIRATION RATE: 22 BRPM

## 2018-06-11 DIAGNOSIS — Z98.82 BREAST IMPLANT STATUS: Chronic | ICD-10-CM

## 2018-06-11 DIAGNOSIS — Z98.890 OTHER SPECIFIED POSTPROCEDURAL STATES: Chronic | ICD-10-CM

## 2018-06-11 LAB
ALBUMIN SERPL ELPH-MCNC: 2.1 G/DL — LOW (ref 3.3–5)
ALP SERPL-CCNC: 326 U/L — HIGH (ref 40–120)
ALT FLD-CCNC: 18 U/L — SIGNIFICANT CHANGE UP (ref 10–45)
ANION GAP SERPL CALC-SCNC: 13 MMOL/L — SIGNIFICANT CHANGE UP (ref 5–17)
APTT BLD: 36.6 SEC — SIGNIFICANT CHANGE UP (ref 27.5–37.4)
AST SERPL-CCNC: 21 U/L — SIGNIFICANT CHANGE UP (ref 10–40)
BILIRUB SERPL-MCNC: 1.8 MG/DL — HIGH (ref 0.2–1.2)
BLD GP AB SCN SERPL QL: NEGATIVE — SIGNIFICANT CHANGE UP
BUN SERPL-MCNC: 15 MG/DL — SIGNIFICANT CHANGE UP (ref 7–23)
CALCIUM SERPL-MCNC: 7.6 MG/DL — LOW (ref 8.4–10.5)
CHLORIDE SERPL-SCNC: 95 MMOL/L — LOW (ref 96–108)
CO2 SERPL-SCNC: 20 MMOL/L — LOW (ref 22–31)
CREAT SERPL-MCNC: 0.63 MG/DL — SIGNIFICANT CHANGE UP (ref 0.5–1.3)
GAS PNL BLDV: SIGNIFICANT CHANGE UP
GAS PNL BLDV: SIGNIFICANT CHANGE UP
GLUCOSE SERPL-MCNC: 97 MG/DL — SIGNIFICANT CHANGE UP (ref 70–99)
HCT VFR BLD CALC: 10.6 % — CRITICAL LOW (ref 34.5–45)
HCT VFR BLD CALC: 9.4 % — CRITICAL LOW (ref 34.5–45)
HGB BLD-MCNC: 3.3 G/DL — CRITICAL LOW (ref 11.5–15.5)
HGB BLD-MCNC: 3.8 G/DL — CRITICAL LOW (ref 11.5–15.5)
INR BLD: 1.6 RATIO — HIGH (ref 0.88–1.16)
MCHC RBC-ENTMCNC: 29.2 PG — SIGNIFICANT CHANGE UP (ref 27–34)
MCHC RBC-ENTMCNC: 29.4 PG — SIGNIFICANT CHANGE UP (ref 27–34)
MCHC RBC-ENTMCNC: 35.5 GM/DL — SIGNIFICANT CHANGE UP (ref 32–36)
MCHC RBC-ENTMCNC: 35.8 GM/DL — SIGNIFICANT CHANGE UP (ref 32–36)
MCV RBC AUTO: 82 FL — SIGNIFICANT CHANGE UP (ref 80–100)
MCV RBC AUTO: 82.1 FL — SIGNIFICANT CHANGE UP (ref 80–100)
PLATELET # BLD AUTO: <2 K/UL — CRITICAL LOW (ref 150–400)
PLATELET # BLD AUTO: <2 K/UL — CRITICAL LOW (ref 150–400)
POTASSIUM SERPL-MCNC: 2.7 MMOL/L — CRITICAL LOW (ref 3.5–5.3)
POTASSIUM SERPL-SCNC: 2.7 MMOL/L — CRITICAL LOW (ref 3.5–5.3)
PROT SERPL-MCNC: 5 G/DL — LOW (ref 6–8.3)
PROTHROM AB SERPL-ACNC: 17.4 SEC — HIGH (ref 9.8–12.7)
RAPID RVP RESULT: SIGNIFICANT CHANGE UP
RBC # BLD: 1.14 M/UL — LOW (ref 3.8–5.2)
RBC # BLD: 1.29 M/UL — LOW (ref 3.8–5.2)
RBC # FLD: 14.6 % — HIGH (ref 10.3–14.5)
RBC # FLD: 14.9 % — HIGH (ref 10.3–14.5)
RH IG SCN BLD-IMP: POSITIVE — SIGNIFICANT CHANGE UP
SODIUM SERPL-SCNC: 128 MMOL/L — LOW (ref 135–145)
WBC # BLD: 0.1 K/UL — CRITICAL LOW (ref 3.8–10.5)
WBC # BLD: 0.1 K/UL — CRITICAL LOW (ref 3.8–10.5)
WBC # FLD AUTO: 0.1 K/UL — CRITICAL LOW (ref 3.8–10.5)
WBC # FLD AUTO: 0.1 K/UL — CRITICAL LOW (ref 3.8–10.5)

## 2018-06-11 PROCEDURE — 71045 X-RAY EXAM CHEST 1 VIEW: CPT | Mod: 26

## 2018-06-11 PROCEDURE — 99291 CRITICAL CARE FIRST HOUR: CPT

## 2018-06-11 RX ORDER — CEFEPIME 1 G/1
1000 INJECTION, POWDER, FOR SOLUTION INTRAMUSCULAR; INTRAVENOUS ONCE
Qty: 0 | Refills: 0 | Status: COMPLETED | OUTPATIENT
Start: 2018-06-11 | End: 2018-06-11

## 2018-06-11 RX ORDER — SODIUM CHLORIDE 9 MG/ML
1000 INJECTION INTRAMUSCULAR; INTRAVENOUS; SUBCUTANEOUS ONCE
Qty: 0 | Refills: 0 | Status: COMPLETED | OUTPATIENT
Start: 2018-06-11 | End: 2018-06-11

## 2018-06-11 RX ORDER — NOREPINEPHRINE BITARTRATE/D5W 8 MG/250ML
0.1 PLASTIC BAG, INJECTION (ML) INTRAVENOUS
Qty: 8 | Refills: 0 | Status: DISCONTINUED | OUTPATIENT
Start: 2018-06-11 | End: 2018-06-12

## 2018-06-11 RX ORDER — POTASSIUM CHLORIDE 20 MEQ
10 PACKET (EA) ORAL
Qty: 0 | Refills: 0 | Status: DISCONTINUED | OUTPATIENT
Start: 2018-06-11 | End: 2018-06-12

## 2018-06-11 RX ORDER — VANCOMYCIN HCL 1 G
1000 VIAL (EA) INTRAVENOUS ONCE
Qty: 0 | Refills: 0 | Status: COMPLETED | OUTPATIENT
Start: 2018-06-11 | End: 2018-06-11

## 2018-06-11 RX ORDER — POTASSIUM CHLORIDE 20 MEQ
40 PACKET (EA) ORAL ONCE
Qty: 0 | Refills: 0 | Status: COMPLETED | OUTPATIENT
Start: 2018-06-11 | End: 2018-06-11

## 2018-06-11 RX ORDER — ACETAMINOPHEN 500 MG
1000 TABLET ORAL ONCE
Qty: 0 | Refills: 0 | Status: COMPLETED | OUTPATIENT
Start: 2018-06-11 | End: 2018-06-11

## 2018-06-11 RX ADMIN — SODIUM CHLORIDE 1000 MILLILITER(S): 9 INJECTION INTRAMUSCULAR; INTRAVENOUS; SUBCUTANEOUS at 21:56

## 2018-06-11 RX ADMIN — Medication 9.86 MICROGRAM(S)/KG/MIN: at 23:40

## 2018-06-11 RX ADMIN — Medication 400 MILLIGRAM(S): at 21:58

## 2018-06-11 RX ADMIN — Medication 250 MILLIGRAM(S): at 22:50

## 2018-06-11 RX ADMIN — Medication 1000 MILLIGRAM(S): at 22:00

## 2018-06-11 RX ADMIN — CEFEPIME 100 MILLIGRAM(S): 1 INJECTION, POWDER, FOR SOLUTION INTRAMUSCULAR; INTRAVENOUS at 22:25

## 2018-06-11 RX ADMIN — SODIUM CHLORIDE 1000 MILLILITER(S): 9 INJECTION INTRAMUSCULAR; INTRAVENOUS; SUBCUTANEOUS at 23:28

## 2018-06-11 RX ADMIN — Medication 40 MILLIEQUIVALENT(S): at 23:08

## 2018-06-11 NOTE — ED PROVIDER NOTE - ATTENDING CONTRIBUTION TO CARE
attending Fabrizio: 43yF h/o relapsed Hodgkin's lymphoma on chemo, last chemo on 6/5 (RICE), follows at [facility] from home with fever and hypotension. As per daughter at bedside, with 1 day of fever, tmax 105 at home. No antipyretics prior to arrival. Also with spontaneous nosebleed and bleeding from mouth. Generalized weakness. Recent hospitalization with dc on 6/6 for fever and hypotension. Intermittent fevers x months as per EMR and daughter. CT C/A/P during prior admission showed increased adenopathy in the C/A/P, increased pleural metastases and pulmonary nodule, lymphomatous involvement of osseous structures. Received Neupogen after last chemo. On arrival, pt hypotensive, febrile, cachetic, dry MM, dried blood at bilateral nares and lips. lungs clear, abdomen soft, pale skin and conjunctiva, RUE PICC without surrounding cellulitis. Critical status discussed at length with daughter at bedside. Pt is full code. Concern for bleeding 2/2 coagulopathy. Also with PICC line- possible source of fever, although without surrounding cellulitis. Will obtain labs including vbg with lactate and blood cultures from peripheral IV and PICC, empiric broad spectrum abx, IVF, likely transfusion, cxr, urinalysis, MICU consult and admission. No recent steroids - will hold stress dose steroids at this time. Low threshold to begin pressor support.

## 2018-06-11 NOTE — ED PROVIDER NOTE - CARE PLAN
Principal Discharge DX:	Neutropenic fever Principal Discharge DX:	Neutropenic fever  Secondary Diagnosis:	Hypotension

## 2018-06-11 NOTE — ED PROVIDER NOTE - PROGRESS NOTE DETAILS
attending Fabrizio: MICU at bedside attending Fabrizio: pt with persistent hypotension despite IVF, emergent release blood. Will begin pressor support. Awaiting admission to MICU.

## 2018-06-11 NOTE — ED PROVIDER NOTE - OBJECTIVE STATEMENT
Patient with Hx hodgkin lymphoma last chemotherapy (ICE regimen) 1 week ago.  P/w fevers starting today max 105.  no cough, no vomiting, no rash, no congestion, no headache, no diarrhea.  Patient feels cold.

## 2018-06-11 NOTE — ED ADULT NURSE REASSESSMENT NOTE - NS ED NURSE REASSESS COMMENT FT1
Patient becoming increasingly hypotensive, MD Acevedo at bedside, levofed drip to be started. Family at bedside.

## 2018-06-11 NOTE — ED PROVIDER NOTE - CRITICAL CARE PROVIDED
documentation/consult w/ pt's family directly relating to pts condition/direct patient care (not related to procedure)/conducted a detailed discussion of DNR status/additional history taking/interpretation of diagnostic studies/consultation with other physicians

## 2018-06-12 DIAGNOSIS — D70.9 NEUTROPENIA, UNSPECIFIED: ICD-10-CM

## 2018-06-12 LAB
-  K. PNEUMONIAE GROUP: SIGNIFICANT CHANGE UP
ALBUMIN SERPL ELPH-MCNC: 1.7 G/DL — LOW (ref 3.3–5)
ALP SERPL-CCNC: 267 U/L — HIGH (ref 40–120)
ALT FLD-CCNC: 17 U/L — SIGNIFICANT CHANGE UP (ref 10–45)
ANION GAP SERPL CALC-SCNC: 16 MMOL/L — SIGNIFICANT CHANGE UP (ref 5–17)
APPEARANCE UR: ABNORMAL
APTT BLD: 36.6 SEC — SIGNIFICANT CHANGE UP (ref 27.5–37.4)
AST SERPL-CCNC: 25 U/L — SIGNIFICANT CHANGE UP (ref 10–40)
BACTERIA # UR AUTO: ABNORMAL /HPF
BILIRUB DIRECT SERPL-MCNC: 2 MG/DL — HIGH (ref 0–0.2)
BILIRUB SERPL-MCNC: 2.8 MG/DL — HIGH (ref 0.2–1.2)
BILIRUB UR-MCNC: NEGATIVE — SIGNIFICANT CHANGE UP
BUN SERPL-MCNC: 15 MG/DL — SIGNIFICANT CHANGE UP (ref 7–23)
CALCIUM SERPL-MCNC: 7.2 MG/DL — LOW (ref 8.4–10.5)
CHLORIDE SERPL-SCNC: 103 MMOL/L — SIGNIFICANT CHANGE UP (ref 96–108)
CO2 SERPL-SCNC: 15 MMOL/L — LOW (ref 22–31)
COLOR SPEC: YELLOW — SIGNIFICANT CHANGE UP
CREAT SERPL-MCNC: 0.56 MG/DL — SIGNIFICANT CHANGE UP (ref 0.5–1.3)
DIFF PNL FLD: ABNORMAL
E CLOAC COMP DNA BLD POS QL NAA+PROBE: SIGNIFICANT CHANGE UP
EPI CELLS # UR: SIGNIFICANT CHANGE UP /HPF
GAS PNL BLDA: SIGNIFICANT CHANGE UP
GAS PNL BLDA: SIGNIFICANT CHANGE UP
GLUCOSE BLDC GLUCOMTR-MCNC: 35 MG/DL — CRITICAL LOW (ref 70–99)
GLUCOSE BLDC GLUCOMTR-MCNC: 394 MG/DL — HIGH (ref 70–99)
GLUCOSE BLDC GLUCOMTR-MCNC: >600 MG/DL — CRITICAL HIGH (ref 70–99)
GLUCOSE BLDC GLUCOMTR-MCNC: >600 MG/DL — CRITICAL HIGH (ref 70–99)
GLUCOSE SERPL-MCNC: 113 MG/DL — HIGH (ref 70–99)
GLUCOSE UR QL: NEGATIVE — SIGNIFICANT CHANGE UP
GRAM STN FLD: SIGNIFICANT CHANGE UP
HAPTOGLOB SERPL-MCNC: 186 MG/DL — SIGNIFICANT CHANGE UP (ref 34–200)
HCT VFR BLD CALC: 16.9 % — CRITICAL LOW (ref 34.5–45)
HCT VFR BLD CALC: 21.7 % — LOW (ref 34.5–45)
HGB BLD-MCNC: 6 G/DL — CRITICAL LOW (ref 11.5–15.5)
HGB BLD-MCNC: 7.7 G/DL — LOW (ref 11.5–15.5)
INR BLD: 1.67 RATIO — HIGH (ref 0.88–1.16)
KETONES UR-MCNC: NEGATIVE — SIGNIFICANT CHANGE UP
LEUKOCYTE ESTERASE UR-ACNC: NEGATIVE — SIGNIFICANT CHANGE UP
MAGNESIUM SERPL-MCNC: 1.5 MG/DL — LOW (ref 1.6–2.6)
MCHC RBC-ENTMCNC: 29.9 PG — SIGNIFICANT CHANGE UP (ref 27–34)
MCHC RBC-ENTMCNC: 30.2 PG — SIGNIFICANT CHANGE UP (ref 27–34)
MCHC RBC-ENTMCNC: 35.6 GM/DL — SIGNIFICANT CHANGE UP (ref 32–36)
MCHC RBC-ENTMCNC: 35.8 GM/DL — SIGNIFICANT CHANGE UP (ref 32–36)
MCV RBC AUTO: 83.5 FL — SIGNIFICANT CHANGE UP (ref 80–100)
MCV RBC AUTO: 84.7 FL — SIGNIFICANT CHANGE UP (ref 80–100)
METHOD TYPE: SIGNIFICANT CHANGE UP
NITRITE UR-MCNC: NEGATIVE — SIGNIFICANT CHANGE UP
PH UR: 6 — SIGNIFICANT CHANGE UP (ref 5–8)
PHOSPHATE SERPL-MCNC: 3.4 MG/DL — SIGNIFICANT CHANGE UP (ref 2.5–4.5)
PLATELET # BLD AUTO: 2 K/UL — CRITICAL LOW (ref 150–400)
PLATELET # BLD AUTO: 31 K/UL — LOW (ref 150–400)
POTASSIUM SERPL-MCNC: 2.6 MMOL/L — CRITICAL LOW (ref 3.5–5.3)
POTASSIUM SERPL-SCNC: 2.6 MMOL/L — CRITICAL LOW (ref 3.5–5.3)
PROT SERPL-MCNC: 4.5 G/DL — LOW (ref 6–8.3)
PROT UR-MCNC: 100 MG/DL
PROTHROM AB SERPL-ACNC: 18.2 SEC — HIGH (ref 9.8–12.7)
RBC # BLD: 2.02 M/UL — LOW (ref 3.8–5.2)
RBC # BLD: 2.57 M/UL — LOW (ref 3.8–5.2)
RBC # FLD: 13.2 % — SIGNIFICANT CHANGE UP (ref 10.3–14.5)
RBC # FLD: 14 % — SIGNIFICANT CHANGE UP (ref 10.3–14.5)
RBC CASTS # UR COMP ASSIST: SIGNIFICANT CHANGE UP /HPF (ref 0–2)
SODIUM SERPL-SCNC: 134 MMOL/L — LOW (ref 135–145)
SP GR SPEC: 1.01 — LOW (ref 1.01–1.02)
SPECIMEN SOURCE: SIGNIFICANT CHANGE UP
UROBILINOGEN FLD QL: NEGATIVE — SIGNIFICANT CHANGE UP
WBC # BLD: 0.1 K/UL — CRITICAL LOW (ref 3.8–10.5)
WBC # BLD: 0.1 K/UL — CRITICAL LOW (ref 3.8–10.5)
WBC # FLD AUTO: 0.1 K/UL — CRITICAL LOW (ref 3.8–10.5)
WBC # FLD AUTO: 0.1 K/UL — CRITICAL LOW (ref 3.8–10.5)
WBC UR QL: SIGNIFICANT CHANGE UP /HPF (ref 0–5)

## 2018-06-12 PROCEDURE — 82803 BLOOD GASES ANY COMBINATION: CPT

## 2018-06-12 PROCEDURE — 70450 CT HEAD/BRAIN W/O DYE: CPT | Mod: 26

## 2018-06-12 PROCEDURE — 85027 COMPLETE CBC AUTOMATED: CPT

## 2018-06-12 PROCEDURE — 82565 ASSAY OF CREATININE: CPT

## 2018-06-12 PROCEDURE — 82435 ASSAY OF BLOOD CHLORIDE: CPT

## 2018-06-12 PROCEDURE — 94640 AIRWAY INHALATION TREATMENT: CPT

## 2018-06-12 PROCEDURE — 36430 TRANSFUSION BLD/BLD COMPNT: CPT

## 2018-06-12 PROCEDURE — 81001 URINALYSIS AUTO W/SCOPE: CPT

## 2018-06-12 PROCEDURE — 87798 DETECT AGENT NOS DNA AMP: CPT

## 2018-06-12 PROCEDURE — 85014 HEMATOCRIT: CPT

## 2018-06-12 PROCEDURE — 82330 ASSAY OF CALCIUM: CPT

## 2018-06-12 PROCEDURE — 84100 ASSAY OF PHOSPHORUS: CPT

## 2018-06-12 PROCEDURE — 84295 ASSAY OF SERUM SODIUM: CPT

## 2018-06-12 PROCEDURE — 82947 ASSAY GLUCOSE BLOOD QUANT: CPT

## 2018-06-12 PROCEDURE — 86850 RBC ANTIBODY SCREEN: CPT

## 2018-06-12 PROCEDURE — 82962 GLUCOSE BLOOD TEST: CPT

## 2018-06-12 PROCEDURE — P9040: CPT

## 2018-06-12 PROCEDURE — 83735 ASSAY OF MAGNESIUM: CPT

## 2018-06-12 PROCEDURE — 86901 BLOOD TYPING SEROLOGIC RH(D): CPT

## 2018-06-12 PROCEDURE — 87581 M.PNEUMON DNA AMP PROBE: CPT

## 2018-06-12 PROCEDURE — 84132 ASSAY OF SERUM POTASSIUM: CPT

## 2018-06-12 PROCEDURE — 83010 ASSAY OF HAPTOGLOBIN QUANT: CPT

## 2018-06-12 PROCEDURE — 86923 COMPATIBILITY TEST ELECTRIC: CPT

## 2018-06-12 PROCEDURE — 87150 DNA/RNA AMPLIFIED PROBE: CPT

## 2018-06-12 PROCEDURE — 83605 ASSAY OF LACTIC ACID: CPT

## 2018-06-12 PROCEDURE — 87040 BLOOD CULTURE FOR BACTERIA: CPT

## 2018-06-12 PROCEDURE — 87486 CHLMYD PNEUM DNA AMP PROBE: CPT

## 2018-06-12 PROCEDURE — 87633 RESP VIRUS 12-25 TARGETS: CPT

## 2018-06-12 PROCEDURE — 93005 ELECTROCARDIOGRAM TRACING: CPT

## 2018-06-12 PROCEDURE — 96374 THER/PROPH/DIAG INJ IV PUSH: CPT

## 2018-06-12 PROCEDURE — P9037: CPT

## 2018-06-12 PROCEDURE — 82248 BILIRUBIN DIRECT: CPT

## 2018-06-12 PROCEDURE — 86900 BLOOD TYPING SEROLOGIC ABO: CPT

## 2018-06-12 PROCEDURE — 31500 INSERT EMERGENCY AIRWAY: CPT

## 2018-06-12 PROCEDURE — 85730 THROMBOPLASTIN TIME PARTIAL: CPT

## 2018-06-12 PROCEDURE — 70450 CT HEAD/BRAIN W/O DYE: CPT

## 2018-06-12 PROCEDURE — 71045 X-RAY EXAM CHEST 1 VIEW: CPT

## 2018-06-12 PROCEDURE — 80053 COMPREHEN METABOLIC PANEL: CPT

## 2018-06-12 PROCEDURE — 96375 TX/PRO/DX INJ NEW DRUG ADDON: CPT

## 2018-06-12 PROCEDURE — 99291 CRITICAL CARE FIRST HOUR: CPT | Mod: 25

## 2018-06-12 PROCEDURE — 85610 PROTHROMBIN TIME: CPT

## 2018-06-12 PROCEDURE — 87186 SC STD MICRODIL/AGAR DIL: CPT

## 2018-06-12 RX ORDER — VANCOMYCIN HCL 1 G
1000 VIAL (EA) INTRAVENOUS EVERY 12 HOURS
Qty: 0 | Refills: 0 | Status: DISCONTINUED | OUTPATIENT
Start: 2018-06-12 | End: 2018-06-12

## 2018-06-12 RX ORDER — IPRATROPIUM/ALBUTEROL SULFATE 18-103MCG
3 AEROSOL WITH ADAPTER (GRAM) INHALATION ONCE
Qty: 0 | Refills: 0 | Status: COMPLETED | OUTPATIENT
Start: 2018-06-12 | End: 2018-06-12

## 2018-06-12 RX ORDER — ONDANSETRON 8 MG/1
4 TABLET, FILM COATED ORAL ONCE
Qty: 0 | Refills: 0 | Status: COMPLETED | OUTPATIENT
Start: 2018-06-12 | End: 2018-06-12

## 2018-06-12 RX ORDER — PHENYLEPHRINE HYDROCHLORIDE 10 MG/ML
0.4 INJECTION INTRAVENOUS
Qty: 40 | Refills: 0 | Status: DISCONTINUED | OUTPATIENT
Start: 2018-06-12 | End: 2018-06-12

## 2018-06-12 RX ORDER — VANCOMYCIN HCL 1 G
VIAL (EA) INTRAVENOUS
Qty: 0 | Refills: 0 | Status: DISCONTINUED | OUTPATIENT
Start: 2018-06-12 | End: 2018-06-12

## 2018-06-12 RX ORDER — CEFEPIME 1 G/1
2000 INJECTION, POWDER, FOR SOLUTION INTRAMUSCULAR; INTRAVENOUS EVERY 12 HOURS
Qty: 0 | Refills: 0 | Status: DISCONTINUED | OUTPATIENT
Start: 2018-06-12 | End: 2018-06-12

## 2018-06-12 RX ORDER — SODIUM CHLORIDE 9 MG/ML
1000 INJECTION, SOLUTION INTRAVENOUS ONCE
Qty: 0 | Refills: 0 | Status: COMPLETED | OUTPATIENT
Start: 2018-06-12 | End: 2018-06-12

## 2018-06-12 RX ORDER — FUROSEMIDE 40 MG
40 TABLET ORAL ONCE
Qty: 0 | Refills: 0 | Status: COMPLETED | OUTPATIENT
Start: 2018-06-12 | End: 2018-06-12

## 2018-06-12 RX ORDER — ACETAMINOPHEN 500 MG
1000 TABLET ORAL ONCE
Qty: 0 | Refills: 0 | Status: COMPLETED | OUTPATIENT
Start: 2018-06-12 | End: 2018-06-12

## 2018-06-12 RX ORDER — MICAFUNGIN SODIUM 100 MG/1
100 INJECTION, POWDER, LYOPHILIZED, FOR SOLUTION INTRAVENOUS EVERY 24 HOURS
Qty: 0 | Refills: 0 | Status: CANCELLED | OUTPATIENT
Start: 2018-06-13 | End: 2018-06-12

## 2018-06-12 RX ORDER — VANCOMYCIN HCL 1 G
1000 VIAL (EA) INTRAVENOUS ONCE
Qty: 0 | Refills: 0 | Status: COMPLETED | OUTPATIENT
Start: 2018-06-12 | End: 2018-06-12

## 2018-06-12 RX ORDER — POTASSIUM CHLORIDE 20 MEQ
10 PACKET (EA) ORAL
Qty: 0 | Refills: 0 | Status: DISCONTINUED | OUTPATIENT
Start: 2018-06-12 | End: 2018-06-12

## 2018-06-12 RX ORDER — MAGNESIUM SULFATE 500 MG/ML
1 VIAL (ML) INJECTION ONCE
Qty: 0 | Refills: 0 | Status: COMPLETED | OUTPATIENT
Start: 2018-06-12 | End: 2018-06-12

## 2018-06-12 RX ORDER — FUROSEMIDE 40 MG
40 TABLET ORAL ONCE
Qty: 0 | Refills: 0 | Status: DISCONTINUED | OUTPATIENT
Start: 2018-06-12 | End: 2018-06-12

## 2018-06-12 RX ORDER — FUROSEMIDE 40 MG
20 TABLET ORAL ONCE
Qty: 0 | Refills: 0 | Status: COMPLETED | OUTPATIENT
Start: 2018-06-12 | End: 2018-06-12

## 2018-06-12 RX ORDER — MICAFUNGIN SODIUM 100 MG/1
INJECTION, POWDER, LYOPHILIZED, FOR SOLUTION INTRAVENOUS
Qty: 0 | Refills: 0 | Status: DISCONTINUED | OUTPATIENT
Start: 2018-06-12 | End: 2018-06-12

## 2018-06-12 RX ORDER — SALIVA SUBSTITUTE COMB NO.11 351 MG
5 POWDER IN PACKET (EA) MUCOUS MEMBRANE THREE TIMES A DAY
Qty: 0 | Refills: 0 | Status: DISCONTINUED | OUTPATIENT
Start: 2018-06-12 | End: 2018-06-12

## 2018-06-12 RX ORDER — MICAFUNGIN SODIUM 100 MG/1
100 INJECTION, POWDER, LYOPHILIZED, FOR SOLUTION INTRAVENOUS ONCE
Qty: 0 | Refills: 0 | Status: COMPLETED | OUTPATIENT
Start: 2018-06-12 | End: 2018-06-12

## 2018-06-12 RX ORDER — SODIUM CHLORIDE 9 MG/ML
1000 INJECTION, SOLUTION INTRAVENOUS
Qty: 0 | Refills: 0 | Status: DISCONTINUED | OUTPATIENT
Start: 2018-06-12 | End: 2018-06-12

## 2018-06-12 RX ADMIN — ONDANSETRON 4 MILLIGRAM(S): 8 TABLET, FILM COATED ORAL at 05:22

## 2018-06-12 RX ADMIN — PHENYLEPHRINE HYDROCHLORIDE 7.59 MICROGRAM(S)/KG/MIN: 10 INJECTION INTRAVENOUS at 02:29

## 2018-06-12 RX ADMIN — CEFEPIME 100 MILLIGRAM(S): 1 INJECTION, POWDER, FOR SOLUTION INTRAMUSCULAR; INTRAVENOUS at 05:22

## 2018-06-12 RX ADMIN — Medication 3 MILLILITER(S): at 05:26

## 2018-06-12 RX ADMIN — Medication 100 MILLIEQUIVALENT(S): at 00:33

## 2018-06-12 RX ADMIN — Medication 100 GRAM(S): at 03:56

## 2018-06-12 RX ADMIN — Medication 100 MILLIEQUIVALENT(S): at 02:32

## 2018-06-12 RX ADMIN — SODIUM CHLORIDE 2000 MILLILITER(S): 9 INJECTION, SOLUTION INTRAVENOUS at 02:30

## 2018-06-12 RX ADMIN — Medication 20 MILLIGRAM(S): at 03:19

## 2018-06-12 RX ADMIN — Medication 250 MILLIGRAM(S): at 02:19

## 2018-06-12 RX ADMIN — Medication 40 MILLIGRAM(S): at 04:55

## 2018-06-12 RX ADMIN — Medication 400 MILLIGRAM(S): at 02:18

## 2018-06-12 RX ADMIN — MICAFUNGIN SODIUM 105 MILLIGRAM(S): 100 INJECTION, POWDER, LYOPHILIZED, FOR SOLUTION INTRAVENOUS at 02:25

## 2018-06-12 RX ADMIN — Medication 100 MILLIEQUIVALENT(S): at 04:51

## 2018-06-12 NOTE — AIRWAY PLACEMENT NOTE ADULT - AIRWAY COMMENTS:
pt required intubation s/p cardiac arrest. multiple large blood clots removed from upper airway via suction and magill forceps. vocal cords visualized by glidescope with Dr Peralta

## 2018-06-12 NOTE — DISCHARGE NOTE FOR THE EXPIRED PATIENT - HOSPITAL COURSE
43 yo F w/PMH of relapsed Hodgkin's lymphoma s/p 8 cycles of Brentuximab (  9/1/17-2/20/18) with PET scan done in 3/2018 showing relapsed disease now with s/p cycle 1 RICE salvage therapy (6/5/18) p/w subjective fever to Tmax 105 with increased lethargy. Reported 1 episode of epistaxis PTA. Also endorsed abdominal pain however attributed to hunger.     ED course: VS: Tmax 103.8  BP /40-60s RR 35 SaO2 %, hypoxic to 70s on RA  Received 2L NS bolus, 2 u PRBCs, 1 u platelets started on norepinephrine for pressor support. Vanc x 1, Zosyn X 1, acetaminophen 1g X 1, KCl 10 mEq x 1, Kcl 40mEq PO x 1    MICU Course: Received 1 additional unit of PRBCs. Developed shortness of breath, POCUS revealed predominantly b/l b-lines at which point she was given lasix 20 IV X 1, additional lasix 40 IV X 1 given as breathing didn't improve with POCUS again revealed improved bilateral b lines. She then cardiac arrested, high quality chest compressions were started, 5 x epinephrine, HCO3, calcium gluconate administered. ROSC achieved briefly then placed on maximum dosage of phenylephrine, norepinephrine, vasopressin. Cardiac arrested again, at which point patient's daughters declared patient to be DNR. Compressions were terminated. 43 yo F w/PMH of relapsed Hodgkin's lymphoma s/p 8 cycles of Brentuximab (  9/1/17-2/20/18) with PET scan done in 3/2018 showing relapsed disease now with s/p cycle 1 RICE salvage therapy (6/5/18) p/w subjective fever to Tmax 105 with increased lethargy. Reported 1 episode of epistaxis PTA. Also endorsed abdominal pain however attributed to hunger.     ED course: VS: Tmax 103.8  BP /40-60s RR 35 SaO2 %, hypoxic to 70s on RA  Received 2L NS bolus, 2 u PRBCs, 1 u platelets started on norepinephrine for pressor support. Vanc x 1, Zosyn X 1, acetaminophen 1g X 1, KCl 10 mEq x 1, Kcl 40mEq PO x 1    MICU Course: Received 1 additional unit of PRBCs. Developed shortness of breath, POCUS revealed predominantly b/l b-lines at which point she was given lasix 20 IV X 1, additional lasix 40 IV X 1 given as breathing didn't improve with POCUS again revealed improved bilateral b lines. She then cardiac arrested, high quality chest compressions were started, 5 x epinephrine, HCO3, calcium gluconate administered. ROSC achieved briefly then placed on maximum dosage of phenylephrine, norepinephrine, vasopressin. Cardiac arrested again, at which point patient's daughters declared patient to be DNR. Compressions were terminated. Medical Examiner [employer] [place]itting[place] was contacted, patient deemed not a candidate.

## 2018-06-12 NOTE — H&P ADULT - HISTORY OF PRESENT ILLNESS
41 yo F w/PMH of relapsed Hodgkin's lymphoma s/p 8 cycles of Brentuximab (  9/1/17-2/20/18) with PET scan done in 3/2018 showing relapsed disease now with s/p cycle 1 RICE salvage therapy (6/5/18) p/w subjective fever to Tmax 105. Recent hospitalization (5/29 - 6/6) for fevers, hypotension BP 80s/40s, tachycardia. Noted generalized fatigue and worsening SOB with exertion. Has been having intermittent fevers for many months. Hgb 6.6, fever to 101. Admitted and given iv fluids with improvement in BP. CT c/a/p at that time revealed increased adenopathy in the chest, abdomen, and pelvis, increased pleural metastases and pulmonary nodule, diffuse lymphomatous involvement of the osseous structures. CT neck revealed extensive cervical lymphadenopathy. Fever felt to be secondary to progression of disease- infectious workup negative. Decision was made to administer RICE therapy inpatient. Developed confusion post chemo- likely secondary to Ifosfamide neurotoxicity. CT head was negative. Mental status gradually improved. Given 1 dose of Methylene Blue. Was also started on Neupogen post chemo. Received total of 3 units for recurrent anemia.     ED course: VS: Tmax 103.8  BP /40-60s RR 35 SaO2 %  Received 2L NS bolus, 2 u PRBCs, 1 u platelets started on norepinephrine for pressor support. Vanc x 1, Zosyn X 1, acetaminophen 1g X 1, KCl 10 mEq x 1 41 yo F w/PMH of relapsed Hodgkin's lymphoma s/p 8 cycles of Brentuximab (  9/1/17-2/20/18) with PET scan done in 3/2018 showing relapsed disease now with s/p cycle 1 RICE salvage therapy (6/5/18) p/w subjective fever to Tmax 105 with increased lethargy. Reported 1 episode of epistaxis PTA. Also endorsed abdominal pain however attributed to hunger. Recent hospitalization (5/29 - 6/6) for fevers, hypotension BP 80s/40s, tachycardia. Noted generalized fatigue and worsening SOB with exertion. Has been having intermittent fevers for many months. Hgb 6.6, fever to 101. Admitted and given iv fluids with improvement in BP. CT c/a/p at that time revealed increased adenopathy in the chest, abdomen, and pelvis, increased pleural metastases and pulmonary nodule, diffuse lymphomatous involvement of the osseous structures. CT neck revealed extensive cervical lymphadenopathy. Fever felt to be secondary to progression of disease- infectious workup negative. Decision was made to administer RICE therapy inpatient. Developed confusion post chemo- likely secondary to Ifosfamide neurotoxicity. CT head was negative. Mental status gradually improved. Given 1 dose of Methylene Blue. Was also started on Neupogen post chemo. Received total of 3 units for recurrent anemia. Denied CP, SOB, melena, BRBPR, N/V, Dysuria/hematuria/urinary frequency.     ED course: VS: Tmax 103.8  BP /40-60s RR 35 SaO2 %, hypoxic to 70s on RA  Received 2L NS bolus, 2 u PRBCs, 1 u platelets started on norepinephrine for pressor support. Vanc x 1, Zosyn X 1, acetaminophen 1g X 1, KCl 10 mEq x 1 41 yo F w/PMH of relapsed Hodgkin's lymphoma s/p 8 cycles of Brentuximab (  9/1/17-2/20/18) with PET scan done in 3/2018 showing relapsed disease now with s/p cycle 1 RICE salvage therapy (6/5/18) p/w subjective fever to Tmax 105 with increased lethargy. Reported 1 episode of epistaxis PTA. Also endorsed abdominal pain however attributed to hunger. Recent hospitalization (5/29 - 6/6) for fevers, hypotension BP 80s/40s, tachycardia. Noted generalized fatigue and worsening SOB with exertion. Has been having intermittent fevers for many months. Hgb 6.6, fever to 101. Admitted and given iv fluids with improvement in BP. CT c/a/p at that time revealed increased adenopathy in the chest, abdomen, and pelvis, increased pleural metastases and pulmonary nodule, diffuse lymphomatous involvement of the osseous structures. CT neck revealed extensive cervical lymphadenopathy. Fever felt to be secondary to progression of disease- infectious workup negative. Decision was made to administer RICE therapy inpatient. Developed confusion post chemo- likely secondary to Ifosfamide neurotoxicity. CT head was negative. Mental status gradually improved. Given 1 dose of Methylene Blue. Was also started on Neupogen post chemo. Received total of 3 units for recurrent anemia. Denied CP, SOB, melena, BRBPR, N/V, Dysuria/hematuria/urinary frequency.     ED course: VS: Tmax 103.8  BP /40-60s RR 35 SaO2 %, hypoxic to 70s on RA  Received 2L NS bolus, 2 u PRBCs, 1 u platelets started on norepinephrine for pressor support. Vanc x 1, Zosyn X 1, acetaminophen 1g X 1, KCl 10 mEq x 1, Kcl 40mEq PO x 1

## 2018-06-12 NOTE — H&P ADULT - NSHPREVIEWOFSYSTEMS_GEN_ALL_CORE
REVIEW OF SYSTEMS:    CONSTITUTIONAL: + lethargy and fever  EYES/ENT: No visual changes;  No vertigo or throat pain   NECK: No pain or stiffness  RESPIRATORY: No cough, wheezing, hemoptysis; No shortness of breath  CARDIOVASCULAR: No chest pain or palpitations  GASTROINTESTINAL: abdominal pain resolved, No nausea, vomiting, or hematemesis; No diarrhea or constipation. No melena or hematochezia.  GENITOURINARY: No dysuria, frequency or hematuria  NEUROLOGICAL: No numbness or weakness  SKIN: No itching, burning, rashes, or lesions   All other review of systems is negative unless indicated above.

## 2018-06-12 NOTE — H&P ADULT - ATTENDING COMMENTS
41 yo F w/PMH of relapsed Hodgkin's lymphoma s/p 8 cycles of Brentuximab followed by salvage therapy with RICE presents with neutropenic fever and septic shock. Pt severely febrile to 105 and developed shock soon after arrival in ED and started on pressors and fluids. Pt condition c/w septic shock w/ possible source being PNA or 41 yo F w/PMH of relapsed Hodgkin's lymphoma s/p 8 cycles of Brentuximab followed by salvage therapy with RICE presents with neutropenic fever and septic shock. Pt severely febrile to 105 and developed shock soon after arrival in ED and started on pressors and fluids. Pt condition c/w septic shock w/ possible source being PNA or PICC line infection. Recommended d/c PICC line but pt resistant to that idea and placing new TLC. Pt with escalating pressor requirements occurring rapidly. This seems likely due to sepsis. Bedside US revealed good LV contractility, normal RV size/fxn and no pericardial effusion. Pt also with severe bone marrow dysfxn w/ plt <2 and Hb 3 and neutropenic fevers. Check hemolysis labs. Pt transfused prbc and plt. During course of night and pt developed sudden SOB and became bradycardic and then went into PEA cardiac arrest. Pt had extensive CPR and epinephrine. Pt found to have significant blood from mouth w/ extensive clotting but was able to be intubated. Pt had brief ROSC but family discussion yielded DNR decision and when patient lost pulse again she passed without further ACLS efforts.

## 2018-06-12 NOTE — ED ADULT NURSE NOTE - OBJECTIVE STATEMENT
42 y/o female with PMH Hodgkins Lymphoma on chemotherapy last treatment 1 week ago. Being treated for approx 1 year. Arrives to ED with c/o fevers and nose bleed. Patient reports having fevers frequently since chemotherapy treatment, worsening today. Family reports fevers of 105.1 orally. Patient is pallor, lethargic, with reported weight loss over past several weeks. Daughter reports needing assistance with walking at home. Upon arrival patient is hypotensive systolic 80's and low o2 sat to 90% on room air. Patient seen and evaluated by MD Acevedo. Patient placed on nonrebreather mask with improvement in oxygen saturation to 99%. Patient is a/ox3, denies shortness of breath, chest pain, palpitations, abdominal pain, n/v/d. Skin is warm to touch. Patient has strong peripheral pulses BL. Denies blood in stool. Patient appears weak.

## 2018-06-12 NOTE — H&P ADULT - NSHPPHYSICALEXAM_GEN_ALL_CORE
Vital Signs Last 24 Hrs  T(C): 38.3 (12 Jun 2018 01:30), Max: 39.9 (11 Jun 2018 22:15)  T(F): 101 (12 Jun 2018 01:30), Max: 103.8 (11 Jun 2018 22:15)  HR: 112 (12 Jun 2018 02:30) (96 - 150)  BP: 89/54 (12 Jun 2018 02:15) (65/41 - 101/62)  BP(mean): 67 (12 Jun 2018 02:15) (60 - 68)  RR: 18 (12 Jun 2018 02:30) (18 - 37)  SpO2: 100% (12 Jun 2018 02:30) (90% - 100%)    General: Cachetic, hair loss on NRB  Neurology: A&Ox3, nonfocal, GARCIA x 4  Eyes: PERRLA/ EOMI, Gross vision intact  ENT/Neck: Neck supple, trachea midline, No JVD, Gross hearing intact  Respiratory: CTA B/L, No wheezing, rales, rhonchi  CV: tachycardic, nl S1S2, no murmurs, rubs or gallops  Abdominal: Soft, NT, mod Distention, +BS,   Extremities: No edema, + peripheral pulses  Skin: No Rashes, Hematoma, Ecchymosis  Tubes: RUE PICC

## 2018-06-12 NOTE — ED ADULT NURSE REASSESSMENT NOTE - NS ED NURSE REASSESS COMMENT FT1
Patient afebrile at this time, HR tachycardic 120's, oxygenation improved to 96% o2 sat on room air. Patient tolerated two units of PRBC without adverse reaction. Patient awake and alert, family at bedside. Report given to MICU RN, patient sent to CT before going up to MICU. Accompanied by RN, MD, and ED Tech. Safety maintained. Patient afebrile at this time, HR tachycardic 120's, oxygenation improved to 96% o2 sat on room air. Patient tolerated two units of PRBC without adverse reaction. Patient voided approx. 300cc clear, yellow  urine using bedpan. Patient awake and alert, family at bedside. Report given to MICU RN, patient sent to CT before going up to MICU. Accompanied by RN, MD, and ED Tech. Safety maintained.

## 2018-06-12 NOTE — AIRWAY PLACEMENT NOTE ADULT - POST AIRWAY PLACEMENT ASSESSMENT:
CXR pending/breath sounds bilateral/breath sounds equal/positive end tidal CO2 noted/skin color improved/chest excursion noted

## 2018-06-12 NOTE — H&P ADULT - ASSESSMENT
43 yo F w/PMH of relapsed  Hodgkin's lymphoma s/p 8 cycles of Brentuximab (  9/1/17-2/20/18) with PET scan done in 3/2018 showing relapsed disease now with s/p cycle 1 RICE salvage therapy (6/5/18) p/w subjective fever to Tmax 105.    #Neuro-  Mental status intact    #CV-  Hypotension 2/2 distributive vs obstructive vs cardiogenic  Distributive shock: no clear source as of yet, UA negative, CXR prelim negative for consolidation  Obstructive shock: At risk for thrombosis due to underlying malignancy, will assess RV function w/ POCUS  Cardiogenic shock: On cardiotoxic chemo agent (ifosfamide) leading to cardiomyopathy. MUGA 3/18 normal EF w/ normal LV/RV function, assess w/ POCUS  C/w levophed to titrate to MAPs > 65    #Pulm-  Currently on RA saturating 100%  CXR revealed L. anterior mediastinal mass w/ small b/l pleural effusions  No issues    #Heme  Refractory classical Hodgkin's lymphoma c/b pancytopenia  Trend cbc q4h  Transfuse hgb < 7, platelets < 10K or if active bleeding < 20K  Will consult Heme in AM  Await CBC w/ diff to determine if neutropenic requiring neupogen      #GI-  No issues    #Renal-      #Electrolytes-   #ID-  #Heme-  #Endocrine-  #PPx-  #Dispo- 43 yo F w/PMH of relapsed  Hodgkin's lymphoma s/p 8 cycles of Brentuximab (  9/1/17-2/20/18) with PET scan done in 3/2018 showing relapsed disease now with s/p cycle 1 RICE salvage therapy (6/5/18) p/w subjective fever to Tmax 105.    #Neuro-  Mental status intact    #CV-  Hypotension 2/2 distributive vs obstructive vs cardiogenic  Distributive shock: no clear source as of yet, UA negative, CXR prelim negative for consolidation  Obstructive shock: At risk for thrombosis due to underlying malignancy, will assess RV function w/ POCUS  Cardiogenic shock: On cardiotoxic chemo agent (ifosfamide) leading to cardiomyopathy. MUGA 3/18 normal EF w/ normal LV/RV function, assess w/ POCUS  C/w levophed to titrate to MAPs > 65  Baseline outpatient BP /60-70    #Pulm-  Currently on RA saturating 100%  CXR revealed L. anterior mediastinal mass w/ small b/l pleural effusions  No issues    #Heme  Refractory classical Hodgkin's lymphoma c/b pancytopenia  Acute on chronic anemia - r/o hemolysis, pending LDH, haptoglobin, direct bilirubin    Transfuse hgb < 7, platelets < 10K or if active bleeding < 20K  Will consult Heme in AM  Await CBC w/ diff to determine if neutropenic requiring Neupogen      #GI-  isolated elevated alk phos with elevated Tbili  obtain D.bili, previous    Consider autoimmune cholangitis    #Renal-  SCr at baseline  No issues    #Electrolytes-   Hypokalemia to 2.7      #ID-  #Heme-  #Endocrine-  #PPx-  #Dispo- 41 yo F w/PMH of relapsed  Hodgkin's lymphoma s/p 8 cycles of Brentuximab (  9/1/17-2/20/18) with PET scan done in 3/2018 showing relapsed disease now with s/p cycle 1 RICE salvage therapy (6/5/18) p/w subjective fever to Tmax 105.    #Neuro-  Mental status intact  CT head no emergent findings on prelim    #CV-  Hypotension 2/2 distributive vs obstructive vs cardiogenic  Distributive shock: no clear source as of yet, UA negative, CXR prelim negative for consolidation. Possible source bacteremia, has RUE PICC  Obstructive shock: At risk for thrombosis due to underlying malignancy, will assess RV function w/ POCUS  Cardiogenic shock: On cardiotoxic chemo agent (ifosfamide) leading to cardiomyopathy. MUGA 3/18 normal EF w/ normal LV/RV function, assess w/ POCUS  C/w levophed to titrate to MAPs > 65  Baseline outpatient BP /60-70  EKG noted for sinus tachycardia w/out ST segment changes    #Pulm-  Currently on RA saturating 100%  CXR revealed L. anterior mediastinal mass w/ small b/l pleural effusions  No issues    #Heme  Refractory classical Hodgkin's lymphoma c/b pancytopenia  Acute on chronic anemia - r/o hemolysis, pending LDH, haptoglobin, direct bilirubin    Thrombocytopenia 2/2 BM suppression vs acute illness  s/p 1 u Platelets  Transfuse hgb < 7, platelets < 10K or if active bleeding < 20K  Trend CBC q4h  Will consult Heme in AM  Await CBC w/ diff to determine if neutropenic requiring Neupogen    #GI-  isolated elevated alk phos with elevated Tbili  obtain D.bili, previous    Consider autoimmune cholangitis    #Renal-  SCr at baseline  No signs of TLS    #Electrolytes-   Hypokalemia to 2.7, will replete  Hyponatremia, will start on maintenance NS  Corrected calcium for hypoalbuminemia 8.9    #ID-  Hx of RUE PICC placed 4/18, will need to be removed s/p central line placement  For possible distributive shock will start on vanc, cefepime  Will add micafungin as chronically immunosuppressed   Check pre-4th vanc trough  Pending Bcx X 2, Ucx; PVP negative    #Endocrine-  No issues    #DVt ppx: Hold in setting of thrombocytopenia    Andria Mcdonald PGY-1  MICU X [id number]  Pager # [phone]/ [phone] 41 yo F w/PMH of relapsed  Hodgkin's lymphoma s/p 8 cycles of Brentuximab (  9/1/17-2/20/18) with PET scan done in 3/2018 showing relapsed disease now with s/p cycle 1 RICE salvage therapy (6/5/18) p/w subjective fever to Tmax 105.    #Neuro-  Mental status intact  CT head no emergent findings on prelim    #CV-  Hypotension 2/2 distributive shock less likely obstructive vs cardiogenic shock  Distributive shock: no clear source as of yet, UA negative, CXR prelim negative for consolidation. Possible source bacteremia, post obstructive PNA from L. anterior mediastinal mass, has RUE PICC  Obstructive shock: At risk for thrombosis due to underlying malignancy, unlikely as good RV function on POCUS  Cardiogenic shock: On cardiotoxic chemo agent (ifosfamide) leading to cardiomyopathy. MUGA 3/18 normal EF w/ normal LV/RV function, unlikely as good Rv/LV function on POCUS  C/w levophed and phenylephrine to titrate to MAPs > 65  Baseline outpatient BP /60-70  EKG noted for sinus tachycardia w/out ST segment changes    #Pulm-  Currently saturating 100% on NRB, will repeat ABG as   CXR revealed L. anterior mediastinal mass w/ small b/l pleural effusions  No issues    #Heme  Refractory classical Hodgkin's lymphoma c/b pancytopenia  Acute on chronic anemia - r/o hemolysis, pending LDH, haptoglobin, direct bilirubin    Thrombocytopenia 2/2 BM suppression vs acute illness  s/p 1 u Platelets  Transfuse hgb < 7, platelets < 10K or if active bleeding < 20K  Trend CBC q4h  Will consult Heme in AM    Leukopenia d/t BM suppression  Consider neupogen after consulting heme    #GI-  isolated elevated alk phos with elevated Tbili  obtain D.bili, previous    Consider autoimmune cholangitis    #Renal-  SCr at baseline  No signs of TLS    #Electrolytes-   Hypokalemia to 2.7, will replete PRN  Hyponatremia, will start on maintenance NS  Corrected calcium for hypoalbuminemia 8.9    #ID-  Hx of RUE PICC placed 4/18, will need to be removed s/p central line placement  For possible distributive shock will start on vanc, cefepime  Will add micafungin as chronically immunosuppressed   Check pre-4th vanc trough  Pending Bcx X 2, Ucx; PVP negative    #Endocrine-  No issues    #DVt ppx: Hold in setting of thrombocytopenia    Andria Mcdonald PGY-1  MICU X [id number]  Pager # [phone]/ [phone] 41 yo F w/PMH of relapsed  Hodgkin's lymphoma s/p 8 cycles of Brentuximab (  9/1/17-2/20/18) with PET scan done in 3/2018 showing relapsed disease now with s/p cycle 1 RICE salvage therapy (6/5/18) p/w subjective fever to Tmax 105.    #Neuro-  Mental status intact  CT head no emergent findings on prelim    #CV-  Hypotension 2/2 distributive shock less likely obstructive vs cardiogenic shock  Distributive shock: no clear source as of yet, UA negative, CXR prelim negative for consolidation. Possible source bacteremia, post obstructive PNA from L. anterior mediastinal mass, has RUE PICC  Obstructive shock: At risk for thrombosis due to underlying malignancy, unlikely as good RV function on POCUS  Cardiogenic shock: On cardiotoxic chemo agent (ifosfamide) leading to cardiomyopathy. MUGA 3/18 normal EF w/ normal LV/RV function, unlikely as good Rv/LV function on POCUS  C/w levophed and phenylephrine to titrate to MAPs > 65  Baseline outpatient BP /60-70  EKG noted for sinus tachycardia w/out ST segment changes    #Pulm-  Currently saturating 100% on NRB, will repeat ABG as   CXR revealed L. anterior mediastinal mass w/ small b/l pleural effusions  No issues    #Heme  Refractory classical Hodgkin's lymphoma c/b pancytopenia  Acute on chronic anemia - r/o hemolysis, pending LDH, haptoglobin, direct bilirubin    Thrombocytopenia 2/2 BM suppression vs acute illness  s/p 1 u Platelets  Transfuse hgb < 7, platelets < 10K or if active bleeding < 20K  Trend CBC q4h  Will consult Heme in AM    Leukopenia d/t BM suppression  Consider neupogen after consulting heme    Coagulopathy d/t poor nutrition  INR 1.67, ctm    #GI-  isolated elevated alk phos with elevated Tbili  obtain D.bili, previous    Consider autoimmune cholangitis    #Renal-  SCr at baseline  No signs of TLS    #Electrolytes-   Hypokalemia to 2.7, will replete PRN  Hyponatremia, pending repeat BMP to confirm hypoNa  Corrected calcium for hypoalbuminemia 8.9    #ID-  Hx of RUE PICC placed 4/18, will need to be removed s/p central line placement  For possible distributive shock will start on vanc, cefepime  Will add micafungin as chronically immunosuppressed   Check pre-4th vanc trough  Pending Bcx X 2, Ucx; PVP negative    #Endocrine-  No issues    #DVt ppx: Hold in setting of thrombocytopenia    Andria Mcdonald PGY-1  MICU X [id number]  Pager # [phone]/ [phone]

## 2018-06-13 ENCOUNTER — APPOINTMENT (OUTPATIENT)
Dept: HEMATOLOGY ONCOLOGY | Facility: CLINIC | Age: 43
End: 2018-06-13

## 2018-06-14 ENCOUNTER — APPOINTMENT (OUTPATIENT)
Dept: NUCLEAR MEDICINE | Facility: IMAGING CENTER | Age: 43
End: 2018-06-14

## 2018-06-14 ENCOUNTER — APPOINTMENT (OUTPATIENT)
Dept: MRI IMAGING | Facility: IMAGING CENTER | Age: 43
End: 2018-06-14

## 2018-06-14 LAB
-  AMIKACIN: SIGNIFICANT CHANGE UP
-  AMPICILLIN/SULBACTAM: SIGNIFICANT CHANGE UP
-  AMPICILLIN: SIGNIFICANT CHANGE UP
-  AZTREONAM: SIGNIFICANT CHANGE UP
-  CEFAZOLIN: SIGNIFICANT CHANGE UP
-  CEFEPIME: SIGNIFICANT CHANGE UP
-  CEFOXITIN: SIGNIFICANT CHANGE UP
-  CEFTRIAXONE: SIGNIFICANT CHANGE UP
-  CIPROFLOXACIN: SIGNIFICANT CHANGE UP
-  ERTAPENEM: SIGNIFICANT CHANGE UP
-  GENTAMICIN: SIGNIFICANT CHANGE UP
-  IMIPENEM: SIGNIFICANT CHANGE UP
-  LEVOFLOXACIN: SIGNIFICANT CHANGE UP
-  MEROPENEM: SIGNIFICANT CHANGE UP
-  PIPERACILLIN/TAZOBACTAM: SIGNIFICANT CHANGE UP
-  TOBRAMYCIN: SIGNIFICANT CHANGE UP
-  TRIMETHOPRIM/SULFAMETHOXAZOLE: SIGNIFICANT CHANGE UP
CULTURE RESULTS: SIGNIFICANT CHANGE UP
METHOD TYPE: SIGNIFICANT CHANGE UP
ORGANISM # SPEC MICROSCOPIC CNT: SIGNIFICANT CHANGE UP
SPECIMEN SOURCE: SIGNIFICANT CHANGE UP

## 2018-07-16 PROBLEM — R59.1 GENERALIZED ENLARGED LYMPH NODES: Chronic | Status: INACTIVE | Noted: 2017-06-28 | Resolved: 2017-07-27

## 2018-07-16 PROBLEM — R50.9 FEVER, UNSPECIFIED: Chronic | Status: INACTIVE | Noted: 2017-06-28 | Resolved: 2017-07-27

## 2018-09-14 NOTE — H&P PST ADULT - PMH
Adenopathy Breast cancer  left mastectomy  H/O right hemicolectomy    History of colon resection    History of craniotomy    Surgery, elective  Thoractomy with lung resection-  Right upper lobe removed, left  lower lobe wedge, right lower wedge  Surgery, elective  Ovaries removed 1980"s  Surgery, elective  Left wrist Adenopathy    Fever and chills

## 2018-10-05 NOTE — PATIENT PROFILE ADULT. - DOES PATIENT HAVE ADVANCE DIRECTIVE
"[facility]  Infectious Disease Progress Note          Assessment and Plan:   IMPRESSION:   1.  A 50-year-old healthy male with multiple recent skin abscesses, now with right thigh and left fourth finger abscesses, no clinical sepsis, almost certainly a Staph aureus type process, although see #2 below.   2.  Recent perirectal infection treated with antibiotics, would be an unusual location for a Staph aureus abscess, but possible.       RECOMMENDATIONS:   1.  cx thigh staph , sens to follow  2 vanco alone, if MSSA keflex 500 qid 10 days and home 10/6, if MRSA, 10 days septra DS bid, or if R this minocin 100 bid  No deep infection or sepsis, no need extended IV  3 If MRSA see me in clinic in 2-4 weeks to discuss strategy        Interval History:   no new complaints and doing well; no cp, sob, n/v/d, or abd pain. Feels well cx staph finger I and D not deep              Medications:       acetaminophen  975 mg Oral Q8H     [START ON 10/6/2018] influenza vaccine adult (product based on age)  0.5 mL Intramuscular Prior to discharge     senna-docusate  1 tablet Oral BID    Or     senna-docusate  2 tablet Oral BID     sodium chloride (PF)  3 mL Intracatheter Q8H     sodium hypochlorite   Topical BID     vancomycin (VANCOCIN) IV  1,500 mg Intravenous Q12H                  Physical Exam:   Blood pressure 132/67, pulse 61, temperature 97  F (36.1  C), temperature source Oral, resp. rate 18, height 1.778 m (5' 10\"), weight 88.5 kg (195 lb), SpO2 97 %.  Wt Readings from Last 2 Encounters:   10/03/18 88.5 kg (195 lb)   09/17/18 88 kg (194 lb)     Vital Signs with Ranges  Temp:  [96.4  F (35.8  C)-97.7  F (36.5  C)] 97  F (36.1  C)  Heart Rate:  [51-85] 64  Resp:  [9-18] 18  BP: (113-138)/(58-85) 132/67  FiO2 (%):  [100 %] 100 %  SpO2:  [90 %-98 %] 97 %    Constitutional: Awake, alert, cooperative, no apparent distress   Lungs: Clear to auscultation bilaterally, no crackles or wheezing   Cardiovascular: " Regular rate and rhythm, normal S1 and S2, and no murmur noted   Abdomen: Normal bowel sounds, soft, non-distended, non-tender   Skin: No rashes, no cyanosis, no edema   Other: Finger and thigh OK          Data:   All microbiology laboratory data reviewed.  Recent Labs   Lab Test  10/04/18   0624  10/03/18   1536   WBC  7.8  11.1*   HGB  15.6  16.7   HCT  46.5  49.0   MCV  90  89   PLT  305  333     Recent Labs   Lab Test  10/05/18   0721  10/04/18   0624  10/03/18   1536   CR  1.15  1.38*  1.29*     No lab results found.  Recent Labs   Lab Test  10/04/18   1725  10/03/18   1803   CULT  Culture in progress  Culture negative monitoring continues  Culture in progress        No

## 2019-03-13 LAB
ALBUMIN SERPL ELPH-MCNC: 2.7 G/DL
ALBUMIN SERPL ELPH-MCNC: 2.9 G/DL
ALBUMIN SERPL ELPH-MCNC: 3 G/DL
ALBUMIN SERPL ELPH-MCNC: 3 G/DL
ALBUMIN SERPL ELPH-MCNC: 3.5 G/DL
ALP BLD-CCNC: 170 U/L
ALP BLD-CCNC: 274 U/L
ALP BLD-CCNC: 357 U/L
ALP BLD-CCNC: 623 U/L
ALP BLD-CCNC: 774 U/L
ALT SERPL-CCNC: 19 U/L
ALT SERPL-CCNC: 20 U/L
ALT SERPL-CCNC: 21 U/L
ALT SERPL-CCNC: 21 U/L
ALT SERPL-CCNC: 28 U/L
ANION GAP SERPL CALC-SCNC: 12 MMOL/L
ANION GAP SERPL CALC-SCNC: 13 MMOL/L
ANION GAP SERPL CALC-SCNC: 13 MMOL/L
ANION GAP SERPL CALC-SCNC: 15 MMOL/L
ANION GAP SERPL CALC-SCNC: 16 MMOL/L
AST SERPL-CCNC: 13 U/L
AST SERPL-CCNC: 16 U/L
AST SERPL-CCNC: 17 U/L
AST SERPL-CCNC: 20 U/L
AST SERPL-CCNC: 23 U/L
BILIRUB SERPL-MCNC: 0.4 MG/DL
BILIRUB SERPL-MCNC: 0.7 MG/DL
BILIRUB SERPL-MCNC: 0.7 MG/DL
BILIRUB SERPL-MCNC: 1.6 MG/DL
BILIRUB SERPL-MCNC: 1.9 MG/DL
BUN SERPL-MCNC: 15 MG/DL
BUN SERPL-MCNC: 7 MG/DL
BUN SERPL-MCNC: 9 MG/DL
CALCIUM SERPL-MCNC: 10.2 MG/DL
CALCIUM SERPL-MCNC: 8.8 MG/DL
CALCIUM SERPL-MCNC: 9.1 MG/DL
CALCIUM SERPL-MCNC: 9.1 MG/DL
CALCIUM SERPL-MCNC: 9.3 MG/DL
CHLORIDE SERPL-SCNC: 105 MMOL/L
CHLORIDE SERPL-SCNC: 91 MMOL/L
CHLORIDE SERPL-SCNC: 96 MMOL/L
CO2 SERPL-SCNC: 24 MMOL/L
CO2 SERPL-SCNC: 25 MMOL/L
CO2 SERPL-SCNC: 27 MMOL/L
CREAT SERPL-MCNC: 0.44 MG/DL
CREAT SERPL-MCNC: 0.54 MG/DL
CREAT SERPL-MCNC: 0.61 MG/DL
CREAT SERPL-MCNC: 0.63 MG/DL
CREAT SERPL-MCNC: 0.64 MG/DL
GLUCOSE SERPL-MCNC: 101 MG/DL
GLUCOSE SERPL-MCNC: 111 MG/DL
GLUCOSE SERPL-MCNC: 122 MG/DL
GLUCOSE SERPL-MCNC: 130 MG/DL
GLUCOSE SERPL-MCNC: 88 MG/DL
LDH SERPL-CCNC: 206 U/L
LDH SERPL-CCNC: 211 U/L
LDH SERPL-CCNC: 212 U/L
LDH SERPL-CCNC: 215 U/L
LDH SERPL-CCNC: 262 U/L
POTASSIUM SERPL-SCNC: 4.2 MMOL/L
POTASSIUM SERPL-SCNC: 4.2 MMOL/L
POTASSIUM SERPL-SCNC: 4.4 MMOL/L
POTASSIUM SERPL-SCNC: 4.5 MMOL/L
POTASSIUM SERPL-SCNC: 4.6 MMOL/L
PROT SERPL-MCNC: 6.9 G/DL
PROT SERPL-MCNC: 7 G/DL
PROT SERPL-MCNC: 7.8 G/DL
PROT SERPL-MCNC: 8.1 G/DL
PROT SERPL-MCNC: 8.9 G/DL
SODIUM SERPL-SCNC: 131 MMOL/L
SODIUM SERPL-SCNC: 133 MMOL/L
SODIUM SERPL-SCNC: 135 MMOL/L
SODIUM SERPL-SCNC: 136 MMOL/L
SODIUM SERPL-SCNC: 142 MMOL/L
URATE SERPL-MCNC: 2.5 MG/DL
URATE SERPL-MCNC: 3.3 MG/DL
URATE SERPL-MCNC: 4.1 MG/DL
URATE SERPL-MCNC: 4.2 MG/DL

## 2019-06-24 NOTE — PROGRESS NOTE ADULT - PROBLEM/PLAN-3
Chief Complaint   Patient presents with     Orders     Follow up on labs.      Gas     Refill Request     Lisinopril     Derm Problem     Rash from Hepatitis C. requesting referral for Cryoglobulinaemia specialist.          
DISPLAY PLAN FREE TEXT

## 2019-07-23 NOTE — H&P PST ADULT - NS MD HP PULSE RADIAL
Consent 2/Introductory Paragraph: Mohs surgery was explained to the patient and consent was obtained. The risks, benefits and alternatives to therapy were discussed in detail. Specifically, the risks of infection, scarring, bleeding, prolonged wound healing, incomplete removal, allergy to anesthesia, nerve injury and recurrence were addressed. Prior to the procedure, the treatment site was clearly identified and confirmed by the patient. All components of Universal Protocol/PAUSE Rule completed. right normal/left normal

## 2019-09-10 NOTE — PROGRESS NOTE ADULT - PROBLEM SELECTOR PROBLEM 6
Bed: 07A  Expected date:   Expected time:   Means of arrival: [facility]  Comments: Pt is brought to this ER by EMS from [facility] for c/o an increase in her chronic SOB, generalized weakness, and cellulitis/draining wound to the back/inner lt thigh. Pt was placed on Keflex for 1 day at the [facility]. Pt reportedly had a fever of 100.3F. Pt arrives A+O x 4, GCS = 15, PMS x 4 intact, eupneic, and PWD. Pt's pulse is slightly tachycardic and regular. Pt has expiratory wheezes t/o bilat. Pt speaks in complete sentences without difficulty. Pt appears well-kept. Abdomen large et soft but nontender.      Brian Maki RN  09/10/19 1600
Code S called @ [phone].      Stephania Vences  09/10/19 1056
Eval by admitting residents.      Cher Nolan RN  09/10/19 3390
Nutritional counseling
Preventive measure

## 2020-11-12 NOTE — H&P ADULT - PROBLEM/PLAN-3
Department of Anesthesiology  Postprocedure Note    Patient: Raine Franco  MRN: [id number]  YOB: 1946  Date of evaluation: 11/12/2020  Time:  11:27 AM     Procedure Summary     Date:  11/12/20 Room / Location:  [place] [address] / [facility] - INPATIENT    Anesthesia Start:  6335 Anesthesia Stop:  2435    Procedure:  EGD ESOPHAGOGASTRODUODENOSCOPY (N/A ) Diagnosis:  (DX GERD ABD PAIN)    Surgeon:  Ilsa Aase, MD Responsible Provider:  Martha Ybarra DO    Anesthesia Type:  MAC, general ASA Status:  3          Anesthesia Type: No value filed. Uli Phase I:      Uli Phase II: Uli Score: 10    Last vitals: Reviewed and per EMR flowsheets.        Anesthesia Post Evaluation    Patient location during evaluation: PACU  Patient participation: complete - patient participated  Level of consciousness: awake and alert  Airway patency: patent  Nausea & Vomiting: no nausea and no vomiting  Complications: no  Cardiovascular status: hemodynamically stable  Respiratory status: acceptable  Hydration status: stable DISPLAY PLAN FREE TEXT

## 2020-12-02 NOTE — DISCHARGE NOTE ADULT - VISION (WITH CORRECTIVE LENSES IF THE PATIENT USUALLY WEARS THEM):
Normal vision: sees adequately in most situations; can see medication labels, newsprint Dr. Raymundo ob/gyn is notified about beta hormone level and sono of pelvis report and sts pt can be discharged and he will see pt at his office is two days. Pt is alert and oriented x 3 abd is soft nontender to palp pt sts she does not have active bleeding pt is given and explained all test reports and advised to see Dr. Raymundo or return here to repeat beta hormone in two days and return if symptoms persist or worsen.

## 2020-12-02 NOTE — DISCHARGE NOTE ADULT - REASON FOR ADMISSION
sent by md for low blood count Propranolol Pregnancy And Lactation Text: This medication is Pregnancy Category C and it isn't known if it is safe during pregnancy. It is excreted in breast milk.

## 2021-01-14 NOTE — ED ADULT NURSE NOTE - PYSCHOSOCIAL ASSESSMENT
adalimumab (HUMIRA *CF* PEN) 40 MG/0.4ML pen kit   Dx:Hidradenitis suppurativa    Inject 0.4 mLs (40 mg) Subcutaneous every 7 days -     Last Written Prescription Date:  12/26/19  Last Fill Quantity: 6,   # refills: 10  Last Office Visit : 12/26/19  Future Office visit:  4/8/21    Routing refill request to provider for review/approval because:  Drug not on the [facility], [facility] or [facility] refill protocol    WDL

## 2021-06-18 NOTE — ED PROVIDER NOTE - NS ED MD TWO NIGHTS YN
Implemented All Universal Safety Interventions:  [place] to call system. Call bell, personal items and telephone within reach. Instruct patient to call for assistance. Room bathroom lighting operational. Non-slip footwear when patient is off stretcher. Physically safe environment: no spills, clutter or unnecessary equipment. Stretcher in lowest position, wheels locked, appropriate side rails in place.
Yes

## 2022-02-25 NOTE — ED PROCEDURE NOTE - PROCEDURE ADDITIONAL DETAILS
Emergency Department Focused Ultrasound performed at patient's bedside for educational purposes. The study will have a follow up study performed or was performed in the direct supervision of an ultrasound trained attending.  moderate pericardial effusion, b/l pleural effusions. currently no evidence of tamponade.
General Sunscreen Counseling: I recommended a broad spectrum sunscreen with a SPF of 30 or higher.  I explained that SPF 30 sunscreens block approximately 97 percent of the sun's harmful rays.  Sunscreens should be applied at least 15 minutes prior to expected sun exposure and then every 2 hours after that as long as sun exposure continues. If swimming or exercising sunscreen should be reapplied every 45 minutes to an hour after getting wet or sweating.  One ounce, or the equivalent of a shot glass full of sunscreen, is adequate to protect the skin not covered by a bathing suit. I also recommended a lip balm with a sunscreen as well. Sun protective clothing can be used in lieu of sunscreen but must be worn the entire time you are exposed to the sun's rays.
Detail Level: Detailed

## 2022-03-07 NOTE — PROGRESS NOTE ADULT - PROBLEM/PLAN-7
[facility]    Medicine Progress Note - Hospitalist Service    Date of Admission:  3/3/2022    Assessment & Plan        Irvin Bridges is an 89 year-old male with history of dementia, chronic kidney disease stage 4 and aortic atherosclerosis who presents via EMS with shortness of breath. Admitted 3/3/2022.     Acute on chronic systolic congestive heart failure (HFrEF)  Acute hypoxic respiratory failure secondary to flash pulmonary edema   Hypertension   Brought in to ED on 2L nasal cannula by EMS, though per notes not clear he was ever hypoxic prior to this  Following episode of agitation and administration of sedatives, was placed on non-rebreather for reported oxygen saturations of 80%.   CXR with worsening in engorged indistinct central pulmonary vasculature with increased interstitial markings consistent with worsening interstitial pulmonary edema  Acute hypoxia likely multifactorial from sedation and flash pulmonary edema potentially caused by severe agitation versus blood transfusion  Recently admitted 2/24-3/1/22 for acute hypoxic respiratory failure due to acute systolic CHF with pulmonary edema, probable type II NSTEMI for above. Medications adjusted by cardiology including initiating hydralazine and Imdur, discharged on PO furosemide  Echocardiogram 2/25 with EF 35-40%, early diastolic dysfunction   Asymptomatic. Sats lower to mid 90s on RA.  - Continue diuresis per cards (transitioned to PO AM 3/6)  - Daily weights, strict I/O - 3/7 net neg 6.7L, wts variable  - Continue prior to admission amlodipine, carvedilol, furosemide, hydralazine, Imdur (when able to take PO)  - Daily BMP while diuresing  - Follow cards recs (appreciate assistance).  - 3/7 - holding PTA cardiac meds and diuresis - had RRT yesterday and received LR bolus.   --- recheck procalc (less useful in setting of renal disease but will see trend). No fevers, nontoxic today.   - Wean off oxygen as able. OOB/IS as  tolerated.      Acute on chronic macrocytic anemia  *CBC drawn at TCU initially at 6.5 g/dl, recheck here 6.8 g/dl. Reportedly had some hematuria at TCU with clot noted at King tip, that resolved with removal.   *Hemoglobin in low 7 g/dls during most recent hospitalization. Iron studies not suggestive of deficiency, folate and B12 within normal limits.    - Transfused 1 unit in ED and 3/4 Hgb 8.4--9.1--8.6   - follow CBC 3/8     Dementia with behavioral disturbance  Calm and cooperative. Has been needing a sitter, however. Needs to be ok without sitter prior to [facility] accepting him.  - Delirum bundle.  - Prn meds.     Urinary retention  Meatus bleeding secondary to trauma   King placed 2/26 due to retention, discharged with King with plan for TOV at TCU.  - Pulled King in TCU with some bleeding from balloon trauma   - King replaced in ED and bleeding resolved  - Continue tamsulosin     Chronic kidney disease, stage 4  Mild hyponatremia  Baseline creatinine 2.3-2.4 with GFR in 20s. Creatinine 2.4 on admission--2.04--2.54  - Lasix held evening 3/6 - creat 2.49 on 3/7, fairly stable. Sodium 130 3/7  - follow BMP     Depression  - Continue prior to admission escitalopram     Chronic low back pain  No acute change.   - Continue scheduled acetaminophen         Diet: 2 Gram Sodium Diet Other - please comment    DVT Prophylaxis: Pneumatic Compression Devices  King Catheter: PRESENT, indication: Retention  Central Lines: None  Cardiac Monitoring: ACTIVE order. Indication: Acute decompensated heart failure (48 hours)  Code Status:   DNR DNI - discussed in detail on the phone with spouse who confirmed     Disposition Plan   Expected Discharge: 03/09/2022     Anticipated discharge location: nursing home    Delays: needing sitter     The patient's care was discussed with the Bedside Nurse, Patient and Patient's Family.    Total encounter time 38 min with greater than 50% spent in direct patient care, discussion  and counseling, with patient and then with spouse on the phone, coordination of care with staff.     Ryan Garibay MD  Hospitalist Service  [facility]  Securely message with the Vocera Web Console (learn more here)  Text page via [employer] Paging/Directory       Clinically Significant Risk Factors Present on Admission             # Moderate Malnutrition: based on nutrition assessment     ______________________________________________________________________    Interval History   Care assumed again today - familiar to me from Friday 3/4. Patient looking much better and more awake/alert. Calm and cooperative, pleasantly confused/disoriented in general. Denies new sob, fevers, or pain. Sitter at bedside.     Data reviewed today: I reviewed all medications, new labs and imaging results over the last 24 hours. I personally reviewed no images or EKG's today.      Physical Exam   Vital Signs: Temp: 99.3  F (37.4  C) Temp src: Oral BP: 126/59 Pulse: 70   Resp: 18 SpO2: 94 % O2 Device: Nasal cannula Oxygen Delivery: 2 LPM  Weight: 134 lbs 8 oz      Gen: NAD, pleasantly confused, elderly  HEENT: Normocephalic, EOMI, MMM  Resp: no focal crackles,  no wheezes, no increased work of resp  CV: S1S2 heard, reg rhythm, reg rate  Abdo: soft, nontender, nondistended, bowel sounds present  Ext: calves nontender, well perfused  Neuro: AAOx self, dementia at baseline, CN grossly intact, no facial asymmetry      Data   Recent Labs   Lab 03/07/22  0751 03/06/22  0749 03/05/22  1833 03/05/22  1232 03/05/22  0601 03/04/22  1909 03/04/22  0643 03/03/22  1753   WBC  --  11.9*  --   --  11.1*  --  10.5 9.0   HGB  --  8.6*  --   --  9.1*  --  8.4* 6.8*   MCV  --  101*  --   --  100  --  100 108*   PLT  --  208  --   --  223  --  209 205   * 133  --   --  137  --  137 135   POTASSIUM 3.7 3.5 4.1   < > 3.2*   < > 3.6 4.1   CHLORIDE 97 96  --   --  99  --  104 103   CO2 29 29  --   --  30  --  28 25   BUN 53* 47*  --    --  36*  --  42* 47*   CR 2.49* 2.54*  --   --  2.06*  --  2.15* 2.40*   ANIONGAP 4 8  --   --  8  --  5 7   ABHI 8.7 8.9  --   --  8.6  --  8.6 8.7   * 133*  --   --  130*  --  113* 146*   ALBUMIN  --   --   --   --   --   --   --  2.8*   PROTTOTAL  --   --   --   --   --   --   --  6.4*   BILITOTAL  --   --   --   --   --   --   --  0.2   ALKPHOS  --   --   --   --   --   --   --  74   ALT  --   --   --   --   --   --   --  17   AST  --   --   --   --   --   --   --  18    < > = values in this interval not displayed.     No results found for this or any previous visit (from the past 24 hour(s)).   DISPLAY PLAN FREE TEXT

## 2022-05-02 NOTE — BEHAVIORAL HEALTH ASSESSMENT NOTE - NS ED [facility] MED ROS MUSCULOSKELETAL
Does the Patient have a central line?  yes:   Device: [employer] Site: Right  and Chest  [facility] Site Appearance: WDL  Is this a port? Yes: Implanted port accessed using a 20 gauge non-coring needle primed with 0.9% sodium chloride. Good blood return obtained. Blood obtained and sent to lab.   Site Care: Aseptic site care per protocol  Central line flushed with: 20 ml 0.9 normal saline and 100 un/ml- 500 units heparin  Central line removed: no  Gradie Panning Needle: Gradie Panning needle de-accessed No complaints

## 2022-11-08 NOTE — DISCHARGE NOTE ADULT - FUNCTIONAL STATUS DATE
27-Mar-2018 26-Mar-2018 Topical Ketoconazole Pregnancy And Lactation Text: This medication is Pregnancy Category B and is considered safe during pregnancy. It is unknown if it is excreted in breast milk.

## 2023-10-02 NOTE — PROVIDER CONTACT NOTE (OTHER) - ASSESSMENT
rolling walker
Pt is alert, no c/o of dizziness, light headedness, pain, sitting comfortably in bed talking w/ family members.
pt asymptomatic

## 2024-02-14 NOTE — CHART NOTE - NSCHARTNOTEFT_GEN_A_CORE
Informed about a low temperature taken rectally at 94.6 but might've been an error since the same thermometer showed a similar temperature of 94 on another patient. Pt seen and examined, other vital signs were stable with BP at 101/72, 99 HR and 18 RR w/ 100% O2 sat. Pt said she did not feel cold and when examined was not cold to touch, no cyanosis, resting comfortably in bed, no SOB or CP. When it was retaken with a disposable  thermometer it was show to be 98.6 F rectally. No.

## 2024-06-17 NOTE — ED ADULT NURSE NOTE - OBJECTIVE STATEMENT
Pt was given follow up instructions and prescriptions. Pt verbalized understanding of all instructions for discharge. AO4. Pt reminded to get dressed for discharge.    [age]y/o F, reported to ED from [facility] via EMS. A&ox3, c/o low H&H. Pt reports that she was at the [facility] for a chemo treatment when her blood results revealed that the pt has a Hbg of 5.9. Pt reports feeling weak. Pt also reports that she has been having intermittent fevers for the past few days. Pt is afebrile upon arrival with a temp of 99.0 orally. Pt also reports some left flank pain with urinary frequency.  Pt reports that she has been urinating about 1x every hour and reports some burning. Pt said "my urine has also been darker in color than normally." Pt reports that she has Hodgkin's Lymphoma and was last treated with chemotherapy on 4/8/18. Pt has a PICC line in her right arm that is primarily used for chemo treatment. Pt denies LOC, SOB, C/P, N/V/D, abd pain. Pt denies dizziness or lightheadedness. Pt is pale in appearance. Daughter at bedside, will continue to monitor pt.

## 2024-12-31 NOTE — PROGRESS NOTE ADULT - PROBLEM/PLAN-4
Patient's most recent potassium results are listed below.   Recent Labs     12/30/24  0402   K 3.5       Plan  - Replete potassium per protocol  - Monitor potassium Daily  - Patient's hypokalemia is stable  
DISPLAY PLAN FREE TEXT
DISPLAY PLAN FREE TEXT

## 2025-01-04 NOTE — PROGRESS NOTE ADULT - PROBLEM SELECTOR PLAN 1
- with associated tachycardia  - infectious workup negative  - most likely lymphoma related, imaging also notably negative for any infection  - Monitoring off antibiotics.
- Likely due to Lymphoma. No further episodes reported.  - Supportive care.
- with associated tachycardia  - infectious workup negative to date  - most likely lymphoma related, imaging also notably negative for any infection
--suspect tumor fevers, patient without any localizing signs of infection. Has had pyelonephritis in the past but currently asymptomatic and refusing CT scan   --blood and urine cultures pending, ID on board, recommend observing off abx for now  --continue to trend fever curve, low threshold to start ABx given active chemo- would start broad spectrum vancomycin + zosyn
Likely due to Lymphoma. Progression of disease on CT. Currently afebrile.   Supportive care.
Likely due to Lymphoma. Progression of disease on CT from yesterday.
Likely due to Lymphoma. Progression of disease on CT. Started on ICE chemo yesterday.
Presumed neurotoxicity due to Ifosfamide- now improved. Given 1 dose of Methylene Blue yesterday.
Presumed neurotoxicity due to Ifosfamide- now improved. Given 1 dose of Methylene Blue.
Presumed neurotoxicity due to Ifosfamide- now improved. Given 1 dose of Methylene Blue.
No

## 2025-07-16 NOTE — ED ADULT NURSE NOTE - ED STAT RN HANDOFF TIME
Detail Level: Detailed Quality 226: Preventive Care And Screening: Tobacco Use: Screening And Cessation Intervention: Patient screened for tobacco use and is an ex/non-smoker 19:09